# Patient Record
Sex: MALE | Race: WHITE | NOT HISPANIC OR LATINO | Employment: OTHER | URBAN - METROPOLITAN AREA
[De-identification: names, ages, dates, MRNs, and addresses within clinical notes are randomized per-mention and may not be internally consistent; named-entity substitution may affect disease eponyms.]

---

## 2017-02-04 ENCOUNTER — APPOINTMENT (OUTPATIENT)
Dept: LAB | Age: 67
End: 2017-02-04
Payer: MEDICARE

## 2017-02-04 ENCOUNTER — TRANSCRIBE ORDERS (OUTPATIENT)
Dept: ADMINISTRATIVE | Age: 67
End: 2017-02-04

## 2017-02-04 DIAGNOSIS — C91.10 LEUKEMIA, LYMPHOCYTIC, CHRONIC (HCC): Primary | ICD-10-CM

## 2017-02-04 DIAGNOSIS — C91.10 LEUKEMIA, LYMPHOCYTIC, CHRONIC (HCC): ICD-10-CM

## 2017-02-04 LAB
ALBUMIN SERPL BCP-MCNC: 3.7 G/DL (ref 3.5–5)
ALP SERPL-CCNC: 106 U/L (ref 46–116)
ALT SERPL W P-5'-P-CCNC: 33 U/L (ref 12–78)
ANION GAP SERPL CALCULATED.3IONS-SCNC: 7 MMOL/L (ref 4–13)
AST SERPL W P-5'-P-CCNC: 13 U/L (ref 5–45)
BASOPHILS # BLD MANUAL: 0 THOUSAND/UL (ref 0–0.1)
BASOPHILS NFR MAR MANUAL: 0 % (ref 0–1)
BILIRUB SERPL-MCNC: 0.21 MG/DL (ref 0.2–1)
BUN SERPL-MCNC: 20 MG/DL (ref 5–25)
CALCIUM SERPL-MCNC: 9 MG/DL (ref 8.3–10.1)
CHLORIDE SERPL-SCNC: 106 MMOL/L (ref 100–108)
CO2 SERPL-SCNC: 29 MMOL/L (ref 21–32)
CREAT SERPL-MCNC: 1.01 MG/DL (ref 0.6–1.3)
EOSINOPHIL # BLD MANUAL: 0.58 THOUSAND/UL (ref 0–0.4)
EOSINOPHIL NFR BLD MANUAL: 2 % (ref 0–6)
ERYTHROCYTE [DISTWIDTH] IN BLOOD BY AUTOMATED COUNT: 14.2 % (ref 11.6–15.1)
GFR SERPL CREATININE-BSD FRML MDRD: >60 ML/MIN/1.73SQ M
GLUCOSE SERPL-MCNC: 95 MG/DL (ref 65–140)
HCT VFR BLD AUTO: 37 % (ref 36.5–49.3)
HGB BLD-MCNC: 12.3 G/DL (ref 12–17)
LDH SERPL-CCNC: 182 U/L (ref 81–234)
LYMPHOCYTES # BLD AUTO: 17.02 THOUSAND/UL (ref 0.6–4.47)
LYMPHOCYTES # BLD AUTO: 59 % (ref 14–44)
MCH RBC QN AUTO: 31.9 PG (ref 26.8–34.3)
MCHC RBC AUTO-ENTMCNC: 33.2 G/DL (ref 31.4–37.4)
MCV RBC AUTO: 96 FL (ref 82–98)
MONOCYTES # BLD AUTO: 1.73 THOUSAND/UL (ref 0–1.22)
MONOCYTES NFR BLD: 6 % (ref 4–12)
NEUTROPHILS # BLD MANUAL: 8.65 THOUSAND/UL (ref 1.85–7.62)
NEUTS SEG NFR BLD AUTO: 30 % (ref 43–75)
NRBC BLD AUTO-RTO: 0 /100 WBCS
OVALOCYTES BLD QL SMEAR: PRESENT
PLATELET # BLD AUTO: 179 THOUSANDS/UL (ref 149–390)
PLATELET BLD QL SMEAR: ADEQUATE
PMV BLD AUTO: 9.5 FL (ref 8.9–12.7)
POIKILOCYTOSIS BLD QL SMEAR: PRESENT
POTASSIUM SERPL-SCNC: 4.5 MMOL/L (ref 3.5–5.3)
PROT SERPL-MCNC: 6.9 G/DL (ref 6.4–8.2)
RBC # BLD AUTO: 3.85 MILLION/UL (ref 3.88–5.62)
RBC MORPH BLD: PRESENT
SODIUM SERPL-SCNC: 142 MMOL/L (ref 136–145)
URATE SERPL-MCNC: 5.1 MG/DL (ref 4.2–8)
VARIANT LYMPHS # BLD AUTO: 3 %
WBC # BLD AUTO: 28.84 THOUSAND/UL (ref 4.31–10.16)

## 2017-02-04 PROCEDURE — 80053 COMPREHEN METABOLIC PANEL: CPT

## 2017-02-04 PROCEDURE — 82232 ASSAY OF BETA-2 PROTEIN: CPT

## 2017-02-04 PROCEDURE — 83615 LACTATE (LD) (LDH) ENZYME: CPT

## 2017-02-04 PROCEDURE — 36415 COLL VENOUS BLD VENIPUNCTURE: CPT

## 2017-02-04 PROCEDURE — 85027 COMPLETE CBC AUTOMATED: CPT

## 2017-02-04 PROCEDURE — 84550 ASSAY OF BLOOD/URIC ACID: CPT

## 2017-02-04 PROCEDURE — 85007 BL SMEAR W/DIFF WBC COUNT: CPT

## 2017-02-07 LAB — B2 MICROGLOB SERPL-MCNC: 1.9 MG/L (ref 0.6–2.4)

## 2017-03-03 ENCOUNTER — TRANSCRIBE ORDERS (OUTPATIENT)
Dept: ADMINISTRATIVE | Age: 67
End: 2017-03-03

## 2017-03-03 ENCOUNTER — APPOINTMENT (OUTPATIENT)
Dept: LAB | Age: 67
End: 2017-03-03
Payer: MEDICARE

## 2017-03-03 DIAGNOSIS — C91.10 CHRONIC LYMPHOCYTIC LEUKEMIA OF B-CELL TYPE NOT HAVING ACHIEVED REMISSION (HCC): ICD-10-CM

## 2017-03-03 LAB
ALBUMIN SERPL BCP-MCNC: 4.4 G/DL (ref 3.5–5)
ALP SERPL-CCNC: 105 U/L (ref 46–116)
ALT SERPL W P-5'-P-CCNC: 28 U/L (ref 12–78)
ANION GAP SERPL CALCULATED.3IONS-SCNC: 6 MMOL/L (ref 4–13)
AST SERPL W P-5'-P-CCNC: 10 U/L (ref 5–45)
BASOPHILS # BLD MANUAL: 0 THOUSAND/UL (ref 0–0.1)
BASOPHILS NFR MAR MANUAL: 0 % (ref 0–1)
BILIRUB SERPL-MCNC: 0.27 MG/DL (ref 0.2–1)
BUN SERPL-MCNC: 22 MG/DL (ref 5–25)
CALCIUM SERPL-MCNC: 9.8 MG/DL (ref 8.3–10.1)
CHLORIDE SERPL-SCNC: 106 MMOL/L (ref 100–108)
CO2 SERPL-SCNC: 28 MMOL/L (ref 21–32)
CREAT SERPL-MCNC: 1.17 MG/DL (ref 0.6–1.3)
EOSINOPHIL # BLD MANUAL: 0 THOUSAND/UL (ref 0–0.4)
EOSINOPHIL NFR BLD MANUAL: 0 % (ref 0–6)
ERYTHROCYTE [DISTWIDTH] IN BLOOD BY AUTOMATED COUNT: 13.7 % (ref 11.6–15.1)
GFR SERPL CREATININE-BSD FRML MDRD: >60 ML/MIN/1.73SQ M
GLUCOSE SERPL-MCNC: 92 MG/DL (ref 65–140)
HCT VFR BLD AUTO: 43.1 % (ref 36.5–49.3)
HGB BLD-MCNC: 14.4 G/DL (ref 12–17)
IGG SERPL-MCNC: 918 MG/DL (ref 700–1600)
LDH SERPL-CCNC: 189 U/L (ref 81–234)
LYMPHOCYTES # BLD AUTO: 21.37 THOUSAND/UL (ref 0.6–4.47)
LYMPHOCYTES # BLD AUTO: 63 % (ref 14–44)
MCH RBC QN AUTO: 31.9 PG (ref 26.8–34.3)
MCHC RBC AUTO-ENTMCNC: 33.4 G/DL (ref 31.4–37.4)
MCV RBC AUTO: 96 FL (ref 82–98)
MONOCYTES # BLD AUTO: 1.36 THOUSAND/UL (ref 0–1.22)
MONOCYTES NFR BLD: 4 % (ref 4–12)
NEUTROPHILS # BLD MANUAL: 8.82 THOUSAND/UL (ref 1.85–7.62)
NEUTS SEG NFR BLD AUTO: 26 % (ref 43–75)
NRBC BLD AUTO-RTO: 0 /100 WBCS
PLATELET # BLD AUTO: 205 THOUSANDS/UL (ref 149–390)
PLATELET BLD QL SMEAR: ADEQUATE
PMV BLD AUTO: 9.8 FL (ref 8.9–12.7)
POIKILOCYTOSIS BLD QL SMEAR: PRESENT
POTASSIUM SERPL-SCNC: 4.5 MMOL/L (ref 3.5–5.3)
PROT SERPL-MCNC: 7.7 G/DL (ref 6.4–8.2)
RBC # BLD AUTO: 4.51 MILLION/UL (ref 3.88–5.62)
RBC MORPH BLD: PRESENT
SODIUM SERPL-SCNC: 140 MMOL/L (ref 136–145)
URATE SERPL-MCNC: 5.1 MG/DL (ref 4.2–8)
VARIANT LYMPHS # BLD AUTO: 7 %
WBC # BLD AUTO: 33.92 THOUSAND/UL (ref 4.31–10.16)

## 2017-03-03 PROCEDURE — 83615 LACTATE (LD) (LDH) ENZYME: CPT

## 2017-03-03 PROCEDURE — 80053 COMPREHEN METABOLIC PANEL: CPT

## 2017-03-03 PROCEDURE — 84550 ASSAY OF BLOOD/URIC ACID: CPT

## 2017-03-03 PROCEDURE — 82784 ASSAY IGA/IGD/IGG/IGM EACH: CPT

## 2017-03-03 PROCEDURE — 85027 COMPLETE CBC AUTOMATED: CPT

## 2017-03-03 PROCEDURE — 82232 ASSAY OF BETA-2 PROTEIN: CPT

## 2017-03-03 PROCEDURE — 85007 BL SMEAR W/DIFF WBC COUNT: CPT

## 2017-03-03 PROCEDURE — 36415 COLL VENOUS BLD VENIPUNCTURE: CPT

## 2017-03-04 LAB — B2 MICROGLOB SERPL-MCNC: 1.8 MG/L (ref 0.6–2.4)

## 2017-03-09 ENCOUNTER — ALLSCRIPTS OFFICE VISIT (OUTPATIENT)
Dept: OTHER | Facility: OTHER | Age: 67
End: 2017-03-09

## 2017-03-30 ENCOUNTER — GENERIC CONVERSION - ENCOUNTER (OUTPATIENT)
Dept: OTHER | Facility: OTHER | Age: 67
End: 2017-03-30

## 2017-03-30 ENCOUNTER — TRANSCRIBE ORDERS (OUTPATIENT)
Dept: ADMINISTRATIVE | Age: 67
End: 2017-03-30

## 2017-03-30 ENCOUNTER — APPOINTMENT (OUTPATIENT)
Dept: LAB | Age: 67
End: 2017-03-30
Payer: MEDICARE

## 2017-03-30 DIAGNOSIS — C91.10 CHRONIC LYMPHOCYTIC LEUKEMIA OF B-CELL TYPE NOT HAVING ACHIEVED REMISSION (HCC): ICD-10-CM

## 2017-03-30 LAB
ALBUMIN SERPL BCP-MCNC: 4.4 G/DL (ref 3.5–5)
ALP SERPL-CCNC: 91 U/L (ref 46–116)
ALT SERPL W P-5'-P-CCNC: 31 U/L (ref 12–78)
ANION GAP SERPL CALCULATED.3IONS-SCNC: 7 MMOL/L (ref 4–13)
AST SERPL W P-5'-P-CCNC: 12 U/L (ref 5–45)
BASOPHILS # BLD MANUAL: 0 THOUSAND/UL (ref 0–0.1)
BASOPHILS NFR MAR MANUAL: 0 % (ref 0–1)
BILIRUB SERPL-MCNC: 0.35 MG/DL (ref 0.2–1)
BUN SERPL-MCNC: 19 MG/DL (ref 5–25)
CALCIUM SERPL-MCNC: 10.1 MG/DL (ref 8.3–10.1)
CHLORIDE SERPL-SCNC: 108 MMOL/L (ref 100–108)
CO2 SERPL-SCNC: 28 MMOL/L (ref 21–32)
CREAT SERPL-MCNC: 1.26 MG/DL (ref 0.6–1.3)
EOSINOPHIL # BLD MANUAL: 0 THOUSAND/UL (ref 0–0.4)
EOSINOPHIL NFR BLD MANUAL: 0 % (ref 0–6)
ERYTHROCYTE [DISTWIDTH] IN BLOOD BY AUTOMATED COUNT: 13.7 % (ref 11.6–15.1)
GFR SERPL CREATININE-BSD FRML MDRD: 57.4 ML/MIN/1.73SQ M
GLUCOSE SERPL-MCNC: 96 MG/DL (ref 65–140)
HCT VFR BLD AUTO: 42.9 % (ref 36.5–49.3)
HGB BLD-MCNC: 14.3 G/DL (ref 12–17)
IGG SERPL-MCNC: 906 MG/DL (ref 700–1600)
LDH SERPL-CCNC: 192 U/L (ref 81–234)
LYMPHOCYTES # BLD AUTO: 30.56 THOUSAND/UL (ref 0.6–4.47)
LYMPHOCYTES # BLD AUTO: 85 % (ref 14–44)
MCH RBC QN AUTO: 31.7 PG (ref 26.8–34.3)
MCHC RBC AUTO-ENTMCNC: 33.3 G/DL (ref 31.4–37.4)
MCV RBC AUTO: 95 FL (ref 82–98)
MONOCYTES # BLD AUTO: 0.72 THOUSAND/UL (ref 0–1.22)
MONOCYTES NFR BLD: 2 % (ref 4–12)
NEUTROPHILS # BLD MANUAL: 3.6 THOUSAND/UL (ref 1.85–7.62)
NEUTS SEG NFR BLD AUTO: 10 % (ref 43–75)
NRBC BLD AUTO-RTO: 0 /100 WBCS
PLATELET # BLD AUTO: 193 THOUSANDS/UL (ref 149–390)
PLATELET BLD QL SMEAR: ADEQUATE
PMV BLD AUTO: 9.5 FL (ref 8.9–12.7)
POIKILOCYTOSIS BLD QL SMEAR: PRESENT
POTASSIUM SERPL-SCNC: 4.8 MMOL/L (ref 3.5–5.3)
PROT SERPL-MCNC: 7.5 G/DL (ref 6.4–8.2)
RBC # BLD AUTO: 4.51 MILLION/UL (ref 3.88–5.62)
SMUDGE CELLS BLD QL SMEAR: PRESENT
SODIUM SERPL-SCNC: 143 MMOL/L (ref 136–145)
TOTAL CELLS COUNTED SPEC: 100
URATE SERPL-MCNC: 4.9 MG/DL (ref 4.2–8)
VARIANT LYMPHS # BLD AUTO: 3 %
WBC # BLD AUTO: 35.95 THOUSAND/UL (ref 4.31–10.16)

## 2017-03-30 PROCEDURE — 84550 ASSAY OF BLOOD/URIC ACID: CPT

## 2017-03-30 PROCEDURE — 85007 BL SMEAR W/DIFF WBC COUNT: CPT

## 2017-03-30 PROCEDURE — 83615 LACTATE (LD) (LDH) ENZYME: CPT

## 2017-03-30 PROCEDURE — 82232 ASSAY OF BETA-2 PROTEIN: CPT

## 2017-03-30 PROCEDURE — 82784 ASSAY IGA/IGD/IGG/IGM EACH: CPT

## 2017-03-30 PROCEDURE — 36415 COLL VENOUS BLD VENIPUNCTURE: CPT

## 2017-03-30 PROCEDURE — 80053 COMPREHEN METABOLIC PANEL: CPT

## 2017-03-30 PROCEDURE — 85027 COMPLETE CBC AUTOMATED: CPT

## 2017-03-31 LAB — B2 MICROGLOB SERPL-MCNC: 1.8 MG/L (ref 0.6–2.4)

## 2017-04-04 ENCOUNTER — GENERIC CONVERSION - ENCOUNTER (OUTPATIENT)
Dept: OTHER | Facility: OTHER | Age: 67
End: 2017-04-04

## 2017-05-02 ENCOUNTER — TRANSCRIBE ORDERS (OUTPATIENT)
Dept: ADMINISTRATIVE | Age: 67
End: 2017-05-02

## 2017-05-02 ENCOUNTER — GENERIC CONVERSION - ENCOUNTER (OUTPATIENT)
Dept: OTHER | Facility: OTHER | Age: 67
End: 2017-05-02

## 2017-05-02 ENCOUNTER — APPOINTMENT (OUTPATIENT)
Dept: LAB | Age: 67
End: 2017-05-02
Payer: MEDICARE

## 2017-05-02 DIAGNOSIS — C91.10 LEUKEMIA, LYMPHOCYTIC, CHRONIC (HCC): ICD-10-CM

## 2017-05-02 DIAGNOSIS — C91.10 LEUKEMIA, LYMPHOCYTIC, CHRONIC (HCC): Primary | ICD-10-CM

## 2017-05-02 LAB
ALBUMIN SERPL BCP-MCNC: 4.2 G/DL (ref 3.5–5)
ALP SERPL-CCNC: 77 U/L (ref 46–116)
ALT SERPL W P-5'-P-CCNC: 31 U/L (ref 12–78)
ANION GAP SERPL CALCULATED.3IONS-SCNC: 8 MMOL/L (ref 4–13)
AST SERPL W P-5'-P-CCNC: 14 U/L (ref 5–45)
BASOPHILS # BLD MANUAL: 0 THOUSAND/UL (ref 0–0.1)
BASOPHILS NFR MAR MANUAL: 0 % (ref 0–1)
BILIRUB SERPL-MCNC: 0.23 MG/DL (ref 0.2–1)
BUN SERPL-MCNC: 16 MG/DL (ref 5–25)
CALCIUM SERPL-MCNC: 9.3 MG/DL (ref 8.3–10.1)
CHLORIDE SERPL-SCNC: 109 MMOL/L (ref 100–108)
CO2 SERPL-SCNC: 26 MMOL/L (ref 21–32)
CREAT SERPL-MCNC: 1.11 MG/DL (ref 0.6–1.3)
EOSINOPHIL # BLD MANUAL: 0 THOUSAND/UL (ref 0–0.4)
EOSINOPHIL NFR BLD MANUAL: 0 % (ref 0–6)
ERYTHROCYTE [DISTWIDTH] IN BLOOD BY AUTOMATED COUNT: 14.1 % (ref 11.6–15.1)
GFR SERPL CREATININE-BSD FRML MDRD: >60 ML/MIN/1.73SQ M
GLUCOSE SERPL-MCNC: 90 MG/DL (ref 65–140)
HCT VFR BLD AUTO: 40.3 % (ref 36.5–49.3)
HGB BLD-MCNC: 13.1 G/DL (ref 12–17)
IGG SERPL-MCNC: 719 MG/DL (ref 700–1600)
LDH SERPL-CCNC: 203 U/L (ref 81–234)
LYMPHOCYTES # BLD AUTO: 29.38 THOUSAND/UL (ref 0.6–4.47)
LYMPHOCYTES # BLD AUTO: 81 % (ref 14–44)
MCH RBC QN AUTO: 31.2 PG (ref 26.8–34.3)
MCHC RBC AUTO-ENTMCNC: 32.5 G/DL (ref 31.4–37.4)
MCV RBC AUTO: 96 FL (ref 82–98)
MONOCYTES # BLD AUTO: 0.73 THOUSAND/UL (ref 0–1.22)
MONOCYTES NFR BLD: 2 % (ref 4–12)
NEUTROPHILS # BLD MANUAL: 2.54 THOUSAND/UL (ref 1.85–7.62)
NEUTS SEG NFR BLD AUTO: 7 % (ref 43–75)
NRBC BLD AUTO-RTO: 0 /100 WBCS
PLATELET # BLD AUTO: 163 THOUSANDS/UL (ref 149–390)
PLATELET BLD QL SMEAR: ADEQUATE
PMV BLD AUTO: 10 FL (ref 8.9–12.7)
POTASSIUM SERPL-SCNC: 4.8 MMOL/L (ref 3.5–5.3)
PROT SERPL-MCNC: 7 G/DL (ref 6.4–8.2)
RBC # BLD AUTO: 4.2 MILLION/UL (ref 3.88–5.62)
SMUDGE CELLS BLD QL SMEAR: PRESENT
SODIUM SERPL-SCNC: 143 MMOL/L (ref 136–145)
URATE SERPL-MCNC: 4.4 MG/DL (ref 4.2–8)
VARIANT LYMPHS # BLD AUTO: 10 %
WBC # BLD AUTO: 36.27 THOUSAND/UL (ref 4.31–10.16)

## 2017-05-02 PROCEDURE — 83615 LACTATE (LD) (LDH) ENZYME: CPT

## 2017-05-02 PROCEDURE — 36415 COLL VENOUS BLD VENIPUNCTURE: CPT

## 2017-05-02 PROCEDURE — 82784 ASSAY IGA/IGD/IGG/IGM EACH: CPT

## 2017-05-02 PROCEDURE — 82232 ASSAY OF BETA-2 PROTEIN: CPT

## 2017-05-02 PROCEDURE — 85027 COMPLETE CBC AUTOMATED: CPT

## 2017-05-02 PROCEDURE — 85007 BL SMEAR W/DIFF WBC COUNT: CPT

## 2017-05-02 PROCEDURE — 80053 COMPREHEN METABOLIC PANEL: CPT

## 2017-05-02 PROCEDURE — 84550 ASSAY OF BLOOD/URIC ACID: CPT

## 2017-05-03 LAB — B2 MICROGLOB SERPL-MCNC: 1.7 MG/L (ref 0.6–2.4)

## 2017-06-02 ENCOUNTER — APPOINTMENT (OUTPATIENT)
Dept: LAB | Age: 67
End: 2017-06-02
Payer: MEDICARE

## 2017-06-02 ENCOUNTER — GENERIC CONVERSION - ENCOUNTER (OUTPATIENT)
Dept: OTHER | Facility: OTHER | Age: 67
End: 2017-06-02

## 2017-06-02 ENCOUNTER — TRANSCRIBE ORDERS (OUTPATIENT)
Dept: ADMINISTRATIVE | Age: 67
End: 2017-06-02

## 2017-06-02 DIAGNOSIS — C91.10 LEUKEMIA, LYMPHOCYTIC, CHRONIC (HCC): Primary | ICD-10-CM

## 2017-06-02 DIAGNOSIS — C91.10 LEUKEMIA, LYMPHOCYTIC, CHRONIC (HCC): ICD-10-CM

## 2017-06-02 LAB
ALBUMIN SERPL BCP-MCNC: 4.5 G/DL (ref 3.5–5)
ALP SERPL-CCNC: 81 U/L (ref 46–116)
ALT SERPL W P-5'-P-CCNC: 29 U/L (ref 12–78)
ANION GAP SERPL CALCULATED.3IONS-SCNC: 7 MMOL/L (ref 4–13)
AST SERPL W P-5'-P-CCNC: 15 U/L (ref 5–45)
BASOPHILS # BLD MANUAL: 0 THOUSAND/UL (ref 0–0.1)
BASOPHILS NFR MAR MANUAL: 0 % (ref 0–1)
BILIRUB SERPL-MCNC: 0.31 MG/DL (ref 0.2–1)
BUN SERPL-MCNC: 21 MG/DL (ref 5–25)
CALCIUM ALBUM COR SERPL-MCNC: 9.9 MG/DL (ref 8.3–10.1)
CALCIUM SERPL-MCNC: 10.3 MG/DL (ref 8.3–10.1)
CHLORIDE SERPL-SCNC: 108 MMOL/L (ref 100–108)
CO2 SERPL-SCNC: 27 MMOL/L (ref 21–32)
CREAT SERPL-MCNC: 1.11 MG/DL (ref 0.6–1.3)
EOSINOPHIL # BLD MANUAL: 0.79 THOUSAND/UL (ref 0–0.4)
EOSINOPHIL NFR BLD MANUAL: 2 % (ref 0–6)
ERYTHROCYTE [DISTWIDTH] IN BLOOD BY AUTOMATED COUNT: 14.2 % (ref 11.6–15.1)
GFR SERPL CREATININE-BSD FRML MDRD: >60 ML/MIN/1.73SQ M
GLUCOSE SERPL-MCNC: 92 MG/DL (ref 65–140)
HCT VFR BLD AUTO: 40.3 % (ref 36.5–49.3)
HGB BLD-MCNC: 13.5 G/DL (ref 12–17)
IGG SERPL-MCNC: 791 MG/DL (ref 700–1600)
LDH SERPL-CCNC: 211 U/L (ref 81–234)
LYMPHOCYTES # BLD AUTO: 33.1 THOUSAND/UL (ref 0.6–4.47)
LYMPHOCYTES # BLD AUTO: 84 % (ref 14–44)
MCH RBC QN AUTO: 32.4 PG (ref 26.8–34.3)
MCHC RBC AUTO-ENTMCNC: 33.5 G/DL (ref 31.4–37.4)
MCV RBC AUTO: 97 FL (ref 82–98)
MONOCYTES # BLD AUTO: 0.39 THOUSAND/UL (ref 0–1.22)
MONOCYTES NFR BLD: 1 % (ref 4–12)
NEUTROPHILS # BLD MANUAL: 3.55 THOUSAND/UL (ref 1.85–7.62)
NEUTS SEG NFR BLD AUTO: 9 % (ref 43–75)
NRBC BLD AUTO-RTO: 0 /100 WBCS
OVALOCYTES BLD QL SMEAR: PRESENT
PLATELET # BLD AUTO: 158 THOUSANDS/UL (ref 149–390)
PLATELET BLD QL SMEAR: ADEQUATE
PMV BLD AUTO: 9.9 FL (ref 8.9–12.7)
POIKILOCYTOSIS BLD QL SMEAR: PRESENT
POTASSIUM SERPL-SCNC: 4.7 MMOL/L (ref 3.5–5.3)
PROT SERPL-MCNC: 7.3 G/DL (ref 6.4–8.2)
RBC # BLD AUTO: 4.17 MILLION/UL (ref 3.88–5.62)
SMUDGE CELLS BLD QL SMEAR: PRESENT
SODIUM SERPL-SCNC: 142 MMOL/L (ref 136–145)
TOTAL CELLS COUNTED SPEC: 100
URATE SERPL-MCNC: 4.7 MG/DL (ref 4.2–8)
VARIANT LYMPHS # BLD AUTO: 4 %
WBC # BLD AUTO: 39.41 THOUSAND/UL (ref 4.31–10.16)

## 2017-06-02 PROCEDURE — 85007 BL SMEAR W/DIFF WBC COUNT: CPT

## 2017-06-02 PROCEDURE — 80053 COMPREHEN METABOLIC PANEL: CPT

## 2017-06-02 PROCEDURE — 85027 COMPLETE CBC AUTOMATED: CPT

## 2017-06-02 PROCEDURE — 84550 ASSAY OF BLOOD/URIC ACID: CPT

## 2017-06-02 PROCEDURE — 82784 ASSAY IGA/IGD/IGG/IGM EACH: CPT

## 2017-06-02 PROCEDURE — 83615 LACTATE (LD) (LDH) ENZYME: CPT | Performed by: INTERNAL MEDICINE

## 2017-06-02 PROCEDURE — 36415 COLL VENOUS BLD VENIPUNCTURE: CPT

## 2017-06-02 PROCEDURE — 82232 ASSAY OF BETA-2 PROTEIN: CPT

## 2017-06-03 LAB — B2 MICROGLOB SERPL-MCNC: 1.7 MG/L (ref 0.6–2.4)

## 2017-06-08 ENCOUNTER — ALLSCRIPTS OFFICE VISIT (OUTPATIENT)
Dept: OTHER | Facility: OTHER | Age: 67
End: 2017-06-08

## 2017-06-08 DIAGNOSIS — C91.10 CHRONIC LYMPHOCYTIC LEUKEMIA OF B-CELL TYPE NOT HAVING ACHIEVED REMISSION (HCC): ICD-10-CM

## 2017-07-28 ENCOUNTER — TRANSCRIBE ORDERS (OUTPATIENT)
Dept: ADMINISTRATIVE | Age: 67
End: 2017-07-28

## 2017-07-28 ENCOUNTER — APPOINTMENT (OUTPATIENT)
Dept: LAB | Age: 67
End: 2017-07-28
Payer: MEDICARE

## 2017-07-28 DIAGNOSIS — C91.10 LEUKEMIA, LYMPHOCYTIC, CHRONIC (HCC): Primary | ICD-10-CM

## 2017-07-28 DIAGNOSIS — C91.10 LEUKEMIA, LYMPHOCYTIC, CHRONIC (HCC): ICD-10-CM

## 2017-07-28 LAB
ALBUMIN SERPL BCP-MCNC: 4.2 G/DL (ref 3.5–5)
ALP SERPL-CCNC: 89 U/L (ref 46–116)
ALT SERPL W P-5'-P-CCNC: 31 U/L (ref 12–78)
ANION GAP SERPL CALCULATED.3IONS-SCNC: 9 MMOL/L (ref 4–13)
AST SERPL W P-5'-P-CCNC: 18 U/L (ref 5–45)
BILIRUB SERPL-MCNC: 0.24 MG/DL (ref 0.2–1)
BUN SERPL-MCNC: 18 MG/DL (ref 5–25)
CALCIUM SERPL-MCNC: 9.5 MG/DL (ref 8.3–10.1)
CHLORIDE SERPL-SCNC: 108 MMOL/L (ref 100–108)
CO2 SERPL-SCNC: 24 MMOL/L (ref 21–32)
CREAT SERPL-MCNC: 1.09 MG/DL (ref 0.6–1.3)
ERYTHROCYTE [DISTWIDTH] IN BLOOD BY AUTOMATED COUNT: 14 % (ref 11.6–15.1)
GFR SERPL CREATININE-BSD FRML MDRD: 71 ML/MIN/1.73SQ M
GLUCOSE SERPL-MCNC: 89 MG/DL (ref 65–140)
HCT VFR BLD AUTO: 37.7 % (ref 36.5–49.3)
HGB BLD-MCNC: 12.7 G/DL (ref 12–17)
IGG SERPL-MCNC: 775 MG/DL (ref 700–1600)
LDH SERPL-CCNC: 189 U/L (ref 81–234)
MCH RBC QN AUTO: 32.4 PG (ref 26.8–34.3)
MCHC RBC AUTO-ENTMCNC: 33.7 G/DL (ref 31.4–37.4)
MCV RBC AUTO: 96 FL (ref 82–98)
NRBC BLD AUTO-RTO: 0 /100 WBCS
PLATELET # BLD AUTO: 158 THOUSANDS/UL (ref 149–390)
PMV BLD AUTO: 9.4 FL (ref 8.9–12.7)
POTASSIUM SERPL-SCNC: 4.1 MMOL/L (ref 3.5–5.3)
PROT SERPL-MCNC: 7 G/DL (ref 6.4–8.2)
RBC # BLD AUTO: 3.92 MILLION/UL (ref 3.88–5.62)
SODIUM SERPL-SCNC: 141 MMOL/L (ref 136–145)
URATE SERPL-MCNC: 5 MG/DL (ref 4.2–8)
WBC # BLD AUTO: 39.93 THOUSAND/UL (ref 4.31–10.16)

## 2017-07-28 PROCEDURE — 82232 ASSAY OF BETA-2 PROTEIN: CPT

## 2017-07-28 PROCEDURE — 83615 LACTATE (LD) (LDH) ENZYME: CPT

## 2017-07-28 PROCEDURE — 80053 COMPREHEN METABOLIC PANEL: CPT

## 2017-07-28 PROCEDURE — 36415 COLL VENOUS BLD VENIPUNCTURE: CPT

## 2017-07-28 PROCEDURE — 85027 COMPLETE CBC AUTOMATED: CPT

## 2017-07-28 PROCEDURE — 84550 ASSAY OF BLOOD/URIC ACID: CPT

## 2017-07-28 PROCEDURE — 82784 ASSAY IGA/IGD/IGG/IGM EACH: CPT

## 2017-07-28 PROCEDURE — 85007 BL SMEAR W/DIFF WBC COUNT: CPT

## 2017-07-29 LAB
B2 MICROGLOB SERPL-MCNC: 1.9 MG/L (ref 0.6–2.4)
BASOPHILS # BLD MANUAL: 0 THOUSAND/UL (ref 0–0.1)
BASOPHILS NFR MAR MANUAL: 0 % (ref 0–1)
EOSINOPHIL # BLD MANUAL: 0.4 THOUSAND/UL (ref 0–0.4)
EOSINOPHIL NFR BLD MANUAL: 1 % (ref 0–6)
LYMPHOCYTES # BLD AUTO: 34.74 THOUSAND/UL (ref 0.6–4.47)
LYMPHOCYTES # BLD AUTO: 87 % (ref 14–44)
MONOCYTES # BLD AUTO: 0.4 THOUSAND/UL (ref 0–1.22)
MONOCYTES NFR BLD: 1 % (ref 4–12)
NEUTROPHILS # BLD MANUAL: 2.8 THOUSAND/UL (ref 1.85–7.62)
NEUTS SEG NFR BLD AUTO: 7 % (ref 43–75)
OVALOCYTES BLD QL SMEAR: PRESENT
OVALOCYTES BLD QL SMEAR: PRESENT
PATHOLOGIST INTERPRETATION: NORMAL
PATHOLOGY REVIEW: YES
PATHOLOGY REVIEW: YES
PLATELET BLD QL SMEAR: ADEQUATE
PLATELET BLD QL SMEAR: ADEQUATE
POIKILOCYTOSIS BLD QL SMEAR: PRESENT
POIKILOCYTOSIS BLD QL SMEAR: PRESENT
RBC MORPH BLD: PRESENT
RBC MORPH BLD: PRESENT
SMUDGE CELLS BLD QL SMEAR: PRESENT
SMUDGE CELLS BLD QL SMEAR: PRESENT
VARIANT LYMPHS # BLD AUTO: 4 %

## 2017-08-03 DIAGNOSIS — C91.10 CHRONIC LYMPHOCYTIC LEUKEMIA OF B-CELL TYPE NOT HAVING ACHIEVED REMISSION (HCC): ICD-10-CM

## 2017-08-08 ENCOUNTER — GENERIC CONVERSION - ENCOUNTER (OUTPATIENT)
Dept: OTHER | Facility: OTHER | Age: 67
End: 2017-08-08

## 2017-09-08 ENCOUNTER — TRANSCRIBE ORDERS (OUTPATIENT)
Dept: ADMINISTRATIVE | Age: 67
End: 2017-09-08

## 2017-09-08 ENCOUNTER — GENERIC CONVERSION - ENCOUNTER (OUTPATIENT)
Dept: OTHER | Facility: OTHER | Age: 67
End: 2017-09-08

## 2017-09-08 ENCOUNTER — APPOINTMENT (OUTPATIENT)
Dept: LAB | Age: 67
End: 2017-09-08
Payer: MEDICARE

## 2017-09-08 DIAGNOSIS — C91.10 CHRONIC LYMPHOCYTIC LEUKEMIA OF B-CELL TYPE NOT HAVING ACHIEVED REMISSION (HCC): ICD-10-CM

## 2017-09-08 LAB
ALBUMIN SERPL BCP-MCNC: 4.3 G/DL (ref 3.5–5)
ALP SERPL-CCNC: 94 U/L (ref 46–116)
ALT SERPL W P-5'-P-CCNC: 33 U/L (ref 12–78)
ANION GAP SERPL CALCULATED.3IONS-SCNC: 6 MMOL/L (ref 4–13)
AST SERPL W P-5'-P-CCNC: 19 U/L (ref 5–45)
BASOPHILS # BLD MANUAL: 0 THOUSAND/UL (ref 0–0.1)
BASOPHILS NFR MAR MANUAL: 0 % (ref 0–1)
BILIRUB SERPL-MCNC: 0.29 MG/DL (ref 0.2–1)
BUN SERPL-MCNC: 21 MG/DL (ref 5–25)
CALCIUM SERPL-MCNC: 9.6 MG/DL (ref 8.3–10.1)
CHLORIDE SERPL-SCNC: 108 MMOL/L (ref 100–108)
CO2 SERPL-SCNC: 26 MMOL/L (ref 21–32)
CREAT SERPL-MCNC: 1.15 MG/DL (ref 0.6–1.3)
EOSINOPHIL # BLD MANUAL: 0.45 THOUSAND/UL (ref 0–0.4)
EOSINOPHIL NFR BLD MANUAL: 1 % (ref 0–6)
ERYTHROCYTE [DISTWIDTH] IN BLOOD BY AUTOMATED COUNT: 13.5 % (ref 11.6–15.1)
GFR SERPL CREATININE-BSD FRML MDRD: 66 ML/MIN/1.73SQ M
GLUCOSE SERPL-MCNC: 94 MG/DL (ref 65–140)
HCT VFR BLD AUTO: 41.5 % (ref 36.5–49.3)
HGB BLD-MCNC: 13.7 G/DL (ref 12–17)
IGG SERPL-MCNC: 874 MG/DL (ref 700–1600)
LDH SERPL-CCNC: 210 U/L (ref 81–234)
LYMPHOCYTES # BLD AUTO: 38.11 THOUSAND/UL (ref 0.6–4.47)
LYMPHOCYTES # BLD AUTO: 85 % (ref 14–44)
MCH RBC QN AUTO: 31.8 PG (ref 26.8–34.3)
MCHC RBC AUTO-ENTMCNC: 33 G/DL (ref 31.4–37.4)
MCV RBC AUTO: 96 FL (ref 82–98)
MONOCYTES # BLD AUTO: 1.35 THOUSAND/UL (ref 0–1.22)
MONOCYTES NFR BLD: 3 % (ref 4–12)
NEUTROPHILS # BLD MANUAL: 2.69 THOUSAND/UL (ref 1.85–7.62)
NEUTS SEG NFR BLD AUTO: 6 % (ref 43–75)
NRBC BLD AUTO-RTO: 0 /100 WBCS
OVALOCYTES BLD QL SMEAR: PRESENT
PLATELET # BLD AUTO: 156 THOUSANDS/UL (ref 149–390)
PLATELET BLD QL SMEAR: ADEQUATE
PMV BLD AUTO: 9.6 FL (ref 8.9–12.7)
POIKILOCYTOSIS BLD QL SMEAR: PRESENT
POTASSIUM SERPL-SCNC: 4.4 MMOL/L (ref 3.5–5.3)
PROT SERPL-MCNC: 7.5 G/DL (ref 6.4–8.2)
RBC # BLD AUTO: 4.31 MILLION/UL (ref 3.88–5.62)
SODIUM SERPL-SCNC: 140 MMOL/L (ref 136–145)
TOTAL CELLS COUNTED SPEC: 100
URATE SERPL-MCNC: 5.2 MG/DL (ref 4.2–8)
VARIANT LYMPHS # BLD AUTO: 5 %
WBC # BLD AUTO: 44.84 THOUSAND/UL (ref 4.31–10.16)

## 2017-09-08 PROCEDURE — 83615 LACTATE (LD) (LDH) ENZYME: CPT

## 2017-09-08 PROCEDURE — 85027 COMPLETE CBC AUTOMATED: CPT

## 2017-09-08 PROCEDURE — 85007 BL SMEAR W/DIFF WBC COUNT: CPT

## 2017-09-08 PROCEDURE — 82784 ASSAY IGA/IGD/IGG/IGM EACH: CPT

## 2017-09-08 PROCEDURE — 36415 COLL VENOUS BLD VENIPUNCTURE: CPT

## 2017-09-08 PROCEDURE — 82232 ASSAY OF BETA-2 PROTEIN: CPT

## 2017-09-08 PROCEDURE — 80053 COMPREHEN METABOLIC PANEL: CPT

## 2017-09-08 PROCEDURE — 84550 ASSAY OF BLOOD/URIC ACID: CPT

## 2017-09-09 LAB — B2 MICROGLOB SERPL-MCNC: 1.8 MG/L (ref 0.6–2.4)

## 2017-09-14 ENCOUNTER — ALLSCRIPTS OFFICE VISIT (OUTPATIENT)
Dept: OTHER | Facility: OTHER | Age: 67
End: 2017-09-14

## 2017-09-28 DIAGNOSIS — C91.10 CHRONIC LYMPHOCYTIC LEUKEMIA OF B-CELL TYPE NOT HAVING ACHIEVED REMISSION (HCC): ICD-10-CM

## 2017-11-03 ENCOUNTER — GENERIC CONVERSION - ENCOUNTER (OUTPATIENT)
Dept: OTHER | Facility: OTHER | Age: 67
End: 2017-11-03

## 2017-11-03 ENCOUNTER — APPOINTMENT (OUTPATIENT)
Dept: LAB | Age: 67
End: 2017-11-03
Payer: MEDICARE

## 2017-11-03 ENCOUNTER — TRANSCRIBE ORDERS (OUTPATIENT)
Dept: ADMINISTRATIVE | Age: 67
End: 2017-11-03

## 2017-11-03 DIAGNOSIS — C91.10 CHRONIC LYMPHOCYTIC LEUKEMIA OF B-CELL TYPE NOT HAVING ACHIEVED REMISSION (HCC): ICD-10-CM

## 2017-11-03 LAB
ALBUMIN SERPL BCP-MCNC: 4.2 G/DL (ref 3.5–5)
ALP SERPL-CCNC: 80 U/L (ref 46–116)
ALT SERPL W P-5'-P-CCNC: 31 U/L (ref 12–78)
ANION GAP SERPL CALCULATED.3IONS-SCNC: 6 MMOL/L (ref 4–13)
AST SERPL W P-5'-P-CCNC: 17 U/L (ref 5–45)
BASOPHILS # BLD MANUAL: 0 THOUSAND/UL (ref 0–0.1)
BASOPHILS NFR MAR MANUAL: 0 % (ref 0–1)
BILIRUB SERPL-MCNC: 0.3 MG/DL (ref 0.2–1)
BUN SERPL-MCNC: 21 MG/DL (ref 5–25)
CALCIUM SERPL-MCNC: 9.9 MG/DL (ref 8.3–10.1)
CHLORIDE SERPL-SCNC: 105 MMOL/L (ref 100–108)
CO2 SERPL-SCNC: 28 MMOL/L (ref 21–32)
CREAT SERPL-MCNC: 1.15 MG/DL (ref 0.6–1.3)
EOSINOPHIL # BLD MANUAL: 0.56 THOUSAND/UL (ref 0–0.4)
EOSINOPHIL NFR BLD MANUAL: 1 % (ref 0–6)
ERYTHROCYTE [DISTWIDTH] IN BLOOD BY AUTOMATED COUNT: 13.9 % (ref 11.6–15.1)
GFR SERPL CREATININE-BSD FRML MDRD: 66 ML/MIN/1.73SQ M
GLUCOSE SERPL-MCNC: 95 MG/DL (ref 65–140)
HCT VFR BLD AUTO: 39.5 % (ref 36.5–49.3)
HGB BLD-MCNC: 13 G/DL (ref 12–17)
IGG SERPL-MCNC: 713 MG/DL (ref 700–1600)
LDH SERPL-CCNC: 212 U/L (ref 81–234)
LYMPHOCYTES # BLD AUTO: 36.69 THOUSAND/UL (ref 0.6–4.47)
LYMPHOCYTES # BLD AUTO: 65 % (ref 14–44)
MCH RBC QN AUTO: 31.9 PG (ref 26.8–34.3)
MCHC RBC AUTO-ENTMCNC: 32.9 G/DL (ref 31.4–37.4)
MCV RBC AUTO: 97 FL (ref 82–98)
MONOCYTES # BLD AUTO: 0.56 THOUSAND/UL (ref 0–1.22)
MONOCYTES NFR BLD: 1 % (ref 4–12)
NEUTROPHILS # BLD MANUAL: 5.64 THOUSAND/UL (ref 1.85–7.62)
NEUTS SEG NFR BLD AUTO: 10 % (ref 43–75)
NRBC BLD AUTO-RTO: 0 /100 WBCS
PATHOLOGIST INTERPRETATION: NORMAL
PATHOLOGY REVIEW: YES
PLATELET # BLD AUTO: 170 THOUSANDS/UL (ref 149–390)
PLATELET BLD QL SMEAR: ADEQUATE
PMV BLD AUTO: 9.3 FL (ref 8.9–12.7)
POTASSIUM SERPL-SCNC: 5 MMOL/L (ref 3.5–5.3)
PROT SERPL-MCNC: 7.2 G/DL (ref 6.4–8.2)
RBC # BLD AUTO: 4.08 MILLION/UL (ref 3.88–5.62)
SMUDGE CELLS BLD QL SMEAR: PRESENT
SODIUM SERPL-SCNC: 139 MMOL/L (ref 136–145)
TOTAL CELLS COUNTED SPEC: 100
URATE SERPL-MCNC: 4.5 MG/DL (ref 4.2–8)
VARIANT LYMPHS # BLD AUTO: 23 %
WBC # BLD AUTO: 56.44 THOUSAND/UL (ref 4.31–10.16)

## 2017-11-03 PROCEDURE — 85007 BL SMEAR W/DIFF WBC COUNT: CPT

## 2017-11-03 PROCEDURE — 82232 ASSAY OF BETA-2 PROTEIN: CPT

## 2017-11-03 PROCEDURE — 85027 COMPLETE CBC AUTOMATED: CPT

## 2017-11-03 PROCEDURE — 80053 COMPREHEN METABOLIC PANEL: CPT

## 2017-11-03 PROCEDURE — 85025 COMPLETE CBC W/AUTO DIFF WBC: CPT

## 2017-11-03 PROCEDURE — 36415 COLL VENOUS BLD VENIPUNCTURE: CPT

## 2017-11-03 PROCEDURE — 82784 ASSAY IGA/IGD/IGG/IGM EACH: CPT

## 2017-11-03 PROCEDURE — 83615 LACTATE (LD) (LDH) ENZYME: CPT

## 2017-11-03 PROCEDURE — 84550 ASSAY OF BLOOD/URIC ACID: CPT

## 2017-11-04 LAB — B2 MICROGLOB SERPL-MCNC: 2.1 MG/L (ref 0.6–2.4)

## 2017-11-23 DIAGNOSIS — C91.10 CHRONIC LYMPHOCYTIC LEUKEMIA OF B-CELL TYPE NOT HAVING ACHIEVED REMISSION (HCC): ICD-10-CM

## 2017-12-05 DIAGNOSIS — C91.10 CHRONIC LYMPHOCYTIC LEUKEMIA OF B-CELL TYPE NOT HAVING ACHIEVED REMISSION (HCC): ICD-10-CM

## 2017-12-05 DIAGNOSIS — L98.9 DISORDER OF SKIN OR SUBCUTANEOUS TISSUE: ICD-10-CM

## 2017-12-08 ENCOUNTER — TRANSCRIBE ORDERS (OUTPATIENT)
Dept: ADMINISTRATIVE | Age: 67
End: 2017-12-08

## 2017-12-08 ENCOUNTER — GENERIC CONVERSION - ENCOUNTER (OUTPATIENT)
Dept: OTHER | Facility: OTHER | Age: 67
End: 2017-12-08

## 2017-12-08 ENCOUNTER — APPOINTMENT (OUTPATIENT)
Dept: LAB | Age: 67
End: 2017-12-08
Payer: MEDICARE

## 2017-12-08 DIAGNOSIS — C91.10 CHRONIC LYMPHOCYTIC LEUKEMIA OF B-CELL TYPE NOT HAVING ACHIEVED REMISSION (HCC): ICD-10-CM

## 2017-12-08 LAB
ALBUMIN SERPL BCP-MCNC: 4.5 G/DL (ref 3.5–5)
ALP SERPL-CCNC: 92 U/L (ref 46–116)
ALT SERPL W P-5'-P-CCNC: 30 U/L (ref 12–78)
ANION GAP SERPL CALCULATED.3IONS-SCNC: 5 MMOL/L (ref 4–13)
AST SERPL W P-5'-P-CCNC: 17 U/L (ref 5–45)
BILIRUB SERPL-MCNC: 0.33 MG/DL (ref 0.2–1)
BUN SERPL-MCNC: 21 MG/DL (ref 5–25)
CALCIUM SERPL-MCNC: 10 MG/DL (ref 8.3–10.1)
CHLORIDE SERPL-SCNC: 106 MMOL/L (ref 100–108)
CO2 SERPL-SCNC: 28 MMOL/L (ref 21–32)
CREAT SERPL-MCNC: 1.19 MG/DL (ref 0.6–1.3)
GFR SERPL CREATININE-BSD FRML MDRD: 63 ML/MIN/1.73SQ M
GLUCOSE SERPL-MCNC: 94 MG/DL (ref 65–140)
IGG SERPL-MCNC: 778 MG/DL (ref 700–1600)
LDH SERPL-CCNC: 190 U/L (ref 81–234)
POTASSIUM SERPL-SCNC: 4.5 MMOL/L (ref 3.5–5.3)
PROT SERPL-MCNC: 7.5 G/DL (ref 6.4–8.2)
SODIUM SERPL-SCNC: 139 MMOL/L (ref 136–145)
URATE SERPL-MCNC: 4.8 MG/DL (ref 4.2–8)

## 2017-12-08 PROCEDURE — 82784 ASSAY IGA/IGD/IGG/IGM EACH: CPT

## 2017-12-08 PROCEDURE — 85025 COMPLETE CBC W/AUTO DIFF WBC: CPT

## 2017-12-08 PROCEDURE — 80053 COMPREHEN METABOLIC PANEL: CPT

## 2017-12-08 PROCEDURE — 83615 LACTATE (LD) (LDH) ENZYME: CPT

## 2017-12-08 PROCEDURE — 36415 COLL VENOUS BLD VENIPUNCTURE: CPT

## 2017-12-08 PROCEDURE — 85027 COMPLETE CBC AUTOMATED: CPT

## 2017-12-08 PROCEDURE — 84550 ASSAY OF BLOOD/URIC ACID: CPT

## 2017-12-08 PROCEDURE — 82232 ASSAY OF BETA-2 PROTEIN: CPT

## 2017-12-08 PROCEDURE — 85007 BL SMEAR W/DIFF WBC COUNT: CPT

## 2017-12-09 LAB
B2 MICROGLOB SERPL-MCNC: 2 MG/L (ref 0.6–2.4)
BASOPHILS # BLD MANUAL: 0 THOUSAND/UL (ref 0–0.1)
BASOPHILS NFR MAR MANUAL: 0 % (ref 0–1)
EOSINOPHIL # BLD MANUAL: 1.19 THOUSAND/UL (ref 0–0.4)
EOSINOPHIL NFR BLD MANUAL: 2 % (ref 0–6)
ERYTHROCYTE [DISTWIDTH] IN BLOOD BY AUTOMATED COUNT: 14 % (ref 11.6–15.1)
HCT VFR BLD AUTO: 40.7 % (ref 36.5–49.3)
HGB BLD-MCNC: 13.6 G/DL (ref 12–17)
LYMPHOCYTES # BLD AUTO: 41.01 THOUSAND/UL (ref 0.6–4.47)
LYMPHOCYTES # BLD AUTO: 69 % (ref 14–44)
MCH RBC QN AUTO: 32.4 PG (ref 26.8–34.3)
MCHC RBC AUTO-ENTMCNC: 33.4 G/DL (ref 31.4–37.4)
MCV RBC AUTO: 97 FL (ref 82–98)
MONOCYTES # BLD AUTO: 7.13 THOUSAND/UL (ref 0–1.22)
MONOCYTES NFR BLD: 12 % (ref 4–12)
NEUTROPHILS # BLD MANUAL: 3.57 THOUSAND/UL (ref 1.85–7.62)
NEUTS SEG NFR BLD AUTO: 6 % (ref 43–75)
NRBC BLD AUTO-RTO: 0 /100 WBCS
OVALOCYTES BLD QL SMEAR: PRESENT
PATHOLOGIST INTERPRETATION: NORMAL
PATHOLOGY REVIEW: YES
PLATELET # BLD AUTO: 174 THOUSANDS/UL (ref 149–390)
PLATELET BLD QL SMEAR: ADEQUATE
PMV BLD AUTO: 9.5 FL (ref 8.9–12.7)
POIKILOCYTOSIS BLD QL SMEAR: PRESENT
RBC # BLD AUTO: 4.2 MILLION/UL (ref 3.88–5.62)
RBC MORPH BLD: PRESENT
TOTAL CELLS COUNTED SPEC: 100
VARIANT LYMPHS # BLD AUTO: 11 %
WBC # BLD AUTO: 59.43 THOUSAND/UL (ref 4.31–10.16)

## 2017-12-15 ENCOUNTER — ALLSCRIPTS OFFICE VISIT (OUTPATIENT)
Dept: OTHER | Facility: OTHER | Age: 67
End: 2017-12-15

## 2017-12-16 NOTE — PROGRESS NOTES
Assessment  1  Chronic lymphocytic leukemia (204 10) (C91 10)    Plan  Chronic lymphocytic leukemia    · Seek Immediate Medical Attention if: Your temperature is higher than 101 degreesFahrenheit ; Status:Complete;   Done: 93HPI1049   Ordered; For:Chronic lymphocytic leukemia; Ordered By:Proothi, Liang;   · (1) B-2 MICROGLOBULIN; Status:Active; Requested for:44Yby9811;    Perform:[facility]; Due:25Rnd5304; Ordered; For:Chronic lymphocytic leukemia; Ordered By:Proothi, Liang;   · (1) CBC/PLT/DIFF; Status:Active; Requested for:76Jtz9969;    Perform:[facility]; Due:30Jak3927; Ordered; For:Chronic lymphocytic leukemia; Ordered By:Proothi, Liang;   · (1) COMPREHENSIVE METABOLIC PANEL; Status:Active; Requested for:44Qzh2554;    Perform:[facility]; Due:35Dbu1338; Ordered; For:Chronic lymphocytic leukemia; Ordered By:Proothi, Liang;   · (1) IGG; Status:Active; Requested for:72Iej7133;    Perform:[facility]; Due:57Wte3231; Ordered; For:Chronic lymphocytic leukemia; Ordered By:Proothi, Liang;   · (1) LD (LDH); Status:Active; Requested for:13Grx9361;    Perform:[facility]; Due:47Pts0295; Ordered; For:Chronic lymphocytic leukemia; Ordered By:Proothi, Liang;   · (1) URIC ACID; Status:Active; Requested for:95Lmu2372;    Perform:[facility]; Due:83Uxy6047; Ordered; For:Chronic lymphocytic leukemia; Ordered By:Proothi, Liang;   · Follow-up visit in 3 months Evaluation and Treatment  Follow-up  Status: Complete Done: 84HLD6015 08:20AM   Ordered; For: Chronic lymphocytic leukemia; Ordered By: Dixon Armendariz Performed:  Due: 80QGF4035; Last Updated By: Tracy Elizondo; 12/15/2017 9:13:43 AM    Discussion/Summary  Discussion Summary:   Follow-up visit for relapsed chronic lymphocytic leukemia  Chronic lymphocytic leukemia was diagnosed in 1996  He was under supervision for several years  He was then treated intermittently with chlorambucil   In 2014 when time came to treat he was given 6 cycles of Rituxan and bendamustine and last treatment was in October 2014  No treatment since  WBC and lymphocyte counts are increasing  He is not symptomatic from relapsed CLL  Some restriction of movements at right shoulder as before    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, unfavorable prognostic feature  No anemia, thrombocytopenia and no enlarged liver , spleen or lymph nodes  He feels pretty good physically and psychologically  He has follow-up appointment with Dr Laura Olivas in February 2018  Physical examination and test results are as recorded and discussed  Patient remains under surveillance  Even though he has unfavorable prognostic feature of 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 his disease is not progressing rapidly  He'll come back in 3 months  Condition and plan discussed  Questions answered  Addendum: Scalp lesions have been taken care of and he has follow-up appointment with Dr Kaushik Doyle in 6 months  Counseling Documentation With Imm: The patient was counseled regarding diagnostic results,-- risk factor reductions,-- patient and family education,-- impressions  total time of encounter was 30 minutes-- and-- 20 minutes was spent counseling  Goals and Barriers: The patient has the current Goals: Treatment of CLL with 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  The patent has the current Barriers: No barriers  Patient's Capacity to Self-Care: Patient is able to Self-Care  Medication SE Review and Pt Understands Tx: The treatment plan was reviewed with the patient/guardian  The patient/guardian understands and agrees with the treatment plan   Self Referrals:   Self Referrals: No   Understands and agrees with treatment plan: The treatment plan was reviewed with the patient/guardian  The patient/guardian understands and agrees with the treatment plan      Chief Complaint  Chief Complaint: Chief Complaint:  The patient presents to the office today with Lymphocytic Leukemia     Chief Complaint Free Text Note Form: Chronic lymphocytic leukemia      History of Present Illness  HPI: Follow-up visit for relapsed chronic lymphocytic leukemia  Chronic lymphocytic leukemia was diagnosed in 1996  He was under supervision for several years  He was then treated intermittently with chlorambucil  In 2014 when time came to treat he was given 6 cycles of Rituxan and bendamustine and last treatment was in October 2014  No treatment since  WBC and lymphocyte counts are increasing  He is not symptomatic from relapsed CLL  Some restriction of movements at right shoulder as before    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, unfavorable prognostic feature  No anemia, thrombocytopenia and no enlarged liver , spleen or lymph nodes  He feels pretty good physically and psychologically  He has follow-up appointment with Dr Mariely Swanson in February 2018  Review of Systems                     Reviewed 13 systems  Has mild headache  No  seizures, diplopia, dysphagia, hoarseness, angina pain, chest pain, palpitations, shortness of breath, cough,  hemoptysis, abdominal pain, melena, or hematuria  No fever, chills, bleeding, bone pains, skin rash, itching, weight loss, nausea, vomiting and no change in bowel habits  Appetite is fair  No night sweats  Patient has minor arthritic symptoms  No leg cramps  No swelling of the ankles  No swollen glands  Not unusually sensitive to heat or cold  He has tiredness  Other symptoms as in history of present illness  Reviewed 12 systems  Complete-Male:  Constitutional: No fever or chills, feels well, no tiredness, no recent weight gain or weight loss,-- no fever,-- not feeling poorly,-- no recent weight gain,-- no chills,-- not feeling tired-- and-- no recent weight loss  Eyes: No complaints of eye pain, no red eyes, no discharge from eyes, no itchy eyes  ENT: no complaints of earache, no hearing loss, no nosebleeds, no nasal discharge, no sore throat, no hoarseness    Cardiovascular: No complaints of slow heart rate, no fast heart rate, no chest pain, no palpitations, no leg claudication, no lower extremity  Respiratory: No complaints of shortness of breath, no wheezing, no cough, no SOB on exertion, no orthopnea or PND  Gastrointestinal: No complaints of abdominal pain, no constipation, no nausea or vomiting, no diarrhea or bloody stools  Genitourinary: no dysuria,-- no urinary hesitancy,-- no incontinence-- and-- no nocturia  Musculoskeletal: joint stiffness-- and-- Right shoulder problem as in history, but-- as noted in HPI,-- no arthralgias,-- no joint swelling,-- no limb pain,-- no myalgias-- and-- no limb swelling  Integumentary: No complaints of skin rash or skin lesions, no itching, no skin wound, no dry skin  Neurological: No compliants of headache, no confusion, no convulsions, no numbness or tingling, no dizziness or fainting, no limb weakness, no difficulty walking  Psychiatric: not suicidal,-- no anxiety,-- no personality change,-- no sleep disturbances,-- no depression-- and-- no emotional problems  Endocrine: no muscle weakness,-- no deepening of the voice,-- no hot flashes-- and-- no feelings of weakness  Hematologic/Lymphatic: No complaints of swollen glands, no swollen glands in the neck, does not bleed easily, no easy bruising  ROS Reviewed:   ROS reviewed  Active Problems  1  Anemia (285 9) (D64 9)   2  Chronic lymphocytic leukemia (204 10) (C91 10)   3  Nausea (787 02) (R11 0)   4  Skin lesions (709 9) (L98 9)   5  Thrombocytopenia (287 5) (D69 6)   6  URI (upper respiratory infection) (465 9) (J06 9)    Past Medical History  1  History of cholelithiasis (V12 79) (Z87 19)                         Reviewed and no change   Surgical History  1  History of Ankle Surgery   2  History of Cataract Surgery   3  History of Jaw Surgery   4  History of Renal Lithotripsy   5  History of Tonsillectomy  Surgical History Reviewed: The surgical history was reviewed and updated today  Family History  Mother    1  Family history of Stroke Syndrome (V17 1)  Family History Reviewed: The family history was reviewed and updated today  Social History   · Never A Smoker  Social History Reviewed: The social history was reviewed and is unchanged  Current Meds   1  Allegra-D 24 Hour TB24; Therapy: (Recorded:39Qeg0799) to Recorded    Allergies  1  No Known Drug Allergies      Reviewed  Vitals  Vital Signs    Recorded: 54VJP3138 08:29AM   Temperature 97 4 F   Heart Rate 87   Respiration 15   Systolic 659   Diastolic 72   Height 5 ft 8 in   Weight 178 lb 8 oz   BMI Calculated 27 14   BSA Calculated 1 95   O2 Saturation 97   Pain Scale 0     Reviewed   Physical Exam                                                                               Patient is alert and oriented   He is not in distress  Vital signs are stable   No icterus  No oral thrush  No palpable neck mass  Heart rate is regular  Lung fields are clear to percussion and auscultation  Bilateral gynecomastia  Abdomen soft and nontender  No palpable abdominal mass  No ascites  No edema of ankles  No calf tenderness  No focal neurological deficit  No skin rash    No palpable lymphadenopathy  Good arterial pulses  Performance status 0  Anxious  No clubbing  Has restricted movements at right shoulder         ECOG 0       Results/Data  (1) B-2 MICROGLOBULIN 47NGO9037 09:50AM Nellie Aden Order Number: [id number]     Test Name Result Flag Reference   B-2 MICROGLOBUL 2 0 mg/L  0 6 - 2 4   S[employer]ens Immulite 2000 Immunochemiluminometric assay [facility])  Performed at:  [address] [place] [address]  [id number] : Marce Smith MD, Phone:  [phone]     (1) COMPREHENSIVE METABOLIC PANEL 37YMR3016 35:33FY Nellie Aden Order Number: [id number]     Test Name Result Flag Reference   GLUCOSE,RANDM 94 mg/dL     If the patient is fasting, the ADA then defines impaired fasting glucose as > 100 mg/dL and diabetes as > or equal to 123 mg/dL  Specimen collection should occur prior to Sulfasalazine administration due to the potential for falsely depressed results  Specimen collection should occur prior to Sulfapyridine administration due to the potential for falsely elevated results  SODIUM 139 mmol/L  136-145   POTASSIUM 4 5 mmol/L  3 5-5 3   CHLORIDE 106 mmol/L  100-108   CARBON DIOXIDE 28 mmol/L  21-32   ANION GAP (CALC) 5 mmol/L  4-13   BLOOD UREA NITROGEN 21 mg/dL  5-25   CREATININE 1 19 mg/dL  0 60-1 30   Standardized to IDMS reference method   CALCIUM 10 0 mg/dL  8 3-10 1   BILI, TOTAL 0 33 mg/dL  0 20-1 00   ALK PHOSPHATAS 92 U/L     ALT (SGPT) 30 U/L  12-78   Specimen collection should occur prior to Sulfasalazine and/or Sulfapyridine administration due to the potential for falsely depressed results  AST(SGOT) 17 U/L  5-45   Specimen collection should occur prior to Sulfasalazine administration due to the potential for falsely depressed results  ALBUMIN 4 5 g/dL  3 5-5 0   TOTAL PROTEIN 7 5 g/dL  6 4-8 2   eGFR 63 ml/min/1 73sq m     National Kidney Disease Education Program recommendations are as follows: GFR calculation is accurate only with a steady state creatinine Chronic Kidney disease less than 60 ml/min/1 73 sq  meters Kidney failure less than 15 ml/min/1 73 sq  meters  (1) LD (LDH) 31SBN9181 09:50AM Proothi, Myrtie Merlin Order Number: [id number]     Test Name Result Flag Reference   LD (LDH) 190 U/L       (1) URIC ACID 42AMH3641 09:50AM Proothi, Myrtie Merlin Order Number: [id number]     Test Name Result Flag Reference   URIC ACID 4 8 mg/dL  4 2-8 0   Specimen collection should occur prior to Metamizole administration due to the potential for falsely depressed results       (1) CBC/PLT/DIFF 70JYV5728 09:50AM Proothi, Myrtie Merlin Order Number: [id number]     Test Name Result Flag Reference   WBC COUNT 59 43 Thousand/uL HH 4 31-10 16   This result has been called to [address] [place], [place] [address]S Patrice @ DR ADEN'S OFFICE by Kia Coto on 12 08 2017 at [phone], and has been read back  RBC COUNT 4 20 Million/uL  3 88-5 62   HEMOGLOBIN 13 6 g/dL  12 0-17 0   HEMATOCRIT 40 7 %  36 5-49 3   MCV 97 fL  82-98   MCH 32 4 pg  26 8-34 3   MCHC 33 4 g/dL  31 4-37 4   RDW 14 0 %  11 6-15 1   MPV 9 5 fL  8 9-12 7   PLATELET COUNT 574 Thousands/uL  149-390   nRBC AUTOMATED 0 /100 WBCs     This is an appended report  These results have been appended to a previously verified report  NEUTROPHILS - REL 6 % L 43-75   LYMPHOCYTES - REL 69 % H 14-44   MONOCYTES - REL 12 %  4-12   EOSINOPHILS - REL 2 %  0-6   BASOPHILS - REL 0 %  0-1   ATYPICAL LYMPH 11 % H <=0   NEUTROPHILS ABS 3 57 Thousand/uL  1 85-7 62   LYMPHOTCYTES ABS 41 01 Thousand/uL H 0 60-4 47   MONOCYTES ABS 7 13 Thousand/uL H 0 00-1 22   EOSINOPHILS ABS 1 19 Thousand/uL H 0 00-0 40   BASOPHILS ABS 0 00 Thousand/uL  0 00-0 10   TOTAL COUNTED 100     RBC MORPHOLOGY Present     Ovalocytosis Present     Poikilocytosis Present     PLT ESTIMATE Adequate  Adequate   PATHOLOGY REVIEW Yes AA (none)     (1) IGG 79ZHQ4863 09:50AM Proothi, Elease Lease Order Number: [id number]     Test Name Result Flag Reference   GAMMAGLOBULIN;  0 mg/dL  700 0-1600 0     (1) MANUAL DIFFERENTIAL 68FIY5186 09:50AM Proothi, Elease Lease Order Number: [id number]     Test Name Result Flag Reference   PATHOLOGIST INTERPRETATION      Absolute mature lymphocytosis, with < 1% prolymphocytes, consistent with CLL  Reviewed By: Dr Diaz Rudolph MD      Future Appointments    Date/Time Provider Specialty Site   03/15/2018 08:20 AM Tessa Aden MD Hematology Oncology [facility]     Signatures   Electronically signed by : Nahid Mace MD; Dec 15 2017  9:59AM EST                       (Author)

## 2018-01-09 NOTE — MISCELLANEOUS
Message   Recorded as Task   Date: 03/30/2017 01:07 PM, Created By: Mel Mo   Task Name: Miscellaneous   Assigned To: Nicolasa Naylor   Regarding Patient: Derek Covarrubias, Status: Active   Comment:    Jordyn Parisi - 30 Mar 2017 1:07 PM     TASK CREATED  Caller: laurita, Other; Other; [phone] (Day)  Addi Mg called from [facility] lab with criticals  drawn today  MZS-16,088  hemog-14 3   Noted  Active Problems    1  Anemia (285 9) (D64 9)   2  Chronic lymphocytic leukemia (204 10) (C91 10)   3  Nausea (787 02) (R11 0)   4  Thrombocytopenia (287 5) (D69 6)   5  URI (upper respiratory infection) (465 9) (J06 9)    Current Meds   1  Allegra-D 24 Hour TB24;   Therapy: (Recorded:43Yji0426) to Recorded    Allergies    1   No Known Drug Allergies    Signatures   Electronically signed by : Patricia Lo RN; Mar 30 2017  1:10PM EST                       (Author)

## 2018-01-11 NOTE — MISCELLANEOUS
Message   Recorded as Task   Date: 05/02/2017 12:48 PM, Created By: Angelica Beal   Task Name: Call Back   Assigned To: Leif Jha   Regarding Patient: Jailene Oswald, Status: Active   CommentLynnviry Antoine - 02 May 2017 12:48 PM     TASK CREATED  Caller: Teri Acuna; Results Inquiry; [phone]  called from [facility] lab with critical labs of wbc 36 27   Noted  Active Problems    1  Anemia (285 9) (D64 9)   2  Chronic lymphocytic leukemia (204 10) (C91 10)   3  Nausea (787 02) (R11 0)   4  Thrombocytopenia (287 5) (D69 6)   5  URI (upper respiratory infection) (465 9) (J06 9)    Current Meds   1  Allegra-D 24 Hour TB24;   Therapy: (Recorded:03Vha0109) to Recorded    Allergies    1   No Known Drug Allergies    Signatures   Electronically signed by : Herminia Bartlett RN; May  2 2017 12:49PM EST                       (Author)

## 2018-01-12 NOTE — MISCELLANEOUS
Message   Recorded as Task   Date: 11/03/2017 12:19 PM, Created By: Ofelia Ruff   Task Name: Call Back   Assigned To: Farzana Merrill   Regarding Patient: Luda Franco, Status: In Progress   CommentMarlys Nails - 03 Nov 2017 12:19 PM     TASK CREATED  Caller: Fernando Moreira, Other; Results Inquiry; [phone] (Work)  Caller from [facility]  has a critical white count of 56 44 for this PT   [facility] - 03 Nov 2017 12:52 PM     TASK REASSIGNED: Previously Assigned To Carlo Cruz - 51 Nov 2017 1:16 PM     TASK IN PROGRESS   Noted  Active Problems    1  Anemia (285 9) (D64 9)   2  Chronic lymphocytic leukemia (204 10) (C91 10)   3  Nausea (787 02) (R11 0)   4  Skin lesions (709 9) (L98 9)   5  Thrombocytopenia (287 5) (D69 6)   6  URI (upper respiratory infection) (465 9) (J06 9)    Current Meds   1  Allegra-D 24 Hour TB24;   Therapy: (Recorded:10Cmu1812) to Recorded    Allergies    1   No Known Drug Allergies    Signatures   Electronically signed by : Kely Garrison, ; Nov  3 2017  1:17PM EST                       (Author)

## 2018-01-13 VITALS
WEIGHT: 192.25 LBS | SYSTOLIC BLOOD PRESSURE: 124 MMHG | OXYGEN SATURATION: 97 % | HEIGHT: 68 IN | RESPIRATION RATE: 16 BRPM | DIASTOLIC BLOOD PRESSURE: 72 MMHG | BODY MASS INDEX: 29.14 KG/M2 | TEMPERATURE: 98.9 F | HEART RATE: 113 BPM

## 2018-01-13 VITALS
OXYGEN SATURATION: 97 % | DIASTOLIC BLOOD PRESSURE: 64 MMHG | HEIGHT: 68 IN | BODY MASS INDEX: 28.49 KG/M2 | SYSTOLIC BLOOD PRESSURE: 122 MMHG | RESPIRATION RATE: 16 BRPM | WEIGHT: 188 LBS | HEART RATE: 107 BPM | TEMPERATURE: 97.5 F

## 2018-01-13 NOTE — MISCELLANEOUS
Message   Recorded as Task   Date: 06/02/2017 02:01 PM, Created By: SAVANA ROCKWELL   Task Name: Miscellaneous   Assigned To: Shannon Henry   Regarding Patient: Ellie Mora, Status: Active   CommentDorise Im - 02 Jun 2017 2:01 PM     TASK CREATED  Miri from [facility] labs called with critical WBC of 39 41   Noted  Active Problems    1  Anemia (285 9) (D64 9)   2  Chronic lymphocytic leukemia (204 10) (C91 10)   3  Nausea (787 02) (R11 0)   4  Thrombocytopenia (287 5) (D69 6)   5  URI (upper respiratory infection) (465 9) (J06 9)    Current Meds   1  Allegra-D 24 Hour TB24;   Therapy: (Recorded:64Uub8394) to Recorded    Allergies    1   No Known Drug Allergies    Signatures   Electronically signed by : Joe Rodas RN; Jun 2 2017  2:10PM EST                       (Author)

## 2018-01-13 NOTE — PROGRESS NOTES
Assessment    1  Chronic lymphocytic leukemia (204 10) (C91 10)    Plan  Chronic lymphocytic leukemia    · Follow-up visit in 3 months Evaluation and Treatment  Follow-up  Status: Complete   Done: [id number] 08:30AM   Ordered; For: Chronic lymphocytic leukemia; Ordered By: Rae Nava Performed:  Due: [id number]; Last Updated By: Lesly Díaz; 3/9/2017 9:41:00 AM    Discussion/Summary  Discussion Summary:   Follow-up visit for chronic lymphocytic leukemia that was diagnosed in 1996  After a long period of surveillance he was intermittently treated with chlorambucil  In 2014 and he had 6 cycles of Rituxan and bendamustine  Physically he is feeling okay but psychologically he is somewhat depressed because of rising lymphocyte count and 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  He saw Dr Kassy Godfrey in January 2017 and has follow-up appointment with him next month  Dr Kassy Godfrey has not recommended any therapy at present  When the treatment is indicated he'll be on IBRUTINIB first before he could be tried on CAR-T therapy at [facility]  He is very much concerned about the financial aspect of IBRUTINIB, an extensive drug  My nurse could help him getting approval and funding for this medication when time came  I explained this to the patient  There has not been any significant change in the CLL parameters in last couple of months  I explained this to the patient  Presently he is under surveillance  He is not symptomatic from CLL at present  No fever, sweats, weight loss and no swollen glands  No recent or frequent infections  He still has problem with his right shoulder  In Nov , 2015 he had surgery on right shoulder for ruptured tendon  He had physical therapy  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  No pain  Physical examination and test results are as recorded and discussed   Discussed the total clinical picture Follow-up visit for chronic lymphocytic leukemia that was diagnosed in 1996  After a long period of surveillance he was intermittently treated with chlorambucil  In 2014 and he had 6 cycles of Rituxan and bendamustine  Physically he is feeling okay but psychologically he is somewhat depressed because of rising lymphocyte count and 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  He saw Dr Anila Estrella in January 2017 and has follow-up appointment with him next month  Dr Anila Estrella has not recommended any therapy at present  When the treatment is indicated he'll be on IBRUTINIB first before he could be tried on CAR[facility] at [facility]  He is very much concerned about the financial aspect of IBRUTINIB, an extensive drug  My nurse could help him getting approval and funding for this medication when time came  I explained this to the patient  There has not been any significant change in the CLL parameters in last couple of months  I explained this to the patient  Presently he is under surveillance  He is not symptomatic from CLL at present  No fever, sweats, weight loss and no swollen glands  No recent or frequent infections  He still has problem with his right shoulder  In Nov , 2015 he had surgery on right shoulder for ruptured tendon  He had physical therapy  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  Patient's Capacity to Self-Care: Patient is able to Self-Care  Medication SE Review and Pt Understands Tx: Possible side effects of new medications were reviewed with the patient/guardian today  The treatment plan was reviewed with the patient/guardian  The patient/guardian understands and agrees with the treatment plan   Counseling Documentation With Imm: The patient was counseled regarding diagnostic results, risk factor reductions, prognosis, patient and family education, impressions, risks and benefits of treatment options  total time of encounter was 35 minutes and 25 minutes was spent counseling  Understands and agrees with treatment plan: The treatment plan was reviewed with the patient/guardian  The patient/guardian understands and agrees with the treatment plan      Chief Complaint  Chief Complaint: Chief Complaint:   The patient presents to the office today with Lymphocytic Leukemia  Chief Complaint Free Text Note Form: Chronic lymphocytic leukemia      History of Present Illness  HPI: Follow-up visit for chronic lymphocytic leukemia that was diagnosed in 1996  After a long period of surveillance he was intermittently treated with chlorambucil  In 2014 and he had 6 cycles of Rituxan and bendamustine  Physically he is feeling okay but psychologically he is somewhat depressed because of rising lymphocyte count and 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  He saw Dr Clem Rebolledo in January 2017 and has follow-up appointment with him next month  Dr Clem Rebolledo has not recommended any therapy at present  When the treatment is indicated he'll be on IBRUTINIB first before he could be tried on CAR-T therapy at [facility]  He is very much concerned about the financial aspect of IBRUTINIB, an extensive drug  My nurse could help him getting approval and funding for this medication when time came  I explained this to the patient  There has not been any significant change in the CLL parameters in last couple of months  I explained this to the patient  Presently he is under surveillance  He is not symptomatic from CLL at present  No fever, sweats, weight loss and no swollen glands  No recent or frequent infections  He still has problem with his right shoulder  In Nov , 2015 he had surgery on right shoulder for ruptured tendon  He had physical therapy  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  No pain  Review of Systems                         Reviewed 13 systems  Has mild headache  No  seizures, diplopia, dysphagia, hoarseness, angina pain, chest pain, palpitations, shortness of breath, cough,  hemoptysis, abdominal pain, melena, or hematuria  No fever, chills, bleeding, bone pains, skin rash, itching, weight loss, nausea, vomiting and no change in bowel habits  Appetite is fair  No night sweats  Patient has minor arthritic symptoms  No leg cramps  No swelling of the ankles  No swollen glands  Not unusually sensitive to heat or cold  He has tiredness  Other symptoms as in history of present illness  Reviewed 12 systems  Complete-Male:   Constitutional: No fever or chills, feels well, no tiredness, no recent weight gain or weight loss, no fever, not feeling poorly, no recent weight gain, no chills, not feeling tired and no recent weight loss  Eyes: No complaints of eye pain, no red eyes, no discharge from eyes, no itchy eyes  ENT: no complaints of earache, no hearing loss, no nosebleeds, no nasal discharge, no sore throat, no hoarseness  Cardiovascular: No complaints of slow heart rate, no fast heart rate, no chest pain, no palpitations, no leg claudication, no lower extremity  Respiratory: No complaints of shortness of breath, no wheezing, no cough, no SOB on exertion, no orthopnea or PND  Gastrointestinal: No complaints of abdominal pain, no constipation, no nausea or vomiting, no diarrhea or bloody stools     Genitourinary: no dysuria, no urinary hesitancy, no incontinence and no nocturia  Musculoskeletal: joint stiffness and Right shoulder problem as in history, but as noted in HPI, no arthralgias, no joint swelling, no limb pain, no myalgias and no limb swelling  Integumentary: No complaints of skin rash or skin lesions, no itching, no skin wound, no dry skin  Neurological: No compliants of headache, no confusion, no convulsions, no numbness or tingling, no dizziness or fainting, no limb weakness, no difficulty walking  Psychiatric: anxiety and depression, but not suicidal, no personality change, no sleep disturbances and no emotional problems  Endocrine: no muscle weakness, no deepening of the voice, no hot flashes and no feelings of weakness  Hematologic/Lymphatic: No complaints of swollen glands, no swollen glands in the neck, does not bleed easily, no easy bruising  ROS Reviewed:   ROS reviewed  Active Problems    1  Anemia (285 9) (D64 9)   2  Chronic lymphocytic leukemia (204 10) (C91 10)   3  Nausea (787 02) (R11 0)   4  Thrombocytopenia (287 5) (D69 6)   5  URI (upper respiratory infection) (465 9) (J06 9)    Past Medical History    1  History of cholelithiasis (V12 79) (Z87 19)                                           Reviewed and no change     Surgical History    1  History of Ankle Surgery   2  History of Cataract Surgery   3  History of Jaw Surgery   4  History of Renal Lithotripsy   5  History of Tonsillectomy  Surgical History Reviewed: The surgical history was reviewed and updated today  Family History  Mother    1  Family history of Stroke Syndrome (V17 1)  Family History Reviewed: The family history was reviewed and updated today  Social History    · Never A Smoker  Social History Reviewed: The social history was reviewed and is unchanged  Current Meds   1  Allegra-D 24 Hour TB24;   Therapy: (Recorded:06Ekh8616) to Recorded    Allergies    1  No Known Drug Allergies      Reviewed       Vitals  Vital Signs    Recorded: [id number] 08: 59AM   Temperature 98 9 F   Heart Rate 113   Respiration 16   Systolic 270   Diastolic 72   Height 5 ft 8 in   Weight 192 lb 4 00 oz   BMI Calculated 29 23   BSA Calculated 2 02   O2 Saturation 97   Pain Scale 0               Reviewed  Heart rate came down to 98     Physical Exam                                                                                         Patient is alert and oriented   He is not in distress  Vital signs are stable  No icterus  No oral thrush  No palpable neck mass  Heart rate is regular  Lung fields are clear to percussion and auscultation  Bilateral gynecomastia  Abdomen soft and nontender  No palpable abdominal mass  No ascites  No edema of ankles  No calf tenderness  No focal neurological deficit  No skin rash    No palpable lymphadenopathy  Good arterial pulses  Performance status 1  Anxious  No clubbing  Has restricted movements at right shoulder  ECOG 1       Results/Data  (1) B-2 MICROGLOBULIN [id number] 10:49AM Marina Luda Order Number: [id number]     Test Name Result Flag Reference   B-2 MICROGLOBUL 1 8 mg/L  0 6 - 2 4   Performed at:  [address] [place], [place] [id number]  : Luz Hess MD, Phone:  [phone]     (1) CBC/PLT/DIFF [id number] 10:49AM Belkys Adenrosia Order Number: [id number]     Test Name Result Flag Reference   WBC COUNT 33 92 Thousand/uL  4 31-10 16   This result has been called to Bishop Barbosa by Viky Jones on 03 03 2017 at 0484 31 [phone] 02, and has been read back  RBC COUNT 4 51 Million/uL  3 88-5 62   HEMOGLOBIN 14 4 g/dL  12 0-17 0   HEMATOCRIT 43 1 %  36 5-49 3   MCV 96 fL  82-98   MCH 31 9 pg  26 8-34 3   MCHC 33 4 g/dL  31 4-37 4   RDW 13 7 %  11 6-15 1   MPV 9 8 fL  8 9-12 7   PLATELET COUNT 398 Thousands/uL  149-390   nRBC AUTOMATED 0 /100 WBCs     - Patient Instructions: This bloodwork is non-fasting    Please drink two glasses of water morning of bloodwork  - Patient Instructions: This bloodwork is non-fasting  Please drink two glasses of water morning of bloodwork  This is an appended report  These results have been appended to a previously verified report  NEUTROPHILS - REL 26 % L 43-75   LYMPHOCYTES - REL 63 % H 14-44   MONOCYTES - REL 4 %  4-12   EOSINOPHILS - REL 0 %  0-6   BASOPHILS - REL 0 %  0-1   ATYPICAL LYMPH 7 % H <=0   NEUTROPHILS ABS 8 82 Thousand/uL H 1 85-7 62   LYMPHOTCYTES ABS 21 37 Thousand/uL H 0 60-4 47   MONOCYTES ABS 1 36 Thousand/uL H 0 00-1 22   EOSINOPHILS ABS 0 00 Thousand/uL  0 00-0 40   BASOPHILS ABS 0 00 Thousand/uL  0 00-0 10   TOTAL COUNTED      RBC MORPHOLOGY Present     Poikilocytosis Present     PLT ESTIMATE Adequate  Adequate   - Patient Instructions: This bloodwork is non-fasting  Please drink two glasses of water morning of bloodwork  - Patient Instructions: This bloodwork is non-fasting  Please drink two glasses of water morning of bloodwork  (1) COMPREHENSIVE METABOLIC PANEL [id number] 61:75ZO [facility] Order Number: [id number]     Test Name Result Flag Reference   GLUCOSE,RANDM 92 mg/dL     If the patient is fasting, the ADA then defines impaired fasting glucose as > 100 mg/dL and diabetes as > or equal to 123 mg/dL     SODIUM 140 mmol/L  136-145   POTASSIUM 4 5 mmol/L  3 5-5 3   CHLORIDE 106 mmol/L  100-108   CARBON DIOXIDE 28 mmol/L  21-32   ANION GAP (CALC) 6 mmol/L  4-13   BLOOD UREA NITROGEN 22 mg/dL  5-25   CREATININE 1 17 mg/dL  0 60-1 30   Standardized to IDMS reference method   CALCIUM 9 8 mg/dL  8 3-10 1   BILI, TOTAL 0 27 mg/dL  0 20-1 00   ALK PHOSPHATAS 105 U/L     ALT (SGPT) 28 U/L  12-78   AST(SGOT) 10 U/L  5-45   ALBUMIN 4 4 g/dL  3 5-5 0   TOTAL PROTEIN 7 7 g/dL  6 4-8 2   eGFR Non-African American      >60 0 ml/min/1 73sq [facility] Disease Education Program recommendations are as follows:  GFR calculation is accurate only with a steady state creatinine  Chronic Kidney disease less than 60 ml/min/1 73 sq  meters  Kidney failure less than 15 ml/min/1 73 sq  meters  (1) IGG [id number] 10:49AM ElBenbria Bussing Order Number: [id number]     Test Name Result Flag Reference   GAMMAGLOBULIN;  0 mg/dL  700 0-1600 0     (1) LD (LDH) [id number] 10:49AM ElAndae Bussing Order Number: [id number]     Test Name Result Flag Reference   LD (LDH) 189 U/L       (1) URIC ACID [id number] 10:49AM ElAndae Bussing Order Number: [id number]     Test Name Result Flag Reference   URIC ACID 5 1 mg/dL  4 2-8 0   Specimen collection should occur prior to Metamizole administration due to the potential for falsely depressed results  (1) URIC ACID [id number] 08:57AM Proothi, Liang     Test Name Result Flag Reference   URIC ACID 5 1 mg/dL  4 2-8 0   Specimen collection should occur prior to Metamizole administration due to the potential for falsely depressed results       Future Appointments    Date/Time Provider Specialty Site   06/08/2017 08:30 AM Britney Aden MD Hematology Oncology [facility]     Signatures   Electronically signed by : Jose D Corcoran MD; Mar 12 2017  6:10PM EST                       (Author)    Electronically signed by : Jose D Corcoran MD; Mar 12 2017  6:11PM EST                       (Author)

## 2018-01-14 NOTE — MISCELLANEOUS
Message   Recorded as Task   Date: 09/08/2017 12:31 PM, Created By: Antonio Esparza   Task Name: Hospital Call   Assigned To: Mara Sims   Regarding Patient: Juan Martin, Status: Active   CommentMelvena Blend - 08 Sep 2017 12:31 PM     TASK CREATED  Alexi Distance from [facility] lab called with critical results    WBC 44 84   Noted  Active Problems    1  Anemia (285 9) (D64 9)   2  Chronic lymphocytic leukemia (204 10) (C91 10)   3  Nausea (787 02) (R11 0)   4  Thrombocytopenia (287 5) (D69 6)   5  URI (upper respiratory infection) (465 9) (J06 9)    Current Meds   1  Allegra-D 24 Hour TB24;   Therapy: (Recorded:83Qzd7608) to Recorded    Allergies    1   No Known Drug Allergies    Signatures   Electronically signed by : Leah Sosa RN; Sep  8 2017 12:38PM EST                       (Author)

## 2018-01-15 VITALS
SYSTOLIC BLOOD PRESSURE: 118 MMHG | HEART RATE: 76 BPM | DIASTOLIC BLOOD PRESSURE: 68 MMHG | WEIGHT: 188 LBS | BODY MASS INDEX: 28.49 KG/M2 | HEIGHT: 68 IN | OXYGEN SATURATION: 98 % | TEMPERATURE: 97.8 F | RESPIRATION RATE: 15 BRPM

## 2018-01-18 DIAGNOSIS — C91.10 CHRONIC LYMPHOCYTIC LEUKEMIA OF B-CELL TYPE NOT HAVING ACHIEVED REMISSION (HCC): ICD-10-CM

## 2018-01-23 VITALS
WEIGHT: 178.5 LBS | RESPIRATION RATE: 15 BRPM | HEART RATE: 87 BPM | TEMPERATURE: 97.4 F | OXYGEN SATURATION: 97 % | SYSTOLIC BLOOD PRESSURE: 144 MMHG | HEIGHT: 68 IN | DIASTOLIC BLOOD PRESSURE: 72 MMHG | BODY MASS INDEX: 27.05 KG/M2

## 2018-01-23 NOTE — MISCELLANEOUS
Message   Recorded as Task   Date: 12/08/2017 01:54 PM, Created By: Jessica Murdock   Task Name: Hospital Call   Assigned To: Tabby Shaw   Regarding Patient: Lambert Espinoza, Status: Active   CommentElesa Bridge - 08 Dec 2017 1:54 PM     TASK CREATED  Caller: Vel Lan , Other; Hospital Call; [phone] from [facility] stating critical WBC findings of 59,430  phone # [phone]   Nellie Vinson - 08 Dec 2017 1:58 PM     TASK EDITED   Noted  Active Problems    1  Anemia (285 9) (D64 9)   2  Chronic lymphocytic leukemia (204 10) (C91 10)   3  Nausea (787 02) (R11 0)   4  Skin lesions (709 9) (L98 9)   5  Thrombocytopenia (287 5) (D69 6)   6  URI (upper respiratory infection) (465 9) (J06 9)    Current Meds   1  Allegra-D 24 Hour TB24;   Therapy: (Recorded:99Uzr3642) to Recorded    Allergies    1   No Known Drug Allergies    Signatures   Electronically signed by : Cyndi De La Cruz RN; Dec  8 2017  2:04PM EST                       (Author)

## 2018-02-02 ENCOUNTER — TRANSCRIBE ORDERS (OUTPATIENT)
Dept: ADMINISTRATIVE | Age: 68
End: 2018-02-02

## 2018-02-02 ENCOUNTER — TELEPHONE (OUTPATIENT)
Dept: HEMATOLOGY ONCOLOGY | Facility: CLINIC | Age: 68
End: 2018-02-02

## 2018-02-02 ENCOUNTER — APPOINTMENT (OUTPATIENT)
Dept: LAB | Age: 68
End: 2018-02-02
Payer: MEDICARE

## 2018-02-02 DIAGNOSIS — L98.9 DISORDER OF SKIN OR SUBCUTANEOUS TISSUE: ICD-10-CM

## 2018-02-02 DIAGNOSIS — C91.10 CHRONIC LYMPHOCYTIC LEUKEMIA OF B-CELL TYPE NOT HAVING ACHIEVED REMISSION (HCC): ICD-10-CM

## 2018-02-02 LAB
ALBUMIN SERPL BCP-MCNC: 4.3 G/DL (ref 3.5–5)
ALP SERPL-CCNC: 94 U/L (ref 46–116)
ALT SERPL W P-5'-P-CCNC: 29 U/L (ref 12–78)
ANION GAP SERPL CALCULATED.3IONS-SCNC: 6 MMOL/L (ref 4–13)
AST SERPL W P-5'-P-CCNC: 15 U/L (ref 5–45)
BASOPHILS # BLD MANUAL: 0.54 THOUSAND/UL (ref 0–0.1)
BASOPHILS NFR MAR MANUAL: 1 % (ref 0–1)
BILIRUB SERPL-MCNC: 0.35 MG/DL (ref 0.2–1)
BUN SERPL-MCNC: 19 MG/DL (ref 5–25)
CALCIUM SERPL-MCNC: 9.6 MG/DL (ref 8.3–10.1)
CHLORIDE SERPL-SCNC: 107 MMOL/L (ref 100–108)
CO2 SERPL-SCNC: 28 MMOL/L (ref 21–32)
CREAT SERPL-MCNC: 1.11 MG/DL (ref 0.6–1.3)
EOSINOPHIL # BLD MANUAL: 0.54 THOUSAND/UL (ref 0–0.4)
EOSINOPHIL NFR BLD MANUAL: 1 % (ref 0–6)
ERYTHROCYTE [DISTWIDTH] IN BLOOD BY AUTOMATED COUNT: 13.5 % (ref 11.6–15.1)
GFR SERPL CREATININE-BSD FRML MDRD: 69 ML/MIN/1.73SQ M
GLUCOSE P FAST SERPL-MCNC: 93 MG/DL (ref 65–99)
HCT VFR BLD AUTO: 40.9 % (ref 36.5–49.3)
HGB BLD-MCNC: 13.5 G/DL (ref 12–17)
IGG SERPL-MCNC: 775 MG/DL (ref 700–1600)
LDH SERPL-CCNC: 188 U/L (ref 81–234)
LYMPHOCYTES # BLD AUTO: 44.7 THOUSAND/UL (ref 0.6–4.47)
LYMPHOCYTES # BLD AUTO: 83 % (ref 14–44)
MCH RBC QN AUTO: 32.1 PG (ref 26.8–34.3)
MCHC RBC AUTO-ENTMCNC: 33 G/DL (ref 31.4–37.4)
MCV RBC AUTO: 97 FL (ref 82–98)
MONOCYTES # BLD AUTO: 2.15 THOUSAND/UL (ref 0–1.22)
MONOCYTES NFR BLD: 4 % (ref 4–12)
NEUTROPHILS # BLD MANUAL: 4.85 THOUSAND/UL (ref 1.85–7.62)
NEUTS SEG NFR BLD AUTO: 9 % (ref 43–75)
NRBC BLD AUTO-RTO: 0 /100 WBCS
PLATELET # BLD AUTO: 158 THOUSANDS/UL (ref 149–390)
PLATELET BLD QL SMEAR: ADEQUATE
PMV BLD AUTO: 9.4 FL (ref 8.9–12.7)
POTASSIUM SERPL-SCNC: 4.6 MMOL/L (ref 3.5–5.3)
PROT SERPL-MCNC: 7.5 G/DL (ref 6.4–8.2)
RBC # BLD AUTO: 4.2 MILLION/UL (ref 3.88–5.62)
SODIUM SERPL-SCNC: 141 MMOL/L (ref 136–145)
TOTAL CELLS COUNTED SPEC: 100
URATE SERPL-MCNC: 4.7 MG/DL (ref 4.2–8)
VARIANT LYMPHS # BLD AUTO: 2 %
WBC # BLD AUTO: 53.85 THOUSAND/UL (ref 4.31–10.16)

## 2018-02-02 PROCEDURE — 83615 LACTATE (LD) (LDH) ENZYME: CPT

## 2018-02-02 PROCEDURE — 80053 COMPREHEN METABOLIC PANEL: CPT

## 2018-02-02 PROCEDURE — 84550 ASSAY OF BLOOD/URIC ACID: CPT

## 2018-02-02 PROCEDURE — 82784 ASSAY IGA/IGD/IGG/IGM EACH: CPT

## 2018-02-02 PROCEDURE — 82232 ASSAY OF BETA-2 PROTEIN: CPT

## 2018-02-02 PROCEDURE — 85007 BL SMEAR W/DIFF WBC COUNT: CPT

## 2018-02-02 PROCEDURE — 85027 COMPLETE CBC AUTOMATED: CPT

## 2018-02-04 LAB — B2 MICROGLOB SERPL-MCNC: 2 MG/L (ref 0.6–2.4)

## 2018-03-15 ENCOUNTER — OFFICE VISIT (OUTPATIENT)
Dept: HEMATOLOGY ONCOLOGY | Facility: CLINIC | Age: 68
End: 2018-03-15
Payer: MEDICARE

## 2018-03-15 VITALS
DIASTOLIC BLOOD PRESSURE: 66 MMHG | HEART RATE: 74 BPM | RESPIRATION RATE: 16 BRPM | HEIGHT: 69 IN | WEIGHT: 176.2 LBS | SYSTOLIC BLOOD PRESSURE: 124 MMHG | TEMPERATURE: 97.5 F | OXYGEN SATURATION: 98 % | BODY MASS INDEX: 26.1 KG/M2

## 2018-03-15 DIAGNOSIS — C91.12 CLL (CHRONIC LYMPHOID LEUKEMIA) IN RELAPSE (HCC): Primary | ICD-10-CM

## 2018-03-15 PROCEDURE — 99214 OFFICE O/P EST MOD 30 MIN: CPT | Performed by: INTERNAL MEDICINE

## 2018-03-15 RX ORDER — FEXOFENADINE HCL AND PSEUDOEPHEDRINE HCI 180; 240 MG/1; MG/1
1 TABLET, EXTENDED RELEASE ORAL AS NEEDED
COMMUNITY

## 2018-03-15 NOTE — LETTER
March 15, 2018     Nikky Brewer MD  [address] [place] [address]    Patient: Jordyn Bower   YOB: 1950   Date of Visit: 3/15/2018       Dear Dr Danielle Nguyen: Thank you for referring Shae Donis to me for evaluation  Below are my notes for this consultation  If you have questions, please do not hesitate to call me  I look forward to following your patient along with you  Sincerely,        Jose D Corcoran MD        CC: MD Jose D Quinn MD  3/15/2018  9:45 AM  Sign at close encounter    HPI:   Jordyn Bower is a 78 y o  male with  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  Right now he is under surveillance  He is not symptomatic from CLL  Follow-up visit for relapsed chronic lymphocytic leukemia  Chronic lymphocytic leukemia was diagnosed in 1996  He was under supervision for several years  He was then treated intermittently with chlorambucil  In 2014 when time came to treat he was given 6 cycles of Rituxan and bendamustine and last treatment was in October 2014  No treatment since  Some restriction of movements at right shoulder   but better than before  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, unfavorable prognostic feature  He feels pretty good physically and psychologically  He has follow-up appointment with Dr Ying Su in August 2018  Current Outpatient Prescriptions:     fexofenadine-pseudoephedrine (ALLEGRA-D 24) 180-240 MG per 24 hr tablet, Take by mouth, Disp: , Rfl:     No Known Allergies    ROS:  03/15/18 Reviewed 13 systems:  Presently no headaches, seizures, dizziness, diplopia, dysphagia, hoarseness, chest pain, palpitations, shortness of breath, cough, hemoptysis, abdominal pain, nausea, vomiting, change in bowel habits, melena, hematuria, fever, chills, bleeding, bone pains, skin rash, weight loss,  numbness, tiredness , weakness, claudication and gait problem  No frequent infections  No hot flashes   Not unusually sensitive to heat or cold  No swelling of the ankles  No swollen glands  Patient is anxious  Other symptoms are in HPI      /66 (BP Location: Left arm, Cuff Size: Adult)   Pulse 74   Temp 97 5 °F (36 4 °C) (Tympanic)   Resp 16   Ht 5' 9" (1 753 m)   Wt 79 9 kg (176 lb 3 2 oz)   SpO2 98%   BMI 26 02 kg/m²      Physical Exam:  Alert, oriented, not in distress, no icterus, no oral thrush, no palpable neck mass, clear lung fields, regular heart rate, abdomen  soft and non tender, no palpable abdominal mass, no ascites, no edema of ankles, no calf tenderness, no focal neurological deficit, no skin rash, no palpable lymphadenopathy, good arterial pulses, no clubbing  Patient is anxious  Performance status 0      IMAGING:  No orders to display       LABS:    Results for orders placed or performed in visit on 02/02/18   Beta 2 microglobuline, serum   Result Value Ref Range    Beta-2 Microglobulin 2 0 0 6 - 2 4 mg/L   CBC and differential   Result Value Ref Range    WBC 53 85 (HH) 4 31 - 10 16 Thousand/uL    RBC 4 20 3 88 - 5 62 Million/uL    Hemoglobin 13 5 12 0 - 17 0 g/dL    Hematocrit 40 9 36 5 - 49 3 %    MCV 97 82 - 98 fL    MCH 32 1 26 8 - 34 3 pg    MCHC 33 0 31 4 - 37 4 g/dL    RDW 13 5 11 6 - 15 1 %    MPV 9 4 8 9 - 12 7 fL    Platelets 540 504 - 064 Thousands/uL    nRBC 0 /100 WBCs   Comprehensive metabolic panel   Result Value Ref Range    Sodium 141 136 - 145 mmol/L    Potassium 4 6 3 5 - 5 3 mmol/L    Chloride 107 100 - 108 mmol/L    CO2 28 21 - 32 mmol/L    Anion Gap 6 4 - 13 mmol/L    BUN 19 5 - 25 mg/dL    Creatinine 1 11 0 60 - 1 30 mg/dL    Glucose, Fasting 93 65 - 99 mg/dL    Calcium 9 6 8 3 - 10 1 mg/dL    AST 15 5 - 45 U/L    ALT 29 12 - 78 U/L    Alkaline Phosphatase 94 46 - 116 U/L    Total Protein 7 5 6 4 - 8 2 g/dL    Albumin 4 3 3 5 - 5 0 g/dL    Total Bilirubin 0 35 0 20 - 1 00 mg/dL    eGFR 69 ml/min/1 73sq m   IgG   Result Value Ref Range     0 700 0 - 1,600 0 mg/dL   LD,Blood   Result Value Ref Range     81 - 234 U/L   Uric acid   Result Value Ref Range    Uric Acid 4 7 4 2 - 8 0 mg/dL   Manual Differential(PHLEBS Do Not Order)   Result Value Ref Range    Segmented % 9 (L) 43 - 75 %    Lymphocytes % 83 (H) 14 - 44 %    Monocytes % 4 4 - 12 %    Eosinophils % 1 0 - 6 %    Basophils % 1 0 - 1 %    Atypical Lymphocytes % 2 (H) <=0 %    Absolute Neutrophils 4 85 1 85 - 7 62 Thousand/uL    Lymphocytes Absolute 44 70 (H) 0 60 - 4 47 Thousand/uL    Monocytes Absolute 2 15 (H) 0 00 - 1 22 Thousand/uL    Eosinophils Absolute 0 54 (H) 0 00 - 0 40 Thousand/uL    Basophils Absolute 0 54 (H) 0 00 - 0 10 Thousand/uL    Total Counted 100     Platelet Estimate Adequate Adequate           Reviewed and discussed with patient  Assessment and plan:  Romero Bullard is a 78 y o  male with  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  Right now he is under surveillance  He is not symptomatic from CLL  Follow-up visit for relapsed chronic lymphocytic leukemia  Chronic lymphocytic leukemia was diagnosed in 1996  He was under supervision for several years  He was then treated intermittently with chlorambucil  In 2014 when time came to treat he was given 6 cycles of Rituxan and bendamustine and last treatment was in October 2014  No treatment since  Some restriction of movements at right shoulder   but better than before  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, unfavorable prognostic feature  He feels pretty good physically and psychologically  He has follow-up appointment with Dr Clem Rebolledo in August 2018  Physical examination and test results are as recorded and discussed in detail  There is some increase in lymphocyte count  No other change  Patient knows that he has 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 that is an unfavorable prognostic feature and he will be requiring treatment in the near future  He is comfortable with present approach of surveillance because he is not symptomatic from this condition    He will wait to  see what Dr Clem Rebolledo has to offer  We had no discussion about ibrutinib, venetoclax and CAR-T  He will discuss more with Dr Kalli Guadarrama  He will come back in 3 months or sooner if he has problem related to CLL or to our speciality  Patient voiced understanding and agreement in the discussion  Counseling / Coordination of Care   Greater than 50% of total time was spent with the patient and / or family counseling and / or coordination of care

## 2018-03-15 NOTE — PROGRESS NOTES
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, unfavorable prognostic feature  He feels pretty good physically and psychologically  He has follow-up appointment with Dr Kamila Bermeo in August 2018  Current Outpatient Prescriptions:     fexofenadine-pseudoephedrine (ALLEGRA-D 24) 180-240 MG per 24 hr tablet, Take by mouth, Disp: , Rfl:     No Known Allergies    ROS:  03/15/18 Reviewed 13 systems:  Presently no headaches, seizures, dizziness, diplopia, dysphagia, hoarseness, chest pain, palpitations, shortness of breath, cough, hemoptysis, abdominal pain, nausea, vomiting, change in bowel habits, melena, hematuria, fever, chills, bleeding, bone pains, skin rash, weight loss,  numbness, tiredness , weakness, claudication and gait problem  No frequent infections  No hot flashes  Not unusually sensitive to heat or cold  No swelling of the ankles  No swollen glands  Patient is anxious     Other symptoms are in HPI      /66 (BP Location: Left arm, Cuff Size: Adult)   Pulse 74   Temp 97 5 °F (36 4 °C) (Tympanic)   Resp 16   Ht 5' 9" (1 753 m)   Wt 79 9 kg (176 lb 3 2 oz)   SpO2 98%   BMI 26 02 kg/m²     Physical Exam:  Alert, oriented, not in distress, no icterus, no oral thrush, no palpable neck mass, clear lung fields, regular heart rate, abdomen  soft and non tender, no palpable abdominal mass, no ascites, no edema of ankles, no calf tenderness, no focal neurological deficit, no skin rash, no palpable lymphadenopathy, good arterial pulses, no clubbing  Patient is anxious  Performance status 0      IMAGING:  No orders to display       LABS:    Results for orders placed or performed in visit on 02/02/18   Beta 2 microglobuline, serum   Result Value Ref Range    Beta-2 Microglobulin 2 0 0 6 - 2 4 mg/L   CBC and differential   Result Value Ref Range    WBC 53 85 (HH) 4 31 - 10 16 Thousand/uL    RBC 4 20 3 88 - 5 62 Million/uL    Hemoglobin 13 5 12 0 - 17 0 g/dL    Hematocrit 40 9 36 5 - 49 3 %    MCV 97 82 - 98 fL    MCH 32 1 26 8 - 34 3 pg    MCHC 33 0 31 4 - 37 4 g/dL    RDW 13 5 11 6 - 15 1 %    MPV 9 4 8 9 - 12 7 fL    Platelets 841 297 - 241 Thousands/uL    nRBC 0 /100 WBCs   Comprehensive metabolic panel   Result Value Ref Range    Sodium 141 136 - 145 mmol/L    Potassium 4 6 3 5 - 5 3 mmol/L    Chloride 107 100 - 108 mmol/L    CO2 28 21 - 32 mmol/L    Anion Gap 6 4 - 13 mmol/L    BUN 19 5 - 25 mg/dL    Creatinine 1 11 0 60 - 1 30 mg/dL    Glucose, Fasting 93 65 - 99 mg/dL    Calcium 9 6 8 3 - 10 1 mg/dL    AST 15 5 - 45 U/L    ALT 29 12 - 78 U/L    Alkaline Phosphatase 94 46 - 116 U/L    Total Protein 7 5 6 4 - 8 2 g/dL    Albumin 4 3 3 5 - 5 0 g/dL    Total Bilirubin 0 35 0 20 - 1 00 mg/dL    eGFR 69 ml/min/1 73sq m   IgG   Result Value Ref Range     0 700 0 - 1,600 0 mg/dL   LD,Blood   Result Value Ref Range     81 - 234 U/L   Uric acid   Result Value Ref Range    Uric Acid 4 7 4 2 - 8 0 mg/dL   Manual Differential(PHLEBS Do Not Order)   Result Value Ref Range    Segmented % 9 (L) 43 - 75 %    Lymphocytes % 83 (H) 14 - 44 %    Monocytes % 4 4 - 12 %    Eosinophils % 1 0 - 6 %    Basophils % 1 0 - 1 %    Atypical Lymphocytes % 2 (H) <=0 %    Absolute Neutrophils 4 85 1 85 - 7 62 Thousand/uL    Lymphocytes Absolute 44 70 (H) 0 60 - 4 47 Thousand/uL    Monocytes Absolute 2 15 (H) 0 00 - 1 22 Thousand/uL    Eosinophils Absolute 0 54 (H) 0 00 - 0 40 Thousand/uL    Basophils Absolute 0 54 (H) 0 00 - 0 10 Thousand/uL    Total Counted 100     Platelet Estimate Adequate Adequate           Reviewed and discussed with patient  Assessment and plan:  Gumaro Olivia is a 78 y o  male with  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  Right now he is under surveillance  He is not symptomatic from CLL  Follow-up visit for relapsed chronic lymphocytic leukemia  Chronic lymphocytic leukemia was diagnosed in 1996  He was under supervision for several years  He was then treated intermittently with chlorambucil  In 2014 when time came to treat he was given 6 cycles of Rituxan and bendamustine and last treatment was in October 2014  No treatment since  Some restriction of movements at right shoulder   but better than before  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, unfavorable prognostic feature  He feels pretty good physically and psychologically  He has follow-up appointment with Dr Jorge Powell in August 2018  Physical examination and test results are as recorded and discussed in detail  There is some increase in lymphocyte count  No other change  Patient knows that he has 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 that is an unfavorable prognostic feature and he will be requiring treatment in the near future  He is comfortable with present approach of surveillance because he is not symptomatic from this condition  He will wait to  see what Dr Jorge Powell has to offer  We had no discussion about ibrutinib, venetoclax and CAR-T  He will discuss more with Dr Jorge Powell  He will come back in 3 months or sooner if he has problem related to CLL or to our speciality  Patient voiced understanding and agreement in the discussion  Counseling / Coordination of Care   Greater than 50% of total time was spent with the patient and / or family counseling and / or coordination of care

## 2018-04-05 ENCOUNTER — TRANSCRIBE ORDERS (OUTPATIENT)
Dept: ADMINISTRATIVE | Age: 68
End: 2018-04-05

## 2018-04-05 DIAGNOSIS — C91.10 LEUKEMIA, LYMPHOCYTIC, CHRONIC (HCC): Primary | ICD-10-CM

## 2018-04-06 ENCOUNTER — APPOINTMENT (OUTPATIENT)
Dept: LAB | Age: 68
End: 2018-04-06
Payer: MEDICARE

## 2018-04-06 ENCOUNTER — TELEPHONE (OUTPATIENT)
Dept: HEMATOLOGY ONCOLOGY | Facility: CLINIC | Age: 68
End: 2018-04-06

## 2018-04-06 DIAGNOSIS — C91.10 LEUKEMIA, LYMPHOCYTIC, CHRONIC (HCC): ICD-10-CM

## 2018-04-06 LAB
ALBUMIN SERPL BCP-MCNC: 4.5 G/DL (ref 3.5–5)
ALP SERPL-CCNC: 86 U/L (ref 46–116)
ALT SERPL W P-5'-P-CCNC: 25 U/L (ref 12–78)
ANION GAP SERPL CALCULATED.3IONS-SCNC: 7 MMOL/L (ref 4–13)
AST SERPL W P-5'-P-CCNC: 15 U/L (ref 5–45)
BASOPHILS # BLD MANUAL: 0 THOUSAND/UL (ref 0–0.1)
BASOPHILS NFR MAR MANUAL: 0 % (ref 0–1)
BILIRUB SERPL-MCNC: 0.25 MG/DL (ref 0.2–1)
BUN SERPL-MCNC: 21 MG/DL (ref 5–25)
CALCIUM SERPL-MCNC: 10.1 MG/DL (ref 8.3–10.1)
CHLORIDE SERPL-SCNC: 105 MMOL/L (ref 100–108)
CO2 SERPL-SCNC: 29 MMOL/L (ref 21–32)
CREAT SERPL-MCNC: 1.13 MG/DL (ref 0.6–1.3)
EOSINOPHIL # BLD MANUAL: 0 THOUSAND/UL (ref 0–0.4)
EOSINOPHIL NFR BLD MANUAL: 0 % (ref 0–6)
ERYTHROCYTE [DISTWIDTH] IN BLOOD BY AUTOMATED COUNT: 14 % (ref 11.6–15.1)
GFR SERPL CREATININE-BSD FRML MDRD: 67 ML/MIN/1.73SQ M
GLUCOSE SERPL-MCNC: 90 MG/DL (ref 65–140)
HCT VFR BLD AUTO: 41.4 % (ref 36.5–49.3)
HGB BLD-MCNC: 13.1 G/DL (ref 12–17)
IGG SERPL-MCNC: 790 MG/DL (ref 700–1600)
LDH SERPL-CCNC: 185 U/L (ref 81–234)
LYMPHOCYTES # BLD AUTO: 46.02 THOUSAND/UL (ref 0.6–4.47)
LYMPHOCYTES # BLD AUTO: 82 % (ref 14–44)
MCH RBC QN AUTO: 30.8 PG (ref 26.8–34.3)
MCHC RBC AUTO-ENTMCNC: 31.6 G/DL (ref 31.4–37.4)
MCV RBC AUTO: 97 FL (ref 82–98)
MONOCYTES # BLD AUTO: 0.56 THOUSAND/UL (ref 0–1.22)
MONOCYTES NFR BLD: 1 % (ref 4–12)
NEUTROPHILS # BLD MANUAL: 8.98 THOUSAND/UL (ref 1.85–7.62)
NEUTS SEG NFR BLD AUTO: 16 % (ref 43–75)
NRBC BLD AUTO-RTO: 0 /100 WBCS
PLATELET # BLD AUTO: 146 THOUSANDS/UL (ref 149–390)
PLATELET BLD QL SMEAR: ADEQUATE
PMV BLD AUTO: 9.5 FL (ref 8.9–12.7)
POTASSIUM SERPL-SCNC: 4.8 MMOL/L (ref 3.5–5.3)
PROT SERPL-MCNC: 7.5 G/DL (ref 6.4–8.2)
RBC # BLD AUTO: 4.25 MILLION/UL (ref 3.88–5.62)
SODIUM SERPL-SCNC: 141 MMOL/L (ref 136–145)
URATE SERPL-MCNC: 4.6 MG/DL (ref 4.2–8)
VARIANT LYMPHS # BLD AUTO: 1 %
WBC # BLD AUTO: 56.12 THOUSAND/UL (ref 4.31–10.16)

## 2018-04-06 PROCEDURE — 85027 COMPLETE CBC AUTOMATED: CPT

## 2018-04-06 PROCEDURE — 84550 ASSAY OF BLOOD/URIC ACID: CPT

## 2018-04-06 PROCEDURE — 85007 BL SMEAR W/DIFF WBC COUNT: CPT

## 2018-04-06 PROCEDURE — 82784 ASSAY IGA/IGD/IGG/IGM EACH: CPT

## 2018-04-06 PROCEDURE — 83615 LACTATE (LD) (LDH) ENZYME: CPT

## 2018-04-06 PROCEDURE — 82232 ASSAY OF BETA-2 PROTEIN: CPT

## 2018-04-06 PROCEDURE — 80053 COMPREHEN METABOLIC PANEL: CPT

## 2018-04-06 PROCEDURE — 36415 COLL VENOUS BLD VENIPUNCTURE: CPT

## 2018-04-06 NOTE — TELEPHONE ENCOUNTER
Patient with a history of CLL had a WBC of 56 12 on 4/6/18  Patient had a previous WBC of 53 85 on 2/2/18 and 59 43 on 12/8/17

## 2018-04-09 LAB — B2 MICROGLOB SERPL-MCNC: 2 MG/L (ref 0.6–2.4)

## 2018-05-30 ENCOUNTER — TRANSCRIBE ORDERS (OUTPATIENT)
Dept: ADMINISTRATIVE | Age: 68
End: 2018-05-30

## 2018-05-30 ENCOUNTER — APPOINTMENT (OUTPATIENT)
Dept: LAB | Age: 68
End: 2018-05-30
Payer: MEDICARE

## 2018-05-30 ENCOUNTER — TELEPHONE (OUTPATIENT)
Dept: HEMATOLOGY ONCOLOGY | Facility: CLINIC | Age: 68
End: 2018-05-30

## 2018-05-30 DIAGNOSIS — L98.9 FIBROHISTIOCYTIC PROLIFERATION OF THE SKIN: ICD-10-CM

## 2018-05-30 DIAGNOSIS — L98.9 FIBROHISTIOCYTIC PROLIFERATION OF THE SKIN: Primary | ICD-10-CM

## 2018-05-30 LAB
ALBUMIN SERPL BCP-MCNC: 4.2 G/DL (ref 3.5–5)
ALP SERPL-CCNC: 97 U/L (ref 46–116)
ALT SERPL W P-5'-P-CCNC: 29 U/L (ref 12–78)
ANION GAP SERPL CALCULATED.3IONS-SCNC: 6 MMOL/L (ref 4–13)
AST SERPL W P-5'-P-CCNC: 20 U/L (ref 5–45)
BASOPHILS # BLD MANUAL: 0 THOUSAND/UL (ref 0–0.1)
BASOPHILS NFR MAR MANUAL: 0 % (ref 0–1)
BILIRUB SERPL-MCNC: 0.24 MG/DL (ref 0.2–1)
BUN SERPL-MCNC: 20 MG/DL (ref 5–25)
CALCIUM SERPL-MCNC: 9.9 MG/DL (ref 8.3–10.1)
CHLORIDE SERPL-SCNC: 106 MMOL/L (ref 100–108)
CO2 SERPL-SCNC: 29 MMOL/L (ref 21–32)
CREAT SERPL-MCNC: 1.12 MG/DL (ref 0.6–1.3)
EOSINOPHIL # BLD MANUAL: 0 THOUSAND/UL (ref 0–0.4)
EOSINOPHIL NFR BLD MANUAL: 0 % (ref 0–6)
ERYTHROCYTE [DISTWIDTH] IN BLOOD BY AUTOMATED COUNT: 13.9 % (ref 11.6–15.1)
GFR SERPL CREATININE-BSD FRML MDRD: 68 ML/MIN/1.73SQ M
GLUCOSE SERPL-MCNC: 92 MG/DL (ref 65–140)
HCT VFR BLD AUTO: 39.7 % (ref 36.5–49.3)
HGB BLD-MCNC: 12.7 G/DL (ref 12–17)
IGG SERPL-MCNC: 779 MG/DL (ref 700–1600)
LDH SERPL-CCNC: 196 U/L (ref 81–234)
LYMPHOCYTES # BLD AUTO: 48.86 THOUSAND/UL (ref 0.6–4.47)
LYMPHOCYTES # BLD AUTO: 78 % (ref 14–44)
MCH RBC QN AUTO: 31.8 PG (ref 26.8–34.3)
MCHC RBC AUTO-ENTMCNC: 32 G/DL (ref 31.4–37.4)
MCV RBC AUTO: 100 FL (ref 82–98)
MONOCYTES # BLD AUTO: 1.88 THOUSAND/UL (ref 0–1.22)
MONOCYTES NFR BLD: 3 % (ref 4–12)
NEUTROPHILS # BLD MANUAL: 11.9 THOUSAND/UL (ref 1.85–7.62)
NEUTS SEG NFR BLD AUTO: 19 % (ref 43–75)
NRBC BLD AUTO-RTO: 0 /100 WBCS
PLATELET # BLD AUTO: 186 THOUSANDS/UL (ref 149–390)
PLATELET BLD QL SMEAR: ADEQUATE
PMV BLD AUTO: 9.3 FL (ref 8.9–12.7)
POTASSIUM SERPL-SCNC: 4.5 MMOL/L (ref 3.5–5.3)
PROT SERPL-MCNC: 7.4 G/DL (ref 6.4–8.2)
RBC # BLD AUTO: 3.99 MILLION/UL (ref 3.88–5.62)
SODIUM SERPL-SCNC: 141 MMOL/L (ref 136–145)
TOTAL CELLS COUNTED SPEC: 100
URATE SERPL-MCNC: 4.3 MG/DL (ref 4.2–8)
WBC # BLD AUTO: 62.64 THOUSAND/UL (ref 4.31–10.16)

## 2018-05-30 PROCEDURE — 82232 ASSAY OF BETA-2 PROTEIN: CPT

## 2018-05-30 PROCEDURE — 84550 ASSAY OF BLOOD/URIC ACID: CPT

## 2018-05-30 PROCEDURE — 36415 COLL VENOUS BLD VENIPUNCTURE: CPT

## 2018-05-30 PROCEDURE — 83615 LACTATE (LD) (LDH) ENZYME: CPT

## 2018-05-30 PROCEDURE — 82784 ASSAY IGA/IGD/IGG/IGM EACH: CPT

## 2018-05-30 PROCEDURE — 85007 BL SMEAR W/DIFF WBC COUNT: CPT

## 2018-05-30 PROCEDURE — 80053 COMPREHEN METABOLIC PANEL: CPT

## 2018-05-30 PROCEDURE — 85027 COMPLETE CBC AUTOMATED: CPT

## 2018-05-30 NOTE — TELEPHONE ENCOUNTER
Patient with a hx of CLL, had a WBC of 62 64 on 5/30/18  WBC for 4/6/18 was 56 12 and 53 85 on 2/2/18  Patient has a f/u appointment with Dr Zuleyka Saucedo on 6/15/18  No new orders at this time

## 2018-05-31 LAB — B2 MICROGLOB SERPL-MCNC: 2.3 MG/L (ref 0.6–2.4)

## 2018-06-15 ENCOUNTER — OFFICE VISIT (OUTPATIENT)
Dept: HEMATOLOGY ONCOLOGY | Facility: CLINIC | Age: 68
End: 2018-06-15
Payer: MEDICARE

## 2018-06-15 VITALS
OXYGEN SATURATION: 99 % | TEMPERATURE: 97.1 F | RESPIRATION RATE: 16 BRPM | WEIGHT: 177.2 LBS | BODY MASS INDEX: 26.25 KG/M2 | SYSTOLIC BLOOD PRESSURE: 128 MMHG | HEIGHT: 69 IN | DIASTOLIC BLOOD PRESSURE: 76 MMHG | HEART RATE: 112 BPM

## 2018-06-15 DIAGNOSIS — C91.12 CLL (CHRONIC LYMPHOID LEUKEMIA) IN RELAPSE (HCC): Primary | ICD-10-CM

## 2018-06-15 PROCEDURE — 99214 OFFICE O/P EST MOD 30 MIN: CPT | Performed by: INTERNAL MEDICINE

## 2018-06-15 NOTE — PATIENT INSTRUCTIONS
Patient will see Dr Teddy Barakat in August   He will come back to our office in October with blood tests

## 2018-06-15 NOTE — PROGRESS NOTES
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 but his disease is not behaving aggressively  There is some increase in WBC and lymphocyte count  CLL (chronic lymphoid leukemia) in relapse ([facility] [address] )    3/15/2018 Initial Diagnosis     CLL (chronic lymphoid leukemia) in relapse [facility])            Current Outpatient Prescriptions:     fexofenadine-pseudoephedrine (ALLEGRA-D 24) 180-240 MG per 24 hr tablet, Take by mouth, Disp: , Rfl:     No Known Allergies     No history exists  ROS:  06/15/18 Reviewed 13 systems:  Presently no headaches, seizures, dizziness, diplopia, dysphagia, hoarseness, chest pain, palpitations, shortness of breath, cough, hemoptysis, abdominal pain, nausea, vomiting, change in bowel habits, melena, hematuria, fever, chills, bleeding, bone pains, skin rash, weight loss, arthritic symptoms, tiredness ,  weakness, numbness,  claudication and gait problem  No frequent infections  Not unusually sensitive to heat or cold  No swelling of the ankles  No swollen glands  Patient is anxious   Other symptoms are in HPI        /76 (BP Location: Right arm, Cuff Size: Adult)   Pulse (!) 112   Temp (!) 97 1 °F (36 2 °C) (Tympanic)   Resp 16   Ht 5' 9" (1 753 m)   Wt 80 4 kg (177 lb 3 2 oz)   SpO2 99%   BMI 26 17 kg/m²     Physical Exam:  Alert, oriented, not in distress, no icterus, no oral thrush, no palpable neck mass, clear lung fields, regular heart rate, abdomen  soft and non tender, no palpable abdominal mass, no ascites, no edema of ankles, no calf tenderness, no focal neurological deficit, no skin rash, no palpable lymphadenopathy, good arterial pulses, no clubbing  Patient is anxious  Performance status 0      IMAGING:      LABS:  Results for orders placed or performed in visit on 05/30/18   Beta 2 microglobulin, serum   Result Value Ref Range    Beta-2 Microglobulin 2 3 0 6 - 2 4 mg/L   CBC and differential   Result Value Ref Range    WBC 62 64 (HH) 4 31 - 10 16 Thousand/uL    RBC 3 99 3 88 - 5 62 Million/uL    Hemoglobin 12 7 12 0 - 17 0 g/dL    Hematocrit 39 7 36 5 - 49 3 %     (H) 82 - 98 fL    MCH 31 8 26 8 - 34 3 pg    MCHC 32 0 31 4 - 37 4 g/dL    RDW 13 9 11 6 - 15 1 %    MPV 9 3 8 9 - 12 7 fL    Platelets 753 350 - 140 Thousands/uL    nRBC 0 /100 WBCs   Comprehensive metabolic panel   Result Value Ref Range    Sodium 141 136 - 145 mmol/L    Potassium 4 5 3 5 - 5 3 mmol/L    Chloride 106 100 - 108 mmol/L    CO2 29 21 - 32 mmol/L    Anion Gap 6 4 - 13 mmol/L    BUN 20 5 - 25 mg/dL    Creatinine 1 12 0 60 - 1 30 mg/dL    Glucose 92 65 - 140 mg/dL    Calcium 9 9 8 3 - 10 1 mg/dL    AST 20 5 - 45 U/L    ALT 29 12 - 78 U/L    Alkaline Phosphatase 97 46 - 116 U/L    Total Protein 7 4 6 4 - 8 2 g/dL    Albumin 4 2 3 5 - 5 0 g/dL    Total Bilirubin 0 24 0 20 - 1 00 mg/dL    eGFR 68 ml/min/1 73sq m   IgG   Result Value Ref Range     0 700 0-1,600 0 mg/dL   LD,Blood   Result Value Ref Range     81 - 234 U/L   Uric acid   Result Value Ref Range    Uric Acid 4 3 4 2 - 8 0 mg/dL   Manual Differential(PHLEBS Do Not Order)   Result Value Ref Range    Segmented % 19 (L) 43 - 75 %    Lymphocytes % 78 (H) 14 - 44 %    Monocytes % 3 (L) 4 - 12 %    Eosinophils % 0 0 - 6 %    Basophils % 0 0 - 1 %    Absolute Neutrophils 11 90 (H) 1 85 - 7 62 Thousand/uL    Lymphocytes Absolute 48 86 (H) 0 60 - 4 47 Thousand/uL    Monocytes Absolute 1 88 (H) 0 00 - 1 22 Thousand/uL    Eosinophils Absolute 0 00 0 00 - 0 40 Thousand/uL    Basophils Absolute 0 00 0 00 - 0 10 Thousand/uL    Total Counted 100     Platelet Estimate Adequate Adequate     Labs, Imaging, & Other studies:   All pertinent labs and imaging studies were personally reviewed    Lab Results   Component Value Date     05/30/2018    K 4 5 05/30/2018     05/30/2018    CO2 29 05/30/2018    ANIONGAP 6 05/30/2018    BUN 20 05/30/2018    CREATININE 1 12 05/30/2018    GLUCOSE 92 05/30/2018    GLUF 93 02/02/2018    CALCIUM 9 9 05/30/2018    CORRECTEDCA 9 9 06/02/2017    AST 20 05/30/2018    ALT 29 05/30/2018    ALKPHOS 97 05/30/2018    PROT 7 4 05/30/2018    BILITOT 0 24 05/30/2018    EGFR 68 05/30/2018     Lab Results   Component Value Date    WBC 62 64 (HH) 05/30/2018    HGB 12 7 05/30/2018    HCT 39 7 05/30/2018     (H) 05/30/2018     05/30/2018       Reviewed and discussed with patient  Assessment and plan: 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  1996:  CLL was diagnosed  Intermittently he received chlorambucil over the years  2014:  6 cycles of Rituxan and bendamustine  Presently under surveillance  No symptoms at present  Patient has appointment with Dr Laura Olivas in August and he will come back to our office in October 2018  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 but his disease is not behaving aggressively  There is some increase in WBC and lymphocyte count     Physical examination and test results are as recorded and discussed in detail  He remains under surveillance  Visit with Dr Laura Olivas as above  Also discussed the importance of eating healthy foods, staying  active and health screening tests  1  CLL (chronic lymphoid leukemia) in relapse [facility])        Patient voiced understanding and agreement in the discussion  Counseling / Coordination of Care   Greater than 50% of total time was spent with the patient and / or family counseling and / or coordination of care

## 2018-06-15 NOTE — LETTER
Fani 15, 2018     Chayo Enrique MD  [address] [address]    Patient: Flower Chiu   YOB: 1950   Date of Visit: 6/15/2018       Dear Dr Franc Ng: Thank you for referring Ceasar Nguyen to me for evaluation  Below are my notes for this consultation  If you have questions, please do not hesitate to call me  I look forward to following your patient along with you  Sincerely,        Ceasar Shaffer MD        CC: MD Ceasar Mcelroy MD  6/15/2018  3:15 PM  Sign at close encounter    HPI:    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  1996:  CLL was diagnosed  Intermittently he received chlorambucil over the years  2014:  6 cycles of Rituxan and bendamustine  Presently under surveillance  No symptoms at present  Patient has appointment with Dr Robbie Sanz in August and he will come back to our office in October 2018  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 but his disease is not behaving aggressively  There is some increase in WBC and lymphocyte count  CLL (chronic lymphoid leukemia) in relapse ([facility] [address] )    3/15/2018 Initial Diagnosis     CLL (chronic lymphoid leukemia) in relapse [facility])            Current Outpatient Prescriptions:     fexofenadine-pseudoephedrine (ALLEGRA-D 24) 180-240 MG per 24 hr tablet, Take by mouth, Disp: , Rfl:     No Known Allergies     No history exists  ROS:  06/15/18 Reviewed 13 systems:  Presently no headaches, seizures, dizziness, diplopia, dysphagia, hoarseness, chest pain, palpitations, shortness of breath, cough, hemoptysis, abdominal pain, nausea, vomiting, change in bowel habits, melena, hematuria, fever, chills, bleeding, bone pains, skin rash, weight loss, arthritic symptoms, tiredness ,  weakness, numbness,  claudication and gait problem  No frequent infections  Not unusually sensitive to heat or cold  No swelling of the ankles  No swollen glands  Patient is anxious   Other symptoms are in HPI        /76 (BP Location: Right arm, Cuff Size: Adult)   Pulse (!) 112   Temp (!) 97 1 °F (36 2 °C) (Tympanic)   Resp 16   Ht 5' 9" (1 753 m)   Wt 80 4 kg (177 lb 3 2 oz)   SpO2 99%   BMI 26 17 kg/m²      Physical Exam:  Alert, oriented, not in distress, no icterus, no oral thrush, no palpable neck mass, clear lung fields, regular heart rate, abdomen  soft and non tender, no palpable abdominal mass, no ascites, no edema of ankles, no calf tenderness, no focal neurological deficit, no skin rash, no palpable lymphadenopathy, good arterial pulses, no clubbing  Patient is anxious  Performance status 0      IMAGING:      LABS:  Results for orders placed or performed in visit on 05/30/18   Beta 2 microglobulin, serum   Result Value Ref Range    Beta-2 Microglobulin 2 3 0 6 - 2 4 mg/L   CBC and differential   Result Value Ref Range    WBC 62 64 (HH) 4 31 - 10 16 Thousand/uL    RBC 3 99 3 88 - 5 62 Million/uL    Hemoglobin 12 7 12 0 - 17 0 g/dL    Hematocrit 39 7 36 5 - 49 3 %     (H) 82 - 98 fL    MCH 31 8 26 8 - 34 3 pg    MCHC 32 0 31 4 - 37 4 g/dL    RDW 13 9 11 6 - 15 1 %    MPV 9 3 8 9 - 12 7 fL    Platelets 409 270 - 301 Thousands/uL    nRBC 0 /100 WBCs   Comprehensive metabolic panel   Result Value Ref Range    Sodium 141 136 - 145 mmol/L    Potassium 4 5 3 5 - 5 3 mmol/L    Chloride 106 100 - 108 mmol/L    CO2 29 21 - 32 mmol/L    Anion Gap 6 4 - 13 mmol/L    BUN 20 5 - 25 mg/dL    Creatinine 1 12 0 60 - 1 30 mg/dL    Glucose 92 65 - 140 mg/dL    Calcium 9 9 8 3 - 10 1 mg/dL    AST 20 5 - 45 U/L    ALT 29 12 - 78 U/L    Alkaline Phosphatase 97 46 - 116 U/L    Total Protein 7 4 6 4 - 8 2 g/dL    Albumin 4 2 3 5 - 5 0 g/dL    Total Bilirubin 0 24 0 20 - 1 00 mg/dL    eGFR 68 ml/min/1 73sq m   IgG   Result Value Ref Range     0 700 0-1,600 0 mg/dL   LD,Blood   Result Value Ref Range     81 - 234 U/L   Uric acid   Result Value Ref Range    Uric Acid 4 3 4 2 - 8 0 mg/dL   Manual Differential(PHLEBS Do Not Order)   Result Value Ref Range    Segmented % 19 (L) 43 - 75 %    Lymphocytes % 78 (H) 14 - 44 %    Monocytes % 3 (L) 4 - 12 %    Eosinophils % 0 0 - 6 %    Basophils % 0 0 - 1 %    Absolute Neutrophils 11 90 (H) 1 85 - 7 62 Thousand/uL    Lymphocytes Absolute 48 86 (H) 0 60 - 4 47 Thousand/uL    Monocytes Absolute 1 88 (H) 0 00 - 1 22 Thousand/uL    Eosinophils Absolute 0 00 0 00 - 0 40 Thousand/uL    Basophils Absolute 0 00 0 00 - 0 10 Thousand/uL    Total Counted 100     Platelet Estimate Adequate Adequate     Labs, Imaging, & Other studies:   All pertinent labs and imaging studies were personally reviewed    Lab Results   Component Value Date     05/30/2018    K 4 5 05/30/2018     05/30/2018    CO2 29 05/30/2018    ANIONGAP 6 05/30/2018    BUN 20 05/30/2018    CREATININE 1 12 05/30/2018    GLUCOSE 92 05/30/2018    GLUF 93 02/02/2018    CALCIUM 9 9 05/30/2018    CORRECTEDCA 9 9 06/02/2017    AST 20 05/30/2018    ALT 29 05/30/2018    ALKPHOS 97 05/30/2018    PROT 7 4 05/30/2018    BILITOT 0 24 05/30/2018    EGFR 68 05/30/2018     Lab Results   Component Value Date    WBC 62 64 (HH) 05/30/2018    HGB 12 7 05/30/2018    HCT 39 7 05/30/2018     (H) 05/30/2018     05/30/2018       Reviewed and discussed with patient  Assessment and plan: 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  1996:  CLL was diagnosed  Intermittently he received chlorambucil over the years  2014:  6 cycles of Rituxan and bendamustine  Presently under surveillance  No symptoms at present  Patient has appointment with Dr Domenico Leblanc in August and he will come back to our office in October 2018  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 but his disease is not behaving aggressively  There is some increase in WBC and lymphocyte count     Physical examination and test results are as recorded and discussed in detail  He remains under surveillance  Visit with Dr Domenico Leblanc as above      Also discussed the importance of eating healthy foods, staying  active and health screening tests  1  CLL (chronic lymphoid leukemia) in relapse [facility])        Patient voiced understanding and agreement in the discussion  Counseling / Coordination of Care   Greater than 50% of total time was spent with the patient and / or family counseling and / or coordination of care

## 2018-08-04 ENCOUNTER — APPOINTMENT (OUTPATIENT)
Dept: LAB | Age: 68
End: 2018-08-04
Payer: MEDICARE

## 2018-08-04 ENCOUNTER — TRANSCRIBE ORDERS (OUTPATIENT)
Dept: ADMINISTRATIVE | Age: 68
End: 2018-08-04

## 2018-08-04 DIAGNOSIS — C91.10 ANEMIA ASSOCIATED WITH CHRONIC LYMPHOCYTIC LEUKEMIA TREATED WITH ERYTHROPOIETIN (HCC): Primary | ICD-10-CM

## 2018-08-04 DIAGNOSIS — C91.10 ANEMIA ASSOCIATED WITH CHRONIC LYMPHOCYTIC LEUKEMIA TREATED WITH ERYTHROPOIETIN (HCC): ICD-10-CM

## 2018-08-04 DIAGNOSIS — D63.0 ANEMIA ASSOCIATED WITH CHRONIC LYMPHOCYTIC LEUKEMIA TREATED WITH ERYTHROPOIETIN (HCC): ICD-10-CM

## 2018-08-04 DIAGNOSIS — D63.0 ANEMIA ASSOCIATED WITH CHRONIC LYMPHOCYTIC LEUKEMIA TREATED WITH ERYTHROPOIETIN (HCC): Primary | ICD-10-CM

## 2018-08-04 LAB
ALBUMIN SERPL BCP-MCNC: 4.3 G/DL (ref 3.5–5)
ALP SERPL-CCNC: 93 U/L (ref 46–116)
ALT SERPL W P-5'-P-CCNC: 23 U/L (ref 12–78)
ANION GAP SERPL CALCULATED.3IONS-SCNC: 7 MMOL/L (ref 4–13)
AST SERPL W P-5'-P-CCNC: 16 U/L (ref 5–45)
BASOPHILS # BLD MANUAL: 0 THOUSAND/UL (ref 0–0.1)
BASOPHILS NFR MAR MANUAL: 0 % (ref 0–1)
BILIRUB SERPL-MCNC: 0.46 MG/DL (ref 0.2–1)
BLASTS NFR BLD MANUAL: 3 %
BUN SERPL-MCNC: 24 MG/DL (ref 5–25)
CALCIUM SERPL-MCNC: 9.5 MG/DL (ref 8.3–10.1)
CHLORIDE SERPL-SCNC: 107 MMOL/L (ref 100–108)
CO2 SERPL-SCNC: 26 MMOL/L (ref 21–32)
CREAT SERPL-MCNC: 1.27 MG/DL (ref 0.6–1.3)
EOSINOPHIL # BLD MANUAL: 0 THOUSAND/UL (ref 0–0.4)
EOSINOPHIL NFR BLD MANUAL: 0 % (ref 0–6)
ERYTHROCYTE [DISTWIDTH] IN BLOOD BY AUTOMATED COUNT: 13.3 % (ref 11.6–15.1)
GFR SERPL CREATININE-BSD FRML MDRD: 58 ML/MIN/1.73SQ M
GLUCOSE SERPL-MCNC: 105 MG/DL (ref 65–140)
HCT VFR BLD AUTO: 40.7 % (ref 36.5–49.3)
HGB BLD-MCNC: 12.8 G/DL (ref 12–17)
LYMPHOCYTES # BLD AUTO: 64.97 THOUSAND/UL (ref 0.6–4.47)
LYMPHOCYTES # BLD AUTO: 84 % (ref 14–44)
MCH RBC QN AUTO: 31.1 PG (ref 26.8–34.3)
MCHC RBC AUTO-ENTMCNC: 31.4 G/DL (ref 31.4–37.4)
MCV RBC AUTO: 99 FL (ref 82–98)
MONOCYTES # BLD AUTO: 1.55 THOUSAND/UL (ref 0–1.22)
MONOCYTES NFR BLD: 2 % (ref 4–12)
NEUTROPHILS # BLD MANUAL: 3.87 THOUSAND/UL (ref 1.85–7.62)
NEUTS SEG NFR BLD AUTO: 5 % (ref 43–75)
NRBC BLD AUTO-RTO: 0 /100 WBCS
PLATELET # BLD AUTO: 161 THOUSANDS/UL (ref 149–390)
PLATELET BLD QL SMEAR: ADEQUATE
PMV BLD AUTO: 9.1 FL (ref 8.9–12.7)
POTASSIUM SERPL-SCNC: 4.6 MMOL/L (ref 3.5–5.3)
PROT SERPL-MCNC: 7.3 G/DL (ref 6.4–8.2)
RBC # BLD AUTO: 4.11 MILLION/UL (ref 3.88–5.62)
RBC MORPH BLD: NORMAL
SMUDGE CELLS BLD QL SMEAR: PRESENT
SODIUM SERPL-SCNC: 140 MMOL/L (ref 136–145)
TOTAL CELLS COUNTED SPEC: 100
VARIANT LYMPHS # BLD AUTO: 6 %
WBC # BLD AUTO: 77.35 THOUSAND/UL (ref 4.31–10.16)

## 2018-08-04 PROCEDURE — 85027 COMPLETE CBC AUTOMATED: CPT

## 2018-08-04 PROCEDURE — 80053 COMPREHEN METABOLIC PANEL: CPT

## 2018-08-04 PROCEDURE — 85007 BL SMEAR W/DIFF WBC COUNT: CPT

## 2018-08-04 PROCEDURE — 82232 ASSAY OF BETA-2 PROTEIN: CPT

## 2018-08-04 PROCEDURE — 36415 COLL VENOUS BLD VENIPUNCTURE: CPT

## 2018-08-06 LAB — B2 MICROGLOB SERPL-MCNC: 1.9 MG/L (ref 0.6–2.4)

## 2018-10-03 ENCOUNTER — APPOINTMENT (OUTPATIENT)
Dept: LAB | Age: 68
End: 2018-10-03
Payer: MEDICARE

## 2018-10-03 ENCOUNTER — TELEPHONE (OUTPATIENT)
Dept: HEMATOLOGY ONCOLOGY | Facility: CLINIC | Age: 68
End: 2018-10-03

## 2018-10-03 ENCOUNTER — TRANSCRIBE ORDERS (OUTPATIENT)
Dept: ADMINISTRATIVE | Age: 68
End: 2018-10-03

## 2018-10-03 DIAGNOSIS — C91.10 ANEMIA ASSOCIATED WITH CHRONIC LYMPHOCYTIC LEUKEMIA TREATED WITH ERYTHROPOIETIN (HCC): ICD-10-CM

## 2018-10-03 DIAGNOSIS — C91.10 ANEMIA ASSOCIATED WITH CHRONIC LYMPHOCYTIC LEUKEMIA TREATED WITH ERYTHROPOIETIN (HCC): Primary | ICD-10-CM

## 2018-10-03 DIAGNOSIS — D63.0 ANEMIA ASSOCIATED WITH CHRONIC LYMPHOCYTIC LEUKEMIA TREATED WITH ERYTHROPOIETIN (HCC): Primary | ICD-10-CM

## 2018-10-03 DIAGNOSIS — D63.0 ANEMIA ASSOCIATED WITH CHRONIC LYMPHOCYTIC LEUKEMIA TREATED WITH ERYTHROPOIETIN (HCC): ICD-10-CM

## 2018-10-03 LAB
ALBUMIN SERPL BCP-MCNC: 4 G/DL (ref 3.5–5)
ALP SERPL-CCNC: 97 U/L (ref 46–116)
ALT SERPL W P-5'-P-CCNC: 30 U/L (ref 12–78)
ANION GAP SERPL CALCULATED.3IONS-SCNC: 5 MMOL/L (ref 4–13)
AST SERPL W P-5'-P-CCNC: 16 U/L (ref 5–45)
BASOPHILS # BLD MANUAL: 0 THOUSAND/UL (ref 0–0.1)
BASOPHILS NFR MAR MANUAL: 0 % (ref 0–1)
BILIRUB SERPL-MCNC: 0.56 MG/DL (ref 0.2–1)
BLASTS NFR BLD MANUAL: 2 %
BUN SERPL-MCNC: 25 MG/DL (ref 5–25)
CALCIUM SERPL-MCNC: 9.7 MG/DL (ref 8.3–10.1)
CHLORIDE SERPL-SCNC: 105 MMOL/L (ref 100–108)
CO2 SERPL-SCNC: 26 MMOL/L (ref 21–32)
CREAT SERPL-MCNC: 1.14 MG/DL (ref 0.6–1.3)
EOSINOPHIL # BLD MANUAL: 0 THOUSAND/UL (ref 0–0.4)
EOSINOPHIL NFR BLD MANUAL: 0 % (ref 0–6)
ERYTHROCYTE [DISTWIDTH] IN BLOOD BY AUTOMATED COUNT: 13.5 % (ref 11.6–15.1)
GFR SERPL CREATININE-BSD FRML MDRD: 66 ML/MIN/1.73SQ M
GLUCOSE SERPL-MCNC: 94 MG/DL (ref 65–140)
HCT VFR BLD AUTO: 40.6 % (ref 36.5–49.3)
HGB BLD-MCNC: 12.7 G/DL (ref 12–17)
LYMPHOCYTES # BLD AUTO: 71.34 THOUSAND/UL (ref 0.6–4.47)
LYMPHOCYTES # BLD AUTO: 89 % (ref 14–44)
MCH RBC QN AUTO: 31.2 PG (ref 26.8–34.3)
MCHC RBC AUTO-ENTMCNC: 31.3 G/DL (ref 31.4–37.4)
MCV RBC AUTO: 100 FL (ref 82–98)
MONOCYTES # BLD AUTO: 1.6 THOUSAND/UL (ref 0–1.22)
MONOCYTES NFR BLD: 2 % (ref 4–12)
NEUTROPHILS # BLD MANUAL: 3.21 THOUSAND/UL (ref 1.85–7.62)
NEUTS SEG NFR BLD AUTO: 4 % (ref 43–75)
NRBC BLD AUTO-RTO: 0 /100 WBCS
PLATELET # BLD AUTO: 177 THOUSANDS/UL (ref 149–390)
PLATELET BLD QL SMEAR: ADEQUATE
PMV BLD AUTO: 9.1 FL (ref 8.9–12.7)
POTASSIUM SERPL-SCNC: 4.7 MMOL/L (ref 3.5–5.3)
PROT SERPL-MCNC: 7.2 G/DL (ref 6.4–8.2)
RBC # BLD AUTO: 4.07 MILLION/UL (ref 3.88–5.62)
SODIUM SERPL-SCNC: 136 MMOL/L (ref 136–145)
TOTAL CELLS COUNTED SPEC: 100
VARIANT LYMPHS # BLD AUTO: 3 %
WBC # BLD AUTO: 80.16 THOUSAND/UL (ref 4.31–10.16)

## 2018-10-03 PROCEDURE — 85027 COMPLETE CBC AUTOMATED: CPT

## 2018-10-03 PROCEDURE — 82232 ASSAY OF BETA-2 PROTEIN: CPT

## 2018-10-03 PROCEDURE — 80053 COMPREHEN METABOLIC PANEL: CPT

## 2018-10-03 PROCEDURE — 85007 BL SMEAR W/DIFF WBC COUNT: CPT

## 2018-10-03 PROCEDURE — 36415 COLL VENOUS BLD VENIPUNCTURE: CPT

## 2018-10-04 LAB — B2 MICROGLOB SERPL-MCNC: 2.1 MG/L (ref 0.6–2.4)

## 2018-10-15 ENCOUNTER — OFFICE VISIT (OUTPATIENT)
Dept: HEMATOLOGY ONCOLOGY | Facility: CLINIC | Age: 68
End: 2018-10-15
Payer: MEDICARE

## 2018-10-15 VITALS
HEIGHT: 69 IN | BODY MASS INDEX: 27.09 KG/M2 | WEIGHT: 182.9 LBS | RESPIRATION RATE: 20 BRPM | TEMPERATURE: 98.7 F | DIASTOLIC BLOOD PRESSURE: 78 MMHG | OXYGEN SATURATION: 97 % | SYSTOLIC BLOOD PRESSURE: 120 MMHG | HEART RATE: 83 BPM

## 2018-10-15 DIAGNOSIS — C91.12 CLL (CHRONIC LYMPHOID LEUKEMIA) IN RELAPSE (HCC): Primary | ICD-10-CM

## 2018-10-15 PROCEDURE — 99214 OFFICE O/P EST MOD 30 MIN: CPT | Performed by: INTERNAL MEDICINE

## 2018-10-15 NOTE — PROGRESS NOTES
HPI:   In 1996 patient was diagnosed to have CLL and since then he has received chlorambucil intermittently, 6 cycles of Rituxan and bendamustine in 2014 and no treatment since then even though CLL has developed 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  He is not symptomatic from CLL  WBC counts and lymphocyte counts are slowly rising  No anemia and no thrombocytopenia  No B symptoms  No lymphadenopathy  No hepatosplenomegaly  He is comfortable with present plan of surveillance  He also follows with Dr Ying Su at [facility] and he suggested that when treatment becomes indicated he could be tried on ibrutinib followed by venetoclax  Clinical trials and   CAR-T could be other options  Patient has been fully aware of his condition and treatment options  He was surprised to hear that there is no cure  He states this was brought to his attention by Dr Ying Su  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  1996:  CLL was diagnosed  Intermittently he received chlorambucil over the years  2014:  6 cycles of Rituxan and bendamustine  Presently under surveillance  No symptoms at present  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 but his disease is not behaving aggressively                 Current Outpatient Prescriptions:     fexofenadine-pseudoephedrine (ALLEGRA-D 24) 180-240 MG per 24 hr tablet, Take by mouth, Disp: , Rfl:     No Known Allergies      ROS:  10/15/18 Reviewed 13 systems:  Presently no headaches, seizures, dizziness, diplopia, dysphagia, hoarseness, chest pain, palpitations, shortness of breath, cough, hemoptysis, abdominal pain, nausea, vomiting, change in bowel habits, melena, hematuria, fever, chills, bleeding, bone pains, skin rash, weight loss, arthritic symptoms, tiredness ,  weakness, numbness,  claudication and gait problem  No frequent infections  Not unusually sensitive to heat or cold  No swelling of the ankles  No swollen glands  Patient is anxious   Other symptoms are in HPI        /78 (BP Location: Right arm, Patient Position: Sitting, Cuff Size: Adult)   Pulse 83   Temp 98 7 °F (37 1 °C)   Resp 20   Ht 5' 9" (1 753 m)   Wt 83 kg (182 lb 14 4 oz)   SpO2 97%   BMI 27 01 kg/m²     Physical Exam:  Alert, oriented, not in distress, no icterus, no oral thrush, no palpable neck mass, clear lung fields, regular heart rate, abdomen  soft and non tender, no palpable abdominal mass, no ascites, no edema of ankles, no calf tenderness, no focal neurological deficit, no skin rash, no palpable lymphadenopathy, good arterial pulses, no clubbing  Patient is anxious  Performance status 1      IMAGING:  No orders to display       LABS:  Results for orders placed or performed in visit on 10/03/18   CBC and differential   Result Value Ref Range    WBC 80 16 (HH) 4 31 - 10 16 Thousand/uL    RBC 4 07 3 88 - 5 62 Million/uL    Hemoglobin 12 7 12 0 - 17 0 g/dL    Hematocrit 40 6 36 5 - 49 3 %     (H) 82 - 98 fL    MCH 31 2 26 8 - 34 3 pg    MCHC 31 3 (L) 31 4 - 37 4 g/dL    RDW 13 5 11 6 - 15 1 %    MPV 9 1 8 9 - 12 7 fL    Platelets 087 762 - 591 Thousands/uL    nRBC 0 /100 WBCs   Comprehensive metabolic panel   Result Value Ref Range    Sodium 136 136 - 145 mmol/L    Potassium 4 7 3 5 - 5 3 mmol/L    Chloride 105 100 - 108 mmol/L    CO2 26 21 - 32 mmol/L    ANION GAP 5 4 - 13 mmol/L    BUN 25 5 - 25 mg/dL    Creatinine 1 14 0 60 - 1 30 mg/dL    Glucose 94 65 - 140 mg/dL    Calcium 9 7 8 3 - 10 1 mg/dL    AST 16 5 - 45 U/L    ALT 30 12 - 78 U/L    Alkaline Phosphatase 97 46 - 116 U/L    Total Protein 7 2 6 4 - 8 2 g/dL    Albumin 4 0 3 5 - 5 0 g/dL    Total Bilirubin 0 56 0 20 - 1 00 mg/dL    eGFR 66 ml/min/1 73sq m   Beta 2 microglobulin, serum   Result Value Ref Range    Beta-2 Microglobulin 2 1 0 6 - 2 4 mg/L   Manual Differential(PHLEBS Do Not Order)   Result Value Ref Range    Segmented % 4 (L) 43 - 75 %    Lymphocytes % 89 (H) 14 - 44 %    Monocytes % 2 (L) 4 - 12 %    Eosinophils % 0 0 - 6 %    Basophils % 0 0 - 1 %    Blasts % 2 (H) <=0 %    Atypical Lymphocytes % 3 (H) <=0 %    Absolute Neutrophils 3 21 1 85 - 7 62 Thousand/uL    Lymphocytes Absolute 71 34 (H) 0 60 - 4 47 Thousand/uL    Monocytes Absolute 1 60 (H) 0 00 - 1 22 Thousand/uL    Eosinophils Absolute 0 00 0 00 - 0 40 Thousand/uL    Basophils Absolute 0 00 0 00 - 0 10 Thousand/uL    Total Counted 100     Platelet Estimate Adequate Adequate     Labs, Imaging, & Other studies:   All pertinent labs and imaging studies were personally reviewed    Lab Results   Component Value Date     10/03/2018    K 4 7 10/03/2018     10/03/2018    CO2 26 10/03/2018    ANIONGAP 8 08/01/2015    BUN 25 10/03/2018    CREATININE 1 14 10/03/2018    GLUCOSE 96 08/01/2015    GLUF 93 02/02/2018    CALCIUM 9 7 10/03/2018    CORRECTEDCA 9 9 06/02/2017    AST 16 10/03/2018    ALT 30 10/03/2018    ALKPHOS 97 10/03/2018    PROT 7 6 08/01/2015    BILITOT 0 33 08/01/2015    EGFR 66 10/03/2018       Reviewed and discussed with patient  Assessment and plan: In 1996 patient was diagnosed to have CLL and since then he has received chlorambucil intermittently, 6 cycles of Rituxan and bendamustine in 2014 and no treatment since then even though CLL has developed 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  He is not symptomatic from CLL  WBC counts and lymphocyte counts are slowly rising  No anemia and no thrombocytopenia  No B symptoms  No lymphadenopathy  No hepatosplenomegaly  He is comfortable with present plan of surveillance  He also follows with Dr Daryl Whittaker at [facility] and he suggested that when treatment becomes indicated he could be tried on ibrutinib followed by venetoclax  Clinical trials and   CAR-T could be other options  Patient has been fully aware of his condition and treatment options  He was surprised to hear that there is no cure  He states this was brought to his attention by Dr Daryl Whittaker  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  1996:  CLL was diagnosed    Intermittently he received chlorambucil over the years  2014:  6 cycles of Rituxan and bendamustine  Presently under surveillance  No symptoms at present  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 but his disease is not behaving aggressively  Pamela Mendoza Physical examination and test results are as recorded and discussed in detail  He remains under surveillance  He also follows with Dr Kassy Godfrey at [facility]  We discussed treatment indications  Condition and plan discussed  Questions answered  Plan is surveillance  Goal is monitoring of patient's condition and blood tests  Also discussed the importance of eating healthy foods, staying  active and health screening tests  Patient is self-care  He would like to have blood tests every 2 months and visit in 3 months  1  CLL (chronic lymphoid leukemia) in relapse [facility])              Patient voiced understanding and agreement in the discussion  Counseling / Coordination of Care   Greater than 50% of total time was spent with the patient and / or family counseling and / or coordination of care

## 2018-10-15 NOTE — LETTER
October 15, 2018     Paulina Whatley MD  [address] [place] [address]    Patient: Zhou Gaming   YOB: 1950   Date of Visit: 10/15/2018       Dear Dr Indigo Nunn: Thank you for referring Jelly Baltazar to me for evaluation  Below are my notes for this consultation  If you have questions, please do not hesitate to call me  I look forward to following your patient along with you  Sincerely,        Flor Velarde MD        CC: MD Flor Das MD  10/15/2018  9:52 AM  Sign at close encounter  HPI:   In 1996 patient was diagnosed to have CLL and since then he has received chlorambucil intermittently, 6 cycles of Rituxan and bendamustine in 2014 and no treatment since then even though CLL has developed 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  He is not symptomatic from CLL  WBC counts and lymphocyte counts are slowly rising  No anemia and no thrombocytopenia  No B symptoms  No lymphadenopathy  No hepatosplenomegaly  He is comfortable with present plan of surveillance  He also follows with Dr Maddie Richard at [facility] and he suggested that when treatment becomes indicated he could be tried on ibrutinib followed by venetoclax  Clinical trials and   CAR-T could be other options  Patient has been fully aware of his condition and treatment options  He was surprised to hear that there is no cure  He states this was brought to his attention by Dr Maddie Richard  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  1996:  CLL was diagnosed  Intermittently he received chlorambucil over the years  2014:  6 cycles of Rituxan and bendamustine  Presently under surveillance  No symptoms at present  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 but his disease is not behaving aggressively                    Current Outpatient Prescriptions:     fexofenadine-pseudoephedrine (ALLEGRA-D 24) 180-240 MG per 24 hr tablet, Take by mouth, Disp: , Rfl:     No Known Allergies      ROS:  10/15/18 Reviewed 13 systems:  Presently no headaches, seizures, dizziness, diplopia, dysphagia, hoarseness, chest pain, palpitations, shortness of breath, cough, hemoptysis, abdominal pain, nausea, vomiting, change in bowel habits, melena, hematuria, fever, chills, bleeding, bone pains, skin rash, weight loss, arthritic symptoms, tiredness ,  weakness, numbness,  claudication and gait problem  No frequent infections  Not unusually sensitive to heat or cold  No swelling of the ankles  No swollen glands  Patient is anxious  Other symptoms are in HPI        /78 (BP Location: Right arm, Patient Position: Sitting, Cuff Size: Adult)   Pulse 83   Temp 98 7 °F (37 1 °C)   Resp 20   Ht 5' 9" (1 753 m)   Wt 83 kg (182 lb 14 4 oz)   SpO2 97%   BMI 27 01 kg/m²      Physical Exam:  Alert, oriented, not in distress, no icterus, no oral thrush, no palpable neck mass, clear lung fields, regular heart rate, abdomen  soft and non tender, no palpable abdominal mass, no ascites, no edema of ankles, no calf tenderness, no focal neurological deficit, no skin rash, no palpable lymphadenopathy, good arterial pulses, no clubbing  Patient is anxious  Performance status 1      IMAGING:  No orders to display       LABS:  Results for orders placed or performed in visit on 10/03/18   CBC and differential   Result Value Ref Range    WBC 80 16 (HH) 4 31 - 10 16 Thousand/uL    RBC 4 07 3 88 - 5 62 Million/uL    Hemoglobin 12 7 12 0 - 17 0 g/dL    Hematocrit 40 6 36 5 - 49 3 %     (H) 82 - 98 fL    MCH 31 2 26 8 - 34 3 pg    MCHC 31 3 (L) 31 4 - 37 4 g/dL    RDW 13 5 11 6 - 15 1 %    MPV 9 1 8 9 - 12 7 fL    Platelets 797 175 - 103 Thousands/uL    nRBC 0 /100 WBCs   Comprehensive metabolic panel   Result Value Ref Range    Sodium 136 136 - 145 mmol/L    Potassium 4 7 3 5 - 5 3 mmol/L    Chloride 105 100 - 108 mmol/L    CO2 26 21 - 32 mmol/L    ANION GAP 5 4 - 13 mmol/L    BUN 25 5 - 25 mg/dL    Creatinine 1 14 0 60 - 1 30 mg/dL    Glucose 94 65 - 140 mg/dL    Calcium 9 7 8 3 - 10 1 mg/dL    AST 16 5 - 45 U/L    ALT 30 12 - 78 U/L    Alkaline Phosphatase 97 46 - 116 U/L    Total Protein 7 2 6 4 - 8 2 g/dL    Albumin 4 0 3 5 - 5 0 g/dL    Total Bilirubin 0 56 0 20 - 1 00 mg/dL    eGFR 66 ml/min/1 73sq m   Beta 2 microglobulin, serum   Result Value Ref Range    Beta-2 Microglobulin 2 1 0 6 - 2 4 mg/L   Manual Differential(PHLEBS Do Not Order)   Result Value Ref Range    Segmented % 4 (L) 43 - 75 %    Lymphocytes % 89 (H) 14 - 44 %    Monocytes % 2 (L) 4 - 12 %    Eosinophils % 0 0 - 6 %    Basophils % 0 0 - 1 %    Blasts % 2 (H) <=0 %    Atypical Lymphocytes % 3 (H) <=0 %    Absolute Neutrophils 3 21 1 85 - 7 62 Thousand/uL    Lymphocytes Absolute 71 34 (H) 0 60 - 4 47 Thousand/uL    Monocytes Absolute 1 60 (H) 0 00 - 1 22 Thousand/uL    Eosinophils Absolute 0 00 0 00 - 0 40 Thousand/uL    Basophils Absolute 0 00 0 00 - 0 10 Thousand/uL    Total Counted 100     Platelet Estimate Adequate Adequate     Labs, Imaging, & Other studies:   All pertinent labs and imaging studies were personally reviewed    Lab Results   Component Value Date     10/03/2018    K 4 7 10/03/2018     10/03/2018    CO2 26 10/03/2018    ANIONGAP 8 08/01/2015    BUN 25 10/03/2018    CREATININE 1 14 10/03/2018    GLUCOSE 96 08/01/2015    GLUF 93 02/02/2018    CALCIUM 9 7 10/03/2018    CORRECTEDCA 9 9 06/02/2017    AST 16 10/03/2018    ALT 30 10/03/2018    ALKPHOS 97 10/03/2018    PROT 7 6 08/01/2015    BILITOT 0 33 08/01/2015    EGFR 66 10/03/2018       Reviewed and discussed with patient  Assessment and plan: In 1996 patient was diagnosed to have CLL and since then he has received chlorambucil intermittently, 6 cycles of Rituxan and bendamustine in 2014 and no treatment since then even though CLL has developed 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  He is not symptomatic from CLL  WBC counts and lymphocyte counts are slowly rising  No anemia and no thrombocytopenia  No B symptoms    No lymphadenopathy  No hepatosplenomegaly  He is comfortable with present plan of surveillance  He also follows with Dr Guicho Bledsoe at [facility] and he suggested that when treatment becomes indicated he could be tried on ibrutinib followed by venetoclax  Clinical trials and   CAR-T could be other options  Patient has been fully aware of his condition and treatment options  He was surprised to hear that there is no cure  He states this was brought to his attention by Dr Guicho Bledsoe  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  1996:  CLL was diagnosed  Intermittently he received chlorambucil over the years  2014:  6 cycles of Rituxan and bendamustine  Presently under surveillance  No symptoms at present  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 but his disease is not behaving aggressively  Lise Anderson Physical examination and test results are as recorded and discussed in detail  He remains under surveillance  He also follows with Dr Guicho Bledsoe at [facility]  We discussed treatment indications  Condition and plan discussed  Questions answered  Also discussed the importance of eating healthy foods, staying  active and health screening tests  Patient is self-care  He would like to have blood tests every 2 months and visit in 3 months  1  CLL (chronic lymphoid leukemia) in relapse [facility])              Patient voiced understanding and agreement in the discussion  Counseling / Coordination of Care   Greater than 50% of total time was spent with the patient and / or family counseling and / or coordination of care

## 2018-10-31 ENCOUNTER — TELEPHONE (OUTPATIENT)
Dept: HEMATOLOGY ONCOLOGY | Facility: CLINIC | Age: 68
End: 2018-10-31

## 2018-10-31 DIAGNOSIS — C91.12 CLL (CHRONIC LYMPHOID LEUKEMIA) IN RELAPSE (HCC): Primary | ICD-10-CM

## 2018-10-31 DIAGNOSIS — C91.12 CHRONIC LYMPHOID LEUKEMIA IN RELAPSE (HCC): Primary | ICD-10-CM

## 2019-01-09 ENCOUNTER — TRANSCRIBE ORDERS (OUTPATIENT)
Dept: ADMINISTRATIVE | Age: 69
End: 2019-01-09

## 2019-01-10 ENCOUNTER — APPOINTMENT (OUTPATIENT)
Dept: LAB | Age: 69
End: 2019-01-10
Payer: MEDICARE

## 2019-01-10 ENCOUNTER — TELEPHONE (OUTPATIENT)
Dept: HEMATOLOGY ONCOLOGY | Facility: CLINIC | Age: 69
End: 2019-01-10

## 2019-01-10 ENCOUNTER — TRANSCRIBE ORDERS (OUTPATIENT)
Dept: ADMINISTRATIVE | Age: 69
End: 2019-01-10

## 2019-01-10 DIAGNOSIS — C91.12 CLL (CHRONIC LYMPHOID LEUKEMIA) IN RELAPSE (HCC): ICD-10-CM

## 2019-01-10 DIAGNOSIS — D63.0 ANEMIA IN NEOPLASTIC DISEASE: ICD-10-CM

## 2019-01-10 DIAGNOSIS — C91.01 ACUTE LYMPHOBLASTIC LEUKEMIA (ALL) IN REMISSION (HCC): ICD-10-CM

## 2019-01-10 DIAGNOSIS — C91.01 ACUTE LYMPHOBLASTIC LEUKEMIA (ALL) IN REMISSION (HCC): Primary | ICD-10-CM

## 2019-01-10 DIAGNOSIS — C91.12 CHRONIC LYMPHOID LEUKEMIA IN RELAPSE (HCC): ICD-10-CM

## 2019-01-10 LAB
ALBUMIN SERPL BCP-MCNC: 4.3 G/DL (ref 3.5–5)
ALP SERPL-CCNC: 95 U/L (ref 46–116)
ALT SERPL W P-5'-P-CCNC: 32 U/L (ref 12–78)
ANION GAP SERPL CALCULATED.3IONS-SCNC: 7 MMOL/L (ref 4–13)
AST SERPL W P-5'-P-CCNC: 12 U/L (ref 5–45)
BASOPHILS # BLD MANUAL: 0 THOUSAND/UL (ref 0–0.1)
BASOPHILS NFR MAR MANUAL: 0 % (ref 0–1)
BILIRUB SERPL-MCNC: 0.37 MG/DL (ref 0.2–1)
BLASTS NFR BLD MANUAL: 3 %
BUN SERPL-MCNC: 21 MG/DL (ref 5–25)
CALCIUM SERPL-MCNC: 9.5 MG/DL (ref 8.3–10.1)
CHLORIDE SERPL-SCNC: 107 MMOL/L (ref 100–108)
CO2 SERPL-SCNC: 27 MMOL/L (ref 21–32)
CREAT SERPL-MCNC: 1.12 MG/DL (ref 0.6–1.3)
EOSINOPHIL # BLD MANUAL: 0 THOUSAND/UL (ref 0–0.4)
EOSINOPHIL NFR BLD MANUAL: 0 % (ref 0–6)
ERYTHROCYTE [DISTWIDTH] IN BLOOD BY AUTOMATED COUNT: 13.8 % (ref 11.6–15.1)
GFR SERPL CREATININE-BSD FRML MDRD: 68 ML/MIN/1.73SQ M
GLUCOSE SERPL-MCNC: 92 MG/DL (ref 65–140)
HCT VFR BLD AUTO: 39.3 % (ref 36.5–49.3)
HGB BLD-MCNC: 12.2 G/DL (ref 12–17)
IGG SERPL-MCNC: 790 MG/DL (ref 700–1600)
LDH SERPL-CCNC: 199 U/L (ref 81–234)
LYMPHOCYTES # BLD AUTO: 67.24 THOUSAND/UL (ref 0.6–4.47)
LYMPHOCYTES # BLD AUTO: 77 % (ref 14–44)
MACROCYTES BLD QL AUTO: PRESENT
MCH RBC QN AUTO: 31.3 PG (ref 26.8–34.3)
MCHC RBC AUTO-ENTMCNC: 31 G/DL (ref 31.4–37.4)
MCV RBC AUTO: 101 FL (ref 82–98)
MONOCYTES # BLD AUTO: 1.75 THOUSAND/UL (ref 0–1.22)
MONOCYTES NFR BLD: 2 % (ref 4–12)
MYELOCYTES NFR BLD MANUAL: 2 % (ref 0–1)
NEUTROPHILS # BLD MANUAL: 8.73 THOUSAND/UL (ref 1.85–7.62)
NEUTS SEG NFR BLD AUTO: 10 % (ref 43–75)
NRBC BLD AUTO-RTO: 0 /100 WBCS
PLATELET # BLD AUTO: 157 THOUSANDS/UL (ref 149–390)
PLATELET BLD QL SMEAR: ADEQUATE
PMV BLD AUTO: 9.1 FL (ref 8.9–12.7)
POTASSIUM SERPL-SCNC: 4.4 MMOL/L (ref 3.5–5.3)
PROMYELOCYTES NFR BLD MANUAL: 1 % (ref 0–0)
PROT SERPL-MCNC: 7.1 G/DL (ref 6.4–8.2)
RBC # BLD AUTO: 3.9 MILLION/UL (ref 3.88–5.62)
RBC MORPH BLD: PRESENT
SODIUM SERPL-SCNC: 141 MMOL/L (ref 136–145)
URATE SERPL-MCNC: 5 MG/DL (ref 4.2–8)
VARIANT LYMPHS # BLD AUTO: 5 %
WBC # BLD AUTO: 87.33 THOUSAND/UL (ref 4.31–10.16)

## 2019-01-10 PROCEDURE — 83615 LACTATE (LD) (LDH) ENZYME: CPT

## 2019-01-10 PROCEDURE — 85027 COMPLETE CBC AUTOMATED: CPT

## 2019-01-10 PROCEDURE — 84550 ASSAY OF BLOOD/URIC ACID: CPT

## 2019-01-10 PROCEDURE — 82784 ASSAY IGA/IGD/IGG/IGM EACH: CPT

## 2019-01-10 PROCEDURE — 82232 ASSAY OF BETA-2 PROTEIN: CPT

## 2019-01-10 PROCEDURE — 85007 BL SMEAR W/DIFF WBC COUNT: CPT

## 2019-01-10 PROCEDURE — 80053 COMPREHEN METABOLIC PANEL: CPT

## 2019-01-10 PROCEDURE — 36415 COLL VENOUS BLD VENIPUNCTURE: CPT

## 2019-01-11 LAB — B2 MICROGLOB SERPL-MCNC: 2.1 MG/L (ref 0.6–2.4)

## 2019-01-18 ENCOUNTER — OFFICE VISIT (OUTPATIENT)
Dept: HEMATOLOGY ONCOLOGY | Facility: CLINIC | Age: 69
End: 2019-01-18
Payer: MEDICARE

## 2019-01-18 VITALS
HEART RATE: 87 BPM | DIASTOLIC BLOOD PRESSURE: 78 MMHG | SYSTOLIC BLOOD PRESSURE: 136 MMHG | BODY MASS INDEX: 26.66 KG/M2 | WEIGHT: 180 LBS | RESPIRATION RATE: 16 BRPM | OXYGEN SATURATION: 99 % | TEMPERATURE: 97.8 F | HEIGHT: 69 IN

## 2019-01-18 DIAGNOSIS — C91.12 CLL (CHRONIC LYMPHOID LEUKEMIA) IN RELAPSE (HCC): Primary | ICD-10-CM

## 2019-01-18 PROCEDURE — 99214 OFFICE O/P EST MOD 30 MIN: CPT | Performed by: INTERNAL MEDICINE

## 2019-01-18 NOTE — PROGRESS NOTES
HPI:  Patient is under surveillance for 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  WBC and lymphocyte counts have been rising  Patient is not symptomatic from this condition  Occasionally some tiredness  In 1996 patient was diagnosed to have CLL and since then he has received chlorambucil intermittently  over the years, 6 cycles of Rituxan and bendamustine in 2014 and no treatment since then even though CLL has developed 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     His disease is not behaving aggressively  No anemia and no thrombocytopenia  No B symptoms  No lymphadenopathy  No hepatosplenomegaly  He is comfortable with present plan of surveillance  He also follows with Dr Kalli Guadarrama at [facility] and he suggested that when treatment becomes indicated he could be tried on ibrutinib followed by venetoclax  Clinical trials and   CAR-T could be other options  Patient has been fully aware of his condition and treatment options  2014:  6 cycles of Rituxan and bendamustine             Current Outpatient Prescriptions:     fexofenadine-pseudoephedrine (ALLEGRA-D 24) 180-240 MG per 24 hr tablet, Take by mouth, Disp: , Rfl:     No Known Allergies    Oncology History    1996:  CLL was diagnosed  Intermittently he received chlorambucil over the years  2014:  6 cycles of Rituxan and bendamustine  Presently under surveillance  CLL (chronic lymphoid leukemia) in relapse [facility])     Chemotherapy        1996:  CLL was diagnosed  Intermittent therapy with chlorambucil  2014:  6 cycles of Rituxan and bendamustine  Presently under surveillance  ROS:  01/18/19 Reviewed 13 systems:  Presently no neurological, cardiac, pulmonary, GI and  symptoms  No  symptoms like fever, chills, bleeding, bone pains, skin rash, weight loss, arthritic symptoms,   weakness, numbness,  claudication and gait problem  No frequent infections  Not unusually sensitive to heat or cold  No swelling of the ankles  No swollen glands  Patient is anxious   Other symptoms are in HPI        /78 (BP Location: Left arm, Patient Position: Sitting, Cuff Size: Adult)   Pulse 87   Temp 97 8 °F (36 6 °C)   Resp 16   Ht 5' 9" (1 753 m)   Wt 81 6 kg (180 lb)   SpO2 99%   BMI 26 58 kg/m²     Physical Exam:    Patient is alert and oriented  He is not in distress  Vital signs are stable  No icterus  No oral thrush  There is no palpable neck mass  Lung fields are clear to percussion and auscultation  Heart rate is regular  Abdomen is soft and non tender, no palpable abdominal mass, no ascites, no edema of ankles, no calf tenderness, no focal neurological deficit, no skin rash, no palpable lymphadenopathy, good arterial pulses, no clubbing  Patient is anxious  Performance status 0  IMAGING:      LABS:  Results for orders placed or performed in visit on 01/10/19   IgG   Result Value Ref Range     0 700 0-1,600 0 mg/dL   LD,Blood   Result Value Ref Range     81 - 234 U/L     Labs, Imaging, & Other studies:   All pertinent labs and imaging studies were personally reviewed    Lab Results   Component Value Date     08/01/2015    K 4 4 01/10/2019     01/10/2019    CO2 27 01/10/2019    ANIONGAP 8 08/01/2015    BUN 21 01/10/2019    CREATININE 1 12 01/10/2019    GLUCOSE 96 08/01/2015    GLUF 93 02/02/2018    CALCIUM 9 5 01/10/2019    CORRECTEDCA 9 9 06/02/2017    AST 12 01/10/2019    ALT 32 01/10/2019    ALKPHOS 95 01/10/2019    PROT 7 6 08/01/2015    BILITOT 0 33 08/01/2015    EGFR 68 01/10/2019     Lab Results   Component Value Date    WBC 87 33 (HH) 01/10/2019    HGB 12 2 01/10/2019    HCT 39 3 01/10/2019     (H) 01/10/2019     01/10/2019    ABSOLUTE LYMPHOCYTE COUNT 67,240  3% Blast cells 5% Atypical lymphocytes and 1% Promyelocytes 8  2% Myelocyte    Reviewed and discussed with patient  Assessment and plan:   Patient is under surveillance for 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  WBC and lymphocyte counts have been rising  Patient is not symptomatic from this condition  In 1996 patient was diagnosed to have CLL and since then he has received chlorambucil intermittently  over the years, 6 cycles of Rituxan and bendamustine in 2014 and no treatment since then even though CLL has developed 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     His disease is not behaving aggressively  No anemia and no thrombocytopenia  No B symptoms  No lymphadenopathy  No hepatosplenomegaly  He is comfortable with present plan of surveillance  He also follows with Dr Kathi Ga at [facility] and he suggested that when treatment becomes indicated he could be tried on ibrutinib followed by venetoclax  Clinical trials and   CAR-T could be other options  Patient has been fully aware of his condition and treatment options  2014:  6 cycles of Rituxan and bendamustine        Physical examination and test results are as recorded and discussed in detail  We discussed bone marrow test because of immature cells including 3% blast cells in the peripheral blood  He preferred to wait  He preferred to remain under surveillance  He has agreed to come back in 2 months for more close monitoring of his condition and counts  He  follows with Dr Kathi Ga at [facility]  Condition and plan discussed  Questions answered  Plan is surveillance  Goal is monitoring of patient's condition and blood tests  Also discussed the importance of eating healthy foods, staying  active and health screening tests  Patient is capable of self-care  1  CLL (chronic lymphoid leukemia) in relapse (HCC)    - CBC and differential; Future  - Comprehensive metabolic panel; Future  - LD,Blood; Future  - IgG; Future  - Uric acid; Future        Patient voiced understanding and agreement in the discussion  Counseling / Coordination of Care   Greater than 50% of total time was spent with the patient and / or family counseling and / or coordination of care

## 2019-01-18 NOTE — LETTER
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  WBC and lymphocyte counts have been rising  Patient is not symptomatic from this condition  Occasionally some tiredness  In 1996 patient was diagnosed to have CLL and since then he has received chlorambucil intermittently  over the years, 6 cycles of Rituxan and bendamustine in 2014 and no treatment since then even though CLL has developed 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     His disease is not behaving aggressively  No anemia and no thrombocytopenia  No B symptoms  No lymphadenopathy  No hepatosplenomegaly  He is comfortable with present plan of surveillance  He also follows with Dr Kathi Ga at [facility] and he suggested that when treatment becomes indicated he could be tried on ibrutinib followed by venetoclax  Clinical trials and   CAR-T could be other options  Patient has been fully aware of his condition and treatment options  2014:  6 cycles of Rituxan and bendamustine             Current Outpatient Prescriptions:     fexofenadine-pseudoephedrine (ALLEGRA-D 24) 180-240 MG per 24 hr tablet, Take by mouth, Disp: , Rfl:     No Known Allergies    Oncology History    1996:  CLL was diagnosed  Intermittently he received chlorambucil over the years  2014:  6 cycles of Rituxan and bendamustine  Presently under surveillance          CLL (chronic lymphoid leukemia) in relapse [facility])     Chemotherapy 1996:  CLL was diagnosed  Intermittent therapy with chlorambucil  2014:  6 cycles of Rituxan and bendamustine  Presently under surveillance  ROS:  01/18/19 Reviewed 13 systems:  Presently no neurological, cardiac, pulmonary, GI and  symptoms  No  symptoms like fever, chills, bleeding, bone pains, skin rash, weight loss, arthritic symptoms,   weakness, numbness,  claudication and gait problem  No frequent infections  Not unusually sensitive to heat or cold  No swelling of the ankles  No swollen glands  Patient is anxious  Other symptoms are in HPI        /78 (BP Location: Left arm, Patient Position: Sitting, Cuff Size: Adult)   Pulse 87   Temp 97 8 °F (36 6 °C)   Resp 16   Ht 5' 9" (1 753 m)   Wt 81 6 kg (180 lb)   SpO2 99%   BMI 26 58 kg/m²      Physical Exam:    Patient is alert and oriented  He is not in distress  Vital signs are stable  No icterus  No oral thrush  There is no palpable neck mass  Lung fields are clear to percussion and auscultation  Heart rate is regular  Abdomen is soft and non tender, no palpable abdominal mass, no ascites, no edema of ankles, no calf tenderness, no focal neurological deficit, no skin rash, no palpable lymphadenopathy, good arterial pulses, no clubbing  Patient is anxious  Performance status 0      IMAGING:      LABS:  Results for orders placed or performed in visit on 01/10/19   IgG   Result Value Ref Range     0 700 0-1,600 0 mg/dL   LD,Blood   Result Value Ref Range     81 - 234 U/L     Labs, Imaging, & Other studies:   All pertinent labs and imaging studies were personally reviewed    Lab Results   Component Value Date     08/01/2015    K 4 4 01/10/2019     01/10/2019    CO2 27 01/10/2019    ANIONGAP 8 08/01/2015    BUN 21 01/10/2019    CREATININE 1 12 01/10/2019    GLUCOSE 96 08/01/2015    GLUF 93 02/02/2018    CALCIUM 9 5 01/10/2019    CORRECTEDCA 9 9 06/02/2017    AST 12 01/10/2019    ALT 32 01/10/2019 ALKPHOS 95 01/10/2019    PROT 7 6 08/01/2015    BILITOT 0 33 08/01/2015    EGFR 68 01/10/2019     Lab Results   Component Value Date    WBC 87 33 (HH) 01/10/2019    HGB 12 2 01/10/2019    HCT 39 3 01/10/2019     (H) 01/10/2019     01/10/2019    ABSOLUTE LYMPHOCYTE COUNT 67,240  3% Blast cells 5% Atypical lymphocytes and 1% Promyelocytes 8  2% Myelocyte    Reviewed and discussed with patient  Assessment and plan:   Patient is under surveillance for 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  WBC and lymphocyte counts have been rising  Patient is not symptomatic from this condition  In 1996 patient was diagnosed to have CLL and since then he has received chlorambucil intermittently  over the years, 6 cycles of Rituxan and bendamustine in 2014 and no treatment since then even though CLL has developed 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     His disease is not behaving aggressively  No anemia and no thrombocytopenia  No B symptoms  No lymphadenopathy  No hepatosplenomegaly  He is comfortable with present plan of surveillance  He also follows with Dr Kalli Guadarrama at [facility] and he suggested that when treatment becomes indicated he could be tried on ibrutinib followed by venetoclax  Clinical trials and   CAR-T could be other options  Patient has been fully aware of his condition and treatment options  2014:  6 cycles of Rituxan and bendamustine        Physical examination and test results are as recorded and discussed in detail  We discussed bone marrow test because of immature cells including 3% blast cells in the peripheral blood  He preferred to wait  He preferred to remain under surveillance  He has agreed to come back in 2 months for more close monitoring of his condition and counts  He  follows with Dr Kalli Guadarrama at [facility]  Condition and plan discussed  Questions answered  Plan is surveillance  Goal is monitoring of patient's condition and blood tests    Also discussed the importance of eating healthy foods, staying  active and health screening tests  Patient is capable of self-care  1  CLL (chronic lymphoid leukemia) in relapse (HCC)    - CBC and differential; Future  - Comprehensive metabolic panel; Future  - LD,Blood; Future  - IgG; Future  - Uric acid; Future        Patient voiced understanding and agreement in the discussion  Counseling / Coordination of Care   Greater than 50% of total time was spent with the patient and / or family counseling and / or coordination of care

## 2019-02-21 ENCOUNTER — TELEPHONE (OUTPATIENT)
Dept: HEMATOLOGY ONCOLOGY | Facility: CLINIC | Age: 69
End: 2019-02-21

## 2019-02-21 NOTE — TELEPHONE ENCOUNTER
[facility] and [facility] for pt to reschedule his appt with Darlene Severe on 3/21  Darlene Severe will not be available that morning but she will be available in the afternoon

## 2019-03-10 ENCOUNTER — TRANSCRIBE ORDERS (OUTPATIENT)
Dept: ADMINISTRATIVE | Age: 69
End: 2019-03-10

## 2019-03-10 DIAGNOSIS — D63.0 ANEMIA IN NEOPLASTIC DISEASE: ICD-10-CM

## 2019-03-10 DIAGNOSIS — C91.10 ANEMIA ASSOCIATED WITH CHRONIC LYMPHOCYTIC LEUKEMIA TREATED WITH ERYTHROPOIETIN (HCC): Primary | ICD-10-CM

## 2019-03-10 DIAGNOSIS — D63.0 ANEMIA ASSOCIATED WITH CHRONIC LYMPHOCYTIC LEUKEMIA TREATED WITH ERYTHROPOIETIN (HCC): Primary | ICD-10-CM

## 2019-03-11 ENCOUNTER — TELEPHONE (OUTPATIENT)
Dept: HEMATOLOGY ONCOLOGY | Facility: CLINIC | Age: 69
End: 2019-03-11

## 2019-03-11 ENCOUNTER — APPOINTMENT (OUTPATIENT)
Dept: LAB | Age: 69
End: 2019-03-11
Payer: MEDICARE

## 2019-03-11 DIAGNOSIS — D63.0 ANEMIA ASSOCIATED WITH CHRONIC LYMPHOCYTIC LEUKEMIA TREATED WITH ERYTHROPOIETIN (HCC): ICD-10-CM

## 2019-03-11 DIAGNOSIS — D63.0 ANEMIA IN NEOPLASTIC DISEASE: ICD-10-CM

## 2019-03-11 DIAGNOSIS — C91.10 ANEMIA ASSOCIATED WITH CHRONIC LYMPHOCYTIC LEUKEMIA TREATED WITH ERYTHROPOIETIN (HCC): ICD-10-CM

## 2019-03-11 DIAGNOSIS — C91.12 CLL (CHRONIC LYMPHOID LEUKEMIA) IN RELAPSE (HCC): ICD-10-CM

## 2019-03-11 LAB
ALBUMIN SERPL BCP-MCNC: 4.3 G/DL (ref 3.5–5)
ALP SERPL-CCNC: 95 U/L (ref 46–116)
ALT SERPL W P-5'-P-CCNC: 30 U/L (ref 12–78)
ANION GAP SERPL CALCULATED.3IONS-SCNC: 7 MMOL/L (ref 4–13)
AST SERPL W P-5'-P-CCNC: 16 U/L (ref 5–45)
BASOPHILS # BLD MANUAL: 0 THOUSAND/UL (ref 0–0.1)
BASOPHILS NFR MAR MANUAL: 0 % (ref 0–1)
BILIRUB SERPL-MCNC: 0.23 MG/DL (ref 0.2–1)
BUN SERPL-MCNC: 25 MG/DL (ref 5–25)
CALCIUM SERPL-MCNC: 9.4 MG/DL (ref 8.3–10.1)
CHLORIDE SERPL-SCNC: 110 MMOL/L (ref 100–108)
CO2 SERPL-SCNC: 26 MMOL/L (ref 21–32)
CREAT SERPL-MCNC: 1.18 MG/DL (ref 0.6–1.3)
EOSINOPHIL # BLD MANUAL: 0 THOUSAND/UL (ref 0–0.4)
EOSINOPHIL NFR BLD MANUAL: 0 % (ref 0–6)
ERYTHROCYTE [DISTWIDTH] IN BLOOD BY AUTOMATED COUNT: 13.7 % (ref 11.6–15.1)
GFR SERPL CREATININE-BSD FRML MDRD: 63 ML/MIN/1.73SQ M
GLUCOSE SERPL-MCNC: 92 MG/DL (ref 65–140)
HCT VFR BLD AUTO: 40.3 % (ref 36.5–49.3)
HGB BLD-MCNC: 12.5 G/DL (ref 12–17)
IGG SERPL-MCNC: 873 MG/DL (ref 700–1600)
LDH SERPL-CCNC: 220 U/L (ref 81–234)
LYMPHOCYTES # BLD AUTO: 54.39 THOUSAND/UL (ref 0.6–4.47)
LYMPHOCYTES # BLD AUTO: 60 % (ref 14–44)
MCH RBC QN AUTO: 31.7 PG (ref 26.8–34.3)
MCHC RBC AUTO-ENTMCNC: 31 G/DL (ref 31.4–37.4)
MCV RBC AUTO: 102 FL (ref 82–98)
MONOCYTES # BLD AUTO: 1.81 THOUSAND/UL (ref 0–1.22)
MONOCYTES NFR BLD: 2 % (ref 4–12)
NEUTROPHILS # BLD MANUAL: 6.35 THOUSAND/UL (ref 1.85–7.62)
NEUTS SEG NFR BLD AUTO: 7 % (ref 43–75)
NRBC BLD AUTO-RTO: 0 /100 WBCS
PLATELET # BLD AUTO: 174 THOUSANDS/UL (ref 149–390)
PLATELET BLD QL SMEAR: ADEQUATE
PMV BLD AUTO: 9.2 FL (ref 8.9–12.7)
POTASSIUM SERPL-SCNC: 4.8 MMOL/L (ref 3.5–5.3)
PROT SERPL-MCNC: 7.3 G/DL (ref 6.4–8.2)
RBC # BLD AUTO: 3.94 MILLION/UL (ref 3.88–5.62)
SMUDGE CELLS BLD QL SMEAR: PRESENT
SODIUM SERPL-SCNC: 143 MMOL/L (ref 136–145)
URATE SERPL-MCNC: 5.2 MG/DL (ref 4.2–8)
VARIANT LYMPHS # BLD AUTO: 31 %
WBC # BLD AUTO: 90.65 THOUSAND/UL (ref 4.31–10.16)

## 2019-03-11 PROCEDURE — 83615 LACTATE (LD) (LDH) ENZYME: CPT

## 2019-03-11 PROCEDURE — 36415 COLL VENOUS BLD VENIPUNCTURE: CPT

## 2019-03-11 PROCEDURE — 80053 COMPREHEN METABOLIC PANEL: CPT

## 2019-03-11 PROCEDURE — 82784 ASSAY IGA/IGD/IGG/IGM EACH: CPT

## 2019-03-11 PROCEDURE — 85007 BL SMEAR W/DIFF WBC COUNT: CPT

## 2019-03-11 PROCEDURE — 82232 ASSAY OF BETA-2 PROTEIN: CPT

## 2019-03-11 PROCEDURE — 84550 ASSAY OF BLOOD/URIC ACID: CPT

## 2019-03-11 PROCEDURE — 85027 COMPLETE CBC AUTOMATED: CPT

## 2019-03-12 LAB — B2 MICROGLOB SERPL-MCNC: 1.9 MG/L (ref 0.6–2.4)

## 2019-03-21 ENCOUNTER — OFFICE VISIT (OUTPATIENT)
Dept: HEMATOLOGY ONCOLOGY | Facility: CLINIC | Age: 69
End: 2019-03-21
Payer: MEDICARE

## 2019-03-21 VITALS
TEMPERATURE: 96 F | DIASTOLIC BLOOD PRESSURE: 80 MMHG | BODY MASS INDEX: 27.99 KG/M2 | HEIGHT: 69 IN | RESPIRATION RATE: 18 BRPM | WEIGHT: 189 LBS | HEART RATE: 69 BPM | SYSTOLIC BLOOD PRESSURE: 110 MMHG | OXYGEN SATURATION: 99 %

## 2019-03-21 DIAGNOSIS — C91.12 CLL (CHRONIC LYMPHOID LEUKEMIA) IN RELAPSE (HCC): Primary | ICD-10-CM

## 2019-03-21 PROCEDURE — 99214 OFFICE O/P EST MOD 30 MIN: CPT | Performed by: PHYSICIAN ASSISTANT

## 2019-03-21 RX ORDER — IMIQUIMOD 12.5 MG/.25G
CREAM TOPICAL
Refills: 1 | Status: ON HOLD | COMMUNITY
Start: 2019-03-08 | End: 2020-04-14

## 2019-03-21 NOTE — PROGRESS NOTES
Hematology/Oncology Outpatient Follow-up  Andie Troncoso 80 y o  male 1950 [id number]    Date:  3/21/2019      Assessment and Plan:    1  CLL (chronic lymphoid leukemia) in relapse [facility])  52-year-old male with history of CLL presents for follow-up  Patient is asymptomatic  Physical exam unremarkable  Labs remain relatively stable  CBC from March 2019 does show 31% atypical lymphs which is increased compared to prior  Patient to repeat CBC in 1 month  Call to review results  During visit patient states that he was told his last visit that he was progressing to develop acute leukemia  Discussed with patient that this is not the case  Discussed that patient misinterpreted this  Patient has 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 which is a unfavorable prognostic factor in can indicate a faster progressing element of CLL  He understands this at this time  Discussed with patient that monitoring is still recommended in that treatment at present is not needed  He still is frustrated with the fact that CLL cannot be cured  Discussed that there are multiple treatment options if needed in her  He also follows up with Bret by yearly  Dr Luis Lowe also spoke with patient during the visit today  - CBC and differential; Standing  - Comprehensive metabolic panel; Standing  - Uric acid; Standing  - LD,Blood; Standing  - IgG; Standing  - CBC and differential  - Comprehensive metabolic panel  - Uric acid  - LD,Blood  - IgG    HPI:  52-year-old male with history of CLL  He was diagnosed with CLL in 1996  He had received chlorambucil intermittently, 6 cycles of Rituxan and bendamustine in 2014 in no treatment since  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  WBC count presently has been slowly increasing  Patient also follows at the [address] [place] [address] with Dr Iza Daniel  He suggested that 1 treatment becomes indicated to try either ibrutinib followed by banana clicks, clinical trials, CAR-T therapy  Interval history:    ROS: Review of Systems   Constitutional: Negative for appetite change, chills, fatigue, fever and unexpected weight change  HENT: Negative for mouth sores and nosebleeds  Respiratory: Negative for cough and shortness of breath  Cardiovascular: Negative for chest pain, palpitations and leg swelling  Gastrointestinal: Negative for abdominal pain, blood in stool, constipation, diarrhea, nausea and vomiting  Genitourinary: Negative for difficulty urinating, dysuria and hematuria  Musculoskeletal: Negative for arthralgias  Skin: Negative  Neurological: Negative for dizziness, weakness, light-headedness, numbness and headaches  Hematological: Negative  Psychiatric/Behavioral: Negative  Past Medical History:   Diagnosis Date    Lymphocytic leukemia ([facility] [address] )        Past Surgical History:   Procedure Laterality Date    SHOULDER SURGERY      TONSILLECTOMY         Social History     Socioeconomic History    Marital status:       Spouse name: Not on file    Number of children: Not on file    Years of education: Not on file    Highest education level: Not on file   Occupational History    Not on file   Social Needs    Financial resource strain: Not on file    Food insecurity:     Worry: Not on file     Inability: Not on file    Transportation needs:     Medical: Not on file     Non-medical: Not on file   Tobacco Use    Smoking status: Never Smoker    Smokeless tobacco: Never Used   Substance and Sexual Activity    Alcohol use: No    Drug use: No    Sexual activity: Not on file   Lifestyle    Physical activity:     Days per week: Not on file     Minutes per session: Not on file    Stress: Not on file   Relationships    Social connections:     Talks on phone: Not on file     Gets together: Not on file     Attends [employer] service: Not on file     Active member of club or organization: Not on file     Attends meetings of clubs or organizations: Not on file     Relationship status: Not on file    Intimate partner violence:     Fear of current or ex partner: Not on file     Emotionally abused: Not on file     Physically abused: Not on file     Forced sexual activity: Not on file   Other Topics Concern    Not on file   Social History Narrative    Not on file       Family History   Problem Relation Age of Onset    No Known Problems Mother     No Known Problems Father        No Known Allergies      Current Outpatient Medications:     fexofenadine-pseudoephedrine (ALLEGRA-D 24) 180-240 MG per 24 hr tablet, Take by mouth, Disp: , Rfl:     imiquimod (ALDARA) 5 % cream, TORIN 1/2 PACKET TO SKIN QHS 5 NIGHTS A WEEK  [address] OFF IN THE MORNING, Disp: , Rfl: 1      Physical Exam:  /80 (BP Location: Left arm)   Pulse 69   Temp (!) 96 °F (35 6 °C) (Tympanic)   Resp 18   Ht 5' 9" (1 753 m)   Wt 85 7 kg (189 lb)   SpO2 99%   BMI 27 91 kg/m²     Physical Exam   Constitutional: He is oriented to person, place, and time  He appears well-developed and well-nourished  No distress  HENT:   Head: Normocephalic and atraumatic  Eyes: Conjunctivae are normal  No scleral icterus  Neck: Normal range of motion  Neck supple  Cardiovascular: Normal rate, regular rhythm and normal heart sounds  No murmur heard  Pulmonary/Chest: Effort normal and breath sounds normal  No respiratory distress  Abdominal: Soft  There is no splenomegaly or hepatomegaly  There is no tenderness  Musculoskeletal: Normal range of motion  He exhibits no edema or tenderness  Lymphadenopathy:        Head (right side): No submental, no submandibular and no tonsillar adenopathy present  Head (left side): No submental, no submandibular and no tonsillar adenopathy present  He has no cervical adenopathy  He has no axillary adenopathy  Right: No inguinal and no supraclavicular adenopathy present  Left: No inguinal and no supraclavicular adenopathy present  Neurological: He is alert and oriented to person, place, and time  No cranial nerve deficit  Skin: Skin is warm and dry  Psychiatric: He has a normal mood and affect  Vitals reviewed  Labs:  Lab Results   Component Value Date    WBC 90 65 (HH) 03/11/2019    HGB 12 5 03/11/2019    HCT 40 3 03/11/2019     (H) 03/11/2019     03/11/2019     Lab Results   Component Value Date     08/01/2015    K 4 8 03/11/2019     (H) 03/11/2019    CO2 26 03/11/2019    ANIONGAP 8 08/01/2015    BUN 25 03/11/2019    CREATININE 1 18 03/11/2019    GLUCOSE 96 08/01/2015    GLUF 93 02/02/2018    CALCIUM 9 4 03/11/2019    CORRECTEDCA 9 9 06/02/2017    AST 16 03/11/2019    ALT 30 03/11/2019    ALKPHOS 95 03/11/2019    PROT 7 6 08/01/2015    BILITOT 0 33 08/01/2015    EGFR 63 03/11/2019     I have spent 25 minutes with Patient  today in which greater than 50% of this time was spent in counseling/coordination of care regarding Diagnostic results, Prognosis, Intructions for management, Patient and family education and Impressions  Patient voiced understanding and agreement in the above discussion  Aware to contact our office with questions/symptoms in the interim

## 2019-04-11 ENCOUNTER — TRANSCRIBE ORDERS (OUTPATIENT)
Dept: ADMINISTRATIVE | Age: 69
End: 2019-04-11

## 2019-04-12 ENCOUNTER — APPOINTMENT (OUTPATIENT)
Dept: LAB | Age: 69
End: 2019-04-12
Payer: MEDICARE

## 2019-04-12 ENCOUNTER — TRANSCRIBE ORDERS (OUTPATIENT)
Dept: ADMINISTRATIVE | Age: 69
End: 2019-04-12

## 2019-04-12 ENCOUNTER — TELEPHONE (OUTPATIENT)
Dept: HEMATOLOGY ONCOLOGY | Facility: CLINIC | Age: 69
End: 2019-04-12

## 2019-04-12 DIAGNOSIS — C91.12 CLL (CHRONIC LYMPHOID LEUKEMIA) IN RELAPSE (HCC): ICD-10-CM

## 2019-04-12 DIAGNOSIS — C91.12 CHRONIC LYMPHOID LEUKEMIA IN RELAPSE (HCC): ICD-10-CM

## 2019-04-12 DIAGNOSIS — C91.12 CHRONIC LYMPHOID LEUKEMIA IN RELAPSE (HCC): Primary | ICD-10-CM

## 2019-04-12 LAB — IGG SERPL-MCNC: 888 MG/DL (ref 700–1600)

## 2019-04-12 PROCEDURE — 82784 ASSAY IGA/IGD/IGG/IGM EACH: CPT

## 2019-04-16 ENCOUNTER — TELEPHONE (OUTPATIENT)
Dept: HEMATOLOGY ONCOLOGY | Facility: CLINIC | Age: 69
End: 2019-04-16

## 2019-05-10 ENCOUNTER — APPOINTMENT (OUTPATIENT)
Dept: LAB | Age: 69
End: 2019-05-10
Payer: MEDICARE

## 2019-05-10 ENCOUNTER — TRANSCRIBE ORDERS (OUTPATIENT)
Dept: ADMINISTRATIVE | Age: 69
End: 2019-05-10

## 2019-05-10 DIAGNOSIS — C91.12 CHRONIC LYMPHOID LEUKEMIA IN RELAPSE (HCC): ICD-10-CM

## 2019-05-10 DIAGNOSIS — C91.12 CHRONIC LYMPHOID LEUKEMIA IN RELAPSE (HCC): Primary | ICD-10-CM

## 2019-05-10 LAB
ALBUMIN SERPL BCP-MCNC: 4.2 G/DL (ref 3.5–5)
ALP SERPL-CCNC: 92 U/L (ref 46–116)
ALT SERPL W P-5'-P-CCNC: 30 U/L (ref 12–78)
ANION GAP SERPL CALCULATED.3IONS-SCNC: 4 MMOL/L (ref 4–13)
AST SERPL W P-5'-P-CCNC: 22 U/L (ref 5–45)
BASOPHILS # BLD MANUAL: 0 THOUSAND/UL (ref 0–0.1)
BASOPHILS NFR MAR MANUAL: 0 % (ref 0–1)
BILIRUB SERPL-MCNC: 0.26 MG/DL (ref 0.2–1)
BUN SERPL-MCNC: 20 MG/DL (ref 5–25)
CALCIUM SERPL-MCNC: 8.9 MG/DL (ref 8.3–10.1)
CHLORIDE SERPL-SCNC: 110 MMOL/L (ref 100–108)
CO2 SERPL-SCNC: 28 MMOL/L (ref 21–32)
CREAT SERPL-MCNC: 1.23 MG/DL (ref 0.6–1.3)
EOSINOPHIL # BLD MANUAL: 0 THOUSAND/UL (ref 0–0.4)
EOSINOPHIL NFR BLD MANUAL: 0 % (ref 0–6)
ERYTHROCYTE [DISTWIDTH] IN BLOOD BY AUTOMATED COUNT: 13.8 % (ref 11.6–15.1)
GFR SERPL CREATININE-BSD FRML MDRD: 60 ML/MIN/1.73SQ M
GLUCOSE SERPL-MCNC: 103 MG/DL (ref 65–140)
HCT VFR BLD AUTO: 39.1 % (ref 36.5–49.3)
HGB BLD-MCNC: 12 G/DL (ref 12–17)
IGG SERPL-MCNC: 799 MG/DL (ref 700–1600)
LDH SERPL-CCNC: 253 U/L (ref 81–234)
LYMPHOCYTES # BLD AUTO: 73.68 THOUSAND/UL (ref 0.6–4.47)
LYMPHOCYTES # BLD AUTO: 89 % (ref 14–44)
MACROCYTES BLD QL AUTO: PRESENT
MCH RBC QN AUTO: 31.5 PG (ref 26.8–34.3)
MCHC RBC AUTO-ENTMCNC: 30.7 G/DL (ref 31.4–37.4)
MCV RBC AUTO: 103 FL (ref 82–98)
MONOCYTES # BLD AUTO: 0.83 THOUSAND/UL (ref 0–1.22)
MONOCYTES NFR BLD: 1 % (ref 4–12)
NEUTROPHILS # BLD MANUAL: 3.31 THOUSAND/UL (ref 1.85–7.62)
NEUTS SEG NFR BLD AUTO: 4 % (ref 43–75)
NRBC BLD AUTO-RTO: 0 /100 WBCS
OVALOCYTES BLD QL SMEAR: PRESENT
PLATELET # BLD AUTO: 152 THOUSANDS/UL (ref 149–390)
PLATELET BLD QL SMEAR: ADEQUATE
PMV BLD AUTO: 9.4 FL (ref 8.9–12.7)
POIKILOCYTOSIS BLD QL SMEAR: PRESENT
POTASSIUM SERPL-SCNC: 4.9 MMOL/L (ref 3.5–5.3)
PROT SERPL-MCNC: 7 G/DL (ref 6.4–8.2)
RBC # BLD AUTO: 3.81 MILLION/UL (ref 3.88–5.62)
RBC MORPH BLD: PRESENT
SMUDGE CELLS BLD QL SMEAR: PRESENT
SODIUM SERPL-SCNC: 142 MMOL/L (ref 136–145)
URATE SERPL-MCNC: 5.7 MG/DL (ref 4.2–8)
VARIANT LYMPHS # BLD AUTO: 6 %
WBC # BLD AUTO: 82.79 THOUSAND/UL (ref 4.31–10.16)

## 2019-05-10 PROCEDURE — 84550 ASSAY OF BLOOD/URIC ACID: CPT | Performed by: PHYSICIAN ASSISTANT

## 2019-05-10 PROCEDURE — 83615 LACTATE (LD) (LDH) ENZYME: CPT | Performed by: PHYSICIAN ASSISTANT

## 2019-05-10 PROCEDURE — 85027 COMPLETE CBC AUTOMATED: CPT | Performed by: PHYSICIAN ASSISTANT

## 2019-05-10 PROCEDURE — 80053 COMPREHEN METABOLIC PANEL: CPT | Performed by: PHYSICIAN ASSISTANT

## 2019-05-10 PROCEDURE — 82784 ASSAY IGA/IGD/IGG/IGM EACH: CPT | Performed by: PHYSICIAN ASSISTANT

## 2019-05-10 PROCEDURE — 85007 BL SMEAR W/DIFF WBC COUNT: CPT | Performed by: PHYSICIAN ASSISTANT

## 2019-05-10 PROCEDURE — 36415 COLL VENOUS BLD VENIPUNCTURE: CPT | Performed by: PHYSICIAN ASSISTANT

## 2019-05-24 ENCOUNTER — OFFICE VISIT (OUTPATIENT)
Dept: HEMATOLOGY ONCOLOGY | Facility: CLINIC | Age: 69
End: 2019-05-24
Payer: MEDICARE

## 2019-05-24 VITALS
OXYGEN SATURATION: 98 % | SYSTOLIC BLOOD PRESSURE: 138 MMHG | DIASTOLIC BLOOD PRESSURE: 78 MMHG | HEIGHT: 69 IN | HEART RATE: 111 BPM | WEIGHT: 182.9 LBS | RESPIRATION RATE: 18 BRPM | BODY MASS INDEX: 27.09 KG/M2 | TEMPERATURE: 98.7 F

## 2019-05-24 DIAGNOSIS — C91.12 CLL (CHRONIC LYMPHOID LEUKEMIA) IN RELAPSE (HCC): Primary | ICD-10-CM

## 2019-05-24 PROCEDURE — 99214 OFFICE O/P EST MOD 30 MIN: CPT | Performed by: INTERNAL MEDICINE

## 2019-07-12 ENCOUNTER — TRANSCRIBE ORDERS (OUTPATIENT)
Dept: ADMINISTRATIVE | Age: 69
End: 2019-07-12

## 2019-07-12 ENCOUNTER — TELEPHONE (OUTPATIENT)
Dept: HEMATOLOGY ONCOLOGY | Facility: CLINIC | Age: 69
End: 2019-07-12

## 2019-07-12 ENCOUNTER — APPOINTMENT (OUTPATIENT)
Dept: LAB | Age: 69
End: 2019-07-12
Payer: MEDICARE

## 2019-07-12 DIAGNOSIS — C91.12 CLL (CHRONIC LYMPHOID LEUKEMIA) IN RELAPSE (HCC): ICD-10-CM

## 2019-07-12 LAB
ALBUMIN SERPL BCP-MCNC: 4.2 G/DL (ref 3.5–5)
ALP SERPL-CCNC: 112 U/L (ref 46–116)
ALT SERPL W P-5'-P-CCNC: 25 U/L (ref 12–78)
ANION GAP SERPL CALCULATED.3IONS-SCNC: 4 MMOL/L (ref 4–13)
AST SERPL W P-5'-P-CCNC: 19 U/L (ref 5–45)
BASOPHILS # BLD MANUAL: 0 THOUSAND/UL (ref 0–0.1)
BASOPHILS NFR MAR MANUAL: 0 % (ref 0–1)
BILIRUB SERPL-MCNC: 0.34 MG/DL (ref 0.2–1)
BUN SERPL-MCNC: 20 MG/DL (ref 5–25)
CALCIUM SERPL-MCNC: 9.7 MG/DL (ref 8.3–10.1)
CHLORIDE SERPL-SCNC: 104 MMOL/L (ref 100–108)
CO2 SERPL-SCNC: 28 MMOL/L (ref 21–32)
CREAT SERPL-MCNC: 1.13 MG/DL (ref 0.6–1.3)
EOSINOPHIL # BLD MANUAL: 1.01 THOUSAND/UL (ref 0–0.4)
EOSINOPHIL NFR BLD MANUAL: 1 % (ref 0–6)
ERYTHROCYTE [DISTWIDTH] IN BLOOD BY AUTOMATED COUNT: 13.7 % (ref 11.6–15.1)
GFR SERPL CREATININE-BSD FRML MDRD: 67 ML/MIN/1.73SQ M
GLUCOSE SERPL-MCNC: 92 MG/DL (ref 65–140)
HCT VFR BLD AUTO: 40.9 % (ref 36.5–49.3)
HGB BLD-MCNC: 12.6 G/DL (ref 12–17)
IGA SERPL-MCNC: 104 MG/DL (ref 70–400)
IGG SERPL-MCNC: 780 MG/DL (ref 700–1600)
IGM SERPL-MCNC: 20 MG/DL (ref 40–230)
LDH SERPL-CCNC: 248 U/L (ref 81–234)
LYMPHOCYTES # BLD AUTO: 75 % (ref 14–44)
LYMPHOCYTES # BLD AUTO: 76.04 THOUSAND/UL (ref 0.6–4.47)
MACROCYTES BLD QL AUTO: PRESENT
MCH RBC QN AUTO: 31 PG (ref 26.8–34.3)
MCHC RBC AUTO-ENTMCNC: 30.8 G/DL (ref 31.4–37.4)
MCV RBC AUTO: 101 FL (ref 82–98)
MONOCYTES # BLD AUTO: 4.06 THOUSAND/UL (ref 0–1.22)
MONOCYTES NFR BLD: 4 % (ref 4–12)
NEUTROPHILS # BLD MANUAL: 5.07 THOUSAND/UL (ref 1.85–7.62)
NEUTS SEG NFR BLD AUTO: 5 % (ref 43–75)
NRBC BLD AUTO-RTO: 0 /100 WBCS
OVALOCYTES BLD QL SMEAR: PRESENT
PLATELET # BLD AUTO: 180 THOUSANDS/UL (ref 149–390)
PLATELET BLD QL SMEAR: ADEQUATE
PMV BLD AUTO: 8.9 FL (ref 8.9–12.7)
POIKILOCYTOSIS BLD QL SMEAR: PRESENT
POTASSIUM SERPL-SCNC: 5.1 MMOL/L (ref 3.5–5.3)
PROT SERPL-MCNC: 7.3 G/DL (ref 6.4–8.2)
RBC # BLD AUTO: 4.06 MILLION/UL (ref 3.88–5.62)
RBC MORPH BLD: PRESENT
SODIUM SERPL-SCNC: 136 MMOL/L (ref 136–145)
TOTAL CELLS COUNTED SPEC: 100
URATE SERPL-MCNC: 5.5 MG/DL (ref 4.2–8)
VARIANT LYMPHS # BLD AUTO: 15 %
WBC # BLD AUTO: 101.38 THOUSAND/UL (ref 4.31–10.16)

## 2019-07-12 PROCEDURE — 85027 COMPLETE CBC AUTOMATED: CPT | Performed by: INTERNAL MEDICINE

## 2019-07-12 PROCEDURE — 83615 LACTATE (LD) (LDH) ENZYME: CPT | Performed by: INTERNAL MEDICINE

## 2019-07-12 PROCEDURE — 80053 COMPREHEN METABOLIC PANEL: CPT | Performed by: INTERNAL MEDICINE

## 2019-07-12 PROCEDURE — 85007 BL SMEAR W/DIFF WBC COUNT: CPT | Performed by: INTERNAL MEDICINE

## 2019-07-12 PROCEDURE — 82784 ASSAY IGA/IGD/IGG/IGM EACH: CPT | Performed by: INTERNAL MEDICINE

## 2019-07-12 PROCEDURE — 36415 COLL VENOUS BLD VENIPUNCTURE: CPT | Performed by: INTERNAL MEDICINE

## 2019-07-12 PROCEDURE — 84550 ASSAY OF BLOOD/URIC ACID: CPT | Performed by: INTERNAL MEDICINE

## 2019-09-08 ENCOUNTER — TRANSCRIBE ORDERS (OUTPATIENT)
Dept: ADMINISTRATIVE | Age: 69
End: 2019-09-08

## 2019-09-09 ENCOUNTER — TELEPHONE (OUTPATIENT)
Dept: HEMATOLOGY ONCOLOGY | Facility: CLINIC | Age: 69
End: 2019-09-09

## 2019-09-09 ENCOUNTER — APPOINTMENT (OUTPATIENT)
Dept: LAB | Age: 69
End: 2019-09-09
Payer: MEDICARE

## 2019-09-09 DIAGNOSIS — C91.12 CLL (CHRONIC LYMPHOID LEUKEMIA) IN RELAPSE (HCC): ICD-10-CM

## 2019-09-09 LAB
ALBUMIN SERPL BCP-MCNC: 4.7 G/DL (ref 3.5–5)
ALP SERPL-CCNC: 122 U/L (ref 46–116)
ALT SERPL W P-5'-P-CCNC: 28 U/L (ref 12–78)
ANION GAP SERPL CALCULATED.3IONS-SCNC: 7 MMOL/L (ref 4–13)
AST SERPL W P-5'-P-CCNC: 16 U/L (ref 5–45)
BASOPHILS # BLD MANUAL: 0 THOUSAND/UL (ref 0–0.1)
BASOPHILS NFR MAR MANUAL: 0 % (ref 0–1)
BILIRUB SERPL-MCNC: 0.33 MG/DL (ref 0.2–1)
BUN SERPL-MCNC: 17 MG/DL (ref 5–25)
CALCIUM SERPL-MCNC: 10 MG/DL (ref 8.3–10.1)
CHLORIDE SERPL-SCNC: 110 MMOL/L (ref 100–108)
CO2 SERPL-SCNC: 26 MMOL/L (ref 21–32)
CREAT SERPL-MCNC: 1.31 MG/DL (ref 0.6–1.3)
EOSINOPHIL # BLD MANUAL: 0 THOUSAND/UL (ref 0–0.4)
EOSINOPHIL NFR BLD MANUAL: 0 % (ref 0–6)
ERYTHROCYTE [DISTWIDTH] IN BLOOD BY AUTOMATED COUNT: 14.1 % (ref 11.6–15.1)
GFR SERPL CREATININE-BSD FRML MDRD: 56 ML/MIN/1.73SQ M
GLUCOSE SERPL-MCNC: 98 MG/DL (ref 65–140)
HCT VFR BLD AUTO: 40.6 % (ref 36.5–49.3)
HGB BLD-MCNC: 12.4 G/DL (ref 12–17)
IGA SERPL-MCNC: 108 MG/DL (ref 70–400)
IGG SERPL-MCNC: 810 MG/DL (ref 700–1600)
IGM SERPL-MCNC: 27 MG/DL (ref 40–230)
LDH SERPL-CCNC: 238 U/L (ref 81–234)
LYMPHOCYTES # BLD AUTO: 90 % (ref 14–44)
LYMPHOCYTES # BLD AUTO: 91.79 THOUSAND/UL (ref 0.6–4.47)
MCH RBC QN AUTO: 31.3 PG (ref 26.8–34.3)
MCHC RBC AUTO-ENTMCNC: 30.5 G/DL (ref 31.4–37.4)
MCV RBC AUTO: 103 FL (ref 82–98)
MONOCYTES # BLD AUTO: 0 THOUSAND/UL (ref 0–1.22)
MONOCYTES NFR BLD: 0 % (ref 4–12)
NEUTROPHILS # BLD MANUAL: 7.14 THOUSAND/UL (ref 1.85–7.62)
NEUTS SEG NFR BLD AUTO: 7 % (ref 43–75)
NRBC BLD AUTO-RTO: 0 /100 WBCS
PLATELET # BLD AUTO: 176 THOUSANDS/UL (ref 149–390)
PLATELET BLD QL SMEAR: ADEQUATE
PMV BLD AUTO: 9.2 FL (ref 8.9–12.7)
POTASSIUM SERPL-SCNC: 5.1 MMOL/L (ref 3.5–5.3)
PROT SERPL-MCNC: 7.5 G/DL (ref 6.4–8.2)
RBC # BLD AUTO: 3.96 MILLION/UL (ref 3.88–5.62)
SMUDGE CELLS BLD QL SMEAR: PRESENT
SODIUM SERPL-SCNC: 143 MMOL/L (ref 136–145)
TOTAL CELLS COUNTED SPEC: 100
URATE SERPL-MCNC: 5.2 MG/DL (ref 4.2–8)
VARIANT LYMPHS # BLD AUTO: 3 %
WBC # BLD AUTO: 101.99 THOUSAND/UL (ref 4.31–10.16)

## 2019-09-09 PROCEDURE — 80053 COMPREHEN METABOLIC PANEL: CPT

## 2019-09-09 PROCEDURE — 84550 ASSAY OF BLOOD/URIC ACID: CPT

## 2019-09-09 PROCEDURE — 83615 LACTATE (LD) (LDH) ENZYME: CPT

## 2019-09-09 PROCEDURE — 82784 ASSAY IGA/IGD/IGG/IGM EACH: CPT

## 2019-09-09 PROCEDURE — 85007 BL SMEAR W/DIFF WBC COUNT: CPT

## 2019-09-09 PROCEDURE — 36415 COLL VENOUS BLD VENIPUNCTURE: CPT

## 2019-09-09 PROCEDURE — 85027 COMPLETE CBC AUTOMATED: CPT

## 2019-09-09 NOTE — TELEPHONE ENCOUNTER
Hermelinda Alcantara from [facility] lab called critical  Patient's WBC count is 101 99, please advise

## 2019-11-05 ENCOUNTER — TRANSCRIBE ORDERS (OUTPATIENT)
Dept: ADMINISTRATIVE | Age: 69
End: 2019-11-05

## 2019-11-05 ENCOUNTER — TELEPHONE (OUTPATIENT)
Dept: HEMATOLOGY ONCOLOGY | Facility: CLINIC | Age: 69
End: 2019-11-05

## 2019-11-05 ENCOUNTER — APPOINTMENT (OUTPATIENT)
Dept: LAB | Age: 69
End: 2019-11-05
Payer: MEDICARE

## 2019-11-05 DIAGNOSIS — C91.12 CLL (CHRONIC LYMPHOID LEUKEMIA) IN RELAPSE (HCC): ICD-10-CM

## 2019-11-05 LAB
ALBUMIN SERPL BCP-MCNC: 4.8 G/DL (ref 3.5–5)
ALP SERPL-CCNC: 113 U/L (ref 46–116)
ALT SERPL W P-5'-P-CCNC: 22 U/L (ref 12–78)
ANION GAP SERPL CALCULATED.3IONS-SCNC: 5 MMOL/L (ref 4–13)
AST SERPL W P-5'-P-CCNC: 16 U/L (ref 5–45)
BASOPHILS # BLD MANUAL: 0 THOUSAND/UL (ref 0–0.1)
BASOPHILS NFR MAR MANUAL: 0 % (ref 0–1)
BILIRUB SERPL-MCNC: 0.33 MG/DL (ref 0.2–1)
BUN SERPL-MCNC: 19 MG/DL (ref 5–25)
CALCIUM SERPL-MCNC: 9.8 MG/DL (ref 8.3–10.1)
CHLORIDE SERPL-SCNC: 105 MMOL/L (ref 100–108)
CO2 SERPL-SCNC: 27 MMOL/L (ref 21–32)
CREAT SERPL-MCNC: 1.19 MG/DL (ref 0.6–1.3)
EOSINOPHIL # BLD MANUAL: 0 THOUSAND/UL (ref 0–0.4)
EOSINOPHIL NFR BLD MANUAL: 0 % (ref 0–6)
ERYTHROCYTE [DISTWIDTH] IN BLOOD BY AUTOMATED COUNT: 13.5 % (ref 11.6–15.1)
GFR SERPL CREATININE-BSD FRML MDRD: 62 ML/MIN/1.73SQ M
GLUCOSE SERPL-MCNC: 87 MG/DL (ref 65–140)
HCT VFR BLD AUTO: 40.5 % (ref 36.5–49.3)
HGB BLD-MCNC: 12.4 G/DL (ref 12–17)
HYPERCHROMIA BLD QL SMEAR: PRESENT
IGA SERPL-MCNC: 119 MG/DL (ref 70–400)
IGG SERPL-MCNC: 767 MG/DL (ref 700–1600)
IGM SERPL-MCNC: 24 MG/DL (ref 40–230)
LDH SERPL-CCNC: 209 U/L (ref 81–234)
LYMPHOCYTES # BLD AUTO: 90 % (ref 14–44)
LYMPHOCYTES # BLD AUTO: 92.06 THOUSAND/UL (ref 0.6–4.47)
MACROCYTES BLD QL AUTO: PRESENT
MCH RBC QN AUTO: 30.9 PG (ref 26.8–34.3)
MCHC RBC AUTO-ENTMCNC: 30.6 G/DL (ref 31.4–37.4)
MCV RBC AUTO: 101 FL (ref 82–98)
MONOCYTES # BLD AUTO: 1.02 THOUSAND/UL (ref 0–1.22)
MONOCYTES NFR BLD: 1 % (ref 4–12)
NEUTROPHILS # BLD MANUAL: 5.11 THOUSAND/UL (ref 1.85–7.62)
NEUTS SEG NFR BLD AUTO: 5 % (ref 43–75)
NRBC BLD AUTO-RTO: 0 /100 WBCS
PLATELET # BLD AUTO: 157 THOUSANDS/UL (ref 149–390)
PLATELET BLD QL SMEAR: ADEQUATE
PMV BLD AUTO: 9.4 FL (ref 8.9–12.7)
POIKILOCYTOSIS BLD QL SMEAR: PRESENT
POTASSIUM SERPL-SCNC: 4.8 MMOL/L (ref 3.5–5.3)
PROT SERPL-MCNC: 7.3 G/DL (ref 6.4–8.2)
RBC # BLD AUTO: 4.01 MILLION/UL (ref 3.88–5.62)
RBC MORPH BLD: PRESENT
SODIUM SERPL-SCNC: 137 MMOL/L (ref 136–145)
URATE SERPL-MCNC: 5 MG/DL (ref 4.2–8)
VARIANT LYMPHS # BLD AUTO: 4 %
WBC # BLD AUTO: 102.29 THOUSAND/UL (ref 4.31–10.16)

## 2019-11-05 PROCEDURE — 83615 LACTATE (LD) (LDH) ENZYME: CPT

## 2019-11-05 PROCEDURE — 80053 COMPREHEN METABOLIC PANEL: CPT

## 2019-11-05 PROCEDURE — 82784 ASSAY IGA/IGD/IGG/IGM EACH: CPT

## 2019-11-05 PROCEDURE — 85007 BL SMEAR W/DIFF WBC COUNT: CPT

## 2019-11-05 PROCEDURE — 85027 COMPLETE CBC AUTOMATED: CPT

## 2019-11-05 PROCEDURE — 36415 COLL VENOUS BLD VENIPUNCTURE: CPT

## 2019-11-05 PROCEDURE — 84550 ASSAY OF BLOOD/URIC ACID: CPT

## 2019-11-06 ENCOUNTER — TELEPHONE (OUTPATIENT)
Dept: HEMATOLOGY ONCOLOGY | Facility: CLINIC | Age: 69
End: 2019-11-06

## 2019-11-06 NOTE — TELEPHONE ENCOUNTER
Spoke to patient and informed him that his appt on 11/21/19 needed to be R/S  His new appt is 11/21/19 at 9:30 am with GLORIA Costello at the [place] location  Patient was fine with appt change

## 2019-11-21 ENCOUNTER — OFFICE VISIT (OUTPATIENT)
Dept: HEMATOLOGY ONCOLOGY | Facility: CLINIC | Age: 69
End: 2019-11-21
Payer: MEDICARE

## 2019-11-21 VITALS
SYSTOLIC BLOOD PRESSURE: 118 MMHG | TEMPERATURE: 97.7 F | HEART RATE: 84 BPM | BODY MASS INDEX: 26.98 KG/M2 | RESPIRATION RATE: 18 BRPM | WEIGHT: 182.2 LBS | DIASTOLIC BLOOD PRESSURE: 68 MMHG | HEIGHT: 69 IN | OXYGEN SATURATION: 99 %

## 2019-11-21 DIAGNOSIS — C91.12 CLL (CHRONIC LYMPHOID LEUKEMIA) IN RELAPSE (HCC): Primary | ICD-10-CM

## 2019-11-21 PROCEDURE — 99214 OFFICE O/P EST MOD 30 MIN: CPT | Performed by: PHYSICIAN ASSISTANT

## 2019-11-21 NOTE — PROGRESS NOTES
Hematology/Oncology Outpatient Follow-up  David Topete 77 y o  male 1950 [id number]    Date:  11/21/2019      Assessment and Plan:  1  CLL (chronic lymphoid leukemia) in relapse [facility])  62-year-old male presents for follow-up regarding chronic lymphocytic leukemia  His WBC is stable  He has no anemia or thrombocytopenia  He has no constitutional symptoms  He does not have any adenopathy on physical exam   Reviewed that continued observation is favored  He will continue with labs every 2 months  Follow-up in 6 months  He continues to follow with Jonnie on a yearly basis  He will be seen again in August 2020  We reviewed that patient could be a candidate for ibritinib if needed in the future  However there also could be new medication on the market when patient requires treatment  HPI:  62-year-old male with history of CLL  He was diagnosed with CLL in 1996  He had received chlorambucil intermittently, 6 cycles of Rituxan and bendamustine in 2014 in no treatment since  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      WBC count presently has been slowly increasing      Patient also follows at the [address] [place] [address] with Dr Carlos Reich  He was last seen at [facility] in Aug 2019  Interval history:    ROS: Review of Systems   Constitutional: Negative for activity change, appetite change, chills, fatigue, fever and unexpected weight change  Denies night sweats   Respiratory: Negative for cough and shortness of breath  Cardiovascular: Negative for leg swelling  Gastrointestinal: Negative for abdominal pain, constipation, diarrhea, nausea and vomiting  Genitourinary: Negative for difficulty urinating and dysuria  Musculoskeletal: Negative for arthralgias  Skin: Negative  Neurological: Negative for dizziness, weakness, light-headedness, numbness and headaches  Hematological: Negative  Psychiatric/Behavioral: Negative          Past Medical History:   Diagnosis Date    Lymphocytic leukemia ([facility] [address] )        Past Surgical History:   Procedure Laterality Date    SHOULDER SURGERY      TONSILLECTOMY         Social History     Socioeconomic History    Marital status:      Spouse name: None    Number of children: None    Years of education: None    Highest education level: None   Occupational History    None   Social Needs    Financial resource strain: None    Food insecurity:     Worry: None     Inability: None    Transportation needs:     Medical: None     Non-medical: None   Tobacco Use    Smoking status: Never Smoker    Smokeless tobacco: Never Used   Substance and Sexual Activity    Alcohol use: No    Drug use: No    Sexual activity: None   Lifestyle    Physical activity:     Days per week: None     Minutes per session: None    Stress: None   Relationships    Social connections:     Talks on phone: None     Gets together: None     Attends Jew service: None     Active member of club or organization: None     Attends meetings of clubs or organizations: None     Relationship status: None    Intimate partner violence:     Fear of current or ex partner: None     Emotionally abused: None     Physically abused: None     Forced sexual activity: None   Other Topics Concern    None   Social History Narrative    None       Family History   Problem Relation Age of Onset    No Known Problems Mother     No Known Problems Father        No Known Allergies      Current Outpatient Medications:     fexofenadine-pseudoephedrine (ALLEGRA-D 24) 180-240 MG per 24 hr tablet, Take by mouth, Disp: , Rfl:     imiquimod (ALDARA) 5 % cream, TORIN 1/2 PACKET TO SKIN QHS 5 NIGHTS A WEEK  [address] OFF IN THE MORNING, Disp: , Rfl: 1      Physical Exam:  Resp 18   Ht 5' 8 75" (1 746 m)   Wt 82 6 kg (182 lb 3 2 oz)   BMI 27 10 kg/m²     Physical Exam   Constitutional: He is oriented to person, place, and time  He appears well-developed and well-nourished  No distress     HENT:   Head: Normocephalic and atraumatic  Eyes: Conjunctivae are normal  No scleral icterus  Neck: Normal range of motion  Neck supple  Cardiovascular: Normal rate, regular rhythm and normal heart sounds  No murmur heard  Pulmonary/Chest: Effort normal and breath sounds normal  No respiratory distress  Abdominal: Soft  There is no tenderness  Musculoskeletal: Normal range of motion  He exhibits no edema or tenderness  Lymphadenopathy:        Head (right side): No submandibular, no tonsillar, no preauricular, no posterior auricular and no occipital adenopathy present  Head (left side): No submandibular, no tonsillar, no preauricular, no posterior auricular and no occipital adenopathy present  He has no cervical adenopathy  He has no axillary adenopathy  Right: No supraclavicular adenopathy present  Left: No supraclavicular adenopathy present  Neurological: He is alert and oriented to person, place, and time  No cranial nerve deficit  Skin: Skin is warm and dry  Psychiatric: He has a normal mood and affect  Labs:  Lab Results   Component Value Date     29 (HH) 11/05/2019    HGB 12 4 11/05/2019    HCT 40 5 11/05/2019     (H) 11/05/2019     11/05/2019     Lab Results   Component Value Date     08/01/2015    K 4 8 11/05/2019     11/05/2019    CO2 27 11/05/2019    ANIONGAP 8 08/01/2015    BUN 19 11/05/2019    CREATININE 1 19 11/05/2019    GLUCOSE 96 08/01/2015    GLUF 93 02/02/2018    CALCIUM 9 8 11/05/2019    CORRECTEDCA 9 9 06/02/2017    AST 16 11/05/2019    ALT 22 11/05/2019    ALKPHOS 113 11/05/2019    PROT 7 6 08/01/2015    BILITOT 0 33 08/01/2015    EGFR 62 11/05/2019       Patient voiced understanding and agreement in the above discussion  Aware to contact our office with questions/symptoms in the interim  This note has been generated by voice recognition software system  Therefore, there may be spelling, grammar, and or syntax errors  Please contact if questions arise

## 2019-12-03 ENCOUNTER — TELEPHONE (OUTPATIENT)
Dept: HEMATOLOGY ONCOLOGY | Facility: CLINIC | Age: 69
End: 2019-12-03

## 2019-12-04 ENCOUNTER — TELEPHONE (OUTPATIENT)
Dept: HEMATOLOGY ONCOLOGY | Facility: CLINIC | Age: 69
End: 2019-12-04

## 2019-12-04 NOTE — TELEPHONE ENCOUNTER
Cristina from [place] called to confirm if we received Medical Records Request  I did confirmed with [place] the request has been received

## 2020-01-08 ENCOUNTER — APPOINTMENT (OUTPATIENT)
Dept: LAB | Age: 70
End: 2020-01-08
Payer: MEDICARE

## 2020-01-08 ENCOUNTER — TELEPHONE (OUTPATIENT)
Dept: HEMATOLOGY ONCOLOGY | Facility: CLINIC | Age: 70
End: 2020-01-08

## 2020-01-08 ENCOUNTER — TRANSCRIBE ORDERS (OUTPATIENT)
Dept: ADMINISTRATIVE | Age: 70
End: 2020-01-08

## 2020-01-08 DIAGNOSIS — C91.12 CHRONIC LYMPHOID LEUKEMIA IN RELAPSE (HCC): ICD-10-CM

## 2020-01-08 DIAGNOSIS — C91.12 CHRONIC LYMPHOID LEUKEMIA IN RELAPSE (HCC): Primary | ICD-10-CM

## 2020-03-05 ENCOUNTER — APPOINTMENT (OUTPATIENT)
Dept: LAB | Age: 70
End: 2020-03-05
Payer: MEDICARE

## 2020-03-05 ENCOUNTER — TELEPHONE (OUTPATIENT)
Dept: INFUSION CENTER | Facility: HOSPITAL | Age: 70
End: 2020-03-05

## 2020-04-10 ENCOUNTER — OFFICE VISIT (OUTPATIENT)
Dept: LAB | Age: 70
End: 2020-04-10
Payer: MEDICARE

## 2020-04-10 DIAGNOSIS — J34.89 LESION OF NOSE: ICD-10-CM

## 2020-04-10 LAB
ATRIAL RATE: 62 BPM
P AXIS: 66 DEGREES
PR INTERVAL: 180 MS
QRS AXIS: 72 DEGREES
QRSD INTERVAL: 80 MS
QT INTERVAL: 396 MS
QTC INTERVAL: 401 MS
T WAVE AXIS: 59 DEGREES
VENTRICULAR RATE: 62 BPM

## 2020-04-10 PROCEDURE — 93010 ELECTROCARDIOGRAM REPORT: CPT | Performed by: INTERNAL MEDICINE

## 2020-04-10 PROCEDURE — 93005 ELECTROCARDIOGRAM TRACING: CPT

## 2020-04-13 ENCOUNTER — ANESTHESIA EVENT (OUTPATIENT)
Dept: PERIOP | Facility: HOSPITAL | Age: 70
DRG: 577 | End: 2020-04-13
Payer: MEDICARE

## 2020-04-14 ENCOUNTER — ANESTHESIA (OUTPATIENT)
Dept: PERIOP | Facility: HOSPITAL | Age: 70
DRG: 577 | End: 2020-04-14
Payer: MEDICARE

## 2020-04-14 ENCOUNTER — HOSPITAL ENCOUNTER (INPATIENT)
Facility: HOSPITAL | Age: 70
LOS: 1 days | Discharge: HOME HEALTH | DRG: 577 | End: 2020-04-16
Attending: PLASTIC SURGERY | Admitting: PLASTIC SURGERY
Payer: MEDICARE

## 2020-04-14 DIAGNOSIS — H53.8 BLURRY VISION, RIGHT EYE: Primary | ICD-10-CM

## 2020-04-14 DIAGNOSIS — C91.12 CLL (CHRONIC LYMPHOID LEUKEMIA) IN RELAPSE (HCC): ICD-10-CM

## 2020-04-14 DIAGNOSIS — C80.1 CARCINOMA (HCC): ICD-10-CM

## 2020-04-14 PROCEDURE — 99024 POSTOP FOLLOW-UP VISIT: CPT | Performed by: PLASTIC SURGERY

## 2020-04-14 PROCEDURE — 15731 FOREHEAD FLAP W/VASC PEDICLE: CPT | Performed by: PLASTIC SURGERY

## 2020-04-14 PROCEDURE — 20912 REMOVE CARTILAGE FOR GRAFT: CPT | Performed by: PLASTIC SURGERY

## 2020-04-14 PROCEDURE — 09UK07Z SUPPLEMENT NASAL MUCOSA AND SOFT TISSUE WITH AUTOLOGOUS TISSUE SUBSTITUTE, OPEN APPROACH: ICD-10-PCS | Performed by: PLASTIC SURGERY

## 2020-04-14 PROCEDURE — 0HR1X73 REPLACEMENT OF FACE SKIN WITH AUTOLOGOUS TISSUE SUBSTITUTE, FULL THICKNESS, EXTERNAL APPROACH: ICD-10-PCS | Performed by: PLASTIC SURGERY

## 2020-04-14 PROCEDURE — 0HB4XZZ EXCISION OF NECK SKIN, EXTERNAL APPROACH: ICD-10-PCS | Performed by: PLASTIC SURGERY

## 2020-04-14 PROCEDURE — 0NBB0ZZ EXCISION OF NASAL BONE, OPEN APPROACH: ICD-10-PCS | Performed by: PLASTIC SURGERY

## 2020-04-14 PROCEDURE — 0HB1XZZ EXCISION OF FACE SKIN, EXTERNAL APPROACH: ICD-10-PCS | Performed by: PLASTIC SURGERY

## 2020-04-14 PROCEDURE — 15260 FTH/GFT FR N/E/E/L 20 SQCM/<: CPT | Performed by: PLASTIC SURGERY

## 2020-04-14 RX ORDER — HYDROMORPHONE HCL/PF 1 MG/ML
0.5 SYRINGE (ML) INJECTION
Status: DISCONTINUED | OUTPATIENT
Start: 2020-04-14 | End: 2020-04-14 | Stop reason: HOSPADM

## 2020-04-14 RX ORDER — HEPARIN SODIUM 5000 [USP'U]/ML
5000 INJECTION, SOLUTION INTRAVENOUS; SUBCUTANEOUS EVERY 8 HOURS SCHEDULED
Status: DISCONTINUED | OUTPATIENT
Start: 2020-04-14 | End: 2020-04-15

## 2020-04-14 RX ORDER — CLINDAMYCIN PHOSPHATE 600 MG/50ML
600 INJECTION INTRAVENOUS EVERY 8 HOURS
Status: COMPLETED | OUTPATIENT
Start: 2020-04-14 | End: 2020-04-15

## 2020-04-14 RX ORDER — OXYCODONE HYDROCHLORIDE 5 MG/1
10 TABLET ORAL EVERY 4 HOURS PRN
Status: DISCONTINUED | OUTPATIENT
Start: 2020-04-14 | End: 2020-04-16 | Stop reason: HOSPADM

## 2020-04-14 RX ORDER — GABAPENTIN 300 MG/1
300 CAPSULE ORAL ONCE
Status: COMPLETED | OUTPATIENT
Start: 2020-04-14 | End: 2020-04-14

## 2020-04-14 RX ORDER — MIDAZOLAM HYDROCHLORIDE 2 MG/2ML
INJECTION, SOLUTION INTRAMUSCULAR; INTRAVENOUS AS NEEDED
Status: DISCONTINUED | OUTPATIENT
Start: 2020-04-14 | End: 2020-04-14 | Stop reason: SURG

## 2020-04-14 RX ORDER — LABETALOL 20 MG/4 ML (5 MG/ML) INTRAVENOUS SYRINGE
5
Status: DISCONTINUED | OUTPATIENT
Start: 2020-04-14 | End: 2020-04-14 | Stop reason: HOSPADM

## 2020-04-14 RX ORDER — OXYMETAZOLINE HYDROCHLORIDE 0.05 G/100ML
SPRAY NASAL AS NEEDED
Status: DISCONTINUED | OUTPATIENT
Start: 2020-04-14 | End: 2020-04-14 | Stop reason: HOSPADM

## 2020-04-14 RX ORDER — GLYCOPYRROLATE 0.2 MG/ML
INJECTION INTRAMUSCULAR; INTRAVENOUS AS NEEDED
Status: DISCONTINUED | OUTPATIENT
Start: 2020-04-14 | End: 2020-04-14 | Stop reason: SURG

## 2020-04-14 RX ORDER — EPHEDRINE SULFATE 50 MG/ML
INJECTION INTRAVENOUS AS NEEDED
Status: DISCONTINUED | OUTPATIENT
Start: 2020-04-14 | End: 2020-04-14 | Stop reason: SURG

## 2020-04-14 RX ORDER — NEOSTIGMINE METHYLSULFATE 1 MG/ML
INJECTION INTRAVENOUS AS NEEDED
Status: DISCONTINUED | OUTPATIENT
Start: 2020-04-14 | End: 2020-04-14 | Stop reason: SURG

## 2020-04-14 RX ORDER — CEFAZOLIN SODIUM 2 G/50ML
2000 SOLUTION INTRAVENOUS ONCE
Status: COMPLETED | OUTPATIENT
Start: 2020-04-14 | End: 2020-04-14

## 2020-04-14 RX ORDER — ACETAMINOPHEN 325 MG/1
975 TABLET ORAL ONCE
Status: COMPLETED | OUTPATIENT
Start: 2020-04-14 | End: 2020-04-14

## 2020-04-14 RX ORDER — GINSENG 100 MG
CAPSULE ORAL AS NEEDED
Status: DISCONTINUED | OUTPATIENT
Start: 2020-04-14 | End: 2020-04-14 | Stop reason: HOSPADM

## 2020-04-14 RX ORDER — ROCURONIUM BROMIDE 10 MG/ML
INJECTION, SOLUTION INTRAVENOUS AS NEEDED
Status: DISCONTINUED | OUTPATIENT
Start: 2020-04-14 | End: 2020-04-14 | Stop reason: SURG

## 2020-04-14 RX ORDER — ONDANSETRON 2 MG/ML
INJECTION INTRAMUSCULAR; INTRAVENOUS AS NEEDED
Status: DISCONTINUED | OUTPATIENT
Start: 2020-04-14 | End: 2020-04-14 | Stop reason: SURG

## 2020-04-14 RX ORDER — ONDANSETRON 2 MG/ML
4 INJECTION INTRAMUSCULAR; INTRAVENOUS ONCE AS NEEDED
Status: DISCONTINUED | OUTPATIENT
Start: 2020-04-14 | End: 2020-04-14 | Stop reason: HOSPADM

## 2020-04-14 RX ORDER — OXYCODONE HYDROCHLORIDE 5 MG/1
5 TABLET ORAL EVERY 4 HOURS PRN
Status: DISCONTINUED | OUTPATIENT
Start: 2020-04-14 | End: 2020-04-16 | Stop reason: HOSPADM

## 2020-04-14 RX ORDER — MEPERIDINE HYDROCHLORIDE 25 MG/ML
12.5 INJECTION INTRAMUSCULAR; INTRAVENOUS; SUBCUTANEOUS AS NEEDED
Status: DISCONTINUED | OUTPATIENT
Start: 2020-04-14 | End: 2020-04-14 | Stop reason: HOSPADM

## 2020-04-14 RX ORDER — DEXAMETHASONE SODIUM PHOSPHATE 10 MG/ML
INJECTION, SOLUTION INTRAMUSCULAR; INTRAVENOUS AS NEEDED
Status: DISCONTINUED | OUTPATIENT
Start: 2020-04-14 | End: 2020-04-14 | Stop reason: SURG

## 2020-04-14 RX ORDER — PROMETHAZINE HYDROCHLORIDE 25 MG/ML
12.5 INJECTION, SOLUTION INTRAMUSCULAR; INTRAVENOUS ONCE AS NEEDED
Status: DISCONTINUED | OUTPATIENT
Start: 2020-04-14 | End: 2020-04-14 | Stop reason: HOSPADM

## 2020-04-14 RX ORDER — ACETAMINOPHEN 325 MG/1
975 TABLET ORAL EVERY 8 HOURS
Status: DISCONTINUED | OUTPATIENT
Start: 2020-04-14 | End: 2020-04-16 | Stop reason: HOSPADM

## 2020-04-14 RX ORDER — LIDOCAINE HYDROCHLORIDE AND EPINEPHRINE 10; 10 MG/ML; UG/ML
INJECTION, SOLUTION INFILTRATION; PERINEURAL AS NEEDED
Status: DISCONTINUED | OUTPATIENT
Start: 2020-04-14 | End: 2020-04-14 | Stop reason: HOSPADM

## 2020-04-14 RX ORDER — SUCCINYLCHOLINE/SOD CL,ISO/PF 100 MG/5ML
SYRINGE (ML) INTRAVENOUS AS NEEDED
Status: DISCONTINUED | OUTPATIENT
Start: 2020-04-14 | End: 2020-04-14 | Stop reason: SURG

## 2020-04-14 RX ORDER — LIDOCAINE HYDROCHLORIDE 10 MG/ML
INJECTION, SOLUTION EPIDURAL; INFILTRATION; INTRACAUDAL; PERINEURAL AS NEEDED
Status: DISCONTINUED | OUTPATIENT
Start: 2020-04-14 | End: 2020-04-14 | Stop reason: SURG

## 2020-04-14 RX ORDER — FENTANYL CITRATE 50 UG/ML
INJECTION, SOLUTION INTRAMUSCULAR; INTRAVENOUS AS NEEDED
Status: DISCONTINUED | OUTPATIENT
Start: 2020-04-14 | End: 2020-04-14 | Stop reason: SURG

## 2020-04-14 RX ORDER — SODIUM CHLORIDE, SODIUM LACTATE, POTASSIUM CHLORIDE, CALCIUM CHLORIDE 600; 310; 30; 20 MG/100ML; MG/100ML; MG/100ML; MG/100ML
50 INJECTION, SOLUTION INTRAVENOUS CONTINUOUS
Status: DISCONTINUED | OUTPATIENT
Start: 2020-04-14 | End: 2020-04-15

## 2020-04-14 RX ORDER — FENTANYL CITRATE/PF 50 MCG/ML
25 SYRINGE (ML) INJECTION
Status: DISCONTINUED | OUTPATIENT
Start: 2020-04-14 | End: 2020-04-14 | Stop reason: HOSPADM

## 2020-04-14 RX ORDER — PROPOFOL 10 MG/ML
INJECTION, EMULSION INTRAVENOUS AS NEEDED
Status: DISCONTINUED | OUTPATIENT
Start: 2020-04-14 | End: 2020-04-14 | Stop reason: SURG

## 2020-04-14 RX ORDER — SODIUM CHLORIDE, SODIUM LACTATE, POTASSIUM CHLORIDE, CALCIUM CHLORIDE 600; 310; 30; 20 MG/100ML; MG/100ML; MG/100ML; MG/100ML
125 INJECTION, SOLUTION INTRAVENOUS CONTINUOUS
Status: DISCONTINUED | OUTPATIENT
Start: 2020-04-14 | End: 2020-04-15

## 2020-04-14 RX ORDER — GABAPENTIN 300 MG/1
300 CAPSULE ORAL 3 TIMES DAILY
Status: DISCONTINUED | OUTPATIENT
Start: 2020-04-14 | End: 2020-04-16

## 2020-04-14 RX ORDER — LIDOCAINE HYDROCHLORIDE 10 MG/ML
0.5 INJECTION, SOLUTION EPIDURAL; INFILTRATION; INTRACAUDAL; PERINEURAL ONCE AS NEEDED
Status: DISCONTINUED | OUTPATIENT
Start: 2020-04-14 | End: 2020-04-14 | Stop reason: HOSPADM

## 2020-04-14 RX ORDER — ALBUTEROL SULFATE 2.5 MG/3ML
2.5 SOLUTION RESPIRATORY (INHALATION) ONCE AS NEEDED
Status: DISCONTINUED | OUTPATIENT
Start: 2020-04-14 | End: 2020-04-14 | Stop reason: HOSPADM

## 2020-04-14 RX ADMIN — HEPARIN SODIUM 5000 UNITS: 5000 INJECTION INTRAVENOUS; SUBCUTANEOUS at 23:59

## 2020-04-14 RX ADMIN — ROCURONIUM BROMIDE 20 MG: 10 INJECTION, SOLUTION INTRAVENOUS at 20:00

## 2020-04-14 RX ADMIN — Medication 100 MG: at 18:21

## 2020-04-14 RX ADMIN — NEOSTIGMINE METHYLSULFATE 4 MG: 1 INJECTION, SOLUTION INTRAVENOUS at 21:26

## 2020-04-14 RX ADMIN — PROPOFOL 200 MG: 10 INJECTION, EMULSION INTRAVENOUS at 18:26

## 2020-04-14 RX ADMIN — LIDOCAINE HYDROCHLORIDE 50 MG: 10 INJECTION, SOLUTION EPIDURAL; INFILTRATION; INTRACAUDAL; PERINEURAL at 21:37

## 2020-04-14 RX ADMIN — DEXAMETHASONE SODIUM PHOSPHATE 5 MG: 10 INJECTION, SOLUTION INTRAMUSCULAR; INTRAVENOUS at 18:32

## 2020-04-14 RX ADMIN — ROCURONIUM BROMIDE 20 MG: 10 INJECTION, SOLUTION INTRAVENOUS at 18:40

## 2020-04-14 RX ADMIN — LIDOCAINE HYDROCHLORIDE 50 MG: 10 INJECTION, SOLUTION EPIDURAL; INFILTRATION; INTRACAUDAL; PERINEURAL at 21:42

## 2020-04-14 RX ADMIN — FENTANYL CITRATE 100 MCG: 50 INJECTION, SOLUTION INTRAMUSCULAR; INTRAVENOUS at 18:21

## 2020-04-14 RX ADMIN — PHENYLEPHRINE HYDROCHLORIDE 100 MCG: 10 INJECTION INTRAVENOUS at 19:31

## 2020-04-14 RX ADMIN — CLINDAMYCIN PHOSPHATE 600 MG: 600 INJECTION, SOLUTION INTRAVENOUS at 23:59

## 2020-04-14 RX ADMIN — ROCURONIUM BROMIDE 10 MG: 10 INJECTION, SOLUTION INTRAVENOUS at 20:45

## 2020-04-14 RX ADMIN — LIDOCAINE HYDROCHLORIDE 50 MG: 10 INJECTION, SOLUTION EPIDURAL; INFILTRATION; INTRACAUDAL; PERINEURAL at 18:21

## 2020-04-14 RX ADMIN — PHENYLEPHRINE HYDROCHLORIDE 40 MCG/MIN: 10 INJECTION INTRAVENOUS at 19:53

## 2020-04-14 RX ADMIN — MIDAZOLAM 2 MG: 1 INJECTION INTRAMUSCULAR; INTRAVENOUS at 18:12

## 2020-04-14 RX ADMIN — SODIUM CHLORIDE, SODIUM LACTATE, POTASSIUM CHLORIDE, AND CALCIUM CHLORIDE 50 ML/HR: .6; .31; .03; .02 INJECTION, SOLUTION INTRAVENOUS at 22:06

## 2020-04-14 RX ADMIN — GABAPENTIN 300 MG: 300 CAPSULE ORAL at 13:46

## 2020-04-14 RX ADMIN — ROCURONIUM BROMIDE 30 MG: 10 INJECTION, SOLUTION INTRAVENOUS at 19:09

## 2020-04-14 RX ADMIN — Medication 100 MG: at 18:26

## 2020-04-14 RX ADMIN — ACETAMINOPHEN 975 MG: 325 TABLET ORAL at 23:59

## 2020-04-14 RX ADMIN — ROCURONIUM BROMIDE 20 MG: 10 INJECTION, SOLUTION INTRAVENOUS at 20:20

## 2020-04-14 RX ADMIN — PHENYLEPHRINE HYDROCHLORIDE 10 MCG: 10 INJECTION INTRAVENOUS at 19:29

## 2020-04-14 RX ADMIN — ONDANSETRON 4 MG: 2 INJECTION INTRAMUSCULAR; INTRAVENOUS at 18:32

## 2020-04-14 RX ADMIN — CEFAZOLIN SODIUM 2000 MG: 2 SOLUTION INTRAVENOUS at 18:34

## 2020-04-14 RX ADMIN — PROPOFOL 150 MG: 10 INJECTION, EMULSION INTRAVENOUS at 18:21

## 2020-04-14 RX ADMIN — GLYCOPYRROLATE 0.4 MG: 0.2 INJECTION, SOLUTION INTRAMUSCULAR; INTRAVENOUS at 21:26

## 2020-04-14 RX ADMIN — ACETAMINOPHEN 975 MG: 325 TABLET ORAL at 13:46

## 2020-04-14 RX ADMIN — EPHEDRINE SULFATE 10 MG: 50 INJECTION, SOLUTION INTRAVENOUS at 19:24

## 2020-04-14 RX ADMIN — SODIUM CHLORIDE, SODIUM LACTATE, POTASSIUM CHLORIDE, AND CALCIUM CHLORIDE 50 ML/HR: .6; .31; .03; .02 INJECTION, SOLUTION INTRAVENOUS at 13:35

## 2020-04-14 RX ADMIN — SODIUM CHLORIDE, SODIUM LACTATE, POTASSIUM CHLORIDE, AND CALCIUM CHLORIDE: .6; .31; .03; .02 INJECTION, SOLUTION INTRAVENOUS at 19:36

## 2020-04-15 LAB
ANION GAP SERPL CALCULATED.3IONS-SCNC: 6 MMOL/L (ref 4–13)
APTT PPP: 24 SECONDS (ref 23–37)
BUN SERPL-MCNC: 19 MG/DL (ref 5–25)
CALCIUM SERPL-MCNC: 8.7 MG/DL (ref 8.3–10.1)
CHLORIDE SERPL-SCNC: 108 MMOL/L (ref 100–108)
CO2 SERPL-SCNC: 24 MMOL/L (ref 21–32)
CREAT SERPL-MCNC: 1.18 MG/DL (ref 0.6–1.3)
ERYTHROCYTE [DISTWIDTH] IN BLOOD BY AUTOMATED COUNT: 13.9 % (ref 11.6–15.1)
GFR SERPL CREATININE-BSD FRML MDRD: 63 ML/MIN/1.73SQ M
GLUCOSE SERPL-MCNC: 166 MG/DL (ref 65–140)
HCT VFR BLD AUTO: 35.1 % (ref 36.5–49.3)
HGB BLD-MCNC: 11 G/DL (ref 12–17)
INR PPP: 1.13 (ref 0.84–1.19)
MAGNESIUM SERPL-MCNC: 2.2 MG/DL (ref 1.6–2.6)
MCH RBC QN AUTO: 30.8 PG (ref 26.8–34.3)
MCHC RBC AUTO-ENTMCNC: 31.3 G/DL (ref 31.4–37.4)
MCV RBC AUTO: 98 FL (ref 82–98)
PLATELET # BLD AUTO: 174 THOUSANDS/UL (ref 149–390)
PMV BLD AUTO: 9 FL (ref 8.9–12.7)
POTASSIUM SERPL-SCNC: 5.2 MMOL/L (ref 3.5–5.3)
PROTHROMBIN TIME: 14.1 SECONDS (ref 11.6–14.5)
RBC # BLD AUTO: 3.57 MILLION/UL (ref 3.88–5.62)
SODIUM SERPL-SCNC: 138 MMOL/L (ref 136–145)
WBC # BLD AUTO: 122.4 THOUSAND/UL (ref 4.31–10.16)

## 2020-04-15 PROCEDURE — 99024 POSTOP FOLLOW-UP VISIT: CPT | Performed by: PLASTIC SURGERY

## 2020-04-15 PROCEDURE — 85027 COMPLETE CBC AUTOMATED: CPT | Performed by: STUDENT IN AN ORGANIZED HEALTH CARE EDUCATION/TRAINING PROGRAM

## 2020-04-15 PROCEDURE — 80048 BASIC METABOLIC PNL TOTAL CA: CPT | Performed by: STUDENT IN AN ORGANIZED HEALTH CARE EDUCATION/TRAINING PROGRAM

## 2020-04-15 PROCEDURE — 85730 THROMBOPLASTIN TIME PARTIAL: CPT | Performed by: STUDENT IN AN ORGANIZED HEALTH CARE EDUCATION/TRAINING PROGRAM

## 2020-04-15 PROCEDURE — 83735 ASSAY OF MAGNESIUM: CPT | Performed by: STUDENT IN AN ORGANIZED HEALTH CARE EDUCATION/TRAINING PROGRAM

## 2020-04-15 PROCEDURE — 85610 PROTHROMBIN TIME: CPT | Performed by: STUDENT IN AN ORGANIZED HEALTH CARE EDUCATION/TRAINING PROGRAM

## 2020-04-15 RX ORDER — ASCORBIC ACID 500 MG
500 TABLET ORAL DAILY
Status: DISCONTINUED | OUTPATIENT
Start: 2020-04-15 | End: 2020-04-16 | Stop reason: HOSPADM

## 2020-04-15 RX ORDER — LORATADINE 10 MG/1
10 TABLET ORAL DAILY
Status: DISCONTINUED | OUTPATIENT
Start: 2020-04-15 | End: 2020-04-15 | Stop reason: CLARIF

## 2020-04-15 RX ORDER — LORATADINE AND PSEUDOEPHEDRINE 10; 240 MG/1; MG/1
1 TABLET, EXTENDED RELEASE ORAL DAILY
Status: DISCONTINUED | OUTPATIENT
Start: 2020-04-15 | End: 2020-04-16 | Stop reason: HOSPADM

## 2020-04-15 RX ORDER — FEXOFENADINE HCL AND PSEUDOEPHEDRINE HCI 180; 240 MG/1; MG/1
1 TABLET, EXTENDED RELEASE ORAL DAILY
Status: DISCONTINUED | OUTPATIENT
Start: 2020-04-15 | End: 2020-04-15 | Stop reason: CLARIF

## 2020-04-15 RX ORDER — SULFAMETHOXAZOLE AND TRIMETHOPRIM 800; 160 MG/1; MG/1
1 TABLET ORAL EVERY 12 HOURS SCHEDULED
Status: DISCONTINUED | OUTPATIENT
Start: 2020-04-15 | End: 2020-04-16 | Stop reason: HOSPADM

## 2020-04-15 RX ORDER — GINSENG 100 MG
1 CAPSULE ORAL DAILY
Status: DISCONTINUED | OUTPATIENT
Start: 2020-04-15 | End: 2020-04-16 | Stop reason: HOSPADM

## 2020-04-15 RX ADMIN — SULFAMETHOXAZOLE AND TRIMETHOPRIM 1 TABLET: 800; 160 TABLET ORAL at 11:28

## 2020-04-15 RX ADMIN — CLINDAMYCIN PHOSPHATE 600 MG: 600 INJECTION, SOLUTION INTRAVENOUS at 07:30

## 2020-04-15 RX ADMIN — ACETAMINOPHEN 975 MG: 325 TABLET ORAL at 07:31

## 2020-04-15 RX ADMIN — GABAPENTIN 300 MG: 300 CAPSULE ORAL at 00:00

## 2020-04-15 RX ADMIN — OXYCODONE HYDROCHLORIDE 10 MG: 10 TABLET ORAL at 04:42

## 2020-04-15 RX ADMIN — LORATADINE AND PSEUDOEPHEDRINE 1 TABLET: 10; 240 TABLET, EXTENDED RELEASE ORAL at 11:28

## 2020-04-15 RX ADMIN — HEPARIN SODIUM 5000 UNITS: 5000 INJECTION INTRAVENOUS; SUBCUTANEOUS at 07:31

## 2020-04-15 RX ADMIN — BACITRACIN 1 LARGE APPLICATION: 500 OINTMENT TOPICAL at 11:28

## 2020-04-15 RX ADMIN — SULFAMETHOXAZOLE AND TRIMETHOPRIM 1 TABLET: 800; 160 TABLET ORAL at 21:37

## 2020-04-15 RX ADMIN — GABAPENTIN 300 MG: 300 CAPSULE ORAL at 21:37

## 2020-04-15 RX ADMIN — GABAPENTIN 300 MG: 300 CAPSULE ORAL at 17:39

## 2020-04-15 RX ADMIN — ACETAMINOPHEN 975 MG: 325 TABLET ORAL at 23:11

## 2020-04-15 RX ADMIN — ACETAMINOPHEN 975 MG: 325 TABLET ORAL at 17:40

## 2020-04-15 RX ADMIN — OXYCODONE HYDROCHLORIDE 10 MG: 10 TABLET ORAL at 11:38

## 2020-04-15 RX ADMIN — GABAPENTIN 300 MG: 300 CAPSULE ORAL at 08:20

## 2020-04-15 RX ADMIN — OXYCODONE HYDROCHLORIDE AND ACETAMINOPHEN 500 MG: 500 TABLET ORAL at 11:28

## 2020-04-16 VITALS
HEART RATE: 85 BPM | OXYGEN SATURATION: 95 % | RESPIRATION RATE: 18 BRPM | TEMPERATURE: 98.4 F | WEIGHT: 180 LBS | DIASTOLIC BLOOD PRESSURE: 70 MMHG | BODY MASS INDEX: 26.66 KG/M2 | SYSTOLIC BLOOD PRESSURE: 130 MMHG | HEIGHT: 69 IN

## 2020-04-16 PROCEDURE — NC001 PR NO CHARGE: Performed by: PLASTIC SURGERY

## 2020-04-16 RX ORDER — SULFAMETHOXAZOLE AND TRIMETHOPRIM 800; 160 MG/1; MG/1
1 TABLET ORAL EVERY 12 HOURS SCHEDULED
Qty: 15 TABLET | Refills: 0 | Status: SHIPPED | OUTPATIENT
Start: 2020-04-16 | End: 2020-04-23

## 2020-04-16 RX ORDER — ACETAMINOPHEN 325 MG/1
975 TABLET ORAL EVERY 8 HOURS
Qty: 30 TABLET | Refills: 0 | Status: SHIPPED | OUTPATIENT
Start: 2020-04-16 | End: 2022-04-21 | Stop reason: SDDI

## 2020-04-16 RX ORDER — OXYCODONE HYDROCHLORIDE 5 MG/1
5 TABLET ORAL EVERY 4 HOURS PRN
Qty: 15 TABLET | Refills: 0 | Status: SHIPPED | OUTPATIENT
Start: 2020-04-16 | End: 2020-04-26

## 2020-04-16 RX ORDER — PURIFIED WATER 986 MG/ML
SOLUTION OPHTHALMIC ONCE AS NEEDED
Status: DISCONTINUED | OUTPATIENT
Start: 2020-04-16 | End: 2020-04-16 | Stop reason: HOSPADM

## 2020-04-16 RX ADMIN — BACITRACIN 1 LARGE APPLICATION: 500 OINTMENT TOPICAL at 08:05

## 2020-04-16 RX ADMIN — ACETAMINOPHEN 975 MG: 325 TABLET ORAL at 08:04

## 2020-04-16 RX ADMIN — LORATADINE AND PSEUDOEPHEDRINE 1 TABLET: 10; 240 TABLET, EXTENDED RELEASE ORAL at 08:04

## 2020-04-16 RX ADMIN — OXYCODONE HYDROCHLORIDE AND ACETAMINOPHEN 500 MG: 500 TABLET ORAL at 08:04

## 2020-04-16 RX ADMIN — GABAPENTIN 300 MG: 300 CAPSULE ORAL at 08:04

## 2020-04-16 RX ADMIN — SULFAMETHOXAZOLE AND TRIMETHOPRIM 1 TABLET: 800; 160 TABLET ORAL at 08:04

## 2020-04-22 ENCOUNTER — OFFICE VISIT (OUTPATIENT)
Dept: PLASTIC SURGERY | Facility: CLINIC | Age: 70
End: 2020-04-22

## 2020-04-22 VITALS — TEMPERATURE: 97.2 F

## 2020-04-22 DIAGNOSIS — C44.311 BASAL CELL CARCINOMA (BCC) OF NASAL SIDEWALL: Primary | ICD-10-CM

## 2020-04-22 DIAGNOSIS — Z98.890 MOHS DEFECT OF NOSE: ICD-10-CM

## 2020-04-22 DIAGNOSIS — M95.0 MOHS DEFECT OF NOSE: ICD-10-CM

## 2020-04-22 PROCEDURE — 99024 POSTOP FOLLOW-UP VISIT: CPT | Performed by: PHYSICIAN ASSISTANT

## 2020-04-24 ENCOUNTER — TELEPHONE (OUTPATIENT)
Dept: PLASTIC SURGERY | Facility: CLINIC | Age: 70
End: 2020-04-24

## 2020-05-03 ENCOUNTER — ANESTHESIA EVENT (OUTPATIENT)
Dept: PERIOP | Facility: HOSPITAL | Age: 70
End: 2020-05-03
Payer: MEDICARE

## 2020-05-03 PROCEDURE — 99024 POSTOP FOLLOW-UP VISIT: CPT | Performed by: PHYSICIAN ASSISTANT

## 2020-05-04 ENCOUNTER — HOSPITAL ENCOUNTER (OUTPATIENT)
Facility: HOSPITAL | Age: 70
Setting detail: OUTPATIENT SURGERY
Discharge: HOME | End: 2020-05-04
Attending: PLASTIC SURGERY | Admitting: PLASTIC SURGERY
Payer: MEDICARE

## 2020-05-04 ENCOUNTER — ANESTHESIA (OUTPATIENT)
Dept: PERIOP | Facility: HOSPITAL | Age: 70
End: 2020-05-04
Payer: MEDICARE

## 2020-05-04 VITALS
OXYGEN SATURATION: 98 % | SYSTOLIC BLOOD PRESSURE: 139 MMHG | DIASTOLIC BLOOD PRESSURE: 70 MMHG | BODY MASS INDEX: 26.66 KG/M2 | HEIGHT: 69 IN | RESPIRATION RATE: 18 BRPM | HEART RATE: 62 BPM | TEMPERATURE: 97.5 F | WEIGHT: 180 LBS

## 2020-05-04 DIAGNOSIS — M95.0 MOHS DEFECT OF NOSE: ICD-10-CM

## 2020-05-04 DIAGNOSIS — C44.311 BASAL CELL CARCINOMA (BCC) OF NASAL SIDEWALL: Primary | ICD-10-CM

## 2020-05-04 DIAGNOSIS — Z98.890 MOHS DEFECT OF NOSE: ICD-10-CM

## 2020-05-04 PROCEDURE — 21235 EAR CARTILAGE GRAFT: CPT | Performed by: PLASTIC SURGERY

## 2020-05-04 PROCEDURE — 21235 EAR CARTILAGE GRAFT: CPT | Performed by: PHYSICIAN ASSISTANT

## 2020-05-04 RX ORDER — SODIUM CHLORIDE, SODIUM LACTATE, POTASSIUM CHLORIDE, CALCIUM CHLORIDE 600; 310; 30; 20 MG/100ML; MG/100ML; MG/100ML; MG/100ML
100 INJECTION, SOLUTION INTRAVENOUS CONTINUOUS
Status: DISCONTINUED | OUTPATIENT
Start: 2020-05-04 | End: 2020-05-04 | Stop reason: HOSPADM

## 2020-05-04 RX ORDER — SODIUM CHLORIDE, SODIUM LACTATE, POTASSIUM CHLORIDE, CALCIUM CHLORIDE 600; 310; 30; 20 MG/100ML; MG/100ML; MG/100ML; MG/100ML
50 INJECTION, SOLUTION INTRAVENOUS CONTINUOUS
Status: DISCONTINUED | OUTPATIENT
Start: 2020-05-04 | End: 2020-05-04

## 2020-05-04 RX ORDER — MIDAZOLAM HYDROCHLORIDE 2 MG/2ML
INJECTION, SOLUTION INTRAMUSCULAR; INTRAVENOUS AS NEEDED
Status: DISCONTINUED | OUTPATIENT
Start: 2020-05-04 | End: 2020-05-04 | Stop reason: SURG

## 2020-05-04 RX ORDER — ACETAMINOPHEN 325 MG/1
975 TABLET ORAL ONCE
Status: COMPLETED | OUTPATIENT
Start: 2020-05-04 | End: 2020-05-04

## 2020-05-04 RX ORDER — HYDROMORPHONE HCL/PF 1 MG/ML
0.2 SYRINGE (ML) INJECTION
Status: DISCONTINUED | OUTPATIENT
Start: 2020-05-04 | End: 2020-05-04 | Stop reason: HOSPADM

## 2020-05-04 RX ORDER — ONDANSETRON 2 MG/ML
4 INJECTION INTRAMUSCULAR; INTRAVENOUS ONCE AS NEEDED
Status: DISCONTINUED | OUTPATIENT
Start: 2020-05-04 | End: 2020-05-04 | Stop reason: HOSPADM

## 2020-05-04 RX ORDER — CEFAZOLIN SODIUM 2 G/50ML
2000 SOLUTION INTRAVENOUS ONCE
Status: COMPLETED | OUTPATIENT
Start: 2020-05-04 | End: 2020-05-04

## 2020-05-04 RX ORDER — LIDOCAINE HYDROCHLORIDE 10 MG/ML
0.5 INJECTION, SOLUTION EPIDURAL; INFILTRATION; INTRACAUDAL; PERINEURAL ONCE AS NEEDED
Status: COMPLETED | OUTPATIENT
Start: 2020-05-04 | End: 2020-05-04

## 2020-05-04 RX ORDER — GABAPENTIN 300 MG/1
300 CAPSULE ORAL ONCE
Status: COMPLETED | OUTPATIENT
Start: 2020-05-04 | End: 2020-05-04

## 2020-05-04 RX ORDER — TRAMADOL HYDROCHLORIDE 50 MG/1
50 TABLET ORAL EVERY 4 HOURS PRN
Status: DISCONTINUED | OUTPATIENT
Start: 2020-05-04 | End: 2020-05-04 | Stop reason: HOSPADM

## 2020-05-04 RX ORDER — TRAMADOL HYDROCHLORIDE 50 MG/1
50 TABLET ORAL EVERY 8 HOURS PRN
Qty: 21 TABLET | Refills: 0 | Status: SHIPPED | OUTPATIENT
Start: 2020-05-04 | End: 2020-05-14

## 2020-05-04 RX ORDER — LIDOCAINE HYDROCHLORIDE AND EPINEPHRINE 10; 10 MG/ML; UG/ML
INJECTION, SOLUTION INFILTRATION; PERINEURAL AS NEEDED
Status: DISCONTINUED | OUTPATIENT
Start: 2020-05-04 | End: 2020-05-04 | Stop reason: HOSPADM

## 2020-05-04 RX ORDER — PROPOFOL 10 MG/ML
INJECTION, EMULSION INTRAVENOUS CONTINUOUS PRN
Status: DISCONTINUED | OUTPATIENT
Start: 2020-05-04 | End: 2020-05-04

## 2020-05-04 RX ORDER — FENTANYL CITRATE/PF 50 MCG/ML
25 SYRINGE (ML) INJECTION
Status: DISCONTINUED | OUTPATIENT
Start: 2020-05-04 | End: 2020-05-04 | Stop reason: HOSPADM

## 2020-05-04 RX ADMIN — PROPOFOL 50 MCG/KG/MIN: 10 INJECTION, EMULSION INTRAVENOUS at 08:56

## 2020-05-04 RX ADMIN — GABAPENTIN 300 MG: 300 CAPSULE ORAL at 07:40

## 2020-05-04 RX ADMIN — ACETAMINOPHEN 975 MG: 325 TABLET ORAL at 07:40

## 2020-05-04 RX ADMIN — SODIUM CHLORIDE, SODIUM LACTATE, POTASSIUM CHLORIDE, AND CALCIUM CHLORIDE 50 ML/HR: .6; .31; .03; .02 INJECTION, SOLUTION INTRAVENOUS at 08:05

## 2020-05-04 RX ADMIN — FENTANYL CITRATE 25 MCG: 50 INJECTION INTRAMUSCULAR; INTRAVENOUS at 10:42

## 2020-05-04 RX ADMIN — MIDAZOLAM 2 MG: 1 INJECTION INTRAMUSCULAR; INTRAVENOUS at 08:56

## 2020-05-04 RX ADMIN — MIDAZOLAM 1 MG: 1 INJECTION INTRAMUSCULAR; INTRAVENOUS at 09:18

## 2020-05-04 RX ADMIN — CEFAZOLIN SODIUM 2000 MG: 2 SOLUTION INTRAVENOUS at 08:51

## 2020-05-04 RX ADMIN — LIDOCAINE HYDROCHLORIDE 0.1 ML: 10 INJECTION, SOLUTION EPIDURAL; INFILTRATION; INTRACAUDAL; PERINEURAL at 08:05

## 2020-05-04 RX ADMIN — FENTANYL CITRATE 25 MCG: 50 INJECTION INTRAMUSCULAR; INTRAVENOUS at 10:33

## 2020-05-05 ENCOUNTER — TELEPHONE (OUTPATIENT)
Dept: PLASTIC SURGERY | Facility: CLINIC | Age: 70
End: 2020-05-05

## 2020-05-07 ENCOUNTER — TELEPHONE (OUTPATIENT)
Dept: HEMATOLOGY ONCOLOGY | Facility: MEDICAL CENTER | Age: 70
End: 2020-05-07

## 2020-05-14 ENCOUNTER — OFFICE VISIT (OUTPATIENT)
Dept: PLASTIC SURGERY | Facility: CLINIC | Age: 70
End: 2020-05-14

## 2020-05-14 VITALS — WEIGHT: 180 LBS | HEIGHT: 69 IN | BODY MASS INDEX: 26.66 KG/M2

## 2020-05-14 DIAGNOSIS — M95.0 MOHS DEFECT OF NOSE: ICD-10-CM

## 2020-05-14 DIAGNOSIS — Z98.890 S/P FLAP GRAFT: ICD-10-CM

## 2020-05-14 DIAGNOSIS — Z98.890 MOHS DEFECT OF NOSE: ICD-10-CM

## 2020-05-14 DIAGNOSIS — C44.311 BASAL CELL CARCINOMA (BCC) OF NASAL SIDEWALL: Primary | ICD-10-CM

## 2020-05-14 PROCEDURE — 99024 POSTOP FOLLOW-UP VISIT: CPT | Performed by: PHYSICIAN ASSISTANT

## 2020-05-19 ENCOUNTER — OFFICE VISIT (OUTPATIENT)
Dept: PLASTIC SURGERY | Facility: CLINIC | Age: 70
End: 2020-05-19

## 2020-05-19 VITALS — TEMPERATURE: 98.6 F

## 2020-05-19 DIAGNOSIS — Z98.890 POST-OPERATIVE STATE: Primary | ICD-10-CM

## 2020-05-19 PROCEDURE — 99024 POSTOP FOLLOW-UP VISIT: CPT | Performed by: PLASTIC SURGERY

## 2020-05-28 ENCOUNTER — OFFICE VISIT (OUTPATIENT)
Dept: PLASTIC SURGERY | Facility: CLINIC | Age: 70
End: 2020-05-28

## 2020-05-28 VITALS — BODY MASS INDEX: 26.66 KG/M2 | HEIGHT: 69 IN | WEIGHT: 180 LBS

## 2020-05-28 DIAGNOSIS — C44.311 BASAL CELL CARCINOMA (BCC) OF NASAL SIDEWALL: Primary | ICD-10-CM

## 2020-05-28 PROCEDURE — 99024 POSTOP FOLLOW-UP VISIT: CPT | Performed by: PHYSICIAN ASSISTANT

## 2020-05-29 ENCOUNTER — TELEPHONE (OUTPATIENT)
Dept: PLASTIC SURGERY | Facility: CLINIC | Age: 70
End: 2020-05-29

## 2020-06-12 ENCOUNTER — TRANSCRIBE ORDERS (OUTPATIENT)
Dept: ADMINISTRATIVE | Age: 70
End: 2020-06-12

## 2020-06-12 ENCOUNTER — APPOINTMENT (OUTPATIENT)
Dept: LAB | Age: 70
End: 2020-06-12
Payer: MEDICARE

## 2020-06-12 ENCOUNTER — TELEPHONE (OUTPATIENT)
Dept: HEMATOLOGY ONCOLOGY | Facility: MEDICAL CENTER | Age: 70
End: 2020-06-12

## 2020-06-12 ENCOUNTER — DOCUMENTATION (OUTPATIENT)
Dept: URGENT CARE | Age: 70
End: 2020-06-12

## 2020-06-12 DIAGNOSIS — C91.12 CHRONIC LYMPHOID LEUKEMIA IN RELAPSE (HCC): Primary | ICD-10-CM

## 2020-06-12 DIAGNOSIS — C91.12 CHRONIC LYMPHOID LEUKEMIA IN RELAPSE (HCC): ICD-10-CM

## 2020-06-12 LAB
ALBUMIN SERPL BCP-MCNC: 4.3 G/DL (ref 3.5–5)
ALP SERPL-CCNC: 121 U/L (ref 46–116)
ALT SERPL W P-5'-P-CCNC: 26 U/L (ref 12–78)
ANION GAP SERPL CALCULATED.3IONS-SCNC: 7 MMOL/L (ref 4–13)
AST SERPL W P-5'-P-CCNC: 19 U/L (ref 5–45)
BASOPHILS # BLD MANUAL: 0 THOUSAND/UL (ref 0–0.1)
BASOPHILS NFR MAR MANUAL: 0 % (ref 0–1)
BILIRUB SERPL-MCNC: 0.27 MG/DL (ref 0.2–1)
BUN SERPL-MCNC: 21 MG/DL (ref 5–25)
CALCIUM SERPL-MCNC: 9.5 MG/DL (ref 8.3–10.1)
CHLORIDE SERPL-SCNC: 108 MMOL/L (ref 100–108)
CO2 SERPL-SCNC: 24 MMOL/L (ref 21–32)
CREAT SERPL-MCNC: 1.14 MG/DL (ref 0.6–1.3)
EOSINOPHIL # BLD MANUAL: 0 THOUSAND/UL (ref 0–0.4)
EOSINOPHIL NFR BLD MANUAL: 0 % (ref 0–6)
ERYTHROCYTE [DISTWIDTH] IN BLOOD BY AUTOMATED COUNT: 13.8 % (ref 11.6–15.1)
GFR SERPL CREATININE-BSD FRML MDRD: 66 ML/MIN/1.73SQ M
GLUCOSE SERPL-MCNC: 90 MG/DL (ref 65–140)
HCT VFR BLD AUTO: 37.9 % (ref 36.5–49.3)
HGB BLD-MCNC: 11.7 G/DL (ref 12–17)
IGA SERPL-MCNC: 88 MG/DL (ref 70–400)
IGG SERPL-MCNC: 760 MG/DL (ref 700–1600)
IGM SERPL-MCNC: 24 MG/DL (ref 40–230)
LDH SERPL-CCNC: 249 U/L (ref 81–234)
LYMPHOCYTES # BLD AUTO: 93 % (ref 14–44)
LYMPHOCYTES # BLD AUTO: 97.06 THOUSAND/UL (ref 0.6–4.47)
MACROCYTES BLD QL AUTO: PRESENT
MCH RBC QN AUTO: 31.4 PG (ref 26.8–34.3)
MCHC RBC AUTO-ENTMCNC: 30.9 G/DL (ref 31.4–37.4)
MCV RBC AUTO: 102 FL (ref 82–98)
MONOCYTES # BLD AUTO: 2.09 THOUSAND/UL (ref 0–1.22)
MONOCYTES NFR BLD: 2 % (ref 4–12)
NEUTROPHILS # BLD MANUAL: 5.22 THOUSAND/UL (ref 1.85–7.62)
NEUTS SEG NFR BLD AUTO: 5 % (ref 43–75)
NRBC BLD AUTO-RTO: 0 /100 WBCS
PLATELET # BLD AUTO: 139 THOUSANDS/UL (ref 149–390)
PLATELET BLD QL SMEAR: ADEQUATE
PMV BLD AUTO: 9.1 FL (ref 8.9–12.7)
POTASSIUM SERPL-SCNC: 4.3 MMOL/L (ref 3.5–5.3)
PROT SERPL-MCNC: 7.3 G/DL (ref 6.4–8.2)
RBC # BLD AUTO: 3.73 MILLION/UL (ref 3.88–5.62)
SODIUM SERPL-SCNC: 139 MMOL/L (ref 136–145)
TOTAL CELLS COUNTED SPEC: 100
URATE SERPL-MCNC: 5.3 MG/DL (ref 4.2–8)
WBC # BLD AUTO: 104.37 THOUSAND/UL (ref 4.31–10.16)

## 2020-06-12 PROCEDURE — 85007 BL SMEAR W/DIFF WBC COUNT: CPT

## 2020-06-12 PROCEDURE — 85027 COMPLETE CBC AUTOMATED: CPT

## 2020-06-12 PROCEDURE — 83615 LACTATE (LD) (LDH) ENZYME: CPT

## 2020-06-12 PROCEDURE — 80053 COMPREHEN METABOLIC PANEL: CPT

## 2020-06-12 PROCEDURE — 36415 COLL VENOUS BLD VENIPUNCTURE: CPT

## 2020-06-12 PROCEDURE — 82784 ASSAY IGA/IGD/IGG/IGM EACH: CPT

## 2020-06-12 PROCEDURE — 84550 ASSAY OF BLOOD/URIC ACID: CPT

## 2020-06-16 ENCOUNTER — ANESTHESIA EVENT (OUTPATIENT)
Dept: PERIOP | Facility: HOSPITAL | Age: 70
End: 2020-06-16
Payer: MEDICARE

## 2020-06-16 PROCEDURE — 99024 POSTOP FOLLOW-UP VISIT: CPT | Performed by: PHYSICIAN ASSISTANT

## 2020-06-17 ENCOUNTER — HOSPITAL ENCOUNTER (OUTPATIENT)
Facility: HOSPITAL | Age: 70
Discharge: HOME | End: 2020-06-18
Attending: PLASTIC SURGERY | Admitting: PLASTIC SURGERY
Payer: MEDICARE

## 2020-06-17 ENCOUNTER — ANESTHESIA (OUTPATIENT)
Dept: PERIOP | Facility: HOSPITAL | Age: 70
End: 2020-06-17
Payer: MEDICARE

## 2020-06-17 VITALS
RESPIRATION RATE: 18 BRPM | WEIGHT: 180 LBS | HEIGHT: 69 IN | OXYGEN SATURATION: 98 % | HEART RATE: 79 BPM | DIASTOLIC BLOOD PRESSURE: 72 MMHG | BODY MASS INDEX: 26.66 KG/M2 | TEMPERATURE: 98 F | SYSTOLIC BLOOD PRESSURE: 126 MMHG

## 2020-06-17 PROCEDURE — 15630 DELAY FLAP EYE/NOS/EAR/LIP: CPT | Performed by: PLASTIC SURGERY

## 2020-06-17 PROCEDURE — 15004 WOUND PREP F/N/HF/G: CPT | Performed by: PLASTIC SURGERY

## 2020-06-17 PROCEDURE — 15240 FTH/GFT F/C/C/M/N/AX/G/H/F20: CPT | Performed by: PLASTIC SURGERY

## 2020-06-17 RX ORDER — SODIUM CHLORIDE, SODIUM LACTATE, POTASSIUM CHLORIDE, CALCIUM CHLORIDE 600; 310; 30; 20 MG/100ML; MG/100ML; MG/100ML; MG/100ML
50 INJECTION, SOLUTION INTRAVENOUS CONTINUOUS
Status: DISCONTINUED | OUTPATIENT
Start: 2020-06-17 | End: 2020-06-18 | Stop reason: HOSPADM

## 2020-06-17 RX ORDER — ACETAMINOPHEN 325 MG/1
975 TABLET ORAL ONCE
Status: COMPLETED | OUTPATIENT
Start: 2020-06-17 | End: 2020-06-17

## 2020-06-17 RX ORDER — LIDOCAINE HYDROCHLORIDE 10 MG/ML
INJECTION, SOLUTION EPIDURAL; INFILTRATION; INTRACAUDAL; PERINEURAL AS NEEDED
Status: DISCONTINUED | OUTPATIENT
Start: 2020-06-17 | End: 2020-06-17 | Stop reason: SURG

## 2020-06-17 RX ORDER — FENTANYL CITRATE 50 UG/ML
INJECTION, SOLUTION INTRAMUSCULAR; INTRAVENOUS AS NEEDED
Status: DISCONTINUED | OUTPATIENT
Start: 2020-06-17 | End: 2020-06-17 | Stop reason: SURG

## 2020-06-17 RX ORDER — GABAPENTIN 300 MG/1
300 CAPSULE ORAL ONCE
Status: COMPLETED | OUTPATIENT
Start: 2020-06-17 | End: 2020-06-17

## 2020-06-17 RX ORDER — PROPOFOL 10 MG/ML
INJECTION, EMULSION INTRAVENOUS AS NEEDED
Status: DISCONTINUED | OUTPATIENT
Start: 2020-06-17 | End: 2020-06-17 | Stop reason: SURG

## 2020-06-17 RX ORDER — HYDROMORPHONE HCL/PF 1 MG/ML
0.2 SYRINGE (ML) INJECTION
Status: DISCONTINUED | OUTPATIENT
Start: 2020-06-17 | End: 2020-06-17 | Stop reason: HOSPADM

## 2020-06-17 RX ORDER — FENTANYL CITRATE/PF 50 MCG/ML
25 SYRINGE (ML) INJECTION
Status: DISCONTINUED | OUTPATIENT
Start: 2020-06-17 | End: 2020-06-17 | Stop reason: HOSPADM

## 2020-06-17 RX ORDER — ONDANSETRON 2 MG/ML
4 INJECTION INTRAMUSCULAR; INTRAVENOUS ONCE AS NEEDED
Status: DISCONTINUED | OUTPATIENT
Start: 2020-06-17 | End: 2020-06-17 | Stop reason: HOSPADM

## 2020-06-17 RX ORDER — CEFAZOLIN SODIUM 2 G/50ML
2000 SOLUTION INTRAVENOUS ONCE
Status: DISCONTINUED | OUTPATIENT
Start: 2020-06-17 | End: 2020-06-17 | Stop reason: HOSPADM

## 2020-06-17 RX ORDER — ONDANSETRON 2 MG/ML
INJECTION INTRAMUSCULAR; INTRAVENOUS AS NEEDED
Status: DISCONTINUED | OUTPATIENT
Start: 2020-06-17 | End: 2020-06-17 | Stop reason: SURG

## 2020-06-17 RX ORDER — METOCLOPRAMIDE HYDROCHLORIDE 5 MG/ML
10 INJECTION INTRAMUSCULAR; INTRAVENOUS ONCE AS NEEDED
Status: DISCONTINUED | OUTPATIENT
Start: 2020-06-17 | End: 2020-06-17 | Stop reason: HOSPADM

## 2020-06-17 RX ORDER — MIDAZOLAM HYDROCHLORIDE 2 MG/2ML
INJECTION, SOLUTION INTRAMUSCULAR; INTRAVENOUS AS NEEDED
Status: DISCONTINUED | OUTPATIENT
Start: 2020-06-17 | End: 2020-06-17 | Stop reason: SURG

## 2020-06-17 RX ORDER — CEFAZOLIN SODIUM 1 G/3ML
INJECTION, POWDER, FOR SOLUTION INTRAMUSCULAR; INTRAVENOUS AS NEEDED
Status: DISCONTINUED | OUTPATIENT
Start: 2020-06-17 | End: 2020-06-17 | Stop reason: SURG

## 2020-06-17 RX ORDER — LIDOCAINE HYDROCHLORIDE AND EPINEPHRINE 10; 10 MG/ML; UG/ML
INJECTION, SOLUTION INFILTRATION; PERINEURAL AS NEEDED
Status: DISCONTINUED | OUTPATIENT
Start: 2020-06-17 | End: 2020-06-17 | Stop reason: HOSPADM

## 2020-06-17 RX ADMIN — SODIUM CHLORIDE, SODIUM LACTATE, POTASSIUM CHLORIDE, AND CALCIUM CHLORIDE 50 ML/HR: .6; .31; .03; .02 INJECTION, SOLUTION INTRAVENOUS at 21:18

## 2020-06-17 RX ADMIN — CEFAZOLIN 2000 MG: 1 INJECTION, POWDER, FOR SOLUTION INTRAVENOUS at 19:47

## 2020-06-17 RX ADMIN — GABAPENTIN 300 MG: 300 CAPSULE ORAL at 15:27

## 2020-06-17 RX ADMIN — FENTANYL CITRATE 25 MCG: 50 INJECTION, SOLUTION INTRAMUSCULAR; INTRAVENOUS at 19:56

## 2020-06-17 RX ADMIN — SODIUM CHLORIDE, SODIUM LACTATE, POTASSIUM CHLORIDE, AND CALCIUM CHLORIDE 50 ML/HR: .6; .31; .03; .02 INJECTION, SOLUTION INTRAVENOUS at 15:40

## 2020-06-17 RX ADMIN — FENTANYL CITRATE 25 MCG: 50 INJECTION, SOLUTION INTRAMUSCULAR; INTRAVENOUS at 20:55

## 2020-06-17 RX ADMIN — MIDAZOLAM 2 MG: 1 INJECTION INTRAMUSCULAR; INTRAVENOUS at 19:38

## 2020-06-17 RX ADMIN — FENTANYL CITRATE 25 MCG: 50 INJECTION, SOLUTION INTRAMUSCULAR; INTRAVENOUS at 20:51

## 2020-06-17 RX ADMIN — LIDOCAINE HYDROCHLORIDE 50 MG: 10 INJECTION, SOLUTION EPIDURAL; INFILTRATION; INTRACAUDAL; PERINEURAL at 19:52

## 2020-06-17 RX ADMIN — ONDANSETRON 4 MG: 2 INJECTION INTRAMUSCULAR; INTRAVENOUS at 19:52

## 2020-06-17 RX ADMIN — ACETAMINOPHEN 975 MG: 325 TABLET ORAL at 15:27

## 2020-06-17 RX ADMIN — FENTANYL CITRATE 25 MCG: 50 INJECTION, SOLUTION INTRAMUSCULAR; INTRAVENOUS at 20:24

## 2020-06-17 RX ADMIN — PROPOFOL 150 MG: 10 INJECTION, EMULSION INTRAVENOUS at 19:52

## 2020-06-24 ENCOUNTER — OFFICE VISIT (OUTPATIENT)
Dept: PLASTIC SURGERY | Facility: CLINIC | Age: 70
End: 2020-06-24

## 2020-06-24 VITALS — BODY MASS INDEX: 26.66 KG/M2 | WEIGHT: 180 LBS | HEIGHT: 69 IN

## 2020-06-24 DIAGNOSIS — Z98.890 POST-OPERATIVE STATE: Primary | ICD-10-CM

## 2020-06-24 PROCEDURE — 99024 POSTOP FOLLOW-UP VISIT: CPT

## 2020-07-01 ENCOUNTER — OFFICE VISIT (OUTPATIENT)
Dept: PLASTIC SURGERY | Facility: CLINIC | Age: 70
End: 2020-07-01

## 2020-07-01 VITALS — TEMPERATURE: 97.7 F | WEIGHT: 180 LBS | HEIGHT: 69 IN | BODY MASS INDEX: 26.66 KG/M2

## 2020-07-01 DIAGNOSIS — C44.311 BASAL CELL CARCINOMA (BCC) OF NASAL SIDEWALL: ICD-10-CM

## 2020-07-01 DIAGNOSIS — Z98.890 S/P FLAP GRAFT: Primary | ICD-10-CM

## 2020-07-01 PROCEDURE — 99024 POSTOP FOLLOW-UP VISIT: CPT | Performed by: PHYSICIAN ASSISTANT

## 2020-07-01 NOTE — PROGRESS NOTES
Pt is 2 weeks post-op: DIVISION INSET FOREHEAD FLAP; FULL THICKNESS SKIN GRAFT TO FOREHEAD (N/A)  He is here for routine follow-up        S:  Doing well  Sutures are out and he feels satisfied with results      O:  Forehead: well healed graft, donor site scar well healed as well with no hypertrophy  Nose: Flap well healed, decreased nasal dorsum projection      A:  Doing well  He is healing well, 2 weeks postop  He is  happy with results       P: Silicone scar massage instruction were provided  Patient to return to evaluate scars and radiation changes

## 2020-07-16 ENCOUNTER — OFFICE VISIT (OUTPATIENT)
Dept: PLASTIC SURGERY | Facility: CLINIC | Age: 70
End: 2020-07-16

## 2020-07-16 VITALS — BODY MASS INDEX: 26.66 KG/M2 | TEMPERATURE: 98.2 F | WEIGHT: 180 LBS | HEIGHT: 69 IN

## 2020-07-16 DIAGNOSIS — C44.311 BASAL CELL CARCINOMA (BCC) OF NASAL SIDEWALL: Primary | ICD-10-CM

## 2020-07-16 PROCEDURE — 99024 POSTOP FOLLOW-UP VISIT: CPT | Performed by: PLASTIC SURGERY

## 2020-07-16 NOTE — PROGRESS NOTES
Raghavendra is 4 weeks post-op: DIVISION INSET FOREHEAD FLAP; FULL THICKNESS SKIN GRAFT TO FOREHEAD (N/A)  Presenting for routine follow-up  S:  Doing well  Patient has noted wound were all healed  He recently has seen dermatology who has recommended radiation therapy  O:  Forehead: well healed graft, donor site scar well healed as well with no hypertrophy  Nose: Flap well healed, mild pincushion effect, decrease nasal dorsum projection distally  narrow left internal valve    A:  Satisfactory course  I discussed with him my findings, he is very heal healed, dorsum loss of projection is due to cartilage loss, that could be improved, but he is not interested at this time and manifest happy with results  I explained that flap was left bulky in anticipation of potential radiation need  Patient asked me about recommendation for radiation therapy for [facility]  I explained that I would defer to his medical and radiation oncology team to discuss  P: scar massage instruction were provided  Patient to return in 2 months to evaluate scars and radiation changes if started  Patient is to return as needed for redness, swelling, discomfort, or any concern about his surgery

## 2020-07-30 ENCOUNTER — OFFICE VISIT (OUTPATIENT)
Dept: HEMATOLOGY ONCOLOGY | Facility: CLINIC | Age: 70
End: 2020-07-30
Payer: MEDICARE

## 2020-07-30 VITALS
HEIGHT: 69 IN | WEIGHT: 176 LBS | HEART RATE: 74 BPM | DIASTOLIC BLOOD PRESSURE: 74 MMHG | SYSTOLIC BLOOD PRESSURE: 128 MMHG | OXYGEN SATURATION: 98 % | RESPIRATION RATE: 17 BRPM | TEMPERATURE: 97.8 F | BODY MASS INDEX: 26.07 KG/M2

## 2020-07-30 DIAGNOSIS — C91.12 CLL (CHRONIC LYMPHOID LEUKEMIA) IN RELAPSE (HCC): Primary | ICD-10-CM

## 2020-07-30 PROCEDURE — 99214 OFFICE O/P EST MOD 30 MIN: CPT | Performed by: PHYSICIAN ASSISTANT

## 2020-07-30 RX ORDER — DIPHENOXYLATE HYDROCHLORIDE AND ATROPINE SULFATE 2.5; .025 MG/1; MG/1
1 TABLET ORAL DAILY
COMMUNITY
End: 2022-07-13

## 2020-07-30 NOTE — PROGRESS NOTES
Hematology/Oncology Outpatient Follow-up  Danny Sinlgeton 77 y o  male 1950 [id number]    Date:  7/30/2020      Assessment and Plan:    1  CLL (chronic lymphoid leukemia)   56-year-old male presents for follow-up regarding history of CLL  His CLL continues to remain stable  WBC is improved compared to last reading  Hemoglobin is stable  Platelets are slightly decreased but still above 100,000  He would be a candidate for treatment if these decreased below 100,000  He asked today regarding treatment options  Reviewed that this time treatment options include acalabrutinib, ibrutinib, venetoclax  Monitoring is favored  Labs every 2 months  Follow-up 6 months     - CBC and differential; Standing  - Comprehensive metabolic panel; Standing  - IgG, IgA, IgM; Standing  - Uric acid; Standing  - LD,Blood; Standing  - CBC and differential  - Comprehensive metabolic panel  - IgG, IgA, IgM  - Uric acid  - LD,Blood      HPI:  56-year-old male with history of CLL  Elisha Epperson was diagnosed with CLL in 1996  He had received chlorambucil intermittently, 6 cycles of Rituxan and bendamustine in 2014 in no treatment since  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      Patient also follows at the [address] [place] [address] with Dr Lashonda Martinez  He was last seen at [facility] in Aug 2019  He does not plan to follow up with Dr Lashonda Martinez at this time  Interval history: Since patient's last visit he was dx with stage III skin cancer, 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, T3N0, left side nose, 1 5 cm, deep invasion into muscle  He required Moh's surgery with positive margins  He underwent re excision and then with subsequent skin graft  He was recommended to receive radiation and will be receiving at [facility] with Dr Matthew Marking  ROS: Review of Systems   Constitutional: Negative for appetite change, chills, fatigue, fever and unexpected weight change          Denies night sweats   HENT: Negative for mouth sores, nosebleeds and trouble swallowing  Respiratory: Negative for cough and shortness of breath  Cardiovascular: Negative for chest pain, palpitations and leg swelling  Gastrointestinal: Negative for abdominal pain, blood in stool, constipation, diarrhea, nausea and vomiting  Genitourinary: Negative for difficulty urinating, dysuria and hematuria  Musculoskeletal: Negative for arthralgias  Skin: Negative  Neurological: Negative for dizziness, weakness, light-headedness, numbness and headaches  Hematological: Negative  Psychiatric/Behavioral: Negative  Past Medical History:   Diagnosis Date    Basal cell carcinoma (BCC) of skin of nose     History of chemotherapy     Lymphocytic leukemia ([facility] [address] )        Past Surgical History:   Procedure Laterality Date    CATARACT EXTRACTION Bilateral     FLAP LOCAL HEAD / NECK N/A 4/14/2020    Procedure: ADJACENT TISSUE TRANSFER  FOREHEAD FLAP,  CARTILAGE GRAFT NOSE;  Surgeon: Zaki Escobar MD;  [facility];  Service: Plastics    FLAP LOCAL HEAD / NECK N/A 6/17/2020    Procedure: DIVISION INSET FOREHEAD FLAP; FULL THICKNESS SKIN GRAFT TO FOREHEAD;  Surgeon: Zaki Escobar MD;  [facility];  Service: Plastics    FULL THICKNESS SKIN GRAFT N/A 4/14/2020    Procedure: SKIN GRAFT FULL THICKNESS  (FTSG) NOSE;  Surgeon: Zaki Escobar MD;  [facility];  Service: Plastics    MOHS RECONSTRUCTION N/A 4/14/2020    Procedure: MOHS RECONSTRUCTION NOSE DEFECT;  Surgeon: Zaki Escobar MD;  [facility];  Service: Plastics    MOHS RECONSTRUCTION N/A 5/4/2020    Procedure: RECONSTRUCTION MOHS NASAL DEFECT WITH EAR CARTILAGE GRAFT;  Surgeon: Zaki Escobra MD;  [facility];  Service: Plastics    SHOULDER SURGERY      TIBIA FRACTURE SURGERY      TONSILLECTOMY         Social History     Socioeconomic History    Marital status:       Spouse name: Not on file    Number of children: Not on file    Years of education: Not on file    Highest education level: Not on file   Occupational History    Not on file   Social Needs    Financial resource strain: Not on file    Food insecurity:     Worry: Not on file     Inability: Not on file    Transportation needs:     Medical: Not on file     Non-medical: Not on file   Tobacco Use    Smoking status: Never Smoker    Smokeless tobacco: Never Used   Substance and Sexual Activity    Alcohol use: Yes     Frequency: Monthly or less    Drug use: No    Sexual activity: Not on file   Lifestyle    Physical activity:     Days per week: Not on file     Minutes per session: Not on file    Stress: Not on file   Relationships    Social connections:     Talks on phone: Not on file     Gets together: Not on file     Attends Faith service: Not on file     Active member of club or organization: Not on file     Attends meetings of clubs or organizations: Not on file     Relationship status: Not on file    Intimate partner violence:     Fear of current or ex partner: Not on file     Emotionally abused: Not on file     Physically abused: Not on file     Forced sexual activity: Not on file   Other Topics Concern    Not on file   Social History Narrative    Not on file       Family History   Problem Relation Age of Onset    No Known Problems Mother     No Known Problems Father        No Known Allergies      Current Outpatient Medications:     acetaminophen (TYLENOL) 325 mg tablet, Take 3 tablets (975 mg total) by mouth every 8 (eight) hours (Patient taking differently: Take 975 mg by mouth as needed ), Disp: 30 tablet, Rfl: 0    Ascorbic Acid (VITAMIN C PO), Take by mouth daily , Disp: , Rfl:     fexofenadine-pseudoephedrine (ALLEGRA-D 24) 180-240 MG per 24 hr tablet, Take 1 tablet by mouth as needed , Disp: , Rfl:     multivitamin (THERAGRAN) TABS, Take 1 tablet by mouth daily, Disp: , Rfl:     VITAMIN D PO, Take by mouth daily , Disp: , Rfl:     VITAMIN E PO, Take by mouth daily , Disp: , Rfl:       Physical Exam:  /74 (BP Location: Left arm, Patient Position: Sitting)   Pulse 74   Temp 97 8 °F (36 6 °C) (Tympanic)   Resp 17   Ht 5' 9" (1 753 m)   Wt 79 8 kg (176 lb)   SpO2 98%   BMI 25 99 kg/m²     Physical Exam   Constitutional: He is oriented to person, place, and time  He appears well-developed and well-nourished  No distress  HENT:   Head: Normocephalic and atraumatic  Eyes: Conjunctivae are normal  No scleral icterus  Neck: Normal range of motion  Neck supple  Cardiovascular: Normal rate, regular rhythm and normal heart sounds  No murmur heard  Pulmonary/Chest: Effort normal and breath sounds normal  No respiratory distress  Abdominal: Soft  There is no tenderness  Musculoskeletal: Normal range of motion  He exhibits no edema or tenderness  Lymphadenopathy:     He has no cervical adenopathy  Neurological: He is alert and oriented to person, place, and time  No cranial nerve deficit  Skin: Skin is warm and dry  Psychiatric: He has a normal mood and affect  Vitals reviewed  Labs:  Lab Results   Component Value Date     37 (HH) 06/12/2020    HGB 11 7 (L) 06/12/2020    HCT 37 9 06/12/2020     (H) 06/12/2020     (L) 06/12/2020     Lab Results   Component Value Date     08/01/2015    K 4 3 06/12/2020     06/12/2020    CO2 24 06/12/2020    ANIONGAP 8 08/01/2015    BUN 21 06/12/2020    CREATININE 1 14 06/12/2020    GLUCOSE 96 08/01/2015    GLUF 91 03/05/2020    CALCIUM 9 5 06/12/2020    CORRECTEDCA 9 9 06/02/2017    AST 19 06/12/2020    ALT 26 06/12/2020    ALKPHOS 121 (H) 06/12/2020    PROT 7 6 08/01/2015    BILITOT 0 33 08/01/2015    EGFR 66 06/12/2020     Patient voiced understanding and agreement in the above discussion  Aware to contact our office with questions/symptoms in the interim  This note has been generated by voice recognition software system    Therefore, there may be spelling, grammar, and or syntax errors  Please contact if questions arise

## 2020-09-17 ENCOUNTER — OFFICE VISIT (OUTPATIENT)
Dept: PLASTIC SURGERY | Facility: CLINIC | Age: 70
End: 2020-09-17

## 2020-09-17 VITALS — HEIGHT: 69 IN | BODY MASS INDEX: 25.92 KG/M2 | TEMPERATURE: 97.1 F | WEIGHT: 175 LBS

## 2020-09-17 DIAGNOSIS — C91.12 CLL (CHRONIC LYMPHOID LEUKEMIA) IN RELAPSE (HCC): ICD-10-CM

## 2020-09-17 DIAGNOSIS — C44.311 BASAL CELL CARCINOMA (BCC) OF NASAL SIDEWALL: Primary | ICD-10-CM

## 2020-09-17 DIAGNOSIS — Z98.890 S/P FLAP GRAFT: ICD-10-CM

## 2020-09-17 PROCEDURE — 99024 POSTOP FOLLOW-UP VISIT: CPT | Performed by: PLASTIC SURGERY

## 2020-09-17 NOTE — PROGRESS NOTES
Arnold Blazing is 2 months post-op: DIVISION INSET FOREHEAD FLAP; FULL THICKNESS SKIN GRAFT TO FOREHEAD (N/A)  Presenting for routine follow-up  S:  Doing well  Patient has completed radiation therapy 2 weeks ago and explained that did well but on last session had significant redness and congestion on nose with bleeding as well  O:  Dorsum of the nose skin paddle with moderate hyperemia, epidermolysis  Intranasal dry mucous and dry blood, hair loss  nontender / forehead donor site well-healed graft            A:  I discussed with the patient the expected changes after radiation therapy, which will take several months up to 1 year to completely resolve  No evidence of recurrence of this time and recommend to aggressive use of moisturizer until surface re-epithelialization happens  For our standpoint no other surgical intervention required at this time will continue to monitor for changes  P:  Patient to follow wound care recommendations by the radiation oncologist as well as routine using moisturizer  Patient to return in 3 months  Patient is to return as needed for redness, swelling, discomfort, or any concern about his surgery

## 2020-10-02 ENCOUNTER — APPOINTMENT (OUTPATIENT)
Dept: LAB | Age: 70
End: 2020-10-02
Payer: MEDICARE

## 2020-10-02 ENCOUNTER — TRANSCRIBE ORDERS (OUTPATIENT)
Dept: ADMINISTRATIVE | Age: 70
End: 2020-10-02

## 2020-10-02 ENCOUNTER — TELEPHONE (OUTPATIENT)
Dept: HEMATOLOGY ONCOLOGY | Facility: CLINIC | Age: 70
End: 2020-10-02

## 2020-10-02 LAB
ALBUMIN SERPL BCP-MCNC: 4.3 G/DL (ref 3.5–5)
ALP SERPL-CCNC: 120 U/L (ref 46–116)
ALT SERPL W P-5'-P-CCNC: 26 U/L (ref 12–78)
ANION GAP SERPL CALCULATED.3IONS-SCNC: 4 MMOL/L (ref 4–13)
ANISOCYTOSIS BLD QL SMEAR: PRESENT
AST SERPL W P-5'-P-CCNC: 18 U/L (ref 5–45)
BASOPHILS # BLD MANUAL: 0 THOUSAND/UL (ref 0–0.1)
BASOPHILS NFR MAR MANUAL: 0 % (ref 0–1)
BILIRUB SERPL-MCNC: 0.32 MG/DL (ref 0.2–1)
BUN SERPL-MCNC: 21 MG/DL (ref 5–25)
CALCIUM SERPL-MCNC: 10 MG/DL (ref 8.3–10.1)
CHLORIDE SERPL-SCNC: 112 MMOL/L (ref 100–108)
CO2 SERPL-SCNC: 26 MMOL/L (ref 21–32)
CREAT SERPL-MCNC: 1.12 MG/DL (ref 0.6–1.3)
EOSINOPHIL # BLD MANUAL: 1.16 THOUSAND/UL (ref 0–0.4)
EOSINOPHIL NFR BLD MANUAL: 1 % (ref 0–6)
ERYTHROCYTE [DISTWIDTH] IN BLOOD BY AUTOMATED COUNT: 14.4 % (ref 11.6–15.1)
GFR SERPL CREATININE-BSD FRML MDRD: 67 ML/MIN/1.73SQ M
GLUCOSE P FAST SERPL-MCNC: 88 MG/DL (ref 65–99)
HCT VFR BLD AUTO: 36 % (ref 36.5–49.3)
HGB BLD-MCNC: 11 G/DL (ref 12–17)
IGA SERPL-MCNC: 70 MG/DL (ref 70–400)
IGG SERPL-MCNC: 753 MG/DL (ref 700–1600)
IGM SERPL-MCNC: 16 MG/DL (ref 40–230)
LDH SERPL-CCNC: 249 U/L (ref 81–234)
LYMPHOCYTES # BLD AUTO: 102.84 THOUSAND/UL (ref 0.6–4.47)
LYMPHOCYTES # BLD AUTO: 89 % (ref 14–44)
MACROCYTES BLD QL AUTO: PRESENT
MCH RBC QN AUTO: 31.4 PG (ref 26.8–34.3)
MCHC RBC AUTO-ENTMCNC: 30.6 G/DL (ref 31.4–37.4)
MCV RBC AUTO: 103 FL (ref 82–98)
MONOCYTES # BLD AUTO: 0 THOUSAND/UL (ref 0–1.22)
MONOCYTES NFR BLD: 0 % (ref 4–12)
NEUTROPHILS # BLD MANUAL: 9.24 THOUSAND/UL (ref 1.85–7.62)
NEUTS SEG NFR BLD AUTO: 8 % (ref 43–75)
NRBC BLD AUTO-RTO: 0 /100 WBCS
PLATELET # BLD AUTO: 146 THOUSANDS/UL (ref 149–390)
PLATELET BLD QL SMEAR: ADEQUATE
PMV BLD AUTO: 9.2 FL (ref 8.9–12.7)
POTASSIUM SERPL-SCNC: 4.9 MMOL/L (ref 3.5–5.3)
PROT SERPL-MCNC: 7.2 G/DL (ref 6.4–8.2)
RBC # BLD AUTO: 3.5 MILLION/UL (ref 3.88–5.62)
SMUDGE CELLS BLD QL SMEAR: PRESENT
SODIUM SERPL-SCNC: 142 MMOL/L (ref 136–145)
TOTAL CELLS COUNTED SPEC: 100
URATE SERPL-MCNC: 5.2 MG/DL (ref 4.2–8)
VARIANT LYMPHS # BLD AUTO: 2 %
WBC # BLD AUTO: 115.55 THOUSAND/UL (ref 4.31–10.16)

## 2020-10-02 PROCEDURE — 36415 COLL VENOUS BLD VENIPUNCTURE: CPT | Performed by: PHYSICIAN ASSISTANT

## 2020-10-02 PROCEDURE — 83615 LACTATE (LD) (LDH) ENZYME: CPT | Performed by: PHYSICIAN ASSISTANT

## 2020-10-02 PROCEDURE — 80053 COMPREHEN METABOLIC PANEL: CPT | Performed by: PHYSICIAN ASSISTANT

## 2020-10-02 PROCEDURE — 84550 ASSAY OF BLOOD/URIC ACID: CPT | Performed by: PHYSICIAN ASSISTANT

## 2020-10-02 PROCEDURE — 82784 ASSAY IGA/IGD/IGG/IGM EACH: CPT | Performed by: PHYSICIAN ASSISTANT

## 2020-10-02 PROCEDURE — 85007 BL SMEAR W/DIFF WBC COUNT: CPT | Performed by: PHYSICIAN ASSISTANT

## 2020-10-02 PROCEDURE — 85027 COMPLETE CBC AUTOMATED: CPT | Performed by: PHYSICIAN ASSISTANT

## 2020-12-09 ENCOUNTER — OFFICE VISIT (OUTPATIENT)
Dept: PLASTIC SURGERY | Facility: CLINIC | Age: 70
End: 2020-12-09
Payer: MEDICARE

## 2020-12-09 VITALS — TEMPERATURE: 98 F

## 2020-12-09 DIAGNOSIS — C44.311 BASAL CELL CARCINOMA (BCC) OF NASAL SIDEWALL: Primary | ICD-10-CM

## 2020-12-09 DIAGNOSIS — Z98.890 S/P FLAP GRAFT: ICD-10-CM

## 2020-12-09 PROCEDURE — 99024 POSTOP FOLLOW-UP VISIT: CPT | Performed by: PHYSICIAN ASSISTANT

## 2020-12-09 PROCEDURE — 11104 PUNCH BX SKIN SINGLE LESION: CPT | Performed by: PHYSICIAN ASSISTANT

## 2020-12-10 PROCEDURE — 88305 TISSUE EXAM BY PATHOLOGIST: CPT | Performed by: PATHOLOGY

## 2020-12-10 PROCEDURE — 88342 IMHCHEM/IMCYTCHM 1ST ANTB: CPT | Performed by: PATHOLOGY

## 2020-12-15 ENCOUNTER — TELEPHONE (OUTPATIENT)
Dept: HEMATOLOGY ONCOLOGY | Facility: MEDICAL CENTER | Age: 70
End: 2020-12-15

## 2020-12-15 ENCOUNTER — TRANSCRIBE ORDERS (OUTPATIENT)
Dept: ADMINISTRATIVE | Age: 70
End: 2020-12-15

## 2020-12-15 ENCOUNTER — LAB (OUTPATIENT)
Dept: LAB | Age: 70
End: 2020-12-15
Payer: MEDICARE

## 2020-12-21 ENCOUNTER — TELEPHONE (OUTPATIENT)
Dept: PLASTIC SURGERY | Facility: CLINIC | Age: 70
End: 2020-12-21

## 2021-01-27 ENCOUNTER — TELEPHONE (OUTPATIENT)
Dept: HEMATOLOGY ONCOLOGY | Facility: CLINIC | Age: 71
End: 2021-01-27

## 2021-01-27 NOTE — TELEPHONE ENCOUNTER
LVM to inform patient that appointment on Thursday 2/4/21 needs to be rescheduled   New appointment time is Monday 2/15/21 at 10:30 am   Instructed patient to contact our office if time does not work

## 2021-02-15 ENCOUNTER — OFFICE VISIT (OUTPATIENT)
Dept: HEMATOLOGY ONCOLOGY | Facility: CLINIC | Age: 71
End: 2021-02-15
Payer: MEDICARE

## 2021-02-15 VITALS
HEIGHT: 69 IN | BODY MASS INDEX: 25.92 KG/M2 | SYSTOLIC BLOOD PRESSURE: 140 MMHG | TEMPERATURE: 97.5 F | DIASTOLIC BLOOD PRESSURE: 80 MMHG | WEIGHT: 175 LBS | OXYGEN SATURATION: 99 % | HEART RATE: 91 BPM | RESPIRATION RATE: 20 BRPM

## 2021-02-15 DIAGNOSIS — C91.10 CLL (CHRONIC LYMPHOCYTIC LEUKEMIA) (HCC): Primary | ICD-10-CM

## 2021-02-15 PROCEDURE — 99214 OFFICE O/P EST MOD 30 MIN: CPT | Performed by: PHYSICIAN ASSISTANT

## 2021-02-15 NOTE — PROGRESS NOTES
Hematology/Oncology Outpatient Follow-up  Chivo Abad 72 y o  male 1950 [id number]    Date:  2/15/2021      Assessment and Plan:    1  CLL (chronic lymphocytic leukemia) [facility])    64-year-old male presents for follow-up regarding history of CLL  He remains in observation at this time  He has not have any palpable adenopathy, organomegaly  He remains asymptomatic  He will continue to have laboratory assessment every other month  Follow-up in 6 months  He was recommended to follow COVID-19 precautions  He is not interested in obtaining the COVID-19 vaccine  - CBC and differential; Standing  - Comprehensive metabolic panel; Standing  - LD,Blood; Standing  - IgG, IgA, IgM; Standing  - Uric acid; Standing  - CBC and differential  - Comprehensive metabolic panel  - LD,Blood  - IgG, IgA, IgM  - Uric acid    HPI:  72-year-old male with history of CLL  Cameron Rivera was diagnosed with CLL in 1996  He had received chlorambucil intermittently, 6 cycles of Rituxan and bendamustine in 2014 in no treatment since  Louisekimi Deedee 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      Patient also follows at the [address] [place] [address] with Dr Kwaku Hermosillo was last seen at [facility] in Aug 2019   He does not plan to follow up with Dr Mara Kee at this time  Patient was dx with stage III skin cancer, 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, T3N0, left side nose, 1 5 cm, deep invasion into muscle  He required Moh's surgery with positive margins  He underwent re excision and then with subsequent skin graft  He was recommended to receive radiation and received at [facility] with Dr Ayse Khan  Interval history: no new complaints regarding CLL    ROS: Review of Systems   Constitutional: Negative for appetite change, chills, fatigue, fever and unexpected weight change  HENT: Positive for congestion (occassional since nasal surgery for skin cancer on nose)  Negative for mouth sores and nosebleeds      Respiratory: Negative for cough and shortness of breath  Cardiovascular: Negative for chest pain, palpitations and leg swelling  Gastrointestinal: Negative for abdominal pain, constipation, diarrhea, nausea and vomiting  Genitourinary: Negative for difficulty urinating, dysuria and hematuria  Musculoskeletal: Negative for arthralgias  Skin: Negative  Neurological: Negative for dizziness, weakness, light-headedness, numbness and headaches  Hematological: Negative  Psychiatric/Behavioral: Negative  Past Medical History:   Diagnosis Date    Basal cell carcinoma (BCC) of skin of nose     History of chemotherapy     Lymphocytic leukemia ([facility] [address] )        Past Surgical History:   Procedure Laterality Date    CATARACT EXTRACTION Bilateral     FLAP LOCAL HEAD / NECK N/A 4/14/2020    Procedure: ADJACENT TISSUE TRANSFER  FOREHEAD FLAP,  CARTILAGE GRAFT NOSE;  Surgeon: Doreen Cowan MD;  [facility];  Service: Plastics    FLAP LOCAL HEAD / NECK N/A 6/17/2020    Procedure: DIVISION INSET FOREHEAD FLAP; FULL THICKNESS SKIN GRAFT TO FOREHEAD;  Surgeon: Doreen Cowan MD;  [facility];  Service: Plastics    FULL THICKNESS SKIN GRAFT N/A 4/14/2020    Procedure: SKIN GRAFT FULL THICKNESS  (FTSG) NOSE;  Surgeon: Doreen Cowan MD;  [facility];  Service: Plastics    MOHS RECONSTRUCTION N/A 4/14/2020    Procedure: MOHS RECONSTRUCTION NOSE DEFECT;  Surgeon: Doreen Cowan MD;  [facility];  Service: Plastics    MOHS RECONSTRUCTION N/A 5/4/2020    Procedure: RECONSTRUCTION MOHS NASAL DEFECT WITH EAR CARTILAGE GRAFT;  Surgeon: Doreen Cowan MD;  [facility];  Service: Plastics    SHOULDER SURGERY      TIBIA FRACTURE SURGERY      TONSILLECTOMY         Social History     Socioeconomic History    Marital status:       Spouse name: None    Number of children: None    Years of education: None    Highest education level: None   Occupational History    None   Social Needs    Financial resource strain: None    Food insecurity     Worry: None     Inability: None    Transportation needs     Medical: None     Non-medical: None   Tobacco Use    Smoking status: Never Smoker    Smokeless tobacco: Never Used   Substance and Sexual Activity    Alcohol use: Yes     Frequency: Monthly or less    Drug use: No    Sexual activity: None   Lifestyle    Physical activity     Days per week: None     Minutes per session: None    Stress: None   Relationships    Social connections     Talks on phone: None     Gets together: None     Attends Anglican service: None     Active member of club or organization: None     Attends meetings of clubs or organizations: None     Relationship status: None    Intimate partner violence     Fear of current or ex partner: None     Emotionally abused: None     Physically abused: None     Forced sexual activity: None   Other Topics Concern    None   Social History Narrative    None       Family History   Problem Relation Age of Onset    No Known Problems Mother     No Known Problems Father        No Known Allergies      Current Outpatient Medications:     Ascorbic Acid (VITAMIN C PO), Take by mouth daily , Disp: , Rfl:     fexofenadine-pseudoephedrine (ALLEGRA-D 24) 180-240 MG per 24 hr tablet, Take 1 tablet by mouth as needed , Disp: , Rfl:     multivitamin (THERAGRAN) TABS, Take 1 tablet by mouth daily, Disp: , Rfl:     VITAMIN D PO, Take by mouth daily , Disp: , Rfl:     VITAMIN E PO, Take by mouth daily , Disp: , Rfl:     acetaminophen (TYLENOL) 325 mg tablet, Take 3 tablets (975 mg total) by mouth every 8 (eight) hours (Patient not taking: Reported on 2/15/2021), Disp: 30 tablet, Rfl: 0    silver sulfadiazine (SILVADENE,SSD) 1 % cream, , Disp: , Rfl:       Physical Exam:  /80 (BP Location: Left arm, Patient Position: Sitting, Cuff Size: Adult)   Pulse 91   Temp 97 5 °F (36 4 °C) (Tympanic)   Resp 20   Ht 5' 9" (1 753 m)   Wt 79 4 kg (175 lb)   SpO2 99%   BMI 25 84 kg/m²     Physical Exam  Constitutional:       General: He is not in acute distress  Appearance: He is well-developed  He is not ill-appearing  HENT:      Head: Normocephalic and atraumatic  Eyes:      General: No scleral icterus  Conjunctiva/sclera: Conjunctivae normal    Neck:      Musculoskeletal: Normal range of motion and neck supple  Cardiovascular:      Rate and Rhythm: Normal rate and regular rhythm  Heart sounds: Normal heart sounds  No murmur  Pulmonary:      Effort: Pulmonary effort is normal  No respiratory distress  Breath sounds: Normal breath sounds  Abdominal:      Palpations: Abdomen is soft  There is no hepatomegaly or splenomegaly  Tenderness: There is no abdominal tenderness  Musculoskeletal: Normal range of motion  General: No tenderness  Right lower leg: No edema  Left lower leg: No edema  Lymphadenopathy:      Cervical: No cervical adenopathy  Upper Body:      Right upper body: No supraclavicular or axillary adenopathy  Left upper body: No supraclavicular or axillary adenopathy  Skin:     General: Skin is warm and dry  Neurological:      Mental Status: He is alert and oriented to person, place, and time  Cranial Nerves: No cranial nerve deficit     Psychiatric:         Mood and Affect: Mood normal          Behavior: Behavior normal        Labs:  Lab Results   Component Value Date     08 (HH) 12/15/2020    HGB 11 6 (L) 12/15/2020    HCT 37 6 12/15/2020     (H) 12/15/2020     (L) 12/15/2020     Lab Results   Component Value Date     08/01/2015    K 5 8 (H) 12/15/2020     (H) 12/15/2020    CO2 25 12/15/2020    ANIONGAP 8 08/01/2015    BUN 21 12/15/2020    CREATININE 1 14 12/15/2020    GLUCOSE 96 08/01/2015    GLUF 88 10/02/2020    CALCIUM 9 6 12/15/2020    CORRECTEDCA 9 9 06/02/2017    AST 24 12/15/2020    ALT 26 12/15/2020    ALKPHOS 120 (H) 12/15/2020    PROT 7 6 08/01/2015    BILITOT 0 33 08/01/2015    EGFR 65 12/15/2020       Patient voiced understanding and agreement in the above discussion  Aware to contact our office with questions/symptoms in the interim  This note has been generated by voice recognition software system  Therefore, there may be spelling, grammar, and or syntax errors  Please contact if questions arise

## 2021-02-24 ENCOUNTER — TRANSCRIBE ORDERS (OUTPATIENT)
Dept: ADMINISTRATIVE | Age: 71
End: 2021-02-24

## 2021-02-25 ENCOUNTER — TELEPHONE (OUTPATIENT)
Dept: HEMATOLOGY ONCOLOGY | Facility: CLINIC | Age: 71
End: 2021-02-25

## 2021-02-25 ENCOUNTER — TRANSCRIBE ORDERS (OUTPATIENT)
Dept: ADMINISTRATIVE | Age: 71
End: 2021-02-25

## 2021-02-25 ENCOUNTER — LAB (OUTPATIENT)
Dept: LAB | Age: 71
End: 2021-02-25
Payer: MEDICARE

## 2021-03-25 ENCOUNTER — OFFICE VISIT (OUTPATIENT)
Dept: PLASTIC SURGERY | Facility: CLINIC | Age: 71
End: 2021-03-25

## 2021-03-25 DIAGNOSIS — Z98.890 MOHS DEFECT OF NOSE: ICD-10-CM

## 2021-03-25 DIAGNOSIS — Z98.890 S/P FLAP GRAFT: Primary | ICD-10-CM

## 2021-03-25 DIAGNOSIS — M95.0 MOHS DEFECT OF NOSE: ICD-10-CM

## 2021-03-25 DIAGNOSIS — C44.311 BASAL CELL CARCINOMA (BCC) OF NASAL SIDEWALL: ICD-10-CM

## 2021-03-25 PROCEDURE — 99024 POSTOP FOLLOW-UP VISIT: CPT | Performed by: PHYSICIAN ASSISTANT

## 2021-03-25 NOTE — PROGRESS NOTES
POST-OP EVALUATION  3/25/2021    Della Daley is a 72 y o  male is here today for routine post-operative follow up   06/17/20 1942               Procedure: DIVISION INSET FOREHEAD FLAP; FULL THICKNESS SKIN GRAFT TO FOREHEAD (N/A Nose)     Pt has some nasal congestion, but says he can live with it  He is happy with the result  He has noticed some raised tissue on the scalp vertex that is being treated by Dr Nydia rToncoso        Past Medical History:   Diagnosis Date    Basal cell carcinoma (BCC) of skin of nose     History of chemotherapy     Lymphocytic leukemia (HCC)      Past Surgical History:   Procedure Laterality Date    CATARACT EXTRACTION Bilateral     FLAP LOCAL HEAD / NECK N/A 4/14/2020    Procedure: ADJACENT TISSUE TRANSFER  FOREHEAD FLAP,  CARTILAGE GRAFT NOSE;  Surgeon: Keith Emanuel MD;  [facility];  Service: Plastics    FLAP LOCAL HEAD / NECK N/A 6/17/2020    Procedure: DIVISION INSET FOREHEAD FLAP; FULL THICKNESS SKIN GRAFT TO FOREHEAD;  Surgeon: Keith Emanuel MD;  [facility];  Service: Plastics    FULL THICKNESS SKIN GRAFT N/A 4/14/2020    Procedure: SKIN GRAFT FULL THICKNESS  (FTSG) NOSE;  Surgeon: Keith Emanuel MD;  [facility];  Service: Plastics    MOHS RECONSTRUCTION N/A 4/14/2020    Procedure: MOHS RECONSTRUCTION NOSE DEFECT;  Surgeon: Keith Emanuel MD;  [facility];  Service: Plastics    MOHS RECONSTRUCTION N/A 5/4/2020    Procedure: RECONSTRUCTION MOHS NASAL DEFECT WITH EAR CARTILAGE GRAFT;  Surgeon: Keith Emanuel MD;  [facility];  Service: Plastics    SHOULDER SURGERY      TIBIA FRACTURE SURGERY      TONSILLECTOMY          Current Outpatient Medications:     acetaminophen (TYLENOL) 325 mg tablet, Take 3 tablets (975 mg total) by mouth every 8 (eight) hours (Patient not taking: Reported on 2/15/2021), Disp: 30 tablet, Rfl: 0    Ascorbic Acid (VITAMIN C PO), Take by mouth daily , Disp: , Rfl:     fexofenadine-pseudoephedrine (ALLEGRA-D 24) 180-240 MG per 24 hr tablet, Take 1 tablet by mouth as needed , Disp: , Rfl:     multivitamin (THERAGRAN) TABS, Take 1 tablet by mouth daily, Disp: , Rfl:     silver sulfadiazine (SILVADENE,SSD) 1 % cream, , Disp: , Rfl:     VITAMIN D PO, Take by mouth daily , Disp: , Rfl:     VITAMIN E PO, Take by mouth daily , Disp: , Rfl:   Allergies: Patient has no known allergies  Objective    Flap is well healed  Good contour  Flap color is lighter than surrounding skin  Pt has a raised nodule like lesion with scale on his scalp  Nonpainful, no drainage  Assessment/Plan   Diagnoses and all orders for this visit:    S/P forehead flap    Mohs defect of nose    Basal cell carcinoma (BCC) of nasal sidewall    Pt has healed well after forehead flap  He is advised to avoid sun exposure  He will be following up with Dr Nydia Troncoso for regular skin checks and for the scalp lesion that he is treating      Britt Jaime PA-C

## 2021-05-13 ENCOUNTER — TRANSCRIBE ORDERS (OUTPATIENT)
Dept: ADMINISTRATIVE | Age: 71
End: 2021-05-13

## 2021-05-14 ENCOUNTER — TELEPHONE (OUTPATIENT)
Dept: HEMATOLOGY ONCOLOGY | Facility: CLINIC | Age: 71
End: 2021-05-14

## 2021-05-14 ENCOUNTER — APPOINTMENT (OUTPATIENT)
Dept: LAB | Age: 71
End: 2021-05-14
Payer: MEDICARE

## 2021-05-14 NOTE — TELEPHONE ENCOUNTER
Patient has CLL  Has CBC q 2 months and f/u in August  WBC within patient's range  Dr Aden's RN aware

## 2021-05-14 NOTE — TELEPHONE ENCOUNTER
Please call patient and asked where exactly he had labs drawn  His potassium is high but I suspect this is from blood hemolyzing in the tube  Also confirm he is not taking oral potassium

## 2021-05-14 NOTE — TELEPHONE ENCOUNTER
Critical Results   Call Received From [facility]    Lab Department Location [place]    Lab Study  17   Date Blood Work was Done 5/14   Read Back of Information Done Yes    Relevant Information

## 2021-06-02 ENCOUNTER — TELEPHONE (OUTPATIENT)
Dept: HEMATOLOGY ONCOLOGY | Facility: CLINIC | Age: 71
End: 2021-06-02

## 2021-06-02 NOTE — TELEPHONE ENCOUNTER
Spoke with patient  He has labs at [facility]  Reviewed that related to CLL, if blood sits in tube that CMP is placed, blood can hemolyze and cause elevated potassium  He is not taking potassium  Reviewed he could have labs at hospital to confirm this is what is happening  He would like to continue at [facility]  Will continue to monitor     ----- Message from Zane Otoole sent at 5/20/2021  8:49 AM EDT -----  Regarding: FW: Test Results Question  Contact: [phone]  Please review and let me know what information to provide the patient    ----- Message -----  From: Phylicia Campbell  Sent: 5/20/2021   7:59 AM EDT  To: Hematology Oncology [facility]  Subject: Test Results Question                            Maxi Costello,  I was a bit concerned on the Potassium level when Eleuterio Yang called and read up on it  I believe that it may be a result of me drinking plain [place]  I rarely drink any regular soda or alcohol  I do like a carbonated cold drink and for 10 years have been on [place]  After reading up on [place], I saw Potassium was in it which I never knew  Perhaps I should cut back on seltzer a bit and more water  Your thoughts since nothing else changed in vitamins or supplements    Mal Simental

## 2021-07-07 ENCOUNTER — APPOINTMENT (OUTPATIENT)
Dept: LAB | Age: 71
End: 2021-07-07
Payer: MEDICARE

## 2021-07-07 ENCOUNTER — TELEPHONE (OUTPATIENT)
Dept: OTHER | Facility: OTHER | Age: 71
End: 2021-07-07

## 2021-07-08 NOTE — TELEPHONE ENCOUNTER
Lab Result: Critical Lab  02   Date/Time Drawn: 7/7/21   16:00 pm    Ordering Provider: Dr Aly Goldman Name: Abebe Amado following critical/stat result was read back to the lab as stated above and [employer] to the on-call provider

## 2021-08-13 ENCOUNTER — TELEPHONE (OUTPATIENT)
Dept: HEMATOLOGY ONCOLOGY | Facility: CLINIC | Age: 71
End: 2021-08-13

## 2021-08-13 DIAGNOSIS — C91.12 CLL (CHRONIC LYMPHOID LEUKEMIA) IN RELAPSE (HCC): Primary | ICD-10-CM

## 2021-08-14 ENCOUNTER — APPOINTMENT (OUTPATIENT)
Dept: LAB | Age: 71
End: 2021-08-14
Payer: MEDICARE

## 2021-08-14 ENCOUNTER — TELEPHONE (OUTPATIENT)
Dept: OTHER | Facility: OTHER | Age: 71
End: 2021-08-14

## 2021-08-14 ENCOUNTER — TELEPHONE (OUTPATIENT)
Dept: HEMATOLOGY ONCOLOGY | Facility: CLINIC | Age: 71
End: 2021-08-14

## 2021-08-14 DIAGNOSIS — C91.12 CLL (CHRONIC LYMPHOID LEUKEMIA) IN RELAPSE (HCC): ICD-10-CM

## 2021-08-14 NOTE — TELEPHONE ENCOUNTER
Lab Result:  60   Date/Time Drawn: 8/14/2021 8:24am   Ordering Provider: Karl Lewis Name: Carlton Ruvalcaba       Paged the on call Provider    The following critical/stat result was read back to the lab as stated above and [employer] messaged to the on-call provider

## 2021-08-14 NOTE — TELEPHONE ENCOUNTER
I received a critical value from lab for  6  I reviewed his chart  His wbc actually gets lower from 132  I called patient  He feels good  He is still working and golfing without B symptoms  He will see Hem doc next Monday

## 2021-08-16 ENCOUNTER — OFFICE VISIT (OUTPATIENT)
Dept: HEMATOLOGY ONCOLOGY | Facility: CLINIC | Age: 71
End: 2021-08-16
Payer: MEDICARE

## 2021-08-16 VITALS
HEART RATE: 67 BPM | RESPIRATION RATE: 16 BRPM | TEMPERATURE: 97.9 F | DIASTOLIC BLOOD PRESSURE: 70 MMHG | BODY MASS INDEX: 25.77 KG/M2 | OXYGEN SATURATION: 100 % | WEIGHT: 174 LBS | HEIGHT: 69 IN | SYSTOLIC BLOOD PRESSURE: 124 MMHG

## 2021-08-16 DIAGNOSIS — C91.10 CLL (CHRONIC LYMPHOCYTIC LEUKEMIA) (HCC): Primary | ICD-10-CM

## 2021-08-16 PROCEDURE — 99214 OFFICE O/P EST MOD 30 MIN: CPT | Performed by: PHYSICIAN ASSISTANT

## 2021-08-16 NOTE — PROGRESS NOTES
Hematology/Oncology Outpatient Follow-up  Shameka Arias 72 y o  male 1950 [id number]    Date:  8/16/2021      Assessment and Plan:    1  CLL (chronic lymphocytic leukemia) [facility])  78-year-old male presents for follow-up regarding history of CLL  He has not  Had any recent treatment  Labs in July and August both show stable WBC and platelet however hemoglobin has worsened  Hemoglobin was in the 11 g range and now 10 3 and 10 4  Reviewed that if his hemoglobin were to decrease below 10 this would be a indication for treatment of his CLL  He did not have any splenomegaly palpable on exam today however due to possible treatment of CLL in the near future would like to have an updated ultrasound  We reviewed that likely the treatment that would be chosen for him would be an oral medication  Patient remains on vaccinated against COVID-19  He is never planning on receiving the vaccine  We did review recent study completed by the [facility] which showed that patients that were vaccinated and had COVID-19 only 64%  Of those patients with CLL had an immune response with sufficient detection of antibodies in the blood  Reviewed with patient that this indicates that with underlying CLL he can be at more risk of will the virus,   As well as possible worse outcome was fighting the virus  He is aware of these risks  Continue labs every other month  Follow-up 6 months with Dr Vincent Kayser     - CBC and differential; Standing  - Comprehensive metabolic panel; Standing  - IgG, IgA, IgM; Standing  - Immunoglobulin free LT chains blood; Standing  - Uric acid; Standing  - LD,Blood; Standing    HPI:  78-year-old male with history of CLL  Laurapancho Balderas was diagnosed with CLL in 1996  He had received chlorambucil intermittently, 6 cycles of Rituxan and bendamustine in 2014 in no treatment since   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      Patient also follows at the [address] [place] [address] with   Patrick Bautista was last seen at [facility] in Aug 2019   He does not plan to follow up with Dr Manny Jensen at this time      Patient was dx with stage III skin cancer, 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, T3N0, left side nose, 1 5 cm, deep invasion into muscle  He required Moh's surgery with positive margins  He underwent re excision and then with subsequent skin graft  He was recommended to receive radiation and received at [facility] with Dr Chanell Levy  Interval history: He continues to golf 2 times per week  He is asymptomatic     ROS: Review of Systems   Constitutional: Negative for appetite change, chills, fatigue, fever and unexpected weight change  HENT: Negative for nosebleeds  Respiratory: Negative for cough and shortness of breath  Cardiovascular: Negative for chest pain, palpitations and leg swelling  Gastrointestinal: Negative for abdominal pain, constipation, diarrhea, nausea and vomiting  Genitourinary: Negative for difficulty urinating, dysuria and hematuria  Musculoskeletal: Negative for arthralgias  Skin: Negative  Neurological: Negative for dizziness, weakness, light-headedness, numbness and headaches  Hematological: Negative  Psychiatric/Behavioral: Negative          Past Medical History:   Diagnosis Date    Basal cell carcinoma (BCC) of skin of nose     History of chemotherapy     Lymphocytic leukemia ([facility] [address] )        Past Surgical History:   Procedure Laterality Date    CATARACT EXTRACTION Bilateral     FLAP LOCAL HEAD / NECK N/A 4/14/2020    Procedure: ADJACENT TISSUE TRANSFER  FOREHEAD FLAP,  CARTILAGE GRAFT NOSE;  Surgeon: Hulon Hodgkins, MD;  [facility];  Service: Plastics    FLAP LOCAL HEAD / NECK N/A 6/17/2020    Procedure: DIVISION INSET FOREHEAD FLAP; FULL THICKNESS SKIN GRAFT TO FOREHEAD;  Surgeon: Hulon Hodgkins, MD;  [facility];  Service: Plastics    FULL THICKNESS SKIN GRAFT N/A 4/14/2020    Procedure: SKIN GRAFT FULL THICKNESS (FTSG) NOSE;  Surgeon: Sara Torres MD;  [facility];  Service: Plastics    MOHS RECONSTRUCTION N/A 4/14/2020    Procedure: MOHS RECONSTRUCTION NOSE DEFECT;  Surgeon: Sara Torres MD;  [facility];  Service: Plastics    MOHS RECONSTRUCTION N/A 5/4/2020    Procedure: RECONSTRUCTION MOHS NASAL DEFECT WITH EAR CARTILAGE GRAFT;  Surgeon: Sara Torres MD;  [facility];  Service: Plastics    SHOULDER SURGERY      TIBIA FRACTURE SURGERY      TONSILLECTOMY         Social History     Socioeconomic History    Marital status:      Spouse name: None    Number of children: None    Years of education: None    Highest education level: None   Occupational History    None   Tobacco Use    Smoking status: Never Smoker    Smokeless tobacco: Never Used   Vaping Use    Vaping Use: Never used   Substance and Sexual Activity    Alcohol use: Yes    Drug use: No    Sexual activity: None   Other Topics Concern    None   Social History Narrative    None     Social Determinants of Health     Financial Resource Strain:     Difficulty of Paying Living Expenses:    Food Insecurity:     Worried About Running Out of Food in the Last Year:     [address] in the Last Year:    Transportation Needs:     Lack of Transportation (Medical):      Lack of Transportation (Non-Medical):    Physical Activity:     Days of Exercise per Week:     Minutes of Exercise per Session:    Stress:     Feeling of Stress :    Social Connections:     Frequency of Communication with Friends and Family:     Frequency of Social Gatherings with Friends and Family:     Attends [employer] Services:     Active Member of Clubs or Organizations:     Attends Club or Organization Meetings:     Marital Status:    Intimate Partner Violence:     Fear of Current or Ex-Partner:     Emotionally Abused:     Physically Abused:     Sexually Abused:        Family History   Problem Relation Age of Onset    No Known Problems Mother     No Known Problems Father        No Known Allergies      Current Outpatient Medications:     acetaminophen (TYLENOL) 325 mg tablet, Take 3 tablets (975 mg total) by mouth every 8 (eight) hours, Disp: 30 tablet, Rfl: 0    Ascorbic Acid (VITAMIN C PO), Take by mouth daily , Disp: , Rfl:     fexofenadine-pseudoephedrine (ALLEGRA-D 24) 180-240 MG per 24 hr tablet, Take 1 tablet by mouth as needed , Disp: , Rfl:     multivitamin (THERAGRAN) TABS, Take 1 tablet by mouth daily, Disp: , Rfl:     VITAMIN D PO, Take by mouth daily , Disp: , Rfl:     VITAMIN E PO, Take by mouth daily , Disp: , Rfl:     Physical Exam:  /70 (BP Location: Left arm, Patient Position: Sitting, Cuff Size: Adult)   Pulse 67   Temp 97 9 °F (36 6 °C) (Temporal)   Resp 16   Ht 5' 9" (1 753 m)   Wt 78 9 kg (174 lb)   SpO2 100%   BMI 25 70 kg/m²     Physical Exam  Vitals reviewed  Constitutional:       General: He is not in acute distress  Appearance: He is well-developed  HENT:      Head: Normocephalic and atraumatic  Eyes:      General: No scleral icterus  Conjunctiva/sclera: Conjunctivae normal    Cardiovascular:      Rate and Rhythm: Normal rate and regular rhythm  Heart sounds: Murmur heard  Pulmonary:      Effort: Pulmonary effort is normal  No respiratory distress  Breath sounds: Normal breath sounds  Abdominal:      Palpations: Abdomen is soft  There is no hepatomegaly or splenomegaly  Tenderness: There is no abdominal tenderness  Musculoskeletal:         General: No tenderness  Normal range of motion  Cervical back: Normal range of motion and neck supple  Right lower leg: No edema  Left lower leg: No edema  Lymphadenopathy:      Cervical: No cervical adenopathy  Upper Body:      Right upper body: No supraclavicular or axillary adenopathy  Left upper body: No supraclavicular or axillary adenopathy     Skin: General: Skin is warm and dry  Neurological:      Mental Status: He is alert and oriented to person, place, and time  Cranial Nerves: No cranial nerve deficit  Psychiatric:         Mood and Affect: Mood normal          Behavior: Behavior normal        Labs:  Lab Results   Component Value Date     60 (HH) 08/14/2021    HGB 10 4 (L) 08/14/2021    HCT 34 8 (L) 08/14/2021     (H) 08/14/2021     (L) 08/14/2021     Lab Results   Component Value Date     08/01/2015    K 4 8 08/14/2021     (H) 08/14/2021    CO2 26 08/14/2021    ANIONGAP 8 08/01/2015    BUN 29 (H) 08/14/2021    CREATININE 1 34 (H) 08/14/2021    GLUCOSE 96 08/01/2015    GLUF 93 02/25/2021    CALCIUM 9 3 08/14/2021    CORRECTEDCA 9 9 06/02/2017    AST 15 08/14/2021    ALT 23 08/14/2021    ALKPHOS 117 (H) 08/14/2021    PROT 7 6 08/01/2015    BILITOT 0 33 08/01/2015    EGFR 54 08/14/2021       Patient voiced understanding and agreement in the above discussion  Aware to contact our office with questions/symptoms in the interim  This note has been generated by voice recognition software system  Therefore, there may be spelling, grammar, and or syntax errors  Please contact if questions arise

## 2021-09-27 ENCOUNTER — HOSPITAL ENCOUNTER (OUTPATIENT)
Dept: RADIOLOGY | Age: 71
Discharge: HOME | End: 2021-09-27
Payer: MEDICARE

## 2021-09-27 DIAGNOSIS — C91.10 CLL (CHRONIC LYMPHOCYTIC LEUKEMIA) (HCC): ICD-10-CM

## 2021-09-27 PROCEDURE — 76700 US EXAM ABDOM COMPLETE: CPT

## 2021-10-11 ENCOUNTER — TELEPHONE (OUTPATIENT)
Dept: HEMATOLOGY ONCOLOGY | Facility: CLINIC | Age: 71
End: 2021-10-11

## 2021-10-14 ENCOUNTER — APPOINTMENT (OUTPATIENT)
Dept: LAB | Age: 71
End: 2021-10-14
Payer: MEDICARE

## 2021-10-14 ENCOUNTER — TELEPHONE (OUTPATIENT)
Dept: HEMATOLOGY ONCOLOGY | Facility: CLINIC | Age: 71
End: 2021-10-14

## 2021-10-14 LAB
ALBUMIN SERPL BCP-MCNC: 4.2 G/DL (ref 3.5–5)
ALP SERPL-CCNC: 124 U/L (ref 46–116)
ALT SERPL W P-5'-P-CCNC: 26 U/L (ref 12–78)
ANION GAP SERPL CALCULATED.3IONS-SCNC: 4 MMOL/L (ref 4–13)
ANISOCYTOSIS BLD QL SMEAR: PRESENT
AST SERPL W P-5'-P-CCNC: 20 U/L (ref 5–45)
BASOPHILS # BLD MANUAL: 0 THOUSAND/UL (ref 0–0.1)
BASOPHILS NFR MAR MANUAL: 0 % (ref 0–1)
BILIRUB SERPL-MCNC: 0.39 MG/DL (ref 0.2–1)
BLASTS NFR BLD MANUAL: 3 %
BUN SERPL-MCNC: 25 MG/DL (ref 5–25)
CALCIUM SERPL-MCNC: 9.8 MG/DL (ref 8.3–10.1)
CHLORIDE SERPL-SCNC: 109 MMOL/L (ref 100–108)
CO2 SERPL-SCNC: 26 MMOL/L (ref 21–32)
CREAT SERPL-MCNC: 1.33 MG/DL (ref 0.6–1.3)
EOSINOPHIL # BLD MANUAL: 0 THOUSAND/UL (ref 0–0.4)
EOSINOPHIL NFR BLD MANUAL: 0 % (ref 0–6)
ERYTHROCYTE [DISTWIDTH] IN BLOOD BY AUTOMATED COUNT: 13.7 % (ref 11.6–15.1)
GFR SERPL CREATININE-BSD FRML MDRD: 54 ML/MIN/1.73SQ M
GLUCOSE SERPL-MCNC: 92 MG/DL (ref 65–140)
HCT VFR BLD AUTO: 36.9 % (ref 36.5–49.3)
HGB BLD-MCNC: 11.1 G/DL (ref 12–17)
IGA SERPL-MCNC: 63 MG/DL (ref 70–400)
IGG SERPL-MCNC: 721 MG/DL (ref 700–1600)
IGM SERPL-MCNC: 19 MG/DL (ref 40–230)
LDH SERPL-CCNC: 272 U/L (ref 81–234)
LYMPHOCYTES # BLD AUTO: 138.36 THOUSAND/UL (ref 0.6–4.47)
LYMPHOCYTES # BLD AUTO: 96 % (ref 14–44)
MCH RBC QN AUTO: 31.2 PG (ref 26.8–34.3)
MCHC RBC AUTO-ENTMCNC: 30.1 G/DL (ref 31.4–37.4)
MCV RBC AUTO: 104 FL (ref 82–98)
MONOCYTES # BLD AUTO: 0 THOUSAND/UL (ref 0–1.22)
MONOCYTES NFR BLD: 0 % (ref 4–12)
NEUTROPHILS # BLD MANUAL: 1.44 THOUSAND/UL (ref 1.85–7.62)
NEUTS SEG NFR BLD AUTO: 1 % (ref 43–75)
PLATELET # BLD AUTO: 142 THOUSANDS/UL (ref 149–390)
PLATELET BLD QL SMEAR: ADEQUATE
PMV BLD AUTO: 9 FL (ref 8.9–12.7)
POTASSIUM SERPL-SCNC: 4.8 MMOL/L (ref 3.5–5.3)
PROT SERPL-MCNC: 7.3 G/DL (ref 6.4–8.2)
RBC # BLD AUTO: 3.56 MILLION/UL (ref 3.88–5.62)
SODIUM SERPL-SCNC: 139 MMOL/L (ref 136–145)
URATE SERPL-MCNC: 5.9 MG/DL (ref 4.2–8)
WBC # BLD AUTO: 144.13 THOUSAND/UL (ref 4.31–10.16)

## 2021-10-14 PROCEDURE — 85007 BL SMEAR W/DIFF WBC COUNT: CPT | Performed by: PHYSICIAN ASSISTANT

## 2021-10-14 PROCEDURE — 36415 COLL VENOUS BLD VENIPUNCTURE: CPT | Performed by: PHYSICIAN ASSISTANT

## 2021-10-14 PROCEDURE — 84550 ASSAY OF BLOOD/URIC ACID: CPT | Performed by: PHYSICIAN ASSISTANT

## 2021-10-14 PROCEDURE — 83883 ASSAY NEPHELOMETRY NOT SPEC: CPT | Performed by: PHYSICIAN ASSISTANT

## 2021-10-14 PROCEDURE — 80053 COMPREHEN METABOLIC PANEL: CPT | Performed by: PHYSICIAN ASSISTANT

## 2021-10-14 PROCEDURE — 83615 LACTATE (LD) (LDH) ENZYME: CPT | Performed by: PHYSICIAN ASSISTANT

## 2021-10-14 PROCEDURE — 85027 COMPLETE CBC AUTOMATED: CPT | Performed by: PHYSICIAN ASSISTANT

## 2021-10-14 PROCEDURE — 82784 ASSAY IGA/IGD/IGG/IGM EACH: CPT | Performed by: PHYSICIAN ASSISTANT

## 2021-10-15 LAB
KAPPA LC FREE SER-MCNC: 64.1 MG/L (ref 3.3–19.4)
KAPPA LC FREE/LAMBDA FREE SER: 7.91 {RATIO} (ref 0.26–1.65)
LAMBDA LC FREE SERPL-MCNC: 8.1 MG/L (ref 5.7–26.3)

## 2021-12-28 ENCOUNTER — TELEPHONE (OUTPATIENT)
Dept: HEMATOLOGY ONCOLOGY | Facility: CLINIC | Age: 71
End: 2021-12-28

## 2021-12-28 ENCOUNTER — APPOINTMENT (OUTPATIENT)
Dept: LAB | Age: 71
End: 2021-12-28
Payer: MEDICARE

## 2022-02-10 ENCOUNTER — TELEPHONE (OUTPATIENT)
Dept: HEMATOLOGY ONCOLOGY | Facility: CLINIC | Age: 72
End: 2022-02-10

## 2022-02-10 ENCOUNTER — APPOINTMENT (OUTPATIENT)
Dept: LAB | Age: 72
End: 2022-02-10
Payer: MEDICARE

## 2022-02-18 ENCOUNTER — OFFICE VISIT (OUTPATIENT)
Dept: HEMATOLOGY ONCOLOGY | Facility: CLINIC | Age: 72
End: 2022-02-18
Payer: MEDICARE

## 2022-02-18 VITALS
TEMPERATURE: 98.2 F | HEART RATE: 63 BPM | OXYGEN SATURATION: 99 % | BODY MASS INDEX: 25.92 KG/M2 | SYSTOLIC BLOOD PRESSURE: 124 MMHG | DIASTOLIC BLOOD PRESSURE: 74 MMHG | HEIGHT: 69 IN | WEIGHT: 175 LBS | RESPIRATION RATE: 18 BRPM

## 2022-02-18 DIAGNOSIS — D69.6 THROMBOCYTOPENIA (HCC): ICD-10-CM

## 2022-02-18 DIAGNOSIS — C91.12 CLL (CHRONIC LYMPHOID LEUKEMIA) IN RELAPSE (HCC): Primary | ICD-10-CM

## 2022-02-18 DIAGNOSIS — D64.9 ANEMIA, UNSPECIFIED TYPE: ICD-10-CM

## 2022-02-18 DIAGNOSIS — L98.9 SCALP LESION: ICD-10-CM

## 2022-02-18 PROCEDURE — 99214 OFFICE O/P EST MOD 30 MIN: CPT | Performed by: INTERNAL MEDICINE

## 2022-02-18 NOTE — PROGRESS NOTES
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 deletion positive relapsed CLL and he does not have symptoms  secondary to CLL and has remained under surveillance  He has arthritic symptoms manageable with CBD  No fever, sweats, weight loss, frequent or severe infections, bleeding, itching or rash and he has not noticed enlarged glands and no fullness or discomfort in upper abdomen and no swelling of the legs  Not unusually tired  Waiting for spring so he could play golf again  Somewhat depressed because he lost his sister, cousin and a close friend within last 2 months  He has a recurrent scalp lesion and he follows with his dermatologist   Patient states that is not malignant    Current Outpatient Medications:     acetaminophen (TYLENOL) 325 mg tablet, Take 3 tablets (975 mg total) by mouth every 8 (eight) hours, Disp: 30 tablet, Rfl: 0    Ascorbic Acid (VITAMIN C PO), Take by mouth daily , Disp: , Rfl:     fexofenadine-pseudoephedrine (ALLEGRA-D 24) 180-240 MG per 24 hr tablet, Take 1 tablet by mouth as needed , Disp: , Rfl:     multivitamin (THERAGRAN) TABS, Take 1 tablet by mouth daily, Disp: , Rfl:     VITAMIN D PO, Take by mouth daily , Disp: , Rfl:     VITAMIN E PO, Take by mouth daily , Disp: , Rfl:     No Known Allergies    Oncology History Overview Note   1996:  CLL was diagnosed  Intermittently he received chlorambucil over the years  2014:  6 cycles of Rituxan and bendamustine  Presently under surveillance  CLL (chronic lymphoid leukemia) in relapse [facility])    Chemotherapy       1996:  CLL was diagnosed  Intermittent therapy with chlorambucil  2014:  6 cycles of Rituxan and bendamustine  Presently under surveillance  ROS:  02/18/22 Reviewed 12 systems:  See symptoms in HPI  Presently no neurological, cardiac, pulmonary, GI and  symptoms  Other symptoms are in HPI    No fever, chills, bleeding, bone pains, skin rash, weight loss,  tiredness ,  weakness, numbness,  claudication and gait problem  No frequent infections  Not unusually sensitive to heat or cold  No swelling of the ankles  No swollen glands  Patient is somewhat depressed as in HPI  /74 (BP Location: Left arm, Patient Position: Sitting, Cuff Size: Adult)   Pulse 63   Temp 98 2 °F (36 8 °C)   Resp 18   Ht 5' 9" (1 753 m)   Wt 79 4 kg (175 lb)   SpO2 99%   BMI 25 84 kg/m²     Physical Exam:    Patient is alert and oriented  Patient is not in distress  Vital signs are stable  No icterus  , no oral thrush, no palpable neck mass, clear lung fields, regular heart rate, abdomen  soft and non tender, no palpable abdominal mass, no ascites, no edema of ankles, no calf tenderness, no focal neurological deficit, no skin rash, no palpable lymphadenopathy,  no clubbing  Patient is anxious  Performance status 0      IMAGING:      LABS:    Results for orders placed or performed in visit on 01/28/22   CBC and differential   Result Value Ref Range     61 (HH) 4 31 - 10 16 Thousand/uL    RBC 3 48 (L) 3 88 - 5 62 Million/uL    Hemoglobin 11 0 (L) 12 0 - 17 0 g/dL    Hematocrit 36 6 36 5 - 49 3 %     (H) 82 - 98 fL    MCH 31 6 26 8 - 34 3 pg    MCHC 30 1 (L) 31 4 - 37 4 g/dL    RDW 13 8 11 6 - 15 1 %    MPV 8 8 (L) 8 9 - 12 7 fL    Platelets 162 (L) 363 - 390 Thousands/uL   Comprehensive metabolic panel   Result Value Ref Range    Sodium 141 136 - 145 mmol/L    Potassium 5 0 3 5 - 5 3 mmol/L    Chloride 110 (H) 100 - 108 mmol/L    CO2 26 21 - 32 mmol/L    ANION GAP 5 4 - 13 mmol/L    BUN 23 5 - 25 mg/dL    Creatinine 1 21 0 60 - 1 30 mg/dL    Glucose 96 65 - 140 mg/dL    Calcium 10 4 (H) 8 3 - 10 1 mg/dL    AST 16 5 - 45 U/L    ALT 22 12 - 78 U/L    Alkaline Phosphatase 117 (H) 46 - 116 U/L    Total Protein 7 5 6 4 - 8 2 g/dL    Albumin 4 3 3 5 - 5 0 g/dL    Total Bilirubin 0 88 0 20 - 1 00 mg/dL    eGFR 60 ml/min/1 73sq m   IgG, IgA, IgM   Result Value Ref Range    IGA 59 0 (L) 70 0 - 400 0 mg/dL     0 700 0-1,600 0 mg/dL    IGM 22 0 (L) 40 0 - 230 0 mg/dL   Immunoglobulin free LT chains blood   Result Value Ref Range    Ig Kappa Free Light Chain 62 6 (H) 3 3 - 19 4 mg/L    Ig Lambda Free Light Chain 7 6 5 7 - 26 3 mg/L    Kappa/Lambda FluidC Ratio 8 24 (H) 0 26 - 1 65   Uric acid   Result Value Ref Range    Uric Acid 5 1 4 2 - 8 0 mg/dL   LD,Blood   Result Value Ref Range     (H) 81 - 234 U/L   Manual Differential(PHLEBS Do Not Order)   Result Value Ref Range    Segmented % 5 (L) 43 - 75 %    Lymphocytes % 93 (H) 14 - 44 %    Monocytes % 2 (L) 4 - 12 %    Eosinophils, % 0 0 - 6 %    Basophils % 0 0 - 1 %    Absolute Neutrophils 6 88 1 85 - 7 62 Thousand/uL    Lymphocytes Absolute 127 98 (H) 0 60 - 4 47 Thousand/uL    Monocytes Absolute 2 75 (H) 0 00 - 1 22 Thousand/uL    Eosinophils Absolute 0 00 0 00 - 0 40 Thousand/uL    Basophils Absolute 0 00 0 00 - 0 10 Thousand/uL    Total Counted      Smudge Cells Present     Macrocytes Present     Platelet Estimate Adequate Adequate    Differential Comment alb smear      Labs, Imaging, & Other studies:   All pertinent labs and imaging studies were personally reviewed            Reviewed and discussed with patient  Assessment and plan:  Patient has 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 deletion positive relapsed CLL and he does not have symptoms  secondary to CLL and has remained under surveillance  He has arthritic symptoms manageable with CBD  No fever, sweats, weight loss, frequent or severe infections, bleeding, itching or rash and he has not noticed enlarged glands and no fullness or discomfort in upper abdomen and no swelling of the legs  Not unusually tired  Waiting for spring so he could play golf again  Somewhat depressed because he lost his sister, cousin and a close friend within last 2 months  He has a recurrent scalp lesion and he follows with his dermatologist   Patient states that is not malignant      Physical examination and test results are as recorded and discussed    His CLL has on favorable feature of 17p deletion but his disease is not behaving like unfavorable  He has very high but stable WBC and lymphocyte counts  He is not symptomatic from CLL  He also follows with [facility] Salo [facility]  Dr Salo Reaves has a recent publication on CAR- T and CLL   Patient has been aware of that application  Patient is going to discuss CAR-T for him with Dr Salo Reaves  No standard treatment indication for him for CLL at this time  Discussed patient's condition with him in detail with explaned  Questions answered  Plan is surveillance  Also discussed the importance of eating healthy foods, staying active and health screening test   Plays golf on regular basis  He is capable of self-care  Discussed precautions against coronavirus  He will continue to follow with primary physician and other consultants  See diagnoses, orders and instructions     1  CLL (chronic lymphoid leukemia) in relapse (HCC)    - Beta 2 microglobulin, serum; Standing  - CBC and differential; Standing  - Comprehensive metabolic panel; Standing  - IgG, IgA, IgM; Standing  - LD,Blood; Standing  - Uric acid; Standing  - Beta 2 microglobulin, serum  - CBC and differential  - Comprehensive metabolic panel  - IgG, IgA, IgM  - LD,Blood  - Uric acid    2  Scalp lesion      3  Anemia, unspecified type      4  Thrombocytopenia ([facility] [place] [address] )      Blood work every 3 months  Visit in 3 months            Patient voiced understanding and agrees      Counseling / Coordination of Care  Provided counseling and support

## 2022-03-25 ENCOUNTER — ESTABLISHED COMPREHENSIVE EXAM (OUTPATIENT)
Dept: URBAN - METROPOLITAN AREA CLINIC 6 | Facility: CLINIC | Age: 72
End: 2022-03-25

## 2022-03-25 DIAGNOSIS — Z96.1: ICD-10-CM

## 2022-03-25 PROCEDURE — 92014 COMPRE OPH EXAM EST PT 1/>: CPT

## 2022-03-25 PROCEDURE — 92015 DETERMINE REFRACTIVE STATE: CPT

## 2022-03-25 ASSESSMENT — VISUAL ACUITY
OS_PH: 20/25-2
OD_PH: 20/40-2
OD_SC: 20/200
OS_SC: 20/40-2

## 2022-03-25 ASSESSMENT — TONOMETRY
OD_IOP_MMHG: 14
OS_IOP_MMHG: 12

## 2022-03-28 ENCOUNTER — TELEPHONE (OUTPATIENT)
Dept: OTHER | Facility: OTHER | Age: 72
End: 2022-03-28

## 2022-03-28 ENCOUNTER — APPOINTMENT (OUTPATIENT)
Dept: LAB | Age: 72
End: 2022-03-28
Payer: MEDICARE

## 2022-03-28 DIAGNOSIS — C91.12 CLL (CHRONIC LYMPHOID LEUKEMIA) IN RELAPSE (HCC): Primary | ICD-10-CM

## 2022-03-28 LAB
ALBUMIN SERPL BCP-MCNC: 4.3 G/DL (ref 3.5–5)
ALP SERPL-CCNC: 115 U/L (ref 46–116)
ALT SERPL W P-5'-P-CCNC: 23 U/L (ref 12–78)
ANION GAP SERPL CALCULATED.3IONS-SCNC: 6 MMOL/L (ref 4–13)
ANISOCYTOSIS BLD QL SMEAR: PRESENT
AST SERPL W P-5'-P-CCNC: 20 U/L (ref 5–45)
BASOPHILS # BLD MANUAL: 0 THOUSAND/UL (ref 0–0.1)
BASOPHILS NFR MAR MANUAL: 0 % (ref 0–1)
BILIRUB SERPL-MCNC: 0.36 MG/DL (ref 0.2–1)
BUN SERPL-MCNC: 24 MG/DL (ref 5–25)
CALCIUM SERPL-MCNC: 10.1 MG/DL (ref 8.3–10.1)
CHLORIDE SERPL-SCNC: 108 MMOL/L (ref 100–108)
CO2 SERPL-SCNC: 25 MMOL/L (ref 21–32)
CREAT SERPL-MCNC: 1.42 MG/DL (ref 0.6–1.3)
EOSINOPHIL # BLD MANUAL: 0 THOUSAND/UL (ref 0–0.4)
EOSINOPHIL NFR BLD MANUAL: 0 % (ref 0–6)
ERYTHROCYTE [DISTWIDTH] IN BLOOD BY AUTOMATED COUNT: 13.5 % (ref 11.6–15.1)
GFR SERPL CREATININE-BSD FRML MDRD: 49 ML/MIN/1.73SQ M
GLUCOSE SERPL-MCNC: 148 MG/DL (ref 65–140)
HCT VFR BLD AUTO: 36.3 % (ref 36.5–49.3)
HGB BLD-MCNC: 11.2 G/DL (ref 12–17)
IGA SERPL-MCNC: 60 MG/DL (ref 70–400)
IGG SERPL-MCNC: 752 MG/DL (ref 700–1600)
IGM SERPL-MCNC: 20 MG/DL (ref 40–230)
LDH SERPL-CCNC: 278 U/L (ref 81–234)
LYMPHOCYTES # BLD AUTO: 140.07 THOUSAND/UL (ref 0.6–4.47)
LYMPHOCYTES # BLD AUTO: 93 % (ref 14–44)
MACROCYTES BLD QL AUTO: PRESENT
MCH RBC QN AUTO: 31.3 PG (ref 26.8–34.3)
MCHC RBC AUTO-ENTMCNC: 30.9 G/DL (ref 31.4–37.4)
MCV RBC AUTO: 101 FL (ref 82–98)
MONOCYTES # BLD AUTO: 1.51 THOUSAND/UL (ref 0–1.22)
MONOCYTES NFR BLD: 1 % (ref 4–12)
NEUTROPHILS # BLD MANUAL: 6.02 THOUSAND/UL (ref 1.85–7.62)
NEUTS BAND NFR BLD MANUAL: 1 % (ref 0–8)
NEUTS SEG NFR BLD AUTO: 3 % (ref 43–75)
OVALOCYTES BLD QL SMEAR: PRESENT
PLATELET # BLD AUTO: 140 THOUSANDS/UL (ref 149–390)
PLATELET BLD QL SMEAR: ABNORMAL
PMV BLD AUTO: 8.9 FL (ref 8.9–12.7)
POIKILOCYTOSIS BLD QL SMEAR: PRESENT
POLYCHROMASIA BLD QL SMEAR: PRESENT
POTASSIUM SERPL-SCNC: 4.5 MMOL/L (ref 3.5–5.3)
PROT SERPL-MCNC: 7.8 G/DL (ref 6.4–8.2)
RBC # BLD AUTO: 3.58 MILLION/UL (ref 3.88–5.62)
SMUDGE CELLS BLD QL SMEAR: PRESENT
SODIUM SERPL-SCNC: 139 MMOL/L (ref 136–145)
URATE SERPL-MCNC: 5.9 MG/DL (ref 4.2–8)
VARIANT LYMPHS # BLD AUTO: 2 %
WBC # BLD AUTO: 150.61 THOUSAND/UL (ref 4.31–10.16)

## 2022-03-28 PROCEDURE — 80053 COMPREHEN METABOLIC PANEL: CPT | Performed by: INTERNAL MEDICINE

## 2022-03-28 PROCEDURE — 36415 COLL VENOUS BLD VENIPUNCTURE: CPT | Performed by: INTERNAL MEDICINE

## 2022-03-28 PROCEDURE — 82784 ASSAY IGA/IGD/IGG/IGM EACH: CPT | Performed by: INTERNAL MEDICINE

## 2022-03-28 PROCEDURE — 85027 COMPLETE CBC AUTOMATED: CPT | Performed by: INTERNAL MEDICINE

## 2022-03-28 PROCEDURE — 85007 BL SMEAR W/DIFF WBC COUNT: CPT | Performed by: INTERNAL MEDICINE

## 2022-03-28 PROCEDURE — 83615 LACTATE (LD) (LDH) ENZYME: CPT | Performed by: INTERNAL MEDICINE

## 2022-03-28 PROCEDURE — 84550 ASSAY OF BLOOD/URIC ACID: CPT | Performed by: INTERNAL MEDICINE

## 2022-03-28 PROCEDURE — 82232 ASSAY OF BETA-2 PROTEIN: CPT | Performed by: INTERNAL MEDICINE

## 2022-03-28 NOTE — TELEPHONE ENCOUNTER
Lab Result:  61   Date/Time Drawn: 03/28/2022 1329   Ordering Provider: Dr Hammond Finders Name: Armida Alexis       The following critical/stat result was read back to the lab as stated above and [employer] to the on-call provider

## 2022-03-29 ENCOUNTER — HOSPITAL ENCOUNTER (OUTPATIENT)
Dept: RADIOLOGY | Facility: HOSPITAL | Age: 72
Discharge: HOME | End: 2022-03-29
Payer: MEDICARE

## 2022-03-29 ENCOUNTER — APPOINTMENT (OUTPATIENT)
Dept: LAB | Facility: HOSPITAL | Age: 72
End: 2022-03-29
Payer: MEDICARE

## 2022-03-29 DIAGNOSIS — I50.9 HEART FAILURE, UNSPECIFIED HF CHRONICITY, UNSPECIFIED HEART FAILURE TYPE (HCC): ICD-10-CM

## 2022-03-29 DIAGNOSIS — R06.02 SHORTNESS OF BREATH: ICD-10-CM

## 2022-03-29 LAB
NT-PROBNP SERPL-MCNC: 174 PG/ML
TSH SERPL DL<=0.05 MIU/L-ACNC: 0.48 UIU/ML (ref 0.36–3.74)

## 2022-03-29 PROCEDURE — 36415 COLL VENOUS BLD VENIPUNCTURE: CPT

## 2022-03-29 PROCEDURE — 84443 ASSAY THYROID STIM HORMONE: CPT

## 2022-03-29 PROCEDURE — 83880 ASSAY OF NATRIURETIC PEPTIDE: CPT

## 2022-03-29 PROCEDURE — 71046 X-RAY EXAM CHEST 2 VIEWS: CPT

## 2022-03-30 ENCOUNTER — TELEPHONE (OUTPATIENT)
Dept: HEMATOLOGY ONCOLOGY | Facility: CLINIC | Age: 72
End: 2022-03-30

## 2022-03-30 LAB — B2 MICROGLOB SERPL-MCNC: 2.8 MG/L (ref 0.6–2.4)

## 2022-03-30 NOTE — TELEPHONE ENCOUNTER
I left message for the patient on his answering machine or in voice box and requested a return call to discuss test results    I left my name and office phone number

## 2022-03-31 ENCOUNTER — DOCUMENTATION (OUTPATIENT)
Dept: HEMATOLOGY ONCOLOGY | Facility: CLINIC | Age: 72
End: 2022-03-31

## 2022-03-31 NOTE — TELEPHONE ENCOUNTER
Patient is returning Dr Aden's call, he indicates that any time is ok to call   He can be reached back at [phone]

## 2022-03-31 NOTE — PROGRESS NOTES
I spoke with patient about exertional dyspnea  He is getting workup from his internist   Will be going for echocardiogram   He already had EKG chest x-ray  TSH and BNP  BNP is high  Chest x-ray has not been read yet  I do not have report of EKG  If he does not have cardiac issue to explain the symptoms he will go on treatment for CLL and most likely acalabrutinib  Discussed all this with patient    He will let me know after his visit with his internist

## 2022-04-01 ENCOUNTER — HOSPITAL ENCOUNTER (OUTPATIENT)
Dept: NON INVASIVE DIAGNOSTICS | Facility: HOSPITAL | Age: 72
Discharge: HOME | End: 2022-04-01
Payer: MEDICARE

## 2022-04-01 VITALS
DIASTOLIC BLOOD PRESSURE: 74 MMHG | WEIGHT: 175 LBS | HEART RATE: 63 BPM | BODY MASS INDEX: 25.92 KG/M2 | HEIGHT: 69 IN | SYSTOLIC BLOOD PRESSURE: 124 MMHG

## 2022-04-01 DIAGNOSIS — I50.9 HEART FAILURE, UNSPECIFIED HF CHRONICITY, UNSPECIFIED HEART FAILURE TYPE (HCC): ICD-10-CM

## 2022-04-01 LAB
AV MEAN GRADIENT: 21 MMHG
AV PEAK GRADIENT: 38 MMHG
AV VALVE AREA: 1.32 CM2
AV VELOCITY RATIO: 0.35
DOP CALC AO PEAK VEL: 3.1 M/S
DOP CALC AO VTI: 67 CM
DOP CALC LVOT AREA: 3.14 CM2
DOP CALC LVOT DIAMETER: 2 CM
DOP CALC LVOT PEAK VEL VTI: 28.1 CM
DOP CALC LVOT PEAK VEL: 1.1 M/S
DOP CALC LVOT STROKE VOLUME: 88.23 CM3
SL CV LV EF: 65

## 2022-04-01 PROCEDURE — 93306 TTE W/DOPPLER COMPLETE: CPT

## 2022-04-01 PROCEDURE — 93306 TTE W/DOPPLER COMPLETE: CPT | Performed by: INTERNAL MEDICINE

## 2022-04-25 ENCOUNTER — OFFICE VISIT (OUTPATIENT)
Dept: CARDIOLOGY CLINIC | Facility: CLINIC | Age: 72
End: 2022-04-25
Payer: MEDICARE

## 2022-04-25 VITALS
HEART RATE: 74 BPM | OXYGEN SATURATION: 100 % | BODY MASS INDEX: 26.66 KG/M2 | SYSTOLIC BLOOD PRESSURE: 132 MMHG | DIASTOLIC BLOOD PRESSURE: 70 MMHG | HEIGHT: 69 IN | WEIGHT: 180 LBS

## 2022-04-25 DIAGNOSIS — R06.00 DYSPNEA ON EXERTION: ICD-10-CM

## 2022-04-25 DIAGNOSIS — R93.1 ABNORMAL ECHOCARDIOGRAM: Primary | ICD-10-CM

## 2022-04-25 DIAGNOSIS — I35.0 NONRHEUMATIC AORTIC VALVE STENOSIS: ICD-10-CM

## 2022-04-25 PROCEDURE — 93000 ELECTROCARDIOGRAM COMPLETE: CPT | Performed by: INTERNAL MEDICINE

## 2022-04-25 PROCEDURE — 99204 OFFICE O/P NEW MOD 45 MIN: CPT | Performed by: INTERNAL MEDICINE

## 2022-04-25 RX ORDER — CHLORTHALIDONE 25 MG/1
TABLET ORAL
Status: ON HOLD | COMMUNITY
Start: 2022-04-05 | End: 2022-06-15 | Stop reason: ALTCHOICE

## 2022-04-25 NOTE — PATIENT INSTRUCTIONS
You were seen today in the Cardiology office for aortic stenosis  We will repeat an echocardiogram and have a nuclear stress test performed  I will call you with the results as soon as they're available  Thank you for choosing [facility]  Please call our office or use [employer] with any questions

## 2022-04-25 NOTE — Clinical Note
I saw Mr  Edelmira Lemon for moderate aortic stenosis  He has new onset dyspnea with exertion and fatigue  From what I could gather, his CLL is stable but I did notice his WBC is 150k  Any thoughts on whether his CLL and/or possible hyperviscosity are potentially contributing here? Thank you in advance

## 2022-04-25 NOTE — PROGRESS NOTES
[facility]    CHIEF COMPLAINT:   Chief Complaint   Patient presents with    New Patient Visit    Abnormal ECG       HPI:  Karo Luo is a 80 y o  male with a past medical history of CLL who was referred for an abnormal echocardiogram  Briefly, he was diagnosed with CLL in 1996 treated with intermittent therapy with chlorambucil  In 2014 he had 6 cycles of Rituxan and bendamustine  He follows with [facility] regularly  He has been in his baseline state of health and generally stays active with outdoor lawn work including cutting the lawn with a push mower  Likes to play golf  Was not very active since last summer up until about 8 weeks ago  He went out one weekend to get ready for the golf season  He was doing some gardening/lawn work and felt very short of breath and fatigued  Also noticed this while going up and down steps  Reports a "little chest tightness" which happens "here and there " Complains of occasional lightheadedness  He denies any recent fever, chills, nausea, vomiting, abdominal pain, orthopnea, PND or LE swelling  He recently saw his primary care provider and an echocardiogram was performed which revealed LVEF 65%, mild-to-moderate concentric hypertrophy, normal diastolic function  Normal RV size and systolic function  Mildly dilated LA  There was moderate aortic stenosis with a mean gradient of 21 mmHg and calculated aortic valve area 1 32 cm2  No family history of premature coronary artery disease  Brother is over 300 lbs and had a heart attack & coronary stent - obese, drinks, smokes, late 46s      The following portions of the patient's history were reviewed and updated as appropriate: allergies, current medications, past family history, past medical history, past social history, past surgical history, and problem list     SINCE LAST OV I REVIEWED WITH THE PATIENT THE INTERIM LABS, TEST RESULTS, CONSULTANT(S) NOTES AND PERFORMED AN INTERIM REVIEW OF HISTORY    Past Medical History:   Diagnosis Date    Basal cell carcinoma (BCC) of skin of nose     History of chemotherapy     Lymphocytic leukemia ([facility] [address] )        Past Surgical History:   Procedure Laterality Date    CATARACT EXTRACTION Bilateral     FLAP LOCAL HEAD / NECK N/A 4/14/2020    Procedure: ADJACENT TISSUE TRANSFER  FOREHEAD FLAP,  CARTILAGE GRAFT NOSE;  Surgeon: Zora Zhang MD;  [facility];  Service: Plastics    FLAP LOCAL HEAD / NECK N/A 6/17/2020    Procedure: DIVISION INSET FOREHEAD FLAP; FULL THICKNESS SKIN GRAFT TO FOREHEAD;  Surgeon: Zora Zhang MD;  [facility];  Service: Plastics    FULL THICKNESS SKIN GRAFT N/A 4/14/2020    Procedure: SKIN GRAFT FULL THICKNESS  (FTSG) NOSE;  Surgeon: Zora Zhang MD;  [facility];  Service: Plastics    MOHS RECONSTRUCTION N/A 4/14/2020    Procedure: MOHS RECONSTRUCTION NOSE DEFECT;  Surgeon: Zora Zhang MD;  [facility];  Service: Plastics    MOHS RECONSTRUCTION N/A 5/4/2020    Procedure: RECONSTRUCTION MOHS NASAL DEFECT WITH EAR CARTILAGE GRAFT;  Surgeon: Zora Zhang MD;  [facility];  Service: Plastics    SHOULDER SURGERY      TIBIA FRACTURE SURGERY      TONSILLECTOMY         Social History     Socioeconomic History    Marital status:      Spouse name: Not on file    Number of children: Not on file    Years of education: Not on file    Highest education level: Not on file   Occupational History    Not on file   Tobacco Use    Smoking status: Never Smoker    Smokeless tobacco: Never Used   Vaping Use    Vaping Use: Never used   Substance and Sexual Activity    Alcohol use:  Yes    Drug use: No    Sexual activity: Not on file   Other Topics Concern    Not on file   Social History Narrative    Not on file     Social Determinants of Health     Financial Resource Strain: Not on file   Food Insecurity: Not on file Transportation Needs: Not on file   Physical Activity: Not on file   Stress: Not on file   Social Connections: Not on file   Intimate Partner Violence: Not on file   Housing Stability: Not on file       Family History   Problem Relation Age of Onset    No Known Problems Mother     No Known Problems Father        No Known Allergies    Current Outpatient Medications   Medication Sig Dispense Refill    Ascorbic Acid (VITAMIN C PO) Take by mouth daily       fexofenadine-pseudoephedrine (ALLEGRA-D 24) 180-240 MG per 24 hr tablet Take 1 tablet by mouth as needed       multivitamin (THERAGRAN) TABS Take 1 tablet by mouth daily      VITAMIN D PO Take by mouth daily       VITAMIN E PO Take by mouth daily       chlorthalidone 25 mg tablet TAKE 1/2 TABLET BY MOUTH DAILY OR AS DIRECTED (Patient not taking: Reported on 4/25/2022)       No current facility-administered medications for this visit  /70 (BP Location: Left arm, Patient Position: Sitting, Cuff Size: Standard)   Pulse 74   Ht 5' 9" (1 753 m)   Wt 81 6 kg (180 lb)   SpO2 100%   BMI 26 58 kg/m²     Review of Systems   All other systems reviewed and are negative  Physical Exam  Vitals and nursing note reviewed  Constitutional:       General: He is not in acute distress  Appearance: Normal appearance  He is well-developed and normal weight  He is not ill-appearing  HENT:      Head: Normocephalic and atraumatic  Eyes:      General: No scleral icterus  Extraocular Movements: Extraocular movements intact  Conjunctiva/sclera: Conjunctivae normal    Cardiovascular:      Rate and Rhythm: Normal rate and regular rhythm  Heart sounds: Murmur (systolic at RUSB) heard  Comments: very faint S1  Pulmonary:      Effort: Pulmonary effort is normal  No respiratory distress  Breath sounds: Normal breath sounds  No wheezing or rales  Abdominal:      General: Abdomen is flat   Bowel sounds are normal  There is no distension  Palpations: Abdomen is soft  Tenderness: There is no abdominal tenderness  Musculoskeletal:      Right lower leg: No edema  Left lower leg: No edema  Skin:     General: Skin is warm and dry  Coloration: Skin is not jaundiced or pale  Neurological:      Mental Status: He is alert  Lab Results   Component Value Date     08/01/2015    K 4 5 03/28/2022     03/28/2022    CO2 25 03/28/2022    BUN 24 03/28/2022    CREATININE 1 42 (H) 03/28/2022    GLUCOSE 96 08/01/2015    CALCIUM 10 1 03/28/2022    ALT 23 03/28/2022    AST 20 03/28/2022    INR 1 13 04/15/2020       Lab Results   Component Value Date     61 (HH) 03/28/2022    HGB 11 2 (L) 03/28/2022    HCT 36 3 (L) 03/28/2022     (L) 03/28/2022       Cardiac studies:   Results for orders placed or performed in visit on 04/25/22   POCT ECG    Impression    Normal sinus rhythm with sinus arrhythmia       TTE - 4/1/22:    Left Ventricle: Left ventricular cavity size is normal  Wall thickness is mildly increased  The left ventricular ejection fraction is 65%  Systolic function is normal  Wall motion is normal  Diastolic function is normal  There is mild to moderate concentric hypertrophy    Left Atrium: The atrium is mildly dilated    Aortic Valve: The aortic valve is trileaflet  The leaflets are moderately thickened  The leaflets are moderately calcified  There is severely reduced mobility  There is mild regurgitation  There is moderate stenosis  The aortic valve mean gradient is 21 0 mmHg  The aortic valve area is 1 32 cm2  The aortic valve velocity is increased due to stenosis    Mitral Valve: There is mild annular calcification    Tricuspid Valve: There is mild regurgitation      ASSESSMENT AND PLAN:  Jesus Caraballo was seen today for new patient visit and abnormal ecg      Diagnoses and all orders for this visit:    Abnormal echocardiogram  -     POCT ECG  -     NM myocardial perfusion spect (rx stress and/or rest); Future  Nonrheumatic aortic valve stenosis  -     Echo follow up/limited w/ contrast if indicated; Future  Dyspnea on exertion  -     NM myocardial perfusion spect (rx stress and/or rest); Future    80 year gentleman with the above mentioned history who presents for abnormal echocardiogram, ROMANO, and fatigue  Previously was quite active and had no physical limitations but this was last summer (2021)  He wasn't very active throughout the fall and winter time  About 8 weeks ago he developed new onset ROMANO and significant fatigue which is unusual for him  Echo revealed preserved LVEF and moderate aortic stenosis  His TTE was personally reviewed with him in the room  His dimensionless index is consistent with moderate aortic stenosis however, velocities and gradient are lower than expected  Additionally, by exam his murmur sounds >moderate with a very faint/almost inaudible S2  Given concomitant complaints of chest tightness and ROMANO which is not easily explained by moderate AS and preserved LVEF, I will repeat a limited TTE for AS quantification  He should also have NM stress testing performed to rule out obstructive coronary disease as a potential etiology  Should his work up still be inconclusive, he may need invasive testing  I am not sure if his CLL/leukocytosis are contributing but would need Oncology's input whether or not symptoms are related to CLL and/or if he has hyperviscosity as a potential contributing factor       Joss Alegre MD

## 2022-05-10 ENCOUNTER — TELEPHONE (OUTPATIENT)
Dept: OTHER | Facility: OTHER | Age: 72
End: 2022-05-10

## 2022-05-10 ENCOUNTER — APPOINTMENT (OUTPATIENT)
Dept: LAB | Age: 72
End: 2022-05-10
Payer: MEDICARE

## 2022-05-10 DIAGNOSIS — C91.12 CLL (CHRONIC LYMPHOID LEUKEMIA) IN RELAPSE (HCC): ICD-10-CM

## 2022-05-10 DIAGNOSIS — C91.10 CLL (CHRONIC LYMPHOCYTIC LEUKEMIA) (HCC): ICD-10-CM

## 2022-05-10 LAB
ALBUMIN SERPL BCP-MCNC: 4.3 G/DL (ref 3.5–5)
ALP SERPL-CCNC: 105 U/L (ref 46–116)
ALT SERPL W P-5'-P-CCNC: 28 U/L (ref 12–78)
ANION GAP SERPL CALCULATED.3IONS-SCNC: 7 MMOL/L (ref 4–13)
ANISOCYTOSIS BLD QL SMEAR: PRESENT
AST SERPL W P-5'-P-CCNC: 17 U/L (ref 5–45)
BASOPHILS # BLD MANUAL: 0 THOUSAND/UL (ref 0–0.1)
BASOPHILS NFR MAR MANUAL: 0 % (ref 0–1)
BILIRUB SERPL-MCNC: 0.32 MG/DL (ref 0.2–1)
BUN SERPL-MCNC: 28 MG/DL (ref 5–25)
CALCIUM SERPL-MCNC: 10.2 MG/DL (ref 8.3–10.1)
CHLORIDE SERPL-SCNC: 107 MMOL/L (ref 100–108)
CO2 SERPL-SCNC: 26 MMOL/L (ref 21–32)
CREAT SERPL-MCNC: 1.41 MG/DL (ref 0.6–1.3)
EOSINOPHIL # BLD MANUAL: 0 THOUSAND/UL (ref 0–0.4)
EOSINOPHIL NFR BLD MANUAL: 0 % (ref 0–6)
ERYTHROCYTE [DISTWIDTH] IN BLOOD BY AUTOMATED COUNT: 14 % (ref 11.6–15.1)
GFR SERPL CREATININE-BSD FRML MDRD: 50 ML/MIN/1.73SQ M
GLUCOSE SERPL-MCNC: 105 MG/DL (ref 65–140)
HCT VFR BLD AUTO: 34.2 % (ref 36.5–49.3)
HGB BLD-MCNC: 10 G/DL (ref 12–17)
LDH SERPL-CCNC: 254 U/L (ref 81–234)
LYMPHOCYTES # BLD AUTO: 127.16 THOUSAND/UL (ref 0.6–4.47)
LYMPHOCYTES # BLD AUTO: 93 % (ref 14–44)
MACROCYTES BLD QL AUTO: PRESENT
MCH RBC QN AUTO: 29.9 PG (ref 26.8–34.3)
MCHC RBC AUTO-ENTMCNC: 29.2 G/DL (ref 31.4–37.4)
MCV RBC AUTO: 102 FL (ref 82–98)
MONOCYTES # BLD AUTO: 0 THOUSAND/UL (ref 0–1.22)
MONOCYTES NFR BLD: 0 % (ref 4–12)
NEUTROPHILS # BLD MANUAL: 5.47 THOUSAND/UL (ref 1.85–7.62)
NEUTS BAND NFR BLD MANUAL: 1 % (ref 0–8)
NEUTS SEG NFR BLD AUTO: 3 % (ref 43–75)
PLATELET # BLD AUTO: 140 THOUSANDS/UL (ref 149–390)
PLATELET BLD QL SMEAR: ADEQUATE
PMV BLD AUTO: 8.9 FL (ref 8.9–12.7)
POTASSIUM SERPL-SCNC: 4.7 MMOL/L (ref 3.5–5.3)
PROT SERPL-MCNC: 7.1 G/DL (ref 6.4–8.2)
RBC # BLD AUTO: 3.35 MILLION/UL (ref 3.88–5.62)
SMUDGE CELLS BLD QL SMEAR: PRESENT
SODIUM SERPL-SCNC: 140 MMOL/L (ref 136–145)
URATE SERPL-MCNC: 5.6 MG/DL (ref 4.2–8)
VARIANT LYMPHS # BLD AUTO: 3 %
WBC # BLD AUTO: 136.73 THOUSAND/UL (ref 4.31–10.16)

## 2022-05-10 PROCEDURE — 80053 COMPREHEN METABOLIC PANEL: CPT

## 2022-05-10 PROCEDURE — 85007 BL SMEAR W/DIFF WBC COUNT: CPT

## 2022-05-10 PROCEDURE — 82784 ASSAY IGA/IGD/IGG/IGM EACH: CPT

## 2022-05-10 PROCEDURE — 84550 ASSAY OF BLOOD/URIC ACID: CPT

## 2022-05-10 PROCEDURE — 83521 IG LIGHT CHAINS FREE EACH: CPT

## 2022-05-10 PROCEDURE — 83615 LACTATE (LD) (LDH) ENZYME: CPT

## 2022-05-10 PROCEDURE — 85027 COMPLETE CBC AUTOMATED: CPT

## 2022-05-10 PROCEDURE — 36415 COLL VENOUS BLD VENIPUNCTURE: CPT

## 2022-05-10 PROCEDURE — 82232 ASSAY OF BETA-2 PROTEIN: CPT

## 2022-05-10 NOTE — TELEPHONE ENCOUNTER
Lab Result:  White Count: 136 73    Date/Time Drawn: 05/10/22 @ 1346    Ordering Provider: [facility] Mabel Jade [address]    Lab Personnel's Name: [facility]       The following critical/stat result was read back to the lab as stated above and [employer] to the on-call provider

## 2022-05-11 LAB
IGA SERPL-MCNC: 54 MG/DL (ref 70–400)
IGG SERPL-MCNC: 688 MG/DL (ref 700–1600)
IGM SERPL-MCNC: 17 MG/DL (ref 40–230)

## 2022-05-12 LAB
B2 MICROGLOB SERPL-MCNC: 2.8 MG/L (ref 0.6–2.4)
KAPPA LC FREE SER-MCNC: 77 MG/L (ref 3.3–19.4)
KAPPA LC FREE/LAMBDA FREE SER: 7.94 {RATIO} (ref 0.26–1.65)
LAMBDA LC FREE SERPL-MCNC: 9.7 MG/L (ref 5.7–26.3)

## 2022-05-13 ENCOUNTER — OFFICE VISIT (OUTPATIENT)
Dept: HEMATOLOGY ONCOLOGY | Facility: CLINIC | Age: 72
End: 2022-05-13
Payer: MEDICARE

## 2022-05-13 VITALS
HEART RATE: 126 BPM | TEMPERATURE: 97.6 F | WEIGHT: 177.5 LBS | RESPIRATION RATE: 17 BRPM | SYSTOLIC BLOOD PRESSURE: 122 MMHG | HEIGHT: 69 IN | BODY MASS INDEX: 26.29 KG/M2 | DIASTOLIC BLOOD PRESSURE: 78 MMHG | OXYGEN SATURATION: 97 %

## 2022-05-13 DIAGNOSIS — C91.12 CLL (CHRONIC LYMPHOID LEUKEMIA) IN RELAPSE (HCC): Primary | ICD-10-CM

## 2022-05-13 DIAGNOSIS — D69.6 THROMBOCYTOPENIA (HCC): ICD-10-CM

## 2022-05-13 DIAGNOSIS — D64.9 ANEMIA, UNSPECIFIED TYPE: ICD-10-CM

## 2022-05-13 PROCEDURE — 99214 OFFICE O/P EST MOD 30 MIN: CPT | Performed by: PHYSICIAN ASSISTANT

## 2022-05-13 NOTE — PROGRESS NOTES
Hematology/Oncology Outpatient Follow-up  Christin Amaral 80 y o  male 1950 [id number]    Date:  5/13/2022      Assessment and Plan:  1  CLL (chronic lymphoid leukemia) in relapse ([facility] [address] ),   2  Anemia, unspecified type, 3  Thrombocytopenia [facility])  30-year-old male presents for follow-up regarding history of CLL  Patient has been under surveillance  He does meet criteria for treatment, and has, with elevated white count, anemia and thrombocytopenia however he has wanted to continue observation  He has had new onset over the last 8 weeks of exertional dyspnea  Cardiac workup showed moderate aortic stenosis  He is undergoing continued cardiac workup  Reviewed that his hemoglobin has down trended over last 2 years  However has been between 10 and 11 over the last 1 year  It is unlikely that this is contributing to his exertional dyspnea over such a new onset time  We did review that we could begin treatment to see if this helps his symptoms however he wishes to proceed with cardiac workup and see what is offered to him as he would wish to proceed with surgery if this is offered  There was a question cardiology note regarding hyperviscosity  This would not be the case with his present situation  We also reviewed Edilma as this patient is not vaccinated against COVID  He is not interested at this time  He will keep us updated about his cardiac planning/status  We can start treatment with acalabrutinib at any time; as above he prefers to wait until cardiac issues are addressed  Follow up in 3 months     - CBC and differential; Standing  - Comprehensive metabolic panel; Standing  - LD,Blood; Standing  - Uric acid; Standing  - IgG, IgA, IgM; Standing  - Beta 2 microglobulin, serum; Standing      HPI:  80year-old male with history of CLL  [facility] was diagnosed with CLL in 1996  He had received chlorambucil intermittently, 6 cycles of Rituxan and bendamustine in 2014 in no treatment since   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      Patient also follows at the [address] [place] [address] with Dr Wai Brewer was last seen at [facility] in Aug 2019   He does not plan to follow up with Dr Henna Waddell at this time      Patient was dx with stage III skin cancer, 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, T3N0, left side nose, 1 5 cm, deep invasion into muscle  He required Moh's surgery with positive margins  He underwent re excision and then with subsequent skin graft  He was recommended to receive radiation and received at [facility] with Dr Ellen Garcia  Over the last 8 weeks patient had developed exertional dyspnea  He is unable to be active with his grandchildren, he cannot play golf  He saw PCP  He was referred to Cardiology  Echocardiogram showed moderate aortic stenosis  He is now recommended a repeat echocardiogram that appears to be more thorough as well as nuclear stress test   Patient then will be following up with Cardiology to determine if he will be undergoing any surgical intervention  Interval history:    ROS: Review of Systems   Constitutional: Negative for appetite change, chills, fatigue, fever and unexpected weight change  Respiratory: Positive for shortness of breath  Negative for cough  Cardiovascular: Negative for chest pain, palpitations and leg swelling  Gastrointestinal: Negative for abdominal pain, constipation, diarrhea, nausea and vomiting  Genitourinary: Negative for difficulty urinating, dysuria and hematuria  Skin: Negative  Neurological: Negative for dizziness, weakness, light-headedness, numbness and headaches  Hematological: Negative  Psychiatric/Behavioral: Negative          Past Medical History:   Diagnosis Date    Basal cell carcinoma (BCC) of skin of nose     History of chemotherapy     Lymphocytic leukemia ([facility] [address] )        Past Surgical History:   Procedure Laterality Date    CATARACT EXTRACTION Bilateral     FLAP LOCAL HEAD / NECK N/A 4/14/2020    Procedure: ADJACENT TISSUE TRANSFER  FOREHEAD FLAP,  CARTILAGE GRAFT NOSE;  Surgeon: Kayla Biggs MD;  [facility];  Service: Plastics    FLAP LOCAL HEAD / NECK N/A 6/17/2020    Procedure: DIVISION INSET FOREHEAD FLAP; FULL THICKNESS SKIN GRAFT TO FOREHEAD;  Surgeon: Kayla Biggs MD;  [facility];  Service: Plastics    FULL THICKNESS SKIN GRAFT N/A 4/14/2020    Procedure: SKIN GRAFT FULL THICKNESS  (FTSG) NOSE;  Surgeon: Kayla Biggs MD;  [facility];  Service: Plastics    MOHS RECONSTRUCTION N/A 4/14/2020    Procedure: MOHS RECONSTRUCTION NOSE DEFECT;  Surgeon: Kayla Biggs MD;  [facility];  Service: Plastics    MOHS RECONSTRUCTION N/A 5/4/2020    Procedure: RECONSTRUCTION MOHS NASAL DEFECT WITH EAR CARTILAGE GRAFT;  Surgeon: Kayla Biggs MD;  [facility];  Service: Plastics    SHOULDER SURGERY      TIBIA FRACTURE SURGERY      TONSILLECTOMY         Social History     Socioeconomic History    Marital status:      Spouse name: None    Number of children: None    Years of education: None    Highest education level: None   Occupational History    None   Tobacco Use    Smoking status: Never Smoker    Smokeless tobacco: Never Used   Vaping Use    Vaping Use: Never used   Substance and Sexual Activity    Alcohol use:  Yes    Drug use: No    Sexual activity: None   Other Topics Concern    None   Social History Narrative    None     Social Determinants of Health     Financial Resource Strain: Not on file   Food Insecurity: Not on file   Transportation Needs: Not on file   Physical Activity: Not on file   Stress: Not on file   Social Connections: Not on file   Intimate Partner Violence: Not on file   Housing Stability: Not on file       Family History   Problem Relation Age of Onset    No Known Problems Mother     No Known Problems Father        No Known Allergies      Current Outpatient Medications:    Ascorbic Acid (VITAMIN C PO), Take by mouth daily , Disp: , Rfl:     fexofenadine-pseudoephedrine (ALLEGRA-D 24) 180-240 MG per 24 hr tablet, Take 1 tablet by mouth as needed , Disp: , Rfl:     multivitamin (THERAGRAN) TABS, Take 1 tablet by mouth daily, Disp: , Rfl:     VITAMIN D PO, Take by mouth daily , Disp: , Rfl:     VITAMIN E PO, Take by mouth daily , Disp: , Rfl:     chlorthalidone 25 mg tablet, TAKE 1/2 TABLET BY MOUTH DAILY OR AS DIRECTED (Patient not taking: No sig reported), Disp: , Rfl:       Physical Exam:  /78 (BP Location: Left arm, Patient Position: Sitting, Cuff Size: Adult)   Pulse (!) 126   Temp 97 6 °F (36 4 °C)   Resp 17   Ht 5' 9" (1 753 m)   Wt 80 5 kg (177 lb 8 oz)   SpO2 97%   BMI 26 21 kg/m²     Physical Exam  Vitals reviewed  Constitutional:       General: He is not in acute distress  Appearance: He is well-developed  He is not ill-appearing  HENT:      Head: Normocephalic and atraumatic  Eyes:      General: No scleral icterus  Conjunctiva/sclera: Conjunctivae normal    Cardiovascular:      Rate and Rhythm: Normal rate and regular rhythm  Heart sounds: Normal heart sounds  No murmur heard  Pulmonary:      Effort: Pulmonary effort is normal  No respiratory distress  Breath sounds: Normal breath sounds  Chest:   Breasts:      Right: No axillary adenopathy or supraclavicular adenopathy  Left: No axillary adenopathy or supraclavicular adenopathy  Abdominal:      Palpations: Abdomen is soft  Tenderness: There is no abdominal tenderness  Musculoskeletal:         General: No tenderness  Normal range of motion  Cervical back: Normal range of motion and neck supple  Right lower leg: No edema  Left lower leg: No edema  Lymphadenopathy:      Cervical: No cervical adenopathy  Upper Body:      Right upper body: No supraclavicular or axillary adenopathy  Left upper body: No supraclavicular or axillary adenopathy  Skin:     General: Skin is warm and dry  Neurological:      Mental Status: He is alert and oriented to person, place, and time  Cranial Nerves: No cranial nerve deficit  Psychiatric:         Mood and Affect: Mood normal          Behavior: Behavior normal        Labs:  Lab Results   Component Value Date     73 (HH) 05/10/2022    HGB 10 0 (L) 05/10/2022    HCT 34 2 (L) 05/10/2022     (H) 05/10/2022     (L) 05/10/2022         Patient voiced understanding and agreement in the above discussion  Aware to contact our office with questions/symptoms in the interim  This note has been generated by voice recognition software system  Therefore, there may be spelling, grammar, and or syntax errors  Please contact if questions arise

## 2022-05-16 ENCOUNTER — TELEPHONE (OUTPATIENT)
Dept: NON INVASIVE DIAGNOSTICS | Facility: CLINIC | Age: 72
End: 2022-05-16

## 2022-05-18 ENCOUNTER — HOSPITAL ENCOUNTER (OUTPATIENT)
Dept: NON INVASIVE DIAGNOSTICS | Facility: CLINIC | Age: 72
Discharge: HOME | End: 2022-05-18
Payer: MEDICARE

## 2022-05-18 VITALS
HEIGHT: 69 IN | SYSTOLIC BLOOD PRESSURE: 122 MMHG | DIASTOLIC BLOOD PRESSURE: 78 MMHG | HEART RATE: 80 BPM | BODY MASS INDEX: 26.22 KG/M2 | WEIGHT: 177 LBS

## 2022-05-18 DIAGNOSIS — R93.1 ABNORMAL ECHOCARDIOGRAM: ICD-10-CM

## 2022-05-18 DIAGNOSIS — R06.00 DYSPNEA ON EXERTION: ICD-10-CM

## 2022-05-18 DIAGNOSIS — I35.0 NONRHEUMATIC AORTIC VALVE STENOSIS: ICD-10-CM

## 2022-05-18 LAB
AORTIC ROOT: 3 CM
AORTIC VALVE MEAN VELOCITY: 25.3 M/S
APICAL FOUR CHAMBER EJECTION FRACTION: 60 %
AV AREA BY CONTINUOUS VTI: 0.8 CM2
AV AREA PEAK VELOCITY: 1 CM2
AV LVOT MEAN GRADIENT: 2 MMHG
AV LVOT PEAK GRADIENT: 5 MMHG
AV MEAN GRADIENT: 28 MMHG
AV PEAK GRADIENT: 49 MMHG
AV VALVE AREA: 0.78 CM2
AV VELOCITY RATIO: 0.3
DOP CALC AO PEAK VEL: 3.48 M/S
DOP CALC AO VTI: 85 CM
DOP CALC LVOT AREA: 3.14 CM2
DOP CALC LVOT DIAMETER: 2 CM
DOP CALC LVOT PEAK VEL VTI: 21 CM
DOP CALC LVOT PEAK VEL: 1.06 M/S
DOP CALC LVOT STROKE INDEX: 34.7 ML/M2
DOP CALC LVOT STROKE VOLUME: 65.94 CM3
E WAVE DECELERATION TIME: 278 MS
FRACTIONAL SHORTENING: 31 (ref 28–44)
INTERVENTRICULAR SEPTUM IN DIASTOLE (PARASTERNAL SHORT AXIS VIEW): 0.9 CM
INTERVENTRICULAR SEPTUM: 0.9 CM (ref 0.6–1.1)
LEFT ATRIUM AREA SYSTOLE SINGLE PLANE A4C: 22.3 CM2
LEFT ATRIUM SIZE: 4.2 CM
LEFT INTERNAL DIMENSION IN SYSTOLE: 3.3 CM (ref 2.1–4)
LEFT VENTRICULAR INTERNAL DIMENSION IN DIASTOLE: 4.8 CM (ref 3.5–6)
LEFT VENTRICULAR POSTERIOR WALL IN END DIASTOLE: 0.9 CM
LEFT VENTRICULAR STROKE VOLUME: 61 ML
LVSV (TEICH): 61 ML
MAX DIASTOLIC BP: 76 MMHG
MAX HEART RATE: 123 BPM
MAX PREDICTED HEART RATE: 150 BPM
MAX. SYSTOLIC BP: 124 MMHG
MV E'TISSUE VEL-SEP: 8 CM/S
MV PEAK A VEL: 1.12 M/S
MV PEAK E VEL: 91 CM/S
MV STENOSIS PRESSURE HALF TIME: 81 MS
MV VALVE AREA P 1/2 METHOD: 2.72
NUC STRESS EJECTION FRACTION: 80 %
PROTOCOL NAME: NORMAL
PULMONARY REGURGITATION LATE DIASTOLIC VELOCITY: 0.02 M/S
RA PRESSURE ESTIMATED: 3 MMHG
RATE PRESSURE PRODUCT: NORMAL
REASON FOR TERMINATION: NORMAL
RIGHT ATRIUM AREA SYSTOLE A4C: 11.8 CM2
RIGHT VENTRICLE ID DIMENSION: 2.9 CM
RV PSP: 18 MMHG
SL CV LV EF: 60
SL CV PED ECHO LEFT VENTRICLE DIASTOLIC VOLUME (MOD BIPLANE) 2D: 106 ML
SL CV PED ECHO LEFT VENTRICLE SYSTOLIC VOLUME (MOD BIPLANE) 2D: 45 ML
SL CV REST NUCLEAR ISOTOPE DOSE: 10.29 MCI
SL CV STRESS NUCLEAR ISOTOPE DOSE: 32.4 MCI
SL CV STRESS RECOVERY BP: NORMAL MMHG
SL CV STRESS RECOVERY HR: 86 BPM
SL CV STRESS RECOVERY O2 SAT: 98 %
STRESS ANGINA INDEX: 0
STRESS BASELINE BP: NORMAL MMHG
STRESS BASELINE HR: 69 BPM
STRESS DUKE TREADMILL SCORE: 3
STRESS O2 SAT REST: 99 %
STRESS PEAK HR: 123 BPM
STRESS POST EXERCISE DUR MIN: 3 MIN
STRESS POST EXERCISE DUR SEC: 0 SEC
STRESS POST O2 SAT PEAK: 98 %
STRESS POST PEAK BP: 108 MMHG
STRESS ST DEPRESSION: 0 MM
STRESS TARGET HR: 123 BPM
STRESS/REST PERFUSION RATIO: 1.01
TARGET HR FORMULA: NORMAL
TEST INDICATION: NORMAL
TIME IN EXERCISE PHASE: NORMAL
TR MAX PG: 15 MMHG
TR PEAK VELOCITY: 1.9 M/S
TRICUSPID VALVE PEAK REGURGITATION VELOCITY: 1.91 M/S

## 2022-05-18 PROCEDURE — 93016 CV STRESS TEST SUPVJ ONLY: CPT | Performed by: INTERNAL MEDICINE

## 2022-05-18 PROCEDURE — 93308 TTE F-UP OR LMTD: CPT | Performed by: INTERNAL MEDICINE

## 2022-05-18 PROCEDURE — 93321 DOPPLER ECHO F-UP/LMTD STD: CPT | Performed by: INTERNAL MEDICINE

## 2022-05-18 PROCEDURE — 93018 CV STRESS TEST I&R ONLY: CPT | Performed by: INTERNAL MEDICINE

## 2022-05-18 PROCEDURE — 78452 HT MUSCLE IMAGE SPECT MULT: CPT | Performed by: INTERNAL MEDICINE

## 2022-05-18 PROCEDURE — 93308 TTE F-UP OR LMTD: CPT

## 2022-05-18 PROCEDURE — 93325 DOPPLER ECHO COLOR FLOW MAPG: CPT | Performed by: INTERNAL MEDICINE

## 2022-05-18 PROCEDURE — 93017 CV STRESS TEST TRACING ONLY: CPT

## 2022-05-18 PROCEDURE — 78452 HT MUSCLE IMAGE SPECT MULT: CPT

## 2022-05-18 PROCEDURE — G1004 CDSM NDSC: HCPCS

## 2022-05-18 RX ADMIN — REGADENOSON 0.4 MG: 0.08 INJECTION, SOLUTION INTRAVENOUS at 09:58

## 2022-05-20 ENCOUNTER — TELEPHONE (OUTPATIENT)
Dept: CARDIOLOGY CLINIC | Facility: CLINIC | Age: 72
End: 2022-05-20

## 2022-05-20 DIAGNOSIS — I35.0 SEVERE AORTIC STENOSIS: Primary | ICD-10-CM

## 2022-05-20 NOTE — TELEPHONE ENCOUNTER
Pt called and would like to speak to Dr Roby Farah  Reached out to Dr Roby Farah and he spoke with pt

## 2022-06-02 ENCOUNTER — OFFICE VISIT (OUTPATIENT)
Dept: CARDIAC SURGERY | Facility: CLINIC | Age: 72
End: 2022-06-02
Payer: MEDICARE

## 2022-06-02 VITALS
WEIGHT: 179 LBS | RESPIRATION RATE: 18 BRPM | HEIGHT: 69 IN | DIASTOLIC BLOOD PRESSURE: 64 MMHG | OXYGEN SATURATION: 100 % | BODY MASS INDEX: 26.51 KG/M2 | HEART RATE: 78 BPM | SYSTOLIC BLOOD PRESSURE: 137 MMHG

## 2022-06-02 DIAGNOSIS — Z13.6 ENCOUNTER FOR SPECIAL SCREENING EXAMINATION FOR CARDIOVASCULAR DISORDER: ICD-10-CM

## 2022-06-02 DIAGNOSIS — I35.0 SEVERE AORTIC STENOSIS: Primary | ICD-10-CM

## 2022-06-02 DIAGNOSIS — Z13.6 ENCOUNTER FOR SCREENING FOR STENOSIS OF CAROTID ARTERY: ICD-10-CM

## 2022-06-02 PROCEDURE — 99204 OFFICE O/P NEW MOD 45 MIN: CPT | Performed by: PHYSICIAN ASSISTANT

## 2022-06-02 NOTE — LETTER
June 2, 2022     Deepak Sandoval, [address] [place] [address]    Patient: Duke Keen   YOB: 1950   Date of Visit: 6/2/2022       Dear Dr Marco Antonio Gonzalez: Thank you for referring Teto Best to me for evaluation  Below are my notes for this consultation  If you have questions, please do not hesitate to call me  I look forward to following your patient along with you  Sincerely,        Alexis Tillman MD        CC: MD Ramón Kennedy MD Evalina Horse, PA-C  6/2/2022  9:30 AM  Attested  [facility] - Cardiothoracic Surgery   Duke Keen 80 y o  male MRN: [id number]    Physician Requesting Consult: Marco Antonio Gonzalez    Reason for Consult / Principal Problem: Aortic stenosis, Non-Rheumatic    History of Present Illness: Duke Keen is a 80y o  year old male who presents for initial outpatient surgical consultation for symptomatic severe aortic stenosis  Patient has a history of basal cell carcinoma of skin of nose, s/p Mohs surgery, CLL s/p chemotherapy in 2014  He follows with Dr Madie Abrams of [place] [address] Hematology/Oncology  Manan Almazan has had symptoms of fatigue and shortness of breath with activity for about 3 months  He is able to play with his grandchildren or do yard work for about 10 minutes before having to take a break due to shortness of breath  He denies SOB at rest and orthopnea  He tries to golf about 2 times a week, but has been unable to do so  He also notes that he's needed coffee in the morning lately due to the fatigue he's been experiencing  He saw Dr Marco Antonio Gonzalez from Cardiology and had an ECHO and stress test on 5/18  Stress test was negative, but ECHO showed severe AS, with EF 60%  He is here today to discuss TAVR  Patient does not smoke, and uses alcohol occasionally  He has not seen his dentist since 2019       Past Medical History:  Past Medical History:   Diagnosis Date    Basal cell carcinoma (BCC) of skin of nose     History of chemotherapy     Lymphocytic leukemia ([facility] [address] )          Past Surgical History:   Past Surgical History:   Procedure Laterality Date    CATARACT EXTRACTION Bilateral     FLAP LOCAL HEAD / NECK N/A 4/14/2020    Procedure: ADJACENT TISSUE TRANSFER  FOREHEAD FLAP,  CARTILAGE GRAFT NOSE;  Surgeon: Elvira Eubanks MD;  [facility];  Service: Plastics    FLAP LOCAL HEAD / NECK N/A 6/17/2020    Procedure: DIVISION INSET FOREHEAD FLAP; FULL THICKNESS SKIN GRAFT TO FOREHEAD;  Surgeon: Elvira Eubanks MD;  [facility];  Service: Plastics    FULL THICKNESS SKIN GRAFT N/A 4/14/2020    Procedure: SKIN GRAFT FULL THICKNESS  (FTSG) NOSE;  Surgeon: Elvira Eubanks MD;  [facility];  Service: Plastics    MOHS RECONSTRUCTION N/A 4/14/2020    Procedure: MOHS RECONSTRUCTION NOSE DEFECT;  Surgeon: Elvira Eubanks MD;  [facility];  Service: Plastics    MOHS RECONSTRUCTION N/A 5/4/2020    Procedure: RECONSTRUCTION MOHS NASAL DEFECT WITH EAR CARTILAGE GRAFT;  Surgeon: Elvira Eubanks MD;  [facility];  Service: Plastics    SHOULDER SURGERY      TIBIA FRACTURE SURGERY      TONSILLECTOMY         Family History:  Family History   Problem Relation Age of Onset    No Known Problems Mother     No Known Problems Father        Social History:  Social History     Substance and Sexual Activity   Alcohol Use Yes    Comment: rare     Social History     Substance and Sexual Activity   Drug Use No     Social History     Tobacco Use   Smoking Status Never Smoker   Smokeless Tobacco Never Used         Home Medications:   Prior to Admission medications    Medication Sig Start Date End Date Taking?  Authorizing Provider   Ascorbic Acid (VITAMIN C PO) Take by mouth daily    Yes Historical Provider, MD   fexofenadine-pseudoephedrine (ALLEGRA-D 24) 180-240 MG per 24 hr tablet Take 1 tablet by mouth as needed    Yes Historical Provider, MD   multivitamin (THERAGRAN) TABS Take 1 tablet by mouth daily   Yes Historical Provider, MD   VITAMIN D PO Take by mouth daily    Yes Historical Provider, MD   VITAMIN E PO Take by mouth daily    Yes Historical Provider, MD   chlorthalidone 25 mg tablet TAKE 1/2 TABLET BY MOUTH DAILY OR AS DIRECTED  Patient not taking: No sig reported 4/5/22   Historical Provider, MD       Allergies:  No Known Allergies    Review of Systems:  Review of Systems - General ROS: positive for  - fatigue  Psychological ROS: negative  Ophthalmic ROS: negative  ENT ROS: negative  Allergy and Immunology ROS: negative  Hematological and Lymphatic ROS: negative  Endocrine ROS: negative  Breast ROS: negative  Respiratory ROS: positive for - shortness of breath  Cardiovascular ROS: positive for - dyspnea on exertion, chest tightness  Gastrointestinal ROS: no abdominal pain, change in bowel habits, or black or bloody stools  Genito-Urinary ROS: negative  Musculoskeletal ROS: negative  Neurological ROS: negative  Dermatological ROS: negative     Vital Signs:     Vitals:    06/02/22 0804 06/02/22 0806   BP: 141/67 137/64   BP Location: Left arm Right arm   Patient Position: Sitting Sitting   Cuff Size: Standard    Pulse: 78    Resp: 18    SpO2: 100%    Weight: 81 2 kg (179 lb)    Height: 5' 9" (1 753 m)        Physical Exam:    General: NAD, pleasant   HEENT/NECK:  PERRLA  No jugular venous distention  Cardiac:Regular rate and rhythm, Murmur noted    Carotid arteries: no bruits  Pulmonary:  Breath sounds clear bilaterally  Abdomen:  Non-tender, Non-distended  Positive bowel sounds  Upper extremities: palpable radial pulses; brisk capillary refill  Lower extremities: Extremities warm/dry  PT/DP pulses 2+ bilaterally  No edema B/L  Neuro: Alert and oriented X 3  Sensation is grossly intact  No focal deficits  Musculoskeletal: no edema noted, ROM intact  Skin: Warm/Dry, without rashes or lesions        Lab Results:               Invalid input(s): LABGLOM      No results found for: HGBA1C  No results found for: CKTOTAL, CKMB, CKMBINDEX, TROPONINI    Imaging Studies:     Echocardiogram:       Left Ventricle: Left ventricular cavity size is normal  Wall thickness is normal  The left ventricular ejection fraction is 60%  Systolic function is normal  Wall motion is normal  Diastolic function is normal     Left Atrium: The atrium is mildly dilated    Aortic Valve: The aortic valve is trileaflet  The leaflets are moderately calcified  There is severely reduced mobility  There is mild regurgitation  There is severe stenosis  The aortic valve peak velocity is 3 48 m/s  The aortic valve mean gradient is 28 mmHg  The DVI is 0 24  The aortic valve area is 0 8 cm2  The aortic valve velocity is increased due to stenosis but lower than expected due to the presence of decreased flow    Mitral Valve: There is mild annular calcification  There is mild regurgitation    Pulmonic Valve: There is mild regurgitation  I have personally reviewed pertinent reports  TAVR evaluation Assessment:     Yousuf Byron: III    5 Meter Walk: 5 sec, 4 sec, 5 sec    STS risk score (preliminary): 2 7    KCCQ-12 completed    Assessment:  Patient Active Problem List    Diagnosis Date Noted    Aortic stenosis, severe 06/02/2022    Severe aortic stenosis 06/02/2022    Encounter for screening for stenosis of carotid artery 06/02/2022    Encounter for special screening examination for cardiovascular disorder 06/02/2022    Scalp lesion 02/18/2022    Anemia 02/18/2022    Thrombocytopenia ([facility] [address] ) 02/18/2022    S/P forehead flap 05/14/2020    Basal cell carcinoma (BCC) of nasal sidewall 04/22/2020    Mohs defect of nose 04/22/2020    CLL (chronic lymphoid leukemia) in relapse ([facility] [address] ) 03/15/2018     Severe aortic stenosis; Ongoing TAVR workup    Plan:    Roya Wood has symptomatic severe aortic stenosis   They will undergo the following testing for transcatheter aortic valve replacement: Gated CTA of the chest/abdomen/pelvis, 3D PARISA, cardiac catheterization, dental clearance, pulmonary function tests with ABG, and carotid artery ultrasound  Once these studies have been completed, Roya Wood will follow up in our office to review the results and to be evaluated to confirm the suitability of proceeding with transcatheter aortic valve replacment  Roya Wood was comfortable with our recommendations, and their questions were answered to their satisfaction  Thank you for allowing us to participate in the care of this patient  The patient was referred to gastroenterology for consideration of routine colonoscopy screening of all patients aged 54-65  Gastroenterology evaluation will not be necessary prior to any open heart procedures, and can be completed following surgical recovery  SIGNATURE: Desiree Esparza PA-C  DATE: June 2, 2022  TIME: 9:25 AM  Attestation signed by Nancy Kitchen MD at 6/2/2022 10:01 AM:  Patient seen and evaluated with Beau Luna PA-C  I agree with the above assessment and plan with the following additions  The patient is a 78-year-old man with symptomatic severe aortic stenosis, I explained the diagnosis to the patient the treatment options, I recommend aortic valve replacement, we spoke about SAVR and TAVR, he chose TAVR, which I think is a good option for him as well  He will undergo preoperative testing and will return to schedule an elective operation  He would be an adequate candidate for open surgery if he were to have significant coronary artery disease as well  This note was completed in part utilizing [employer] direct voice recognition software  Grammatical errors, random word insertion, spelling mistakes, and incomplete sentences may be an occasional consequence of the system secondary to software limitations, ambient noise and hardware issues  At the time of dictation, efforts were made to edit, clarify and /or correct errors    Please read the chart carefully and recognize, using context, where substitutions have occurred  If you have any questions or concerns about the context, text or information contained within the body of this dictation, please contact myself, the provider, for further clarification

## 2022-06-02 NOTE — PROGRESS NOTES
[facility] - Cardiothoracic Surgery   Raymond Echols 80 y o  male MRN: [id number]    Physician Requesting Consult: Maia Calhoun    Reason for Consult / Principal Problem: Aortic stenosis, Non-Rheumatic    History of Present Illness: Raymond Echols is a 80y o  year old male who presents for initial outpatient surgical consultation for symptomatic severe aortic stenosis  Patient has a history of basal cell carcinoma of skin of nose, s/p Mohs surgery, CLL s/p chemotherapy in 2014  He follows with Dr Kendra Richardson of [place] [address] Hematology/Oncology  Toan Even has had symptoms of fatigue and shortness of breath with activity for about 3 months  He is able to play with his grandchildren or do yard work for about 10 minutes before having to take a break due to shortness of breath  He denies SOB at rest and orthopnea  He tries to golf about 2 times a week, but has been unable to do so  He also notes that he's needed coffee in the morning lately due to the fatigue he's been experiencing  He saw Dr Maia Calhoun from Cardiology and had an ECHO and stress test on 5/18  Stress test was negative, but ECHO showed severe AS, with EF 60%  He is here today to discuss TAVR  Patient does not smoke, and uses alcohol occasionally  He has not seen his dentist since 2019       Past Medical History:  Past Medical History:   Diagnosis Date    Basal cell carcinoma (BCC) of skin of nose     History of chemotherapy     Lymphocytic leukemia ([facility] [address] )          Past Surgical History:   Past Surgical History:   Procedure Laterality Date    CATARACT EXTRACTION Bilateral     FLAP LOCAL HEAD / NECK N/A 4/14/2020    Procedure: ADJACENT TISSUE TRANSFER  FOREHEAD FLAP,  CARTILAGE GRAFT NOSE;  Surgeon: Lc Jaramillo MD;  [facility];  Service: Plastics    FLAP LOCAL HEAD / NECK N/A 6/17/2020    Procedure: DIVISION INSET FOREHEAD FLAP; FULL THICKNESS SKIN GRAFT TO FOREHEAD;  Surgeon: Lc Jaramillo MD;  [facility];  Service: Plastics    FULL THICKNESS SKIN GRAFT N/A 4/14/2020    Procedure: SKIN GRAFT FULL THICKNESS  (FTSG) NOSE;  Surgeon: Salo Hoffman MD;  [facility];  Service: Plastics    MOHS RECONSTRUCTION N/A 4/14/2020    Procedure: MOHS RECONSTRUCTION NOSE DEFECT;  Surgeon: Salo Hoffman MD;  [facility];  Service: Plastics    MOHS RECONSTRUCTION N/A 5/4/2020    Procedure: RECONSTRUCTION MOHS NASAL DEFECT WITH EAR CARTILAGE GRAFT;  Surgeon: Salo Hoffman MD;  [facility];  Service: Plastics    SHOULDER SURGERY      TIBIA FRACTURE SURGERY      TONSILLECTOMY         Family History:  Family History   Problem Relation Age of Onset    No Known Problems Mother     No Known Problems Father        Social History:  Social History     Substance and Sexual Activity   Alcohol Use Yes    Comment: rare     Social History     Substance and Sexual Activity   Drug Use No     Social History     Tobacco Use   Smoking Status Never Smoker   Smokeless Tobacco Never Used         Home Medications:   Prior to Admission medications    Medication Sig Start Date End Date Taking?  Authorizing Provider   Ascorbic Acid (VITAMIN C PO) Take by mouth daily    Yes Historical Provider, MD   fexofenadine-pseudoephedrine (ALLEGRA-D 24) 180-240 MG per 24 hr tablet Take 1 tablet by mouth as needed    Yes Historical Provider, MD   multivitamin (THERAGRAN) TABS Take 1 tablet by mouth daily   Yes Historical Provider, MD   VITAMIN D PO Take by mouth daily    Yes Historical Provider, MD   VITAMIN E PO Take by mouth daily    Yes Historical Provider, MD   chlorthalidone 25 mg tablet TAKE 1/2 TABLET BY MOUTH DAILY OR AS DIRECTED  Patient not taking: No sig reported 4/5/22   Historical Provider, MD       Allergies:  No Known Allergies    Review of Systems:  Review of Systems - General ROS: positive for  - fatigue  Psychological ROS: negative  Ophthalmic ROS: negative  ENT ROS: negative  Allergy and Immunology ROS: negative  Hematological and Lymphatic ROS: negative  Endocrine ROS: negative  Breast ROS: negative  Respiratory ROS: positive for - shortness of breath  Cardiovascular ROS: positive for - dyspnea on exertion, chest tightness  Gastrointestinal ROS: no abdominal pain, change in bowel habits, or black or bloody stools  Genito-Urinary ROS: negative  Musculoskeletal ROS: negative  Neurological ROS: negative  Dermatological ROS: negative     Vital Signs:     Vitals:    06/02/22 0804 06/02/22 0806   BP: 141/67 137/64   BP Location: Left arm Right arm   Patient Position: Sitting Sitting   Cuff Size: Standard    Pulse: 78    Resp: 18    SpO2: 100%    Weight: 81 2 kg (179 lb)    Height: 5' 9" (1 753 m)        Physical Exam:    General: NAD, pleasant   HEENT/NECK:  PERRLA  No jugular venous distention  Cardiac:Regular rate and rhythm, Murmur noted    Carotid arteries: no bruits  Pulmonary:  Breath sounds clear bilaterally  Abdomen:  Non-tender, Non-distended  Positive bowel sounds  Upper extremities: palpable radial pulses; brisk capillary refill  Lower extremities: Extremities warm/dry  PT/DP pulses 2+ bilaterally  No edema B/L  Neuro: Alert and oriented X 3  Sensation is grossly intact  No focal deficits  Musculoskeletal: no edema noted, ROM intact  Skin: Warm/Dry, without rashes or lesions  Lab Results:               Invalid input(s): LABGLOM      No results found for: HGBA1C  No results found for: CKTOTAL, CKMB, CKMBINDEX, TROPONINI    Imaging Studies:     Echocardiogram:       Left Ventricle: Left ventricular cavity size is normal  Wall thickness is normal  The left ventricular ejection fraction is 60%  Systolic function is normal  Wall motion is normal  Diastolic function is normal     Left Atrium: The atrium is mildly dilated    Aortic Valve: The aortic valve is trileaflet  The leaflets are moderately calcified  There is severely reduced mobility  There is mild regurgitation   There is severe stenosis  The aortic valve peak velocity is 3 48 m/s  The aortic valve mean gradient is 28 mmHg  The DVI is 0 24  The aortic valve area is 0 8 cm2  The aortic valve velocity is increased due to stenosis but lower than expected due to the presence of decreased flow    Mitral Valve: There is mild annular calcification  There is mild regurgitation    Pulmonic Valve: There is mild regurgitation  I have personally reviewed pertinent reports  TAVR evaluation Assessment:     Mabel Ennis 122: III    5 Meter Walk: 5 sec, 4 sec, 5 sec    STS risk score (preliminary): 2 7    KCCQ-12 completed    Assessment:  Patient Active Problem List    Diagnosis Date Noted    Aortic stenosis, severe 06/02/2022    Severe aortic stenosis 06/02/2022    Encounter for screening for stenosis of carotid artery 06/02/2022    Encounter for special screening examination for cardiovascular disorder 06/02/2022    Scalp lesion 02/18/2022    Anemia 02/18/2022    Thrombocytopenia ([facility] [address] ) 02/18/2022    S/P forehead flap 05/14/2020    Basal cell carcinoma (BCC) of nasal sidewall 04/22/2020    Mohs defect of nose 04/22/2020    CLL (chronic lymphoid leukemia) in relapse ([facility] [address] ) 03/15/2018     Severe aortic stenosis; Ongoing TAVR workup    Plan:    Lesly Joel has symptomatic severe aortic stenosis  They will undergo the following testing for transcatheter aortic valve replacement: Gated CTA of the chest/abdomen/pelvis, 3D PARISA, cardiac catheterization, dental clearance, pulmonary function tests with ABG, and carotid artery ultrasound  Once these studies have been completed, Lesly Joel will follow up in our office to review the results and to be evaluated to confirm the suitability of proceeding with transcatheter aortic valve replacment  Lesly Joel was comfortable with our recommendations, and their questions were answered to their satisfaction  Thank you for allowing us to participate in the care of this patient  The patient was referred to gastroenterology for consideration of routine colonoscopy screening of all patients aged 54-65  Gastroenterology evaluation will not be necessary prior to any open heart procedures, and can be completed following surgical recovery      SIGNATURE: Sammie Gagnon PA-C  DATE: June 2, 2022  TIME: 9:25 AM

## 2022-06-03 ENCOUNTER — TELEPHONE (OUTPATIENT)
Dept: CARDIOLOGY CLINIC | Facility: CLINIC | Age: 72
End: 2022-06-03

## 2022-06-03 ENCOUNTER — PREP FOR PROCEDURE (OUTPATIENT)
Dept: CARDIOLOGY CLINIC | Facility: CLINIC | Age: 72
End: 2022-06-03

## 2022-06-03 DIAGNOSIS — I35.0 SEVERE AORTIC STENOSIS: Primary | ICD-10-CM

## 2022-06-03 NOTE — TELEPHONE ENCOUNTER
----- Message from Tonya Matthews sent at 6/2/2022 12:08 PM EDT -----  Regarding: Cath  Please schedule patient for:     Pre TAVR Cardiac Cath: to be done in the next few weeks  Patient has medicare as primary and had bloodwork done 5/10    To be done at: [facility]     To be done by:     Please address any questions regarding this request to Rupali Coates or Cynthia Menjivar,     Thank you

## 2022-06-09 ENCOUNTER — HOSPITAL ENCOUNTER (OUTPATIENT)
Dept: RADIOLOGY | Facility: HOSPITAL | Age: 72
Discharge: HOME | End: 2022-06-09
Payer: MEDICARE

## 2022-06-09 ENCOUNTER — HOSPITAL ENCOUNTER (OUTPATIENT)
Dept: NON INVASIVE DIAGNOSTICS | Facility: HOSPITAL | Age: 72
Discharge: HOME | End: 2022-06-09
Payer: MEDICARE

## 2022-06-09 DIAGNOSIS — I35.0 SEVERE AORTIC STENOSIS: ICD-10-CM

## 2022-06-09 DIAGNOSIS — Z13.6 ENCOUNTER FOR SPECIAL SCREENING EXAMINATION FOR CARDIOVASCULAR DISORDER: ICD-10-CM

## 2022-06-09 DIAGNOSIS — Z13.6 ENCOUNTER FOR SCREENING FOR STENOSIS OF CAROTID ARTERY: ICD-10-CM

## 2022-06-09 PROCEDURE — 74174 CTA ABD&PLVS W/CONTRAST: CPT

## 2022-06-09 PROCEDURE — 75572 CT HRT W/3D IMAGE: CPT

## 2022-06-09 PROCEDURE — 93880 EXTRACRANIAL BILAT STUDY: CPT

## 2022-06-09 PROCEDURE — G1004 CDSM NDSC: HCPCS

## 2022-06-09 PROCEDURE — 93880 EXTRACRANIAL BILAT STUDY: CPT | Performed by: SURGERY

## 2022-06-09 RX ADMIN — IODIXANOL 120 ML: 320 INJECTION, SOLUTION INTRAVASCULAR at 10:51

## 2022-06-09 NOTE — NURSING NOTE
Ultrasound dynamic guidance was used for peripheral line insertion due to difficult non-ultrasound guided peripheral intravenous line insertion, attempted once by RT Ike Pike  Per patient in the past was stuck 6 times for a large IV because he is a difficult access  Patient aware of the risks which included pain, bleeding, and infection  The patient was prepped using standard aseptic and ultrasound guided IV procedures  Using direct visualization of the intravenous needle, the vessel was successfully cannulated with return of blood and advancement of the catheter  The catheter was secured in the standard technique with Tegaderm  Bard Power midline 18 gauge placed in left basilic vein   No complication or complaints verbalized by patient  CTA of chest, abdomen and Pelvis (TAVR) was then started  Post Scan IV will be removed by RT

## 2022-06-11 ENCOUNTER — APPOINTMENT (OUTPATIENT)
Dept: LAB | Age: 72
End: 2022-06-11
Payer: MEDICARE

## 2022-06-11 ENCOUNTER — TELEPHONE (OUTPATIENT)
Dept: OTHER | Facility: OTHER | Age: 72
End: 2022-06-11

## 2022-06-11 LAB
ALBUMIN SERPL BCP-MCNC: 4.1 G/DL (ref 3.5–5)
ALP SERPL-CCNC: 137 U/L (ref 46–116)
ALT SERPL W P-5'-P-CCNC: 106 U/L (ref 12–78)
ANION GAP SERPL CALCULATED.3IONS-SCNC: 2 MMOL/L (ref 4–13)
ANISOCYTOSIS BLD QL SMEAR: PRESENT
AST SERPL W P-5'-P-CCNC: 84 U/L (ref 5–45)
BASOPHILS # BLD MANUAL: 0 THOUSAND/UL (ref 0–0.1)
BASOPHILS NFR MAR MANUAL: 0 % (ref 0–1)
BILIRUB SERPL-MCNC: 0.44 MG/DL (ref 0.2–1)
BUN SERPL-MCNC: 25 MG/DL (ref 5–25)
CALCIUM SERPL-MCNC: 10.7 MG/DL (ref 8.3–10.1)
CHLORIDE SERPL-SCNC: 108 MMOL/L (ref 100–108)
CO2 SERPL-SCNC: 28 MMOL/L (ref 21–32)
CREAT SERPL-MCNC: 1.36 MG/DL (ref 0.6–1.3)
EOSINOPHIL # BLD MANUAL: 1.58 THOUSAND/UL (ref 0–0.4)
EOSINOPHIL NFR BLD MANUAL: 1 % (ref 0–6)
ERYTHROCYTE [DISTWIDTH] IN BLOOD BY AUTOMATED COUNT: 14.3 % (ref 11.6–15.1)
GFR SERPL CREATININE-BSD FRML MDRD: 52 ML/MIN/1.73SQ M
GLUCOSE SERPL-MCNC: 109 MG/DL (ref 65–140)
HCT VFR BLD AUTO: 35.3 % (ref 36.5–49.3)
HGB BLD-MCNC: 10.3 G/DL (ref 12–17)
LYMPHOCYTES # BLD AUTO: 142.12 THOUSAND/UL (ref 0.6–4.47)
LYMPHOCYTES # BLD AUTO: 90 % (ref 14–44)
MACROCYTES BLD QL AUTO: PRESENT
MCH RBC QN AUTO: 29.9 PG (ref 26.8–34.3)
MCHC RBC AUTO-ENTMCNC: 29.2 G/DL (ref 31.4–37.4)
MCV RBC AUTO: 103 FL (ref 82–98)
MONOCYTES # BLD AUTO: 1.58 THOUSAND/UL (ref 0–1.22)
MONOCYTES NFR BLD: 1 % (ref 4–12)
NEUTROPHILS # BLD MANUAL: 4.74 THOUSAND/UL (ref 1.85–7.62)
NEUTS SEG NFR BLD AUTO: 3 % (ref 43–75)
PLATELET # BLD AUTO: 143 THOUSANDS/UL (ref 149–390)
PLATELET BLD QL SMEAR: ABNORMAL
PMV BLD AUTO: 8.8 FL (ref 8.9–12.7)
POTASSIUM SERPL-SCNC: 5.2 MMOL/L (ref 3.5–5.3)
PROT SERPL-MCNC: 7.3 G/DL (ref 6.4–8.2)
RBC # BLD AUTO: 3.44 MILLION/UL (ref 3.88–5.62)
RBC MORPH BLD: PRESENT
SMUDGE CELLS BLD QL SMEAR: PRESENT
SODIUM SERPL-SCNC: 138 MMOL/L (ref 136–145)
VARIANT LYMPHS # BLD AUTO: 5 %
WBC # BLD AUTO: 157.91 THOUSAND/UL (ref 4.31–10.16)

## 2022-06-11 PROCEDURE — 80053 COMPREHEN METABOLIC PANEL: CPT

## 2022-06-11 PROCEDURE — 85027 COMPLETE CBC AUTOMATED: CPT

## 2022-06-11 PROCEDURE — 85007 BL SMEAR W/DIFF WBC COUNT: CPT

## 2022-06-11 PROCEDURE — 36415 COLL VENOUS BLD VENIPUNCTURE: CPT

## 2022-06-11 NOTE — TELEPHONE ENCOUNTER
Lab Result: critical   Date/Time Drawn: 06/11/2022 @ 1106   Ordering Provider: Dr Arpita Salinas Name: Anthony @ [facility] [phone]       The following critical/stat result was read back to the lab as stated above and [employer] to the on-call provider

## 2022-06-15 ENCOUNTER — HOSPITAL ENCOUNTER (OUTPATIENT)
Facility: HOSPITAL | Age: 72
Setting detail: OUTPATIENT SURGERY
Discharge: HOME | End: 2022-06-15
Attending: INTERNAL MEDICINE | Admitting: INTERNAL MEDICINE
Payer: MEDICARE

## 2022-06-15 VITALS
BODY MASS INDEX: 25.92 KG/M2 | WEIGHT: 175 LBS | HEART RATE: 69 BPM | DIASTOLIC BLOOD PRESSURE: 57 MMHG | HEIGHT: 69 IN | TEMPERATURE: 98.1 F | SYSTOLIC BLOOD PRESSURE: 110 MMHG | RESPIRATION RATE: 17 BRPM | OXYGEN SATURATION: 95 %

## 2022-06-15 DIAGNOSIS — I35.0 SEVERE AORTIC STENOSIS: ICD-10-CM

## 2022-06-15 LAB
ANION GAP SERPL CALCULATED.3IONS-SCNC: 1 MMOL/L (ref 4–13)
ANION GAP SERPL CALCULATED.3IONS-SCNC: 2 MMOL/L (ref 4–13)
ATRIAL RATE: 71 BPM
BUN SERPL-MCNC: 22 MG/DL (ref 5–25)
BUN SERPL-MCNC: 23 MG/DL (ref 5–25)
CALCIUM SERPL-MCNC: 9.3 MG/DL (ref 8.3–10.1)
CALCIUM SERPL-MCNC: 9.4 MG/DL (ref 8.3–10.1)
CHLORIDE SERPL-SCNC: 108 MMOL/L (ref 100–108)
CHLORIDE SERPL-SCNC: 109 MMOL/L (ref 100–108)
CO2 SERPL-SCNC: 27 MMOL/L (ref 21–32)
CO2 SERPL-SCNC: 28 MMOL/L (ref 21–32)
CREAT SERPL-MCNC: 1.29 MG/DL (ref 0.6–1.3)
CREAT SERPL-MCNC: 1.4 MG/DL (ref 0.6–1.3)
GFR SERPL CREATININE-BSD FRML MDRD: 50 ML/MIN/1.73SQ M
GFR SERPL CREATININE-BSD FRML MDRD: 55 ML/MIN/1.73SQ M
GLUCOSE P FAST SERPL-MCNC: 102 MG/DL (ref 65–99)
GLUCOSE P FAST SERPL-MCNC: 103 MG/DL (ref 65–99)
GLUCOSE SERPL-MCNC: 102 MG/DL (ref 65–140)
GLUCOSE SERPL-MCNC: 103 MG/DL (ref 65–140)
P AXIS: 49 DEGREES
POTASSIUM SERPL-SCNC: 5.8 MMOL/L (ref 3.5–5.3)
POTASSIUM SERPL-SCNC: 6.3 MMOL/L (ref 3.5–5.3)
PR INTERVAL: 198 MS
QRS AXIS: 67 DEGREES
QRSD INTERVAL: 80 MS
QT INTERVAL: 382 MS
QTC INTERVAL: 415 MS
SODIUM SERPL-SCNC: 137 MMOL/L (ref 136–145)
SODIUM SERPL-SCNC: 138 MMOL/L (ref 136–145)
T WAVE AXIS: 50 DEGREES
VENTRICULAR RATE: 71 BPM

## 2022-06-15 PROCEDURE — 99153 MOD SED SAME PHYS/QHP EA: CPT | Performed by: INTERNAL MEDICINE

## 2022-06-15 PROCEDURE — 93005 ELECTROCARDIOGRAM TRACING: CPT

## 2022-06-15 PROCEDURE — NC001 PR NO CHARGE: Performed by: INTERNAL MEDICINE

## 2022-06-15 PROCEDURE — 99152 MOD SED SAME PHYS/QHP 5/>YRS: CPT | Performed by: INTERNAL MEDICINE

## 2022-06-15 PROCEDURE — 80048 BASIC METABOLIC PNL TOTAL CA: CPT | Performed by: INTERNAL MEDICINE

## 2022-06-15 PROCEDURE — C1894 INTRO/SHEATH, NON-LASER: HCPCS | Performed by: INTERNAL MEDICINE

## 2022-06-15 PROCEDURE — C1769 GUIDE WIRE: HCPCS | Performed by: INTERNAL MEDICINE

## 2022-06-15 PROCEDURE — 93454 CORONARY ARTERY ANGIO S&I: CPT | Performed by: INTERNAL MEDICINE

## 2022-06-15 PROCEDURE — 93010 ELECTROCARDIOGRAM REPORT: CPT | Performed by: INTERNAL MEDICINE

## 2022-06-15 RX ORDER — MIDAZOLAM HYDROCHLORIDE 2 MG/2ML
INJECTION, SOLUTION INTRAMUSCULAR; INTRAVENOUS AS NEEDED
Status: DISCONTINUED | OUTPATIENT
Start: 2022-06-15 | End: 2022-06-15 | Stop reason: HOSPADM

## 2022-06-15 RX ORDER — FENTANYL CITRATE 50 UG/ML
INJECTION, SOLUTION INTRAMUSCULAR; INTRAVENOUS AS NEEDED
Status: DISCONTINUED | OUTPATIENT
Start: 2022-06-15 | End: 2022-06-15 | Stop reason: HOSPADM

## 2022-06-15 RX ORDER — HEPARIN SODIUM 1000 [USP'U]/ML
INJECTION, SOLUTION INTRAVENOUS; SUBCUTANEOUS AS NEEDED
Status: DISCONTINUED | OUTPATIENT
Start: 2022-06-15 | End: 2022-06-15 | Stop reason: HOSPADM

## 2022-06-15 RX ORDER — ONDANSETRON 2 MG/ML
4 INJECTION INTRAMUSCULAR; INTRAVENOUS EVERY 6 HOURS PRN
Status: DISCONTINUED | OUTPATIENT
Start: 2022-06-15 | End: 2022-06-15 | Stop reason: HOSPADM

## 2022-06-15 RX ORDER — SODIUM CHLORIDE 9 MG/ML
125 INJECTION, SOLUTION INTRAVENOUS CONTINUOUS
Status: DISCONTINUED | OUTPATIENT
Start: 2022-06-15 | End: 2022-06-15 | Stop reason: HOSPADM

## 2022-06-15 RX ORDER — SODIUM CHLORIDE 9 MG/ML
50 INJECTION, SOLUTION INTRAVENOUS CONTINUOUS
Status: DISCONTINUED | OUTPATIENT
Start: 2022-06-15 | End: 2022-06-15

## 2022-06-15 RX ORDER — NITROGLYCERIN 20 MG/100ML
INJECTION INTRAVENOUS AS NEEDED
Status: DISCONTINUED | OUTPATIENT
Start: 2022-06-15 | End: 2022-06-15 | Stop reason: HOSPADM

## 2022-06-15 RX ORDER — LIDOCAINE HYDROCHLORIDE 10 MG/ML
INJECTION, SOLUTION EPIDURAL; INFILTRATION; INTRACAUDAL; PERINEURAL AS NEEDED
Status: DISCONTINUED | OUTPATIENT
Start: 2022-06-15 | End: 2022-06-15 | Stop reason: HOSPADM

## 2022-06-15 RX ORDER — ASPIRIN 81 MG/1
324 TABLET, CHEWABLE ORAL ONCE
Status: COMPLETED | OUTPATIENT
Start: 2022-06-15 | End: 2022-06-15

## 2022-06-15 RX ORDER — VERAPAMIL HCL 2.5 MG/ML
AMPUL (ML) INTRAVENOUS AS NEEDED
Status: DISCONTINUED | OUTPATIENT
Start: 2022-06-15 | End: 2022-06-15 | Stop reason: HOSPADM

## 2022-06-15 RX ORDER — ACETAMINOPHEN 325 MG/1
650 TABLET ORAL EVERY 4 HOURS PRN
Status: DISCONTINUED | OUTPATIENT
Start: 2022-06-15 | End: 2022-06-15 | Stop reason: HOSPADM

## 2022-06-15 RX ADMIN — ACETAMINOPHEN 650 MG: 325 TABLET, FILM COATED ORAL at 12:19

## 2022-06-15 RX ADMIN — ASPIRIN 81 MG CHEWABLE TABLET 324 MG: 81 TABLET CHEWABLE at 08:24

## 2022-06-15 RX ADMIN — SODIUM CHLORIDE 243.6 ML: 0.9 INJECTION, SOLUTION INTRAVENOUS at 08:18

## 2022-06-15 NOTE — H&P
Consultation - Interventional Cardiology Team  Sussy Veal 80 y o  male MRN: [id number]  Unit/Bed#: [facility] CATH LAB ROOM Encounter: [id number]      Consults  PCP: Leatha Camacho MD     History of Present Illness   Physician Requesting Consult: Blanca Ewing MD  Reason for Consult / Principal Problem: Aortic Stenosis    HPI: Sussy Vela is a 80y o  year old male with a history of CLL s/p Chemo (1996 treated with intermittent therapy with chlorambucil  In 2014 he had 6 cycles of Rituxan and Bendamustine), BCC of the nose who presents for preoperative evaluation with [facility] for symptomatic severe aortic stenosis with NAMITA of 0 8, DVI 0 24, MG 28 and PV 3 5m/s  Review of Systems  Review of system was conducted and was negative except for as stated in the HPI        Historical Information   Past Medical History:   Diagnosis Date    Basal cell carcinoma (BCC) of skin of nose     History of chemotherapy     Lymphocytic leukemia ([facility] [address] )      Past Surgical History:   Procedure Laterality Date    CATARACT EXTRACTION Bilateral     FLAP LOCAL HEAD / NECK N/A 4/14/2020    Procedure: ADJACENT TISSUE TRANSFER  FOREHEAD FLAP,  CARTILAGE GRAFT NOSE;  Surgeon: Venice Wise MD;  Location: [facility] MAIN OR;  Service: Plastics    FLAP LOCAL HEAD / NECK N/A 6/17/2020    Procedure: DIVISION INSET FOREHEAD FLAP; FULL THICKNESS SKIN GRAFT TO FOREHEAD;  Surgeon: Venice Wise MD;  Location: [facility] MAIN OR;  Service: Plastics    FULL THICKNESS SKIN GRAFT N/A 4/14/2020    Procedure: SKIN GRAFT FULL THICKNESS  (FTSG) NOSE;  Surgeon: Venice Wise MD;  Location: [facility] MAIN OR;  Service: Plastics    MOHS RECONSTRUCTION N/A 4/14/2020    Procedure: MOHS RECONSTRUCTION NOSE DEFECT;  Surgeon: Venice Wise MD;  Location: [facility] MAIN OR;  Service: Plastics    MOHS RECONSTRUCTION N/A 5/4/2020    Procedure: RECONSTRUCTION MOHS NASAL DEFECT WITH EAR CARTILAGE GRAFT;  Surgeon: Venice Wise MD; Location: [facility] MAIN OR;  Service: Plastics    SHOULDER SURGERY      TIBIA FRACTURE SURGERY      TONSILLECTOMY       Social History     Substance and Sexual Activity   Alcohol Use Yes    Comment: rare     Social History     Substance and Sexual Activity   Drug Use No     Social History     Tobacco Use   Smoking Status Never Smoker   Smokeless Tobacco Never Used     Family History:   Family History   Problem Relation Age of Onset    No Known Problems Mother     No Known Problems Father        Meds/Allergies   Hospital Medications:   Current Facility-Administered Medications   Medication Dose Route Frequency    aspirin chewable tablet 324 mg  324 mg Oral Once    sodium chloride 0 9 % bolus 243 6 mL  3 mL/kg Intravenous Once    Followed by    sodium chloride 0 9 % infusion  50 mL/hr Intravenous Continuous     Home Medications:   Medications Prior to Admission   Medication    Ascorbic Acid (VITAMIN C PO)    fexofenadine-pseudoephedrine (ALLEGRA-D 24) 180-240 MG per 24 hr tablet    multivitamin (THERAGRAN) TABS    VITAMIN D PO    VITAMIN E PO       No Known Allergies    Objective   Vitals: There were no vitals taken for this visit          Invasive Devices  Report    None                 Physical Exam  GEN: Rachel Gutierrez appears well, alert and oriented x 3, pleasant and cooperative   HEENT:  Normocephalic, atraumatic, anicteric, moist mucous membranes  NECK: No JVD or carotid bruits   HEART: reg rhythm, reg rate, normal S1 with faint S2 (almost inaudible), systolic ejection murmur at RUSB  LUNGS: Clear to auscultation bilaterally; no wheezes, rales, or rhonchi; respiration nonlabored   ABDOMEN:  Normoactive bowel sounds, soft, no tenderness, no distention  EXTREMITIES: peripheral pulses palpable; no edema  NEURO: no gross focal findings; cranial nerves grossly intact   SKIN:  Dry, intact, warm to touch    Lab Results:   CBC with diff:   Results from last 7 days   Lab Units 06/11/22  0833   WBC Thousand/uL 157 91*   RBC Million/uL 3 44*   HEMOGLOBIN g/dL 10 3*   HEMATOCRIT % 35 3*   MCV fL 103*   MCH pg 29 9   MCHC g/dL 29 2*   RDW % 14 3   MPV fL 8 8*   PLATELETS Thousands/uL 143*     CMP:   Results from last 7 days   Lab Units 06/11/22  0833   SODIUM mmol/L 138   CHLORIDE mmol/L 108   CO2 mmol/L 28   BUN mg/dL 25   CREATININE mg/dL 1 36*   CALCIUM mg/dL 10 7*   AST U/L 84*   ALT U/L 106*   ALK PHOS U/L 137*   EGFR ml/min/1 73sq m 52     HS Troponin: No results found for: HSTNI, HSTNI0, HSTNI2, HSTNI4  BNP:   Results from last 7 days   Lab Units 06/11/22  0833   POTASSIUM mmol/L 5 2   CHLORIDE mmol/L 108   CO2 mmol/L 28   BUN mg/dL 25   CREATININE mg/dL 1 36*   CALCIUM mg/dL 10 7*   EGFR ml/min/1 73sq m 52     Coags:     TSH:     Magnesium:     Lipid Profile:             Results from last 7 days   Lab Units 06/11/22  0833   POTASSIUM mmol/L 5 2   CO2 mmol/L 28   CHLORIDE mmol/L 108   BUN mg/dL 25   CREATININE mg/dL 1 36*     Results from last 7 days   Lab Units 06/11/22  0833   HEMOGLOBIN g/dL 10 3*   HEMATOCRIT % 35 3*   PLATELETS Thousands/uL 143*             Imaging: I have personally reviewed pertinent reports  Results for orders placed during the hospital encounter of 05/18/22    NM myocardial perfusion spect (rx stress and/or rest)    Interpretation Summary    Stress ECG: No ST deviation is noted  The stress ECG is negative for ischemia after pharmacologic vasodilation, without reproduction of symptoms    Stress Function: Left ventricular function post-stress is normal  Post-stress ejection fraction is 80 %    Perfusion: There are no perfusion defects    Stress Combined Conclusion: The ECG and SPECT imaging portions of the stress study are concordant with no evidence of stress induced myocardial ischemia   Left ventricular perfusion is normal     Results for orders placed during the hospital encounter of 04/01/22    Echo complete w/ contrast if indicated    Interpretation Summary    Left Ventricle: Left ventricular cavity size is normal  Wall thickness is mildly increased  The left ventricular ejection fraction is 65%  Systolic function is normal  Wall motion is normal  Diastolic function is normal  There is mild to moderate concentric hypertrophy    Left Atrium: The atrium is mildly dilated    Aortic Valve: The aortic valve is trileaflet  The leaflets are moderately thickened  The leaflets are moderately calcified  There is severely reduced mobility  There is mild regurgitation  There is moderate stenosis  The aortic valve mean gradient is 21 0 mmHg  The aortic valve area is 1 32 cm2  The aortic valve velocity is increased due to stenosis    Mitral Valve: There is mild annular calcification    Tricuspid Valve: There is mild regurgitation  VTE Prophylaxis: Heparin       Assessment/Plan     Assessment:    1  Symptomatic Severe Aortic Stenosis  2  CLL s/p Chemotherapy (1996 treated with intermittent therapy with chlorambucil  In 2014 he had 6 cycles of Rituxan and Bendamustine)  3  BCC of Nose    Plan:  - Will proceed with LHC  - GDMT based on cath findings  - IVF Hydration based on contrast dye use      Case discussed and reviewed with Dr Dominik Butler who agrees with my assessment and plan  Thank you for involving us in the care of your patient  Cristian Ly MD  Cardiology Fellow PGY-5    ==========================================================================================    Counseling / Coordination of Care  Total floor / unit time spent today 1 hour minutes  Greater than 50% of total time was spent with the patient and / or family counseling and / or coordination of care  A description of the counseling / coordination of care:         Epic/ Allscripts/Care Everywhere records reviewed:     ** Please Note: Fluency DirectDictation voice to text software may have been used in the creation of this document   **

## 2022-06-15 NOTE — DISCHARGE INSTRUCTIONS
1  Please see the post cardiac catheterization dishcarge instructions  No heavy lifting, greater than 10 lbs  or strenuous  activity for 48 hrs  2 Remove band aid tomorrow  Shower and wash area- wrist gently with soap and water- beginning tomorrow  Rinse and pat dry  Apply new water seal band aid  Repeat this process for 5 days  No powders, creams lotions or antibiotic ointments  for 5 days  No tub baths, hot tubs or swimming for 5 days  3  Please call our office ([phone]) if you have any fever, redness, swelling, discharge from your wrist access site      4 No driving for 1 day clear

## 2022-06-23 DIAGNOSIS — D69.6 THROMBOCYTOPENIA (HCC): ICD-10-CM

## 2022-06-23 DIAGNOSIS — C91.12 CLL (CHRONIC LYMPHOID LEUKEMIA) IN RELAPSE (HCC): Primary | ICD-10-CM

## 2022-06-27 ENCOUNTER — TELEPHONE (OUTPATIENT)
Dept: HEMATOLOGY ONCOLOGY | Facility: CLINIC | Age: 72
End: 2022-06-27

## 2022-06-27 NOTE — TELEPHONE ENCOUNTER
Reschedule Appointment     Who is calling in Patient    Doctor Appointment Scheduled with Tadeo Martinez date and time 08/08 at [address] date and time 09/08 at 10:00am   Location [facility]   Patient verbalized understanding    yes

## 2022-07-05 ENCOUNTER — APPOINTMENT (OUTPATIENT)
Dept: LAB | Age: 72
End: 2022-07-05
Payer: MEDICARE

## 2022-07-05 DIAGNOSIS — C91.12 CLL (CHRONIC LYMPHOID LEUKEMIA) IN RELAPSE (HCC): ICD-10-CM

## 2022-07-05 DIAGNOSIS — D69.6 THROMBOCYTOPENIA (HCC): ICD-10-CM

## 2022-07-05 LAB
APTT PPP: 25 SECONDS (ref 23–37)
INR PPP: 1.09 (ref 0.84–1.19)
PROTHROMBIN TIME: 13.7 SECONDS (ref 11.6–14.5)

## 2022-07-05 PROCEDURE — 85027 COMPLETE CBC AUTOMATED: CPT

## 2022-07-05 PROCEDURE — 85730 THROMBOPLASTIN TIME PARTIAL: CPT

## 2022-07-05 PROCEDURE — 85610 PROTHROMBIN TIME: CPT

## 2022-07-05 PROCEDURE — 36415 COLL VENOUS BLD VENIPUNCTURE: CPT

## 2022-07-05 PROCEDURE — 82784 ASSAY IGA/IGD/IGG/IGM EACH: CPT

## 2022-07-05 PROCEDURE — 84550 ASSAY OF BLOOD/URIC ACID: CPT

## 2022-07-05 PROCEDURE — 80053 COMPREHEN METABOLIC PANEL: CPT

## 2022-07-05 PROCEDURE — 85007 BL SMEAR W/DIFF WBC COUNT: CPT

## 2022-07-05 PROCEDURE — 83615 LACTATE (LD) (LDH) ENZYME: CPT

## 2022-07-06 ENCOUNTER — TELEPHONE (OUTPATIENT)
Dept: HEMATOLOGY ONCOLOGY | Facility: CLINIC | Age: 72
End: 2022-07-06

## 2022-07-06 LAB
ALBUMIN SERPL BCP-MCNC: 4.1 G/DL (ref 3.5–5)
ALP SERPL-CCNC: 108 U/L (ref 46–116)
ALT SERPL W P-5'-P-CCNC: 24 U/L (ref 12–78)
ANION GAP SERPL CALCULATED.3IONS-SCNC: 10 MMOL/L (ref 4–13)
AST SERPL W P-5'-P-CCNC: 20 U/L (ref 5–45)
BASOPHILS # BLD MANUAL: 0 THOUSAND/UL (ref 0–0.1)
BASOPHILS NFR MAR MANUAL: 0 % (ref 0–1)
BILIRUB SERPL-MCNC: 0.42 MG/DL (ref 0.2–1)
BUN SERPL-MCNC: 27 MG/DL (ref 5–25)
CALCIUM SERPL-MCNC: 10.2 MG/DL (ref 8.3–10.1)
CHLORIDE SERPL-SCNC: 106 MMOL/L (ref 100–108)
CO2 SERPL-SCNC: 25 MMOL/L (ref 21–32)
CREAT SERPL-MCNC: 1.41 MG/DL (ref 0.6–1.3)
EOSINOPHIL # BLD MANUAL: 0 THOUSAND/UL (ref 0–0.4)
EOSINOPHIL NFR BLD MANUAL: 0 % (ref 0–6)
ERYTHROCYTE [DISTWIDTH] IN BLOOD BY AUTOMATED COUNT: 14.1 % (ref 11.6–15.1)
GFR SERPL CREATININE-BSD FRML MDRD: 50 ML/MIN/1.73SQ M
GLUCOSE SERPL-MCNC: 85 MG/DL (ref 65–140)
HCT VFR BLD AUTO: 35.5 % (ref 36.5–49.3)
HGB BLD-MCNC: 10.7 G/DL (ref 12–17)
IGG SERPL-MCNC: 746 MG/DL (ref 700–1600)
LDH SERPL-CCNC: 270 U/L (ref 81–234)
LYMPHOCYTES # BLD AUTO: 150.33 THOUSAND/UL (ref 0.6–4.47)
LYMPHOCYTES # BLD AUTO: 94 % (ref 14–44)
MACROCYTES BLD QL AUTO: PRESENT
MCH RBC QN AUTO: 31.2 PG (ref 26.8–34.3)
MCHC RBC AUTO-ENTMCNC: 30.1 G/DL (ref 31.4–37.4)
MCV RBC AUTO: 104 FL (ref 82–98)
MONOCYTES # BLD AUTO: 0 THOUSAND/UL (ref 0–1.22)
MONOCYTES NFR BLD: 0 % (ref 4–12)
NEUTROPHILS # BLD MANUAL: 6.4 THOUSAND/UL (ref 1.85–7.62)
NEUTS SEG NFR BLD AUTO: 4 % (ref 43–75)
PLATELET # BLD AUTO: 132 THOUSANDS/UL (ref 149–390)
PLATELET BLD QL SMEAR: ABNORMAL
PMV BLD AUTO: 8.8 FL (ref 8.9–12.7)
POIKILOCYTOSIS BLD QL SMEAR: PRESENT
POTASSIUM SERPL-SCNC: 4.7 MMOL/L (ref 3.5–5.3)
PROT SERPL-MCNC: 7.3 G/DL (ref 6.4–8.2)
RBC # BLD AUTO: 3.43 MILLION/UL (ref 3.88–5.62)
RBC MORPH BLD: PRESENT
SMUDGE CELLS BLD QL SMEAR: PRESENT
SODIUM SERPL-SCNC: 141 MMOL/L (ref 136–145)
URATE SERPL-MCNC: 5.9 MG/DL (ref 4.2–8)
VARIANT LYMPHS # BLD AUTO: 2 %
WBC # BLD AUTO: 159.93 THOUSAND/UL (ref 4.31–10.16)

## 2022-07-07 ENCOUNTER — APPOINTMENT (OUTPATIENT)
Dept: URGENT CARE | Age: 72
End: 2022-07-07
Payer: MEDICARE

## 2022-07-07 ENCOUNTER — OFFICE VISIT (OUTPATIENT)
Dept: CARDIAC SURGERY | Facility: CLINIC | Age: 72
End: 2022-07-07
Payer: MEDICARE

## 2022-07-07 ENCOUNTER — APPOINTMENT (OUTPATIENT)
Dept: LAB | Age: 72
DRG: 267 | End: 2022-07-07
Payer: MEDICARE

## 2022-07-07 VITALS
HEIGHT: 69 IN | DIASTOLIC BLOOD PRESSURE: 67 MMHG | TEMPERATURE: 97.1 F | SYSTOLIC BLOOD PRESSURE: 143 MMHG | RESPIRATION RATE: 20 BRPM | HEART RATE: 72 BPM | OXYGEN SATURATION: 100 % | WEIGHT: 178 LBS | BODY MASS INDEX: 26.36 KG/M2

## 2022-07-07 DIAGNOSIS — I35.0 SEVERE AORTIC STENOSIS: ICD-10-CM

## 2022-07-07 DIAGNOSIS — Z01.812 ENCOUNTER FOR PRE-OPERATIVE LABORATORY TESTING: ICD-10-CM

## 2022-07-07 DIAGNOSIS — Z11.59 SCREENING FOR VIRAL DISEASE: ICD-10-CM

## 2022-07-07 DIAGNOSIS — I35.0 SEVERE AORTIC STENOSIS: Primary | ICD-10-CM

## 2022-07-07 LAB
ABO GROUP BLD: NORMAL
BLD GP AB SCN SERPL QL: NEGATIVE
RH BLD: POSITIVE
SPECIMEN EXPIRATION DATE: NORMAL

## 2022-07-07 PROCEDURE — U0003 INFECTIOUS AGENT DETECTION BY NUCLEIC ACID (DNA OR RNA); SEVERE ACUTE RESPIRATORY SYNDROME CORONAVIRUS 2 (SARS-COV-2) (CORONAVIRUS DISEASE [COVID-19]), AMPLIFIED PROBE TECHNIQUE, MAKING USE OF HIGH THROUGHPUT TECHNOLOGIES AS DESCRIBED BY CMS-2020-01-R: HCPCS

## 2022-07-07 PROCEDURE — 36415 COLL VENOUS BLD VENIPUNCTURE: CPT

## 2022-07-07 PROCEDURE — 99214 OFFICE O/P EST MOD 30 MIN: CPT | Performed by: THORACIC SURGERY (CARDIOTHORACIC VASCULAR SURGERY)

## 2022-07-07 PROCEDURE — 86900 BLOOD TYPING SEROLOGIC ABO: CPT

## 2022-07-07 PROCEDURE — 86901 BLOOD TYPING SEROLOGIC RH(D): CPT

## 2022-07-07 PROCEDURE — 86850 RBC ANTIBODY SCREEN: CPT

## 2022-07-07 PROCEDURE — 87081 CULTURE SCREEN ONLY: CPT

## 2022-07-07 PROCEDURE — 86920 COMPATIBILITY TEST SPIN: CPT

## 2022-07-07 PROCEDURE — U0005 INFEC AGEN DETEC AMPLI PROBE: HCPCS

## 2022-07-07 RX ORDER — CEFAZOLIN SODIUM 2 G/50ML
2000 SOLUTION INTRAVENOUS ONCE
Status: CANCELLED | OUTPATIENT
Start: 2022-07-07 | End: 2022-07-07

## 2022-07-07 RX ORDER — CHLORHEXIDINE GLUCONATE 0.12 MG/ML
15 RINSE ORAL ONCE
Status: CANCELLED | OUTPATIENT
Start: 2022-07-07 | End: 2022-07-07

## 2022-07-07 NOTE — H&P
Pre-Op History & Physical - Cardiothoracic Surgery   Florian Rodriguez 80 y o  male MRN: [id number]    Physician Requesting Consult: Dr Gabino Beard    Reason for Consult / Principal Problem: Aortic stenosis, Non-Rheumatic    History of Present Illness: Florian Rodriguez is a 80y o  year old male who was previously evaluated in our office by GENA Monge  for transcatheter aortic valve replacement  During this initial consultation, arrangements were made for the following studies to be completed: Gated CTA of the chest/abdomen/pelvis, cardiac catheterization, dental clearance and carotid artery ultrasound  Florian Rodriguez now presents to review the results of these tests and obtain a second surgeon to confirm the suitability of proceeding with transcatheter aortic valve replacment  In review Pavel Goldsmith is a 80 yr old male with PMHx notable for AS, CLL s/p chemo (2014) now in relapse, thrombocytopenia, anemia and BCC (nose) s/p Moh's  He has been symptomatic for several months with progressive fatigue and exertional dyspnea  He denies chest pain, lightheadedness, syncope, palpitation or fluid retention  Echo demonstrates severe AS  Cardiac cath with 50% m LAD stenosis; carotid duplex WNL, CTA with cholelithiasis, nephrolithiasis and splenomegaly  Patient is non-smoker  Up to date with dental care    No covid vaccinations  Past Medical History:  Past Medical History:   Diagnosis Date    Basal cell carcinoma (BCC) of skin of nose     History of chemotherapy     Lymphocytic leukemia ([facility] [address] )          Past Surgical History:   Past Surgical History:   Procedure Laterality Date    CARDIAC CATHETERIZATION N/A 6/15/2022    Procedure: Cardiac Coronary Angiogram;  Surgeon: Bria Baig MD;  Location: [facility] CARDIAC CATH LAB;   Service: Cardiology    CATARACT EXTRACTION Bilateral     FLAP LOCAL HEAD / NECK N/A 4/14/2020    Procedure: ADJACENT TISSUE TRANSFER  FOREHEAD FLAP,  CARTILAGE GRAFT NOSE; Surgeon: Winifred Pimentel MD;  Location: [facility] MAIN OR;  Service: Plastics    FLAP LOCAL HEAD / NECK N/A 6/17/2020    Procedure: DIVISION INSET FOREHEAD FLAP; FULL THICKNESS SKIN GRAFT TO FOREHEAD;  Surgeon: Winifred Pimentel MD;  Location: [facility] MAIN OR;  Service: Plastics    FULL THICKNESS SKIN GRAFT N/A 4/14/2020    Procedure: SKIN GRAFT FULL THICKNESS  (FTSG) NOSE;  Surgeon: Winifred Pimentel MD;  Location: [facility] MAIN OR;  Service: Plastics    MOHS RECONSTRUCTION N/A 4/14/2020    Procedure: MOHS RECONSTRUCTION NOSE DEFECT;  Surgeon: Winifred Pimentel MD;  Location: [facility] MAIN OR;  Service: Plastics    MOHS RECONSTRUCTION N/A 5/4/2020    Procedure: RECONSTRUCTION MOHS NASAL DEFECT WITH EAR CARTILAGE GRAFT;  Surgeon: Winifred Pimentel MD;  Location: [facility] MAIN OR;  Service: Plastics    SHOULDER SURGERY      TIBIA FRACTURE SURGERY      TONSILLECTOMY           Family History:  Family History   Problem Relation Age of Onset    No Known Problems Mother     No Known Problems Father          Social History:    Social History     Substance and Sexual Activity   Alcohol Use Yes    Comment: rare     Social History     Substance and Sexual Activity   Drug Use No     Social History     Tobacco Use   Smoking Status Never Smoker   Smokeless Tobacco Never Used       Home Medications:   Prior to Admission medications    Medication Sig Start Date End Date Taking? Authorizing Provider   Ascorbic Acid (VITAMIN C PO) Take by mouth daily    Yes Historical Provider, MD   fexofenadine-pseudoephedrine (ALLEGRA-D 24) 180-240 MG per 24 hr tablet Take 1 tablet by mouth as needed    Yes Historical Provider, MD   multivitamin (THERAGRAN) TABS Take 1 tablet by mouth daily   Yes Historical Provider, MD   VITAMIN D PO Take by mouth daily    Yes Historical Provider, MD   VITAMIN E PO Take by mouth daily    Yes Historical Provider, MD   chlorhexidine (PERIDEX) 0 12 % solution 1/2 OZ   TWICE DAILY 6/21/22   Historical Provider, MD       Allergies:  No Known Allergies    Review of Systems:  Review of Systems - History obtained from chart review and the patient  General ROS: positive for  - fatigue and change in activity tolerance  negative for - chills or fever  Psychological ROS: negative  Ophthalmic ROS: negative  ENT ROS: negative  Allergy and Immunology ROS: negative  Hematological and Lymphatic ROS: negative  Endocrine ROS: negative  Breast ROS: negative  Respiratory ROS: positive for - shortness of breath  negative for - cough, hemoptysis, orthopnea or pleuritic pain  Cardiovascular ROS: positive for - dyspnea on exertion and murmur  negative for - chest pain, edema, irregular heartbeat, loss of consciousness, orthopnea, palpitations or paroxysmal nocturnal dyspnea  Gastrointestinal ROS: no abdominal pain, change in bowel habits, or black or bloody stools  Genito-Urinary ROS: no dysuria, trouble voiding, or hematuria  Musculoskeletal ROS: negative  Neurological ROS: no TIA or stroke symptoms  Dermatological ROS: negative    Vital Signs:     Vitals:    07/07/22 1129 07/07/22 1132   BP: 136/62 143/67   BP Location: Left arm Right arm   Patient Position: Sitting Sitting   Cuff Size: Standard    Pulse: 72    Resp: 20    Temp: (!) 97 1 °F (36 2 °C)    SpO2: 100%    Weight: 80 7 kg (178 lb)    Height: 5' 9" (1 753 m)        Physical Exam:    General: Alert, oriented, well developed, no acute distress  HEENT/NECK:  PERRLA  No jugular venous distention  Cardiac:Regular rate and rhythm, III/VI harsh systolic murmur RUSB  Carotids: 1+ pulses, no bruits   Pulmonary:  Breath sounds clear bilaterally  Abdomen:  Non-tender, Non-distended  Positive bowel sounds  Upper extremities: 2+ radial pulses; brisk capillary refill  Lower extremities: Extremities warm/dry  PT/DP pulses 2+ bilaterally  No edema B/L  Neuro: Alert and oriented X 3  Sensation is grossly intact  No focal deficits    Musculoskeletal: MAEE, stable gait  Skin: Warm/Dry, without rashes or lesions  Lab Results:     Results from last 7 days   Lab Units 07/05/22  1501   WBC Thousand/uL 159 93*   HEMOGLOBIN g/dL 10 7*   HEMATOCRIT % 35 5*   PLATELETS Thousands/uL 132*     Results from last 7 days   Lab Units 07/05/22  1501   POTASSIUM mmol/L 4 7   CHLORIDE mmol/L 106   CO2 mmol/L 25   BUN mg/dL 27*   CREATININE mg/dL 1 41*   CALCIUM mg/dL 10 2*     Results from last 7 days   Lab Units 07/05/22  1501   INR  1 09   PTT seconds 25       Imaging Studies:     Echocardiogram: 5/18/22      Left Ventricle: Left ventricular cavity size is normal  Wall thickness is normal  The left ventricular ejection fraction is 60%  Systolic function is normal  Wall motion is normal  Diastolic function is normal     Left Atrium: The atrium is mildly dilated    Aortic Valve: The aortic valve is trileaflet  The leaflets are moderately calcified  There is severely reduced mobility  There is mild regurgitation  There is severe stenosis  The aortic valve peak velocity is 3 48 m/s  The aortic valve mean gradient is 28 mmHg  The DVI is 0 24  The aortic valve area is 0 8 cm2  The aortic valve velocity is increased due to stenosis but lower than expected due to the presence of decreased flow    Mitral Valve: There is mild annular calcification  There is mild regurgitation    Pulmonic Valve: There is mild regurgitation      Gated CTA of the chest/abdomen/pelvis: 6/9/22    VASCULAR STRUCTURES:       Annulus: diameter 29 3 x 23 6 mm      area: 531 3 sq mm    Annulus to LCA: 10 9 mm    Annulus to RCA: 11 2 mm    Minimal diameter right iliofemoral segment: 6 7 mm    Minimal diameter left iliofemoral segment: 6 4 mm     The ascending aorta is nonaneurysmal measuring 36 mm with minimal atherosclerosis  There is a type I aortic arch with bovine branching anatomy and no stenosis in the visualized great vessels  The aortic arch is nonaneurysmal with mild atherosclerosis     The descending thoracic aorta is nonaneurysmal with mild atherosclerosis      The abdominal aorta is nonaneurysmal with mild atherosclerosis  Celiac, superior mesenteric, and inferior mesenteric arteries are patent  Atherosclerotic calcifications at the origins of bilateral renal arteries  Bilateral iliofemoral arteries are   nonaneurysmal with mild atherosclerosis      Cardiac findings: There is moderate calcification of the aortic valve  The left ventricle is normal   The ventricular septum is normal   No prior valvular surgery  No prior bypass surgery  No pericardial effusion  No cardiac mass or thrombus  EC/15/22    NSR    Cardiac catheterization: 6/15/22    · Left Anterior Descending: The vessel was visualized by angiography and is large  Mid LAD lesion is 50% stenosed  WILLIAN flow is 3  The lesion is focal   · Ramus Intermedius: The vessel was visualized by angiography, is moderate in size and is angiographically normal   · Left Circumflex: The vessel was visualized by angiography, is moderate in size and is angiographically normal   · Right Coronary Artery: The vessel was visualized by angiography, is large, is angiographically normal and dominant  Carotid artery ultrasound: 22    RIGHT:  There is <50% stenosis noted in the internal carotid artery  Plaque is  heterogenous and irregular  Vertebral artery flow is antegrade  There is no significant subclavian artery  disease  LEFT:  There is <50% stenosis noted in the internal carotid artery  Plaque is  heterogenous and irregular  Vertebral artery flow is antegrade  There is no significant subclavian artery  Disease        I have personally reviewed pertinent films in PACS     PCP, Cardiology and Hem/Onc notes reviewed    TAVR evaluation Assessment:     5 Meter Walk Test:      Attempt 1: 5 sec   Attempt 2: 4 sec   Attempt 3: 5 sec    STS Risk Score: 1 7%    NYHC: III    KCCQ-12 completed    Assessment:  Patient Active Problem List    Diagnosis Date Noted    Aortic stenosis, severe 06/02/2022    Severe aortic stenosis 06/02/2022    Encounter for screening for stenosis of carotid artery 06/02/2022    Encounter for special screening examination for cardiovascular disorder 06/02/2022    Scalp lesion 02/18/2022    Anemia 02/18/2022    Th[facility]ocyt[facility] ) 02/18/2022    S/P forehead flap 05/14/2020    Basal cell carcinoma (BCC) of nasal sidewall 04/22/2020    Mohs defect of nose 04/22/2020    CLL (chronic lymphoid leukemia) in relapse ([facility] ) 03/15/2018     Severe aortic stenosis;  TAVR workup completed    Plan:    Danny Singleton has symptomatic severe aortic stenosis  They have undergone testing for transcatheter aortic valve replacement  The results of these studies have been interpreted by the multidisciplinary TAVR team who have determined the patient to be Intermediate risk for open aortic valve replacement based on other pre-existing comobidities not reflected in the STS risk score  The risks, benefits, and alternatives to TAVR were discussed in detail with the patient today  They understand and wish to proceed with transfemoral transcatheter aortic valve replacement  Informed consent was obtained and a date for the intervention has been set  Danny Singleton was comfortable with our recommendations, and their questions were answered to their satisfaction  The following preoperative instructions were provided at the conclusion of their consultation:     1  You will receive a phone call from the hospital between 2:00 PM and 8:00 PM the day prior to surgery to confirm arrival time and location  For surgery on Mondays, you will receive a call on Friday  2  Do not drink or eat anything after midnight the night before surgery  That includes no water, candy, gum, lozenges, Lifesavers, etc  We recommend you not eat any salty or fatty snack food, consume alcohol or smoke the night before surgery  3  Continue taking aspirin but only 81 mg daily    4  If you take Coumadin and/or Plavix, discontinue it 5 days before surgery  5  If you are diabetic, do not take any of your diabetic pills the morning of surgery  If you take Lantus insulin, you may take it at your regularly scheduled time the day before surgery  Do not take any other insulins the morning of surgery  6  The 2 nights before surgery, take a shower each night using the special antiseptic soap or soap sponges you received from the office or [facility] your hair with regular shampoo and rinse completely before using the antiseptic sponges  Use the sponge to wash from your neck down, with special attention to the armpits and groin area  Do not use any other soap or cleanser on your skin  Do not use lotions, powder, deodorant or perfume of any kind on your skin after you shower  Use clean bed linens and wear clean pajamas after your shower  7  You will be prescribed Mupirocin nasal ointment  Apply to both nostrils twice a day for 5 days prior to surgery  8  Do not take a shower the morning of her surgery; you'll be given a special" bath" at the hospital   9  Notify the CT surgery office if you develop a cold, sore throat, cough, fever or other health issues before your surgery    10  Other medication changes included the following: stop Vitamin E and Multivitamin now     SIGNATURE: CANDE Roca  DATE: July 7, 2022  TIME: 12:02 PM

## 2022-07-07 NOTE — LETTER
July 7, 2022     Almita Willoughby, [address] [address] [place] [address]    Patient: Carey High   YOB: 1950   Date of Visit: 7/7/2022       Dear Dr Kayla Dupree: Thank you for referring Abeba Gonsales to me for evaluation  Below are my notes for this consultation  If you have questions, please do not hesitate to call me  I look forward to following your patient along with you  Sincerely,        Estefania Foster MD        CC: MD Miladys Neves MD Henrene Ouch, [address] [place] [address]  7/7/2022 12:27 PM  Attested  Pre-Op History & Physical - Cardiothoracic Surgery   Carey High 80 y o  male MRN: [id number]    Physician Requesting Consult: Dr Kayla Dupree    Reason for Consult / Principal Problem: Aortic stenosis, Non-Rheumatic    History of Present Illness: Carey High is a 80y o  year old male who was previously evaluated in our office by GENA Denis  for transcatheter aortic valve replacement  During this initial consultation, arrangements were made for the following studies to be completed: Gated CTA of the chest/abdomen/pelvis, cardiac catheterization, dental clearance and carotid artery ultrasound  Carey High now presents to review the results of these tests and obtain a second surgeon to confirm the suitability of proceeding with transcatheter aortic valve replacment  In review Abeba Gonsales is a 80 yr old male with PMHx notable for AS, CLL s/p chemo (2014) now in relapse, thrombocytopenia, anemia and BCC (nose) s/p Moh's  He has been symptomatic for several months with progressive fatigue and exertional dyspnea  He denies chest pain, lightheadedness, syncope, palpitation or fluid retention  Echo demonstrates severe AS  Cardiac cath with 50% m LAD stenosis; carotid duplex WNL, CTA with cholelithiasis, nephrolithiasis and splenomegaly  Patient is non-smoker  Up to date with dental care    No covid vaccinations        Past Medical History:  Past Medical History:   Diagnosis Date    Basal cell carcinoma (BCC) of skin of nose     History of chemotherapy     Lymphocytic leukemia ([facility] [address] )          Past Surgical History:   Past Surgical History:   Procedure Laterality Date    CARDIAC CATHETERIZATION N/A 6/15/2022    Procedure: Cardiac Coronary Angiogram;  Surgeon: Rylie Franz MD;  [facility];   Service: Cardiology    CATARACT EXTRACTION Bilateral     FLAP LOCAL HEAD / NECK N/A 4/14/2020    Procedure: ADJACENT TISSUE TRANSFER  FOREHEAD FLAP,  CARTILAGE GRAFT NOSE;  Surgeon: Delaney Epperson MD;  Location: [facility];  Service: Plastics    FLAP LOCAL HEAD / NECK N/A 6/17/2020    Procedure: DIVISION INSET FOREHEAD FLAP; FULL THICKNESS SKIN GRAFT TO FOREHEAD;  Surgeon: Delaney Epperson MD;  Location[facility];  Service: Plastics    FULL THICKNESS SKIN GRAFT N/A 4/14/2020    Procedure: SKIN GRAFT FULL THICKNESS  (FTSG) NOSE;  Surgeon: Delaney Epperson MD;  [facility];  Service: Plastics    MOHS RECONSTRUCTION N/A 4/14/2020    Procedure: MOHS RECONSTRUCTION NOSE DEFECT;  Surgeon: Delaney Epperson MD;  [facility];  Service: Plastics    MOHS RECONSTRUCTION N/A 5/4/2020    Procedure: RECONSTRUCTION MOHS NASAL DEFECT WITH EAR CARTILAGE GRAFT;  Surgeon: Delaney Epperson MD;  [facility];  Service: Plastics    SHOULDER SURGERY      TIBIA FRACTURE SURGERY      TONSILLECTOMY           Family History:  Family History   Problem Relation Age of Onset    No Known Problems Mother     No Known Problems Father          Social History:    Social History     Substance and Sexual Activity   Alcohol Use Yes    Comment: rare     Social History     Substance and Sexual Activity   Drug Use No     Social History     Tobacco Use   Smoking Status Never Smoker   Smokeless Tobacco Never Used       Home Medications:   Prior to Admission medications    Medication Sig Start Date End Date Taking? Authorizing Provider   Ascorbic Acid (VITAMIN C PO) Take by mouth daily    Yes Historical Provider, MD   fexofenadine-pseudoephedrine (ALLEGRA-D 24) 180-240 MG per 24 hr tablet Take 1 tablet by mouth as needed    Yes Historical Provider, MD   multivitamin (THERAGRAN) TABS Take 1 tablet by mouth daily   Yes Historical Provider, MD   VITAMIN D PO Take by mouth daily    Yes Historical Provider, MD   VITAMIN E PO Take by mouth daily    Yes Historical Provider, MD   chlorhexidine (PERIDEX) 0 12 % solution 1/2 OZ   TWICE DAILY 6/21/22   Historical Provider, MD       Allergies:  No Known Allergies    Review of Systems:  Review of Systems - History obtained from chart review and the patient  General ROS: positive for  - fatigue and change in activity tolerance  negative for - chills or fever  Psychological ROS: negative  Ophthalmic ROS: negative  ENT ROS: negative  Allergy and Immunology ROS: negative  Hematological and Lymphatic ROS: negative  Endocrine ROS: negative  Breast ROS: negative  Respiratory ROS: positive for - shortness of breath  negative for - cough, hemoptysis, orthopnea or pleuritic pain  Cardiovascular ROS: positive for - dyspnea on exertion and murmur  negative for - chest pain, edema, irregular heartbeat, loss of consciousness, orthopnea, palpitations or paroxysmal nocturnal dyspnea  Gastrointestinal ROS: no abdominal pain, change in bowel habits, or black or bloody stools  Genito-Urinary ROS: no dysuria, trouble voiding, or hematuria  Musculoskeletal ROS: negative  Neurological ROS: no TIA or stroke symptoms  Dermatological ROS: negative    Vital Signs:     Vitals:    07/07/22 1129 07/07/22 1132   BP: 136/62 143/67   BP Location: Left arm Right arm   Patient Position: Sitting Sitting   Cuff Size: Standard    Pulse: 72    Resp: 20    Temp: (!) 97 1 °F (36 2 °C)    SpO2: 100%    Weight: 80 7 kg (178 lb)    Height: 5' 9" (1 753 m)        Physical Exam:    General: Alert, oriented, well developed, no acute distress  HEENT/NECK:  PERRLA  No jugular venous distention  Cardiac:Regular rate and rhythm, III/VI harsh systolic murmur RUSB  Carotids: 1+ pulses, no bruits   Pulmonary:  Breath sounds clear bilaterally  Abdomen:  Non-tender, Non-distended  Positive bowel sounds  Upper extremities: 2+ radial pulses; brisk capillary refill  Lower extremities: Extremities warm/dry  PT/DP pulses 2+ bilaterally  No edema B/L  Neuro: Alert and oriented X 3  Sensation is grossly intact  No focal deficits  Musculoskeletal: MAEE, stable gait  Skin: Warm/Dry, without rashes or lesions  Lab Results:     Results from last 7 days   Lab Units 07/05/22  1501   WBC Thousand/uL 159 93*   HEMOGLOBIN g/dL 10 7*   HEMATOCRIT % 35 5*   PLATELETS Thousands/uL 132*     Results from last 7 days   Lab Units 07/05/22  1501   POTASSIUM mmol/L 4 7   CHLORIDE mmol/L 106   CO2 mmol/L 25   BUN mg/dL 27*   CREATININE mg/dL 1 41*   CALCIUM mg/dL 10 2*     Results from last 7 days   Lab Units 07/05/22  1501   INR  1 09   PTT seconds 25       Imaging Studies:     Echocardiogram: 5/18/22      Left Ventricle: Left ventricular cavity size is normal  Wall thickness is normal  The left ventricular ejection fraction is 60%  Systolic function is normal  Wall motion is normal  Diastolic function is normal     Left Atrium: The atrium is mildly dilated    Aortic Valve: The aortic valve is trileaflet  The leaflets are moderately calcified  There is severely reduced mobility  There is mild regurgitation  There is severe stenosis  The aortic valve peak velocity is 3 48 m/s  The aortic valve mean gradient is 28 mmHg  The DVI is 0 24  The aortic valve area is 0 8 cm2  The aortic valve velocity is increased due to stenosis but lower than expected due to the presence of decreased flow    Mitral Valve: There is mild annular calcification  There is mild regurgitation    Pulmonic Valve:  There is mild regurgitation      Gated CTA of the chest/abdomen/pelvis: 22    VASCULAR STRUCTURES:       Annulus: diameter 29 3 x 23 6 mm      area: 531 3 sq mm    Annulus to LCA: 10 9 mm    Annulus to RCA: 11 2 mm    Minimal diameter right iliofemoral segment: 6 7 mm    Minimal diameter left iliofemoral segment: 6 4 mm     The ascending aorta is nonaneurysmal measuring 36 mm with minimal atherosclerosis  There is a type I aortic arch with bovine branching anatomy and no stenosis in the visualized great vessels  The aortic arch is nonaneurysmal with mild atherosclerosis  The descending thoracic aorta is nonaneurysmal with mild atherosclerosis      The abdominal aorta is nonaneurysmal with mild atherosclerosis  Celiac, superior mesenteric, and inferior mesenteric arteries are patent  Atherosclerotic calcifications at the origins of bilateral renal arteries  Bilateral iliofemoral arteries are   nonaneurysmal with mild atherosclerosis      Cardiac findings: There is moderate calcification of the aortic valve  The left ventricle is normal   The ventricular septum is normal   No prior valvular surgery  No prior bypass surgery  No pericardial effusion  No cardiac mass or thrombus  EC/15/22    NSR    Cardiac catheterization: 6/15/22    · Left Anterior Descending: The vessel was visualized by angiography and is large  Mid LAD lesion is 50% stenosed  WILLIAN flow is 3  The lesion is focal   · Ramus Intermedius: The vessel was visualized by angiography, is moderate in size and is angiographically normal   · Left Circumflex: The vessel was visualized by angiography, is moderate in size and is angiographically normal   · Right Coronary Artery: The vessel was visualized by angiography, is large, is angiographically normal and dominant  Carotid artery ultrasound: 22    RIGHT:  There is <50% stenosis noted in the internal carotid artery  Plaque is  heterogenous and irregular  Vertebral artery flow is antegrade   There is no significant subclavian artery  disease  LEFT:  There is <50% stenosis noted in the internal carotid artery  Plaque is  heterogenous and irregular  Vertebral artery flow is antegrade  There is no significant subclavian artery  Disease  I have personally reviewed pertinent films in PACS     PCP, Cardiology and Hem/Onc notes reviewed    TAVR evaluation Assessment:     5 Meter Walk Test:      Attempt 1: 5 sec   Attempt 2: 4 sec   Attempt 3: 5 sec    STS Risk Score: 1 7%    Mabel Ennis 122: III    KCCQ-12 completed    Assessment:  Patient Active Problem List    Diagnosis Date Noted    Aortic stenosis, severe 06/02/2022    Severe aortic stenosis 06/02/2022    Encounter for screening for stenosis of carotid artery 06/02/2022    Encounter for special screening examination for cardiovascular disorder 06/02/2022    Scalp lesion 02/18/2022    Anemia 02/18/2022    Thrombocytopenia ([facility] [address] ) 02/18/2022    S/P forehead flap 05/14/2020    Basal cell carcinoma (BCC) of nasal sidewall 04/22/2020    Mohs defect of nose 04/22/2020    CLL (chronic lymphoid leukemia) in relapse ([facility] [address] ) 03/15/2018     Severe aortic stenosis;  TAVR workup completed    Plan:    Cleveland Vazquez has symptomatic severe aortic stenosis  They have undergone testing for transcatheter aortic valve replacement  The results of these studies have been interpreted by the multidisciplinary TAVR team who have determined the patient to be Intermediate risk for open aortic valve replacement based on other pre-existing comobidities not reflected in the STS risk score  The risks, benefits, and alternatives to TAVR were discussed in detail with the patient today  They understand and wish to proceed with transfemoral transcatheter aortic valve replacement  Informed consent was obtained and a date for the intervention has been set  Cleveland Vazquez was comfortable with our recommendations, and their questions were answered to their satisfaction        The following preoperative instructions were provided at the conclusion of their consultation:     1  You will receive a phone call from the hospital between 2:00 PM and 8:00 PM the day prior to surgery to confirm arrival time and location  For surgery on Mondays, you will receive a call on Friday  2  Do not drink or eat anything after midnight the night before surgery  That includes no water, candy, gum, lozenges, Lifesavers, etc  We recommend you not eat any salty or fatty snack food, consume alcohol or smoke the night before surgery  3  Continue taking aspirin but only 81 mg daily  4  If you take Coumadin and/or Plavix, discontinue it 5 days before surgery  5  If you are diabetic, do not take any of your diabetic pills the morning of surgery  If you take Lantus insulin, you may take it at your regularly scheduled time the day before surgery  Do not take any other insulins the morning of surgery  6  The 2 nights before surgery, take a shower each night using the special antiseptic soap or soap sponges you received from the office or hospital  Gumaro Hoe your hair with regular shampoo and rinse completely before using the antiseptic sponges  Use the sponge to wash from your neck down, with special attention to the armpits and groin area  Do not use any other soap or cleanser on your skin  Do not use lotions, powder, deodorant or perfume of any kind on your skin after you shower  Use clean bed linens and wear clean pajamas after your shower  7  You will be prescribed Mupirocin nasal ointment  Apply to both nostrils twice a day for 5 days prior to surgery  8  Do not take a shower the morning of her surgery; you'll be given a special" bath" at the hospital   9  Notify the CT surgery office if you develop a cold, sore throat, cough, fever or other health issues before your surgery    10  Other medication changes included the following: stop Vitamin E and Multivitamin now     SIGNATURE: CANDE Potter  DATE: July 7, 2022  TIME: 12:02 PM  Attestation signed by Harvey Iniguez MD at 7/7/2022 12:32 PM:  Patient seen and evaluated with 10 Faraz Chau / RENARD  I agree with the above assessment and plan with the following additions  The patient is a 73-year-old man with symptomatic severe aortic stenosis, I had evaluated him and recommended TAVR  He has completed preoperative testing and return to schedule surgery  I reviewed the diagnosis once again and indication for surgery, I explained the procedure, benefits, risks and possible complications  He understands and agrees to proceed with surgery  He will return for an elective TAVR  This note was completed in part utilizing [employer] direct voice recognition software  Grammatical errors, random word insertion, spelling mistakes, and incomplete sentences may be an occasional consequence of the system secondary to software limitations, ambient noise and hardware issues  At the time of dictation, efforts were made to edit, clarify and /or correct errors  Please read the chart carefully and recognize, using context, where substitutions have occurred  If you have any questions or concerns about the context, text or information contained within the body of this dictation, please contact myself, the provider, for further clarification

## 2022-07-07 NOTE — H&P (VIEW-ONLY)
Pre-Op History & Physical - Cardiothoracic Surgery   Gumaro Olivia 80 y o  male MRN: [id number]    Physician Requesting Consult: Dr Brant Bull    Reason for Consult / Principal Problem: Aortic stenosis, Non-Rheumatic    History of Present Illness: Gumaro Olivia is a 80y o  year old male who was previously evaluated in our office by GENA Quintanilla  for transcatheter aortic valve replacement  During this initial consultation, arrangements were made for the following studies to be completed: Gated CTA of the chest/abdomen/pelvis, cardiac catheterization, dental clearance and carotid artery ultrasound  Gumaro Olivia now presents to review the results of these tests and obtain a second surgeon to confirm the suitability of proceeding with transcatheter aortic valve replacment  In review Mary Ann Overton is a 80 yr old male with PMHx notable for AS, CLL s/p chemo (2014) now in relapse, thrombocytopenia, anemia and BCC (nose) s/p Moh's  He has been symptomatic for several months with progressive fatigue and exertional dyspnea  He denies chest pain, lightheadedness, syncope, palpitation or fluid retention  Echo demonstrates severe AS  Cardiac cath with 50% m LAD stenosis; carotid duplex WNL, CTA with cholelithiasis, nephrolithiasis and splenomegaly  Patient is non-smoker  Up to date with dental care    No covid vaccinations  Past Medical History:  Past Medical History:   Diagnosis Date    Basal cell carcinoma (BCC) of skin of nose     History of chemotherapy     Lymphocytic leukemia ([facility] [address] )          Past Surgical History:   Past Surgical History:   Procedure Laterality Date    CARDIAC CATHETERIZATION N/A 6/15/2022    Procedure: Cardiac Coronary Angiogram;  Surgeon: Niesha Brooks MD;  [facility];   Service: Cardiology    CATARACT EXTRACTION Bilateral     FLAP LOCAL HEAD / NECK N/A 4/14/2020    Procedure: ADJACENT TISSUE TRANSFER  FOREHEAD FLAP,  CARTILAGE GRAFT NOSE; Surgeon: Cady Cardenas MD;  Location: [facility];  Service: Plastics    FLAP LOCAL HEAD / NECK N/A 6/17/2020    Procedure: DIVISION INSET FOREHEAD FLAP; FULL THICKNESS SKIN GRAFT TO FOREHEAD;  Surgeon: Cady Cardenas MD;  Location[facility];  Service: Plastics    FULL THICKNESS SKIN GRAFT N/A 4/14/2020    Procedure: SKIN GRAFT FULL THICKNESS  (FTSG) NOSE;  Surgeon: Cady Cardenas MD;  [facility];  Service: Plastics    MOHS RECONSTRUCTION N/A 4/14/2020    Procedure: MOHS RECONSTRUCTION NOSE DEFECT;  Surgeon: Cady Cardenas MD;  [facility];  Service: Plastics    MOHS RECONSTRUCTION N/A 5/4/2020    Procedure: RECONSTRUCTION MOHS NASAL DEFECT WITH EAR CARTILAGE GRAFT;  Surgeon: Cady Cardenas MD;  [facility];  Service: Plastics    SHOULDER SURGERY      TIBIA FRACTURE SURGERY      TONSILLECTOMY           Family History:  Family History   Problem Relation Age of Onset    No Known Problems Mother     No Known Problems Father          Social History:    Social History     Substance and Sexual Activity   Alcohol Use Yes    Comment: rare     Social History     Substance and Sexual Activity   Drug Use No     Social History     Tobacco Use   Smoking Status Never Smoker   Smokeless Tobacco Never Used       Home Medications:   Prior to Admission medications    Medication Sig Start Date End Date Taking? Authorizing Provider   Ascorbic Acid (VITAMIN C PO) Take by mouth daily    Yes Historical Provider, MD   fexofenadine-pseudoephedrine (ALLEGRA-D 24) 180-240 MG per 24 hr tablet Take 1 tablet by mouth as needed    Yes Historical Provider, MD   multivitamin (THERAGRAN) TABS Take 1 tablet by mouth daily   Yes Historical Provider, MD   VITAMIN D PO Take by mouth daily    Yes Historical Provider, MD   VITAMIN E PO Take by mouth daily    Yes Historical Provider, MD   chlorhexidine (PERIDEX) 0 12 % solution 1/2 OZ   TWICE DAILY 6/21/22   Historical Provider, MD       Allergies:  No Known Allergies    Review of Systems:  Review of Systems - History obtained from chart review and the patient  General ROS: positive for  - fatigue and change in activity tolerance  negative for - chills or fever  Psychological ROS: negative  Ophthalmic ROS: negative  ENT ROS: negative  Allergy and Immunology ROS: negative  Hematological and Lymphatic ROS: negative  Endocrine ROS: negative  Breast ROS: negative  Respiratory ROS: positive for - shortness of breath  negative for - cough, hemoptysis, orthopnea or pleuritic pain  Cardiovascular ROS: positive for - dyspnea on exertion and murmur  negative for - chest pain, edema, irregular heartbeat, loss of consciousness, orthopnea, palpitations or paroxysmal nocturnal dyspnea  Gastrointestinal ROS: no abdominal pain, change in bowel habits, or black or bloody stools  Genito-Urinary ROS: no dysuria, trouble voiding, or hematuria  Musculoskeletal ROS: negative  Neurological ROS: no TIA or stroke symptoms  Dermatological ROS: negative    Vital Signs:     Vitals:    07/07/22 1129 07/07/22 1132   BP: 136/62 143/67   BP Location: Left arm Right arm   Patient Position: Sitting Sitting   Cuff Size: Standard    Pulse: 72    Resp: 20    Temp: (!) 97 1 °F (36 2 °C)    SpO2: 100%    Weight: 80 7 kg (178 lb)    Height: 5' 9" (1 753 m)        Physical Exam:    General: Alert, oriented, well developed, no acute distress  HEENT/NECK:  PERRLA  No jugular venous distention  Cardiac:Regular rate and rhythm, III/VI harsh systolic murmur RUSB  Carotids: 1+ pulses, no bruits   Pulmonary:  Breath sounds clear bilaterally  Abdomen:  Non-tender, Non-distended  Positive bowel sounds  Upper extremities: 2+ radial pulses; brisk capillary refill  Lower extremities: Extremities warm/dry  PT/DP pulses 2+ bilaterally  No edema B/L  Neuro: Alert and oriented X 3  Sensation is grossly intact  No focal deficits    Musculoskeletal: MAEE, stable gait  Skin: Warm/Dry, without rashes or lesions  Lab Results:     Results from last 7 days   Lab Units 07/05/22  1501   WBC Thousand/uL 159 93*   HEMOGLOBIN g/dL 10 7*   HEMATOCRIT % 35 5*   PLATELETS Thousands/uL 132*     Results from last 7 days   Lab Units 07/05/22  1501   POTASSIUM mmol/L 4 7   CHLORIDE mmol/L 106   CO2 mmol/L 25   BUN mg/dL 27*   CREATININE mg/dL 1 41*   CALCIUM mg/dL 10 2*     Results from last 7 days   Lab Units 07/05/22  1501   INR  1 09   PTT seconds 25       Imaging Studies:     Echocardiogram: 5/18/22      Left Ventricle: Left ventricular cavity size is normal  Wall thickness is normal  The left ventricular ejection fraction is 60%  Systolic function is normal  Wall motion is normal  Diastolic function is normal     Left Atrium: The atrium is mildly dilated    Aortic Valve: The aortic valve is trileaflet  The leaflets are moderately calcified  There is severely reduced mobility  There is mild regurgitation  There is severe stenosis  The aortic valve peak velocity is 3 48 m/s  The aortic valve mean gradient is 28 mmHg  The DVI is 0 24  The aortic valve area is 0 8 cm2  The aortic valve velocity is increased due to stenosis but lower than expected due to the presence of decreased flow    Mitral Valve: There is mild annular calcification  There is mild regurgitation    Pulmonic Valve: There is mild regurgitation      Gated CTA of the chest/abdomen/pelvis: 6/9/22    VASCULAR STRUCTURES:       Annulus: diameter 29 3 x 23 6 mm      area: 531 3 sq mm    Annulus to LCA: 10 9 mm    Annulus to RCA: 11 2 mm    Minimal diameter right iliofemoral segment: 6 7 mm    Minimal diameter left iliofemoral segment: 6 4 mm     The ascending aorta is nonaneurysmal measuring 36 mm with minimal atherosclerosis  There is a type I aortic arch with bovine branching anatomy and no stenosis in the visualized great vessels  The aortic arch is nonaneurysmal with mild atherosclerosis     The descending thoracic aorta is nonaneurysmal with mild atherosclerosis      The abdominal aorta is nonaneurysmal with mild atherosclerosis  Celiac, superior mesenteric, and inferior mesenteric arteries are patent  Atherosclerotic calcifications at the origins of bilateral renal arteries  Bilateral iliofemoral arteries are   nonaneurysmal with mild atherosclerosis      Cardiac findings: There is moderate calcification of the aortic valve  The left ventricle is normal   The ventricular septum is normal   No prior valvular surgery  No prior bypass surgery  No pericardial effusion  No cardiac mass or thrombus  EC/15/22    NSR    Cardiac catheterization: 6/15/22    · Left Anterior Descending: The vessel was visualized by angiography and is large  Mid LAD lesion is 50% stenosed  WILLIAN flow is 3  The lesion is focal   · Ramus Intermedius: The vessel was visualized by angiography, is moderate in size and is angiographically normal   · Left Circumflex: The vessel was visualized by angiography, is moderate in size and is angiographically normal   · Right Coronary Artery: The vessel was visualized by angiography, is large, is angiographically normal and dominant  Carotid artery ultrasound: 22    RIGHT:  There is <50% stenosis noted in the internal carotid artery  Plaque is  heterogenous and irregular  Vertebral artery flow is antegrade  There is no significant subclavian artery  disease  LEFT:  There is <50% stenosis noted in the internal carotid artery  Plaque is  heterogenous and irregular  Vertebral artery flow is antegrade  There is no significant subclavian artery  Disease        I have personally reviewed pertinent films in PACS     PCP, Cardiology and Hem/Onc notes reviewed    TAVR evaluation Assessment:     5 Meter Walk Test:      Attempt 1: 5 sec   Attempt 2: 4 sec   Attempt 3: 5 sec    STS Risk Score: 1 7%    NYHC: III    KCCQ-12 completed    Assessment:  Patient Active Problem List    Diagnosis Date Noted    Aortic stenosis, severe 06/02/2022    Severe aortic stenosis 06/02/2022    Encounter for screening for stenosis of carotid artery 06/02/2022    Encounter for special screening examination for cardiovascular disorder 06/02/2022    Scalp lesion 02/18/2022    Anemia 02/18/2022    Thrombocytopenia ([facility] ) 02/18/2022    S/P forehead flap 05/14/2020    Basal cell carcinoma (BCC) of nasal sidewall 04/22/2020    Mohs defect of nose 04/22/2020    CLL (chronic lymphoid leukemia) in relapse ([facility] ) 03/15/2018     Severe aortic stenosis;  TAVR workup completed    Plan:    Tracey Murray has symptomatic severe aortic stenosis  They have undergone testing for transcatheter aortic valve replacement  The results of these studies have been interpreted by the multidisciplinary TAVR team who have determined the patient to be Intermediate risk for open aortic valve replacement based on other pre-existing comobidities not reflected in the STS risk score  The risks, benefits, and alternatives to TAVR were discussed in detail with the patient today  They understand and wish to proceed with transfemoral transcatheter aortic valve replacement  Informed consent was obtained and a date for the intervention has been set  Tracey Murray was comfortable with our recommendations, and their questions were answered to their satisfaction  The following preoperative instructions were provided at the conclusion of their consultation:     1  You will receive a phone call from the hospital between 2:00 PM and 8:00 PM the day prior to surgery to confirm arrival time and location  For surgery on Mondays, you will receive a call on Friday  2  Do not drink or eat anything after midnight the night before surgery  That includes no water, candy, gum, lozenges, Lifesavers, etc  We recommend you not eat any salty or fatty snack food, consume alcohol or smoke the night before surgery  3  Continue taking aspirin but only 81 mg daily    4  If you take Coumadin and/or Plavix, discontinue it 5 days before surgery  5  If you are diabetic, do not take any of your diabetic pills the morning of surgery  If you take Lantus insulin, you may take it at your regularly scheduled time the day before surgery  Do not take any other insulins the morning of surgery  6  The 2 nights before surgery, take a shower each night using the special antiseptic soap or soap sponges you received from the office or hospital  Alex Fernando your hair with regular shampoo and rinse completely before using the antiseptic sponges  Use the sponge to wash from your neck down, with special attention to the armpits and groin area  Do not use any other soap or cleanser on your skin  Do not use lotions, powder, deodorant or perfume of any kind on your skin after you shower  Use clean bed linens and wear clean pajamas after your shower  7  You will be prescribed Mupirocin nasal ointment  Apply to both nostrils twice a day for 5 days prior to surgery  8  Do not take a shower the morning of her surgery; you'll be given a special" bath" at the hospital   9  Notify the CT surgery office if you develop a cold, sore throat, cough, fever or other health issues before your surgery    10  Other medication changes included the following: stop Vitamin E and Multivitamin now     SIGNATURE: CANDE Lopez  DATE: July 7, 2022  TIME: 12:02 PM

## 2022-07-07 NOTE — PROGRESS NOTES
Pre-Op History & Physical - Cardiothoracic Surgery   Tracey Murray 80 y o  male MRN: [id number]    Physician Requesting Consult: Dr Morgan Ramires    Reason for Consult / Principal Problem: Aortic stenosis, Non-Rheumatic    History of Present Illness: Tracey Murray is a 80y o  year old male who was previously evaluated in our office by GENA Marroquin  for transcatheter aortic valve replacement  During this initial consultation, arrangements were made for the following studies to be completed: Gated CTA of the chest/abdomen/pelvis, cardiac catheterization, dental clearance and carotid artery ultrasound  Tracey Murray now presents to review the results of these tests and obtain a second surgeon to confirm the suitability of proceeding with transcatheter aortic valve replacment  In review Paul Mckeon is a 80 yr old male with PMHx notable for AS, CLL s/p chemo (2014) now in relapse, thrombocytopenia, anemia and BCC (nose) s/p Moh's  He has been symptomatic for several months with progressive fatigue and exertional dyspnea  He denies chest pain, lightheadedness, syncope, palpitation or fluid retention  Echo demonstrates severe AS  Cardiac cath with 50% m LAD stenosis; carotid duplex WNL, CTA with cholelithiasis, nephrolithiasis and splenomegaly  Patient is non-smoker  Up to date with dental care    No covid vaccinations  Past Medical History:  Past Medical History:   Diagnosis Date    Basal cell carcinoma (BCC) of skin of nose     History of chemotherapy     Lymphocytic leukemia ([facility] [address] )          Past Surgical History:   Past Surgical History:   Procedure Laterality Date    CARDIAC CATHETERIZATION N/A 6/15/2022    Procedure: Cardiac Coronary Angiogram;  Surgeon: Harjeet Waller MD;  [facility];   Service: Cardiology    CATARACT EXTRACTION Bilateral     FLAP LOCAL HEAD / NECK N/A 4/14/2020    Procedure: ADJACENT TISSUE TRANSFER  FOREHEAD FLAP,  CARTILAGE GRAFT NOSE; Surgeon: Leann Locke MD;  Location: [facility];  Service: Plastics    FLAP LOCAL HEAD / NECK N/A 6/17/2020    Procedure: DIVISION INSET FOREHEAD FLAP; FULL THICKNESS SKIN GRAFT TO FOREHEAD;  Surgeon: Leann Locke MD;  [facility];  Service: Plastics    FULL THICKNESS SKIN GRAFT N/A 4/14/2020    Procedure: SKIN GRAFT FULL THICKNESS  (FTSG) NOSE;  Surgeon: Leann Locke MD;  [facility];  Service: Plastics    MOHS RECONSTRUCTION N/A 4/14/2020    Procedure: MOHS RECONSTRUCTION NOSE DEFECT;  Surgeon: Leann Locke MD;  [facility];  Service: Plastics    MOHS RECONSTRUCTION N/A 5/4/2020    Procedure: RECONSTRUCTION MOHS NASAL DEFECT WITH EAR CARTILAGE GRAFT;  Surgeon: Leann Locke MD;  [facility];  Service: Plastics    SHOULDER SURGERY      TIBIA FRACTURE SURGERY      TONSILLECTOMY           Family History:  Family History   Problem Relation Age of Onset    No Known Problems Mother     No Known Problems Father          Social History:    Social History     Substance and Sexual Activity   Alcohol Use Yes    Comment: rare     Social History     Substance and Sexual Activity   Drug Use No     Social History     Tobacco Use   Smoking Status Never Smoker   Smokeless Tobacco Never Used       Home Medications:   Prior to Admission medications    Medication Sig Start Date End Date Taking? Authorizing Provider   Ascorbic Acid (VITAMIN C PO) Take by mouth daily    Yes Historical Provider, MD   fexofenadine-pseudoephedrine (ALLEGRA-D 24) 180-240 MG per 24 hr tablet Take 1 tablet by mouth as needed    Yes Historical Provider, MD   multivitamin (THERAGRAN) TABS Take 1 tablet by mouth daily   Yes Historical Provider, MD   VITAMIN D PO Take by mouth daily    Yes Historical Provider, MD   VITAMIN E PO Take by mouth daily    Yes Historical Provider, MD   chlorhexidine (PERIDEX) 0 12 % solution 1/2 OZ   TWICE DAILY 6/21/22   Historical Provider, MD       Allergies:  No Known Allergies    Review of Systems:  Review of Systems - History obtained from chart review and the patient  General ROS: positive for  - fatigue and change in activity tolerance  negative for - chills or fever  Psychological ROS: negative  Ophthalmic ROS: negative  ENT ROS: negative  Allergy and Immunology ROS: negative  Hematological and Lymphatic ROS: negative  Endocrine ROS: negative  Breast ROS: negative  Respiratory ROS: positive for - shortness of breath  negative for - cough, hemoptysis, orthopnea or pleuritic pain  Cardiovascular ROS: positive for - dyspnea on exertion and murmur  negative for - chest pain, edema, irregular heartbeat, loss of consciousness, orthopnea, palpitations or paroxysmal nocturnal dyspnea  Gastrointestinal ROS: no abdominal pain, change in bowel habits, or black or bloody stools  Genito-Urinary ROS: no dysuria, trouble voiding, or hematuria  Musculoskeletal ROS: negative  Neurological ROS: no TIA or stroke symptoms  Dermatological ROS: negative    Vital Signs:     Vitals:    07/07/22 1129 07/07/22 1132   BP: 136/62 143/67   BP Location: Left arm Right arm   Patient Position: Sitting Sitting   Cuff Size: Standard    Pulse: 72    Resp: 20    Temp: (!) 97 1 °F (36 2 °C)    SpO2: 100%    Weight: 80 7 kg (178 lb)    Height: 5' 9" (1 753 m)        Physical Exam:    General: Alert, oriented, well developed, no acute distress  HEENT/NECK:  PERRLA  No jugular venous distention  Cardiac:Regular rate and rhythm, III/VI harsh systolic murmur RUSB  Carotids: 1+ pulses, no bruits   Pulmonary:  Breath sounds clear bilaterally  Abdomen:  Non-tender, Non-distended  Positive bowel sounds  Upper extremities: 2+ radial pulses; brisk capillary refill  Lower extremities: Extremities warm/dry  PT/DP pulses 2+ bilaterally  No edema B/L  Neuro: Alert and oriented X 3  Sensation is grossly intact  No focal deficits    Musculoskeletal: MAEE, stable gait  Skin: Warm/Dry, without rashes or lesions  Lab Results:     Results from last 7 days   Lab Units 07/05/22  1501   WBC Thousand/uL 159 93*   HEMOGLOBIN g/dL 10 7*   HEMATOCRIT % 35 5*   PLATELETS Thousands/uL 132*     Results from last 7 days   Lab Units 07/05/22  1501   POTASSIUM mmol/L 4 7   CHLORIDE mmol/L 106   CO2 mmol/L 25   BUN mg/dL 27*   CREATININE mg/dL 1 41*   CALCIUM mg/dL 10 2*     Results from last 7 days   Lab Units 07/05/22  1501   INR  1 09   PTT seconds 25       Imaging Studies:     Echocardiogram: 5/18/22      Left Ventricle: Left ventricular cavity size is normal  Wall thickness is normal  The left ventricular ejection fraction is 60%  Systolic function is normal  Wall motion is normal  Diastolic function is normal     Left Atrium: The atrium is mildly dilated    Aortic Valve: The aortic valve is trileaflet  The leaflets are moderately calcified  There is severely reduced mobility  There is mild regurgitation  There is severe stenosis  The aortic valve peak velocity is 3 48 m/s  The aortic valve mean gradient is 28 mmHg  The DVI is 0 24  The aortic valve area is 0 8 cm2  The aortic valve velocity is increased due to stenosis but lower than expected due to the presence of decreased flow    Mitral Valve: There is mild annular calcification  There is mild regurgitation    Pulmonic Valve: There is mild regurgitation      Gated CTA of the chest/abdomen/pelvis: 6/9/22    VASCULAR STRUCTURES:       Annulus: diameter 29 3 x 23 6 mm      area: 531 3 sq mm    Annulus to LCA: 10 9 mm    Annulus to RCA: 11 2 mm    Minimal diameter right iliofemoral segment: 6 7 mm    Minimal diameter left iliofemoral segment: 6 4 mm     The ascending aorta is nonaneurysmal measuring 36 mm with minimal atherosclerosis  There is a type I aortic arch with bovine branching anatomy and no stenosis in the visualized great vessels  The aortic arch is nonaneurysmal with mild atherosclerosis     The descending thoracic aorta is nonaneurysmal with mild atherosclerosis      The abdominal aorta is nonaneurysmal with mild atherosclerosis  Celiac, superior mesenteric, and inferior mesenteric arteries are patent  Atherosclerotic calcifications at the origins of bilateral renal arteries  Bilateral iliofemoral arteries are   nonaneurysmal with mild atherosclerosis      Cardiac findings: There is moderate calcification of the aortic valve  The left ventricle is normal   The ventricular septum is normal   No prior valvular surgery  No prior bypass surgery  No pericardial effusion  No cardiac mass or thrombus  EC/15/22    NSR    Cardiac catheterization: 6/15/22    · Left Anterior Descending: The vessel was visualized by angiography and is large  Mid LAD lesion is 50% stenosed  WILLIAN flow is 3  The lesion is focal   · Ramus Intermedius: The vessel was visualized by angiography, is moderate in size and is angiographically normal   · Left Circumflex: The vessel was visualized by angiography, is moderate in size and is angiographically normal   · Right Coronary Artery: The vessel was visualized by angiography, is large, is angiographically normal and dominant  Carotid artery ultrasound: 22    RIGHT:  There is <50% stenosis noted in the internal carotid artery  Plaque is  heterogenous and irregular  Vertebral artery flow is antegrade  There is no significant subclavian artery  disease  LEFT:  There is <50% stenosis noted in the internal carotid artery  Plaque is  heterogenous and irregular  Vertebral artery flow is antegrade  There is no significant subclavian artery  Disease        I have personally reviewed pertinent films in PACS     PCP, Cardiology and Hem/Onc notes reviewed    TAVR evaluation Assessment:     5 Meter Walk Test:      Attempt 1: 5 sec   Attempt 2: 4 sec   Attempt 3: 5 sec    STS Risk Score: 1 7%    NYHC: III    KCCQ-12 completed    Assessment:  Patient Active Problem List    Diagnosis Date Noted    Aortic stenosis, severe 06/02/2022    Severe aortic stenosis 06/02/2022    Encounter for screening for stenosis of carotid artery 06/02/2022    Encounter for special screening examination for cardiovascular disorder 06/02/2022    Scalp lesion 02/18/2022    Anemia 02/18/2022    Thrombocytopenia ([facility] ) 02/18/2022    S/P forehead flap 05/14/2020    Basal cell carcinoma (BCC) of nasal sidewall 04/22/2020    Mohs defect of nose 04/22/2020    CLL (chronic lymphoid leukemia) in relapse ([facility] ) 03/15/2018     Severe aortic stenosis;  TAVR workup completed    Plan:    Ana Cuenca has symptomatic severe aortic stenosis  They have undergone testing for transcatheter aortic valve replacement  The results of these studies have been interpreted by the multidisciplinary TAVR team who have determined the patient to be Intermediate risk for open aortic valve replacement based on other pre-existing comobidities not reflected in the STS risk score  The risks, benefits, and alternatives to TAVR were discussed in detail with the patient today  They understand and wish to proceed with transfemoral transcatheter aortic valve replacement  Informed consent was obtained and a date for the intervention has been set  Ana Cuenca was comfortable with our recommendations, and their questions were answered to their satisfaction  The following preoperative instructions were provided at the conclusion of their consultation:     1  You will receive a phone call from the hospital between 2:00 PM and 8:00 PM the day prior to surgery to confirm arrival time and location  For surgery on Mondays, you will receive a call on Friday  2  Do not drink or eat anything after midnight the night before surgery  That includes no water, candy, gum, lozenges, Lifesavers, etc  We recommend you not eat any salty or fatty snack food, consume alcohol or smoke the night before surgery  3  Continue taking aspirin but only 81 mg daily    4  If you take Coumadin and/or Plavix, discontinue it 5 days before surgery  5  If you are diabetic, do not take any of your diabetic pills the morning of surgery  If you take Lantus insulin, you may take it at your regularly scheduled time the day before surgery  Do not take any other insulins the morning of surgery  6  The 2 nights before surgery, take a shower each night using the special antiseptic soap or soap sponges you received from the office or [facility] Sera your hair with regular shampoo and rinse completely before using the antiseptic sponges  Use the sponge to wash from your neck down, with special attention to the armpits and groin area  Do not use any other soap or cleanser on your skin  Do not use lotions, powder, deodorant or perfume of any kind on your skin after you shower  Use clean bed linens and wear clean pajamas after your shower  7  You will be prescribed Mupirocin nasal ointment  Apply to both nostrils twice a day for 5 days prior to surgery  8  Do not take a shower the morning of her surgery; you'll be given a special" bath" at the hospital   9  Notify the CT surgery office if you develop a cold, sore throat, cough, fever or other health issues before your surgery    10  Other medication changes included the following: stop Vitamin E and Multivitamin now     SIGNATURE: CANDE Roca  DATE: July 7, 2022  TIME: 12:02 PM

## 2022-07-07 NOTE — PRE-PROCEDURE INSTRUCTIONS
Pre-Surgery Instructions:   Medication Instructions    Ascorbic Acid (VITAMIN C PO) Instructions provided by MD Parson Coursejessica fexofenadine-pseudoephedrine (ALLEGRA-D 24) 180-240 MG per 24 hr tablet Instructions provided by MD    multivitamin (THERAGRAN) TABS Instructions provided by MD Parson Coursejessica mupirocin (BACTROBAN) 2 % ointment Instructions provided by MD Parson Coursejessica VITAMIN D PO Instructions provided by MD Blank Ang VITAMIN E PO Instructions provided by MD   Have you had / have a sore throat? No  Have you had / have a cough less than 1 week? No  Have you had / have a fever greater than 100 0 - 100  4? No  Are you experiencing any shortness of breath? No    Review with patient via phone medications and showering instructions was receive from surgeon office and verbalize understanding  [facility] call with surgery schedule time

## 2022-07-08 LAB
MRSA NOSE QL CULT: NORMAL
SARS-COV-2 RNA RESP QL NAA+PROBE: NEGATIVE

## 2022-07-11 RX ORDER — HEPARIN SODIUM 1000 [USP'U]/ML
400 INJECTION, SOLUTION INTRAVENOUS; SUBCUTANEOUS ONCE
Status: CANCELLED | OUTPATIENT
Start: 2022-07-12

## 2022-07-12 ENCOUNTER — ANESTHESIA (OUTPATIENT)
Dept: PERIOP | Facility: HOSPITAL | Age: 72
DRG: 267 | End: 2022-07-12
Payer: MEDICARE

## 2022-07-12 ENCOUNTER — APPOINTMENT (INPATIENT)
Dept: NON INVASIVE DIAGNOSTICS | Facility: HOSPITAL | Age: 72
DRG: 267 | End: 2022-07-12
Payer: MEDICARE

## 2022-07-12 ENCOUNTER — HOSPITAL ENCOUNTER (INPATIENT)
Facility: HOSPITAL | Age: 72
LOS: 1 days | Discharge: HOME | DRG: 267 | End: 2022-07-13
Attending: THORACIC SURGERY (CARDIOTHORACIC VASCULAR SURGERY) | Admitting: THORACIC SURGERY (CARDIOTHORACIC VASCULAR SURGERY)
Payer: MEDICARE

## 2022-07-12 ENCOUNTER — APPOINTMENT (INPATIENT)
Dept: RADIOLOGY | Facility: HOSPITAL | Age: 72
DRG: 267 | End: 2022-07-12
Payer: MEDICARE

## 2022-07-12 ENCOUNTER — ANESTHESIA EVENT (OUTPATIENT)
Dept: PERIOP | Facility: HOSPITAL | Age: 72
DRG: 267 | End: 2022-07-12
Payer: MEDICARE

## 2022-07-12 DIAGNOSIS — Z95.2 S/P TAVR (TRANSCATHETER AORTIC VALVE REPLACEMENT): ICD-10-CM

## 2022-07-12 DIAGNOSIS — I35.0 SEVERE AORTIC STENOSIS: ICD-10-CM

## 2022-07-12 DIAGNOSIS — I35.9 NONRHEUMATIC AORTIC VALVE DISORDER, UNSPECIFIED: ICD-10-CM

## 2022-07-12 DIAGNOSIS — I35.0 SEVERE AORTIC STENOSIS: Primary | ICD-10-CM

## 2022-07-12 DIAGNOSIS — I35.0 AORTIC STENOSIS, SEVERE: Primary | ICD-10-CM

## 2022-07-12 PROBLEM — D69.3 CHRONIC IDIOPATHIC THROMBOCYTOPENIA (HCC): Chronic | Status: ACTIVE | Noted: 2022-07-12

## 2022-07-12 PROBLEM — E78.5 HYPERLIPIDEMIA: Chronic | Status: ACTIVE | Noted: 2022-07-12

## 2022-07-12 PROBLEM — D64.9 CHRONIC ANEMIA: Chronic | Status: ACTIVE | Noted: 2022-07-12

## 2022-07-12 PROBLEM — D69.3 CHRONIC IDIOPATHIC THROMBOCYTOPENIA (HCC): Chronic | Status: RESOLVED | Noted: 2022-07-12 | Resolved: 2022-07-12

## 2022-07-12 LAB
ABO GROUP BLD: NORMAL
ANION GAP SERPL CALCULATED.3IONS-SCNC: 2 MMOL/L (ref 4–13)
AORTIC VALVE ANNULUS: 2.2 CM
AORTIC VALVE MEAN VELOCITY: 10.6 M/S
AV AREA BY CONTINUOUS VTI: 3 CM2
AV AREA PEAK VELOCITY: 3.2 CM2
AV LVOT MEAN GRADIENT: 4 MMHG
AV LVOT PEAK GRADIENT: 9 MMHG
AV MEAN GRADIENT: 5 MMHG
AV PEAK GRADIENT: 13 MMHG
BASE EXCESS BLDA CALC-SCNC: -2 MMOL/L (ref -2–3)
BASE EXCESS BLDA CALC-SCNC: -4 MMOL/L (ref -2–3)
BASE EXCESS BLDA CALC-SCNC: -5 MMOL/L (ref -2–3)
BUN SERPL-MCNC: 25 MG/DL (ref 5–25)
CA-I BLD-SCNC: 1.26 MMOL/L (ref 1.12–1.32)
CA-I BLD-SCNC: 1.29 MMOL/L (ref 1.12–1.32)
CA-I BLD-SCNC: 1.34 MMOL/L (ref 1.12–1.32)
CALCIUM SERPL-MCNC: 8.3 MG/DL (ref 8.3–10.1)
CHLORIDE SERPL-SCNC: 115 MMOL/L (ref 100–108)
CO2 SERPL-SCNC: 22 MMOL/L (ref 21–32)
CREAT SERPL-MCNC: 1.18 MG/DL (ref 0.6–1.3)
DOP CALC AO VTI: 25.2 CM
DOP CALC LVOT AREA: 3.8 CM2
DOP CALC LVOT DIAMETER: 2.2 CM
DOP CALC LVOT PEAK VEL VTI: 20.2 CM
DOP CALC LVOT PEAK VEL: 1.47 M/S
DOP CALC LVOT STROKE INDEX: 39.5 ML/M2
DOP CALC LVOT STROKE VOLUME: 77 CM3
GFR SERPL CREATININE-BSD FRML MDRD: 62 ML/MIN/1.73SQ M
GLUCOSE SERPL-MCNC: 103 MG/DL (ref 65–140)
GLUCOSE SERPL-MCNC: 108 MG/DL (ref 65–140)
GLUCOSE SERPL-MCNC: 110 MG/DL (ref 65–140)
GLUCOSE SERPL-MCNC: 115 MG/DL (ref 65–140)
GLUCOSE SERPL-MCNC: 120 MG/DL (ref 65–140)
GLUCOSE SERPL-MCNC: 120 MG/DL (ref 65–140)
GLUCOSE SERPL-MCNC: 125 MG/DL (ref 65–140)
GLUCOSE SERPL-MCNC: 136 MG/DL (ref 65–140)
HCO3 BLDA-SCNC: 21.4 MMOL/L (ref 22–28)
HCO3 BLDA-SCNC: 22 MMOL/L (ref 22–28)
HCO3 BLDA-SCNC: 23.4 MMOL/L (ref 24–30)
HCT VFR BLD AUTO: 28.8 % (ref 36.5–49.3)
HCT VFR BLD CALC: 24 % (ref 36.5–49.3)
HCT VFR BLD CALC: 25 % (ref 36.5–49.3)
HCT VFR BLD CALC: 30 % (ref 36.5–49.3)
HGB BLD-MCNC: 8.3 G/DL (ref 12–17)
HGB BLDA-MCNC: 10.2 G/DL (ref 12–17)
HGB BLDA-MCNC: 8.2 G/DL (ref 12–17)
HGB BLDA-MCNC: 8.5 G/DL (ref 12–17)
KCT BLD-ACNC: 122 SEC (ref 89–137)
KCT BLD-ACNC: 140 SEC (ref 89–137)
KCT BLD-ACNC: 302 SEC (ref 89–137)
PCO2 BLD: 23 MMOL/L (ref 21–32)
PCO2 BLD: 23 MMOL/L (ref 21–32)
PCO2 BLD: 25 MMOL/L (ref 21–32)
PCO2 BLD: 38.7 MM HG (ref 36–44)
PCO2 BLD: 41.6 MM HG (ref 42–50)
PCO2 BLD: 49.1 MM HG (ref 36–44)
PH BLD: 7.26 [PH] (ref 7.35–7.45)
PH BLD: 7.35 [PH] (ref 7.35–7.45)
PH BLD: 7.36 [PH] (ref 7.3–7.4)
PLATELET # BLD AUTO: 107 THOUSANDS/UL (ref 149–390)
PMV BLD AUTO: 8.7 FL (ref 8.9–12.7)
PO2 BLD: 159 MM HG (ref 75–129)
PO2 BLD: 188 MM HG (ref 75–129)
PO2 BLD: 45 MM HG (ref 35–45)
POTASSIUM BLD-SCNC: 4 MMOL/L (ref 3.5–5.3)
POTASSIUM BLD-SCNC: 4 MMOL/L (ref 3.5–5.3)
POTASSIUM BLD-SCNC: 4.5 MMOL/L (ref 3.5–5.3)
POTASSIUM SERPL-SCNC: 5.2 MMOL/L (ref 3.5–5.3)
RH BLD: POSITIVE
SAO2 % BLD FROM PO2: 100 % (ref 60–85)
SAO2 % BLD FROM PO2: 79 % (ref 60–85)
SAO2 % BLD FROM PO2: 99 % (ref 60–85)
SODIUM BLD-SCNC: 141 MMOL/L (ref 136–145)
SODIUM BLD-SCNC: 141 MMOL/L (ref 136–145)
SODIUM BLD-SCNC: 143 MMOL/L (ref 136–145)
SODIUM SERPL-SCNC: 139 MMOL/L (ref 136–145)
SPECIMEN SOURCE: ABNORMAL
SPECIMEN SOURCE: NORMAL

## 2022-07-12 PROCEDURE — 33361 REPLACE AORTIC VALVE PERQ: CPT | Performed by: THORACIC SURGERY (CARDIOTHORACIC VASCULAR SURGERY)

## 2022-07-12 PROCEDURE — C1769 GUIDE WIRE: HCPCS | Performed by: THORACIC SURGERY (CARDIOTHORACIC VASCULAR SURGERY)

## 2022-07-12 PROCEDURE — 82803 BLOOD GASES ANY COMBINATION: CPT

## 2022-07-12 PROCEDURE — 85014 HEMATOCRIT: CPT | Performed by: PHYSICIAN ASSISTANT

## 2022-07-12 PROCEDURE — 86900 BLOOD TYPING SEROLOGIC ABO: CPT | Performed by: THORACIC SURGERY (CARDIOTHORACIC VASCULAR SURGERY)

## 2022-07-12 PROCEDURE — C1894 INTRO/SHEATH, NON-LASER: HCPCS | Performed by: THORACIC SURGERY (CARDIOTHORACIC VASCULAR SURGERY)

## 2022-07-12 PROCEDURE — 80048 BASIC METABOLIC PNL TOTAL CA: CPT | Performed by: PHYSICIAN ASSISTANT

## 2022-07-12 PROCEDURE — 93306 TTE W/DOPPLER COMPLETE: CPT

## 2022-07-12 PROCEDURE — 85014 HEMATOCRIT: CPT

## 2022-07-12 PROCEDURE — 93355 ECHO TRANSESOPHAGEAL (TEE): CPT

## 2022-07-12 PROCEDURE — 71045 X-RAY EXAM CHEST 1 VIEW: CPT

## 2022-07-12 PROCEDURE — 02HV33Z INSERTION OF INFUSION DEVICE INTO SUPERIOR VENA CAVA, PERCUTANEOUS APPROACH: ICD-10-PCS | Performed by: STUDENT IN AN ORGANIZED HEALTH CARE EDUCATION/TRAINING PROGRAM

## 2022-07-12 PROCEDURE — C1751 CATH, INF, PER/CENT/MIDLINE: HCPCS | Performed by: THORACIC SURGERY (CARDIOTHORACIC VASCULAR SURGERY)

## 2022-07-12 PROCEDURE — 94760 N-INVAS EAR/PLS OXIMETRY 1: CPT

## 2022-07-12 PROCEDURE — 85049 AUTOMATED PLATELET COUNT: CPT | Performed by: PHYSICIAN ASSISTANT

## 2022-07-12 PROCEDURE — 33361 REPLACE AORTIC VALVE PERQ: CPT | Performed by: INTERNAL MEDICINE

## 2022-07-12 PROCEDURE — C1760 CLOSURE DEV, VASC: HCPCS | Performed by: THORACIC SURGERY (CARDIOTHORACIC VASCULAR SURGERY)

## 2022-07-12 PROCEDURE — 93005 ELECTROCARDIOGRAM TRACING: CPT

## 2022-07-12 PROCEDURE — 84295 ASSAY OF SERUM SODIUM: CPT

## 2022-07-12 PROCEDURE — 82948 REAGENT STRIP/BLOOD GLUCOSE: CPT

## 2022-07-12 PROCEDURE — 82947 ASSAY GLUCOSE BLOOD QUANT: CPT

## 2022-07-12 PROCEDURE — 85018 HEMOGLOBIN: CPT | Performed by: PHYSICIAN ASSISTANT

## 2022-07-12 PROCEDURE — 82330 ASSAY OF CALCIUM: CPT

## 2022-07-12 PROCEDURE — 86901 BLOOD TYPING SEROLOGIC RH(D): CPT | Performed by: THORACIC SURGERY (CARDIOTHORACIC VASCULAR SURGERY)

## 2022-07-12 PROCEDURE — 02RF38Z REPLACEMENT OF AORTIC VALVE WITH ZOOPLASTIC TISSUE, PERCUTANEOUS APPROACH: ICD-10-PCS | Performed by: THORACIC SURGERY (CARDIOTHORACIC VASCULAR SURGERY)

## 2022-07-12 PROCEDURE — 84132 ASSAY OF SERUM POTASSIUM: CPT

## 2022-07-12 PROCEDURE — 85347 COAGULATION TIME ACTIVATED: CPT

## 2022-07-12 PROCEDURE — NC001 PR NO CHARGE: Performed by: PHYSICIAN ASSISTANT

## 2022-07-12 DEVICE — PERCLOSE™ PROSTYLE™ SUTURE-MEDIATED CLOSURE AND REPAIR SYSTEM
Type: IMPLANTABLE DEVICE | Site: GROIN | Status: FUNCTIONAL
Brand: PERCLOSE™ PROSTYLE™

## 2022-07-12 DEVICE — VALVE TAVR SAPIEN 3 ULTRA W/ CMNDR DLV SYS 26MM: Type: IMPLANTABLE DEVICE | Site: HEART | Status: FUNCTIONAL

## 2022-07-12 RX ORDER — ONDANSETRON 2 MG/ML
4 INJECTION INTRAMUSCULAR; INTRAVENOUS EVERY 6 HOURS PRN
Status: DISCONTINUED | OUTPATIENT
Start: 2022-07-12 | End: 2022-07-13 | Stop reason: HOSPADM

## 2022-07-12 RX ORDER — PANTOPRAZOLE SODIUM 40 MG/1
40 TABLET, DELAYED RELEASE ORAL DAILY
Status: DISCONTINUED | OUTPATIENT
Start: 2022-07-12 | End: 2022-07-13 | Stop reason: HOSPADM

## 2022-07-12 RX ORDER — ONDANSETRON 2 MG/ML
4 INJECTION INTRAMUSCULAR; INTRAVENOUS ONCE AS NEEDED
Status: DISCONTINUED | OUTPATIENT
Start: 2022-07-12 | End: 2022-07-12 | Stop reason: HOSPADM

## 2022-07-12 RX ORDER — CEFAZOLIN SODIUM 2 G/50ML
SOLUTION INTRAVENOUS AS NEEDED
Status: DISCONTINUED | OUTPATIENT
Start: 2022-07-12 | End: 2022-07-12

## 2022-07-12 RX ORDER — ONDANSETRON 2 MG/ML
INJECTION INTRAMUSCULAR; INTRAVENOUS AS NEEDED
Status: DISCONTINUED | OUTPATIENT
Start: 2022-07-12 | End: 2022-07-12

## 2022-07-12 RX ORDER — CLOPIDOGREL BISULFATE 75 MG/1
75 TABLET ORAL DAILY
Status: DISCONTINUED | OUTPATIENT
Start: 2022-07-12 | End: 2022-07-13 | Stop reason: HOSPADM

## 2022-07-12 RX ORDER — INSULIN LISPRO 100 [IU]/ML
1-6 INJECTION, SOLUTION INTRAVENOUS; SUBCUTANEOUS
Status: DISCONTINUED | OUTPATIENT
Start: 2022-07-12 | End: 2022-07-13 | Stop reason: HOSPADM

## 2022-07-12 RX ORDER — CEFAZOLIN SODIUM 2 G/50ML
2000 SOLUTION INTRAVENOUS EVERY 8 HOURS
Status: COMPLETED | OUTPATIENT
Start: 2022-07-12 | End: 2022-07-13

## 2022-07-12 RX ORDER — LIDOCAINE HYDROCHLORIDE 20 MG/ML
INJECTION, SOLUTION EPIDURAL; INFILTRATION; INTRACAUDAL; PERINEURAL AS NEEDED
Status: DISCONTINUED | OUTPATIENT
Start: 2022-07-12 | End: 2022-07-12

## 2022-07-12 RX ORDER — SODIUM CHLORIDE 9 MG/ML
INJECTION, SOLUTION INTRAVENOUS CONTINUOUS PRN
Status: DISCONTINUED | OUTPATIENT
Start: 2022-07-12 | End: 2022-07-12

## 2022-07-12 RX ORDER — DEXAMETHASONE SODIUM PHOSPHATE 10 MG/ML
INJECTION, SOLUTION INTRAMUSCULAR; INTRAVENOUS AS NEEDED
Status: DISCONTINUED | OUTPATIENT
Start: 2022-07-12 | End: 2022-07-12

## 2022-07-12 RX ORDER — ACETAMINOPHEN 325 MG/1
650 TABLET ORAL EVERY 4 HOURS PRN
Status: DISCONTINUED | OUTPATIENT
Start: 2022-07-12 | End: 2022-07-13 | Stop reason: HOSPADM

## 2022-07-12 RX ORDER — FENTANYL CITRATE 50 UG/ML
INJECTION, SOLUTION INTRAMUSCULAR; INTRAVENOUS AS NEEDED
Status: DISCONTINUED | OUTPATIENT
Start: 2022-07-12 | End: 2022-07-12

## 2022-07-12 RX ORDER — CEFAZOLIN SODIUM 2 G/50ML
2000 SOLUTION INTRAVENOUS ONCE
Status: DISCONTINUED | OUTPATIENT
Start: 2022-07-12 | End: 2022-07-12 | Stop reason: HOSPADM

## 2022-07-12 RX ORDER — POLYETHYLENE GLYCOL 3350 17 G/17G
17 POWDER, FOR SOLUTION ORAL DAILY
Status: DISCONTINUED | OUTPATIENT
Start: 2022-07-13 | End: 2022-07-13 | Stop reason: HOSPADM

## 2022-07-12 RX ORDER — SODIUM CHLORIDE, SODIUM LACTATE, POTASSIUM CHLORIDE, CALCIUM CHLORIDE 600; 310; 30; 20 MG/100ML; MG/100ML; MG/100ML; MG/100ML
100 INJECTION, SOLUTION INTRAVENOUS CONTINUOUS
Status: DISCONTINUED | OUTPATIENT
Start: 2022-07-12 | End: 2022-07-13 | Stop reason: HOSPADM

## 2022-07-12 RX ORDER — HEPARIN SODIUM 5000 [USP'U]/ML
5000 INJECTION, SOLUTION INTRAVENOUS; SUBCUTANEOUS EVERY 8 HOURS SCHEDULED
Status: DISCONTINUED | OUTPATIENT
Start: 2022-07-13 | End: 2022-07-13 | Stop reason: HOSPADM

## 2022-07-12 RX ORDER — ESMOLOL HYDROCHLORIDE 10 MG/ML
INJECTION INTRAVENOUS AS NEEDED
Status: DISCONTINUED | OUTPATIENT
Start: 2022-07-12 | End: 2022-07-12

## 2022-07-12 RX ORDER — HEPARIN SODIUM 1000 [USP'U]/ML
INJECTION, SOLUTION INTRAVENOUS; SUBCUTANEOUS AS NEEDED
Status: DISCONTINUED | OUTPATIENT
Start: 2022-07-12 | End: 2022-07-12

## 2022-07-12 RX ORDER — CHLORHEXIDINE GLUCONATE 0.12 MG/ML
15 RINSE ORAL 2 TIMES DAILY
Status: DISCONTINUED | OUTPATIENT
Start: 2022-07-12 | End: 2022-07-13 | Stop reason: HOSPADM

## 2022-07-12 RX ORDER — ATORVASTATIN CALCIUM 20 MG/1
20 TABLET, FILM COATED ORAL
Status: DISCONTINUED | OUTPATIENT
Start: 2022-07-12 | End: 2022-07-13 | Stop reason: HOSPADM

## 2022-07-12 RX ORDER — SODIUM CHLORIDE, SODIUM LACTATE, POTASSIUM CHLORIDE, CALCIUM CHLORIDE 600; 310; 30; 20 MG/100ML; MG/100ML; MG/100ML; MG/100ML
INJECTION, SOLUTION INTRAVENOUS CONTINUOUS PRN
Status: DISCONTINUED | OUTPATIENT
Start: 2022-07-12 | End: 2022-07-12

## 2022-07-12 RX ORDER — PROPOFOL 10 MG/ML
INJECTION, EMULSION INTRAVENOUS AS NEEDED
Status: DISCONTINUED | OUTPATIENT
Start: 2022-07-12 | End: 2022-07-12

## 2022-07-12 RX ORDER — ACETAMINOPHEN 650 MG/1
650 SUPPOSITORY RECTAL EVERY 4 HOURS PRN
Status: DISCONTINUED | OUTPATIENT
Start: 2022-07-12 | End: 2022-07-13 | Stop reason: HOSPADM

## 2022-07-12 RX ORDER — ROCURONIUM BROMIDE 10 MG/ML
INJECTION, SOLUTION INTRAVENOUS AS NEEDED
Status: DISCONTINUED | OUTPATIENT
Start: 2022-07-12 | End: 2022-07-12

## 2022-07-12 RX ORDER — CHLORHEXIDINE GLUCONATE 0.12 MG/ML
15 RINSE ORAL ONCE
Status: COMPLETED | OUTPATIENT
Start: 2022-07-12 | End: 2022-07-12

## 2022-07-12 RX ORDER — SODIUM CHLORIDE, SODIUM GLUCONATE, SODIUM ACETATE, POTASSIUM CHLORIDE, MAGNESIUM CHLORIDE, SODIUM PHOSPHATE, DIBASIC, AND POTASSIUM PHOSPHATE .53; .5; .37; .037; .03; .012; .00082 G/100ML; G/100ML; G/100ML; G/100ML; G/100ML; G/100ML; G/100ML
50 INJECTION, SOLUTION INTRAVENOUS CONTINUOUS
Status: DISPENSED | OUTPATIENT
Start: 2022-07-12 | End: 2022-07-12

## 2022-07-12 RX ORDER — PROTAMINE SULFATE 10 MG/ML
INJECTION, SOLUTION INTRAVENOUS AS NEEDED
Status: DISCONTINUED | OUTPATIENT
Start: 2022-07-12 | End: 2022-07-12

## 2022-07-12 RX ORDER — INSULIN LISPRO 100 [IU]/ML
1-5 INJECTION, SOLUTION INTRAVENOUS; SUBCUTANEOUS
Status: DISCONTINUED | OUTPATIENT
Start: 2022-07-12 | End: 2022-07-13 | Stop reason: HOSPADM

## 2022-07-12 RX ORDER — ASPIRIN 81 MG/1
81 TABLET, CHEWABLE ORAL DAILY
Status: DISCONTINUED | OUTPATIENT
Start: 2022-07-12 | End: 2022-07-13 | Stop reason: HOSPADM

## 2022-07-12 RX ORDER — FENTANYL CITRATE/PF 50 MCG/ML
50 SYRINGE (ML) INJECTION
Status: DISCONTINUED | OUTPATIENT
Start: 2022-07-12 | End: 2022-07-12 | Stop reason: HOSPADM

## 2022-07-12 RX ORDER — BISACODYL 10 MG
10 SUPPOSITORY, RECTAL RECTAL DAILY PRN
Status: DISCONTINUED | OUTPATIENT
Start: 2022-07-12 | End: 2022-07-13 | Stop reason: HOSPADM

## 2022-07-12 RX ADMIN — CHLORHEXIDINE GLUCONATE 15 ML: 1.2 SOLUTION ORAL at 17:30

## 2022-07-12 RX ADMIN — Medication 12.5 MG: at 21:24

## 2022-07-12 RX ADMIN — CLOPIDOGREL BISULFATE 75 MG: 75 TABLET ORAL at 12:01

## 2022-07-12 RX ADMIN — SODIUM CHLORIDE, SODIUM GLUCONATE, SODIUM ACETATE, POTASSIUM CHLORIDE, MAGNESIUM CHLORIDE, SODIUM PHOSPHATE, DIBASIC, AND POTASSIUM PHOSPHATE 50 ML/HR: .53; .5; .37; .037; .03; .012; .00082 INJECTION, SOLUTION INTRAVENOUS at 09:26

## 2022-07-12 RX ADMIN — SODIUM CHLORIDE: 0.9 INJECTION, SOLUTION INTRAVENOUS at 07:32

## 2022-07-12 RX ADMIN — FENTANYL CITRATE 50 MCG: 50 INJECTION INTRAMUSCULAR; INTRAVENOUS at 07:27

## 2022-07-12 RX ADMIN — MUPIROCIN 1 APPLICATION: 20 OINTMENT TOPICAL at 21:24

## 2022-07-12 RX ADMIN — CHLORHEXIDINE GLUCONATE 15 ML: 1.2 SOLUTION ORAL at 05:52

## 2022-07-12 RX ADMIN — ATORVASTATIN CALCIUM 20 MG: 20 TABLET, FILM COATED ORAL at 17:30

## 2022-07-12 RX ADMIN — ESMOLOL HYDROCHLORIDE 30 MG: 100 INJECTION, SOLUTION INTRAVENOUS at 08:17

## 2022-07-12 RX ADMIN — FENTANYL CITRATE 50 MCG: 50 INJECTION INTRAMUSCULAR; INTRAVENOUS at 08:32

## 2022-07-12 RX ADMIN — SUGAMMADEX 160 MG: 100 INJECTION, SOLUTION INTRAVENOUS at 08:22

## 2022-07-12 RX ADMIN — ACETAMINOPHEN 650 MG: 325 TABLET, FILM COATED ORAL at 12:03

## 2022-07-12 RX ADMIN — CEFAZOLIN SODIUM 2000 MG: 2 SOLUTION INTRAVENOUS at 07:47

## 2022-07-12 RX ADMIN — ONDANSETRON 4 MG: 2 INJECTION INTRAMUSCULAR; INTRAVENOUS at 08:29

## 2022-07-12 RX ADMIN — FENTANYL CITRATE 50 MCG: 50 INJECTION INTRAMUSCULAR; INTRAVENOUS at 08:37

## 2022-07-12 RX ADMIN — FENTANYL CITRATE 50 MCG: 50 INJECTION INTRAMUSCULAR; INTRAVENOUS at 07:23

## 2022-07-12 RX ADMIN — SODIUM CHLORIDE, SODIUM LACTATE, POTASSIUM CHLORIDE, AND CALCIUM CHLORIDE: .6; .31; .03; .02 INJECTION, SOLUTION INTRAVENOUS at 07:15

## 2022-07-12 RX ADMIN — DEXAMETHASONE SODIUM PHOSPHATE 8 MG: 10 INJECTION, SOLUTION INTRAMUSCULAR; INTRAVENOUS at 08:02

## 2022-07-12 RX ADMIN — PROTAMINE SULFATE 20 MG: 10 INJECTION, SOLUTION INTRAVENOUS at 08:19

## 2022-07-12 RX ADMIN — ASPIRIN 81 MG CHEWABLE TABLET 81 MG: 81 TABLET CHEWABLE at 12:01

## 2022-07-12 RX ADMIN — LIDOCAINE HYDROCHLORIDE 100 MG: 20 INJECTION, SOLUTION EPIDURAL; INFILTRATION; INTRACAUDAL; PERINEURAL at 07:32

## 2022-07-12 RX ADMIN — PROPOFOL 50 MG: 10 INJECTION, EMULSION INTRAVENOUS at 07:34

## 2022-07-12 RX ADMIN — PROTAMINE SULFATE 30 MG: 10 INJECTION, SOLUTION INTRAVENOUS at 08:20

## 2022-07-12 RX ADMIN — ROCURONIUM BROMIDE 50 MG: 10 INJECTION INTRAVENOUS at 07:32

## 2022-07-12 RX ADMIN — PANTOPRAZOLE SODIUM 40 MG: 40 TABLET, DELAYED RELEASE ORAL at 12:01

## 2022-07-12 RX ADMIN — NICARDIPINE HYDROCHLORIDE 5 MG/HR: 2.5 INJECTION, SOLUTION INTRAVENOUS at 08:16

## 2022-07-12 RX ADMIN — MUPIROCIN 1 APPLICATION: 20 OINTMENT TOPICAL at 05:52

## 2022-07-12 RX ADMIN — PROPOFOL 50 MG: 10 INJECTION, EMULSION INTRAVENOUS at 07:32

## 2022-07-12 RX ADMIN — HEPARIN SODIUM 11000 UNITS: 1000 INJECTION INTRAVENOUS; SUBCUTANEOUS at 08:06

## 2022-07-12 RX ADMIN — CEFAZOLIN SODIUM 2000 MG: 2 SOLUTION INTRAVENOUS at 17:30

## 2022-07-12 NOTE — INTERVAL H&P NOTE
H&P reviewed  After examining the patient I find no changes in the patients condition since the H&P had been written  Vitals:    07/12/22 0547   BP: 130/75   Pulse: 68   Resp: 20   Temp: (!) 97 1 °F (36 2 °C)   SpO2: 98%     Anticipated Length of Stay:  Patient will be admitted on an Inpatient basis with an anticipated length of stay of  greater than 2 midnights  Justification for Hospital Stay:  Post surgical recovery following open heart surgery

## 2022-07-12 NOTE — ANESTHESIA PROCEDURE NOTES
Arterial Line Insertion  Performed by: Yasmine Cordero MD  Authorized by: Yasmine Cordero MD   Consent: Verbal consent obtained  Written consent obtained  Risks and benefits: risks, benefits and alternatives were discussed  Consent given by: patient  Patient understanding: patient states understanding of the procedure being performed  Patient consent: the patient's understanding of the procedure matches consent given  Procedure consent: procedure consent matches procedure scheduled  Required items: required blood products, implants, devices, and special equipment available  Patient identity confirmed: verbally with patient, arm band and provided demographic data  Preparation: Patient was prepped and draped in the usual sterile fashion  Indications: hemodynamic monitoring  Orientation:  Left  Location: radial artery  Sedation:  Patient sedated: yes  Analgesia: see MAR for details  Vitals: Vital signs were monitored during sedation  Procedure Details:  Needle gauge: 20  Seldinger technique: Seldinger technique used  Number of attempts: 1    Post-procedure:  Post-procedure: dressing applied  Waveform: good waveform  Post-procedure CNS: normal and unchanged  Patient tolerance: Patient tolerated the procedure well with no immediate complications  Comments: Single skin and vessel puncture  Easy thread of wires  No apparent hematoma or complications  Ultrasound guided

## 2022-07-12 NOTE — CONSULTS
Consultation - Raad Gutierrez 80 y o  male MRN: [id number]  Unit/Bed#: [facility] 410-01 Encounter: [id number]      Assessment/Plan  Aortic stenosis  S/p TAVR  CT surgery managing    Frailty   Clinical Frail Scale: 4- Vulnerable  Not dependent for daily help, symptoms limit activity  Feeling slowed up, tired during the day  PT/OT  Albumin 4 1  Keep physically, mentally and socially active    Cognitive screening  No reported memory loss  TSH 0 483 (3/29/22)  Recommend check Vitamin B12  maintain neuro protective lifestyle :   Keep physically, mentally and socially active   Maintain  physical exercise- likes to golf   Recommend Mediterranean diet   Monitor good control of blood pressure, blood sugar and cholestrerol      Fall prevention  Recommend fall prevention/ balance training such as Matter of Balance or Jonah Chi or yoga  Keep physically active, pt likes to golf  Fall prevention hand outs from cdc gov/steadi given    Insomnia  Related to hospitalization  First line is behavioral therapy  Avoid sedative hypnotics such as benzodiazepines and benadryl  Encourage staying awake during the day  Encourage daytime activity, morning exercise  Decrease or eliminate day time naps  Avoid caffiene, alcohol especially during late afternoon and evening hours  Establish a night time routine  Recommend melatonin 3mg po QHS      Delirium precautions  Alert and oriented x 3  Avoid deliriogenic medications such as tramadol, benzodiazepines, anticholinergics,  Benadryl  Treat pain, See geriatric pain protocol  Monitor for constipation and urinary retention  Encourage early and frequent moblization, OOB  Encourage Hydration/ Nutrition  Implement sleep hygiene, limit night time interuptions, group activities    Advanced care planning  Full code           History of Present Illness   Physician Requesting Consult: Sg Patel MD  Reason for Consult / Principal Problem: aortic stenosis  Hx and PE limited by: NA  HPI: Danette Duque Kalpana Mehta is a 80y o  year old male who presents with aortic stenosis  He is admitted with transcatheter aortic valve replacement surgery  He has hx of leukemia, BCC, chemotherapy    Prior to arrival he lives at home alone  He is independent with IADLs and ADLS  He ambulates independently  He denies issues with vision, hearing or dentition  He denies issues with urination or constipation  He likes to golf  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  He visits often with his grandchildren  Upon exam pt is lying in bed  He is alert and oriented x 3  Inpatient consult to Gerontology  Consult performed by: CANDE Denton  Consult ordered by: Nayeli Downing PA-C          Review of Systems   Constitutional: Negative for unexpected weight change  HENT: Negative for hearing loss  Eyes: Negative for visual disturbance  Respiratory: Negative for cough  Cardiovascular: Negative for chest pain  Gastrointestinal: Negative for constipation  Genitourinary: Negative for difficulty urinating  Musculoskeletal: Negative for gait problem  Neurological: Negative for weakness  Psychiatric/Behavioral: Negative for sleep disturbance  Historical Information   Past Medical History:   Diagnosis Date    Basal cell carcinoma (BCC) of skin of nose     History of chemotherapy     Lymphocytic leukemia ([facility] [address] )      Past Surgical History:   Procedure Laterality Date    CARDIAC CATHETERIZATION N/A 6/15/2022    Procedure: Cardiac Coronary Angiogram;  Surgeon: Ondina Denson MD;  Location: [facility] CARD[facility]AC CATH [facility];   Service: Cardiology    CATARACT EXTRACTION Bilateral     FLAP LOCAL HEAD / NECK N/A 4/14/2020    Procedure: ADJACENT TISSUE TRANSFER  FOREHEAD FLAP,  CARTILAGE GRAFT NOSE;  Surgeon: Soila Barraza MD;  Location: [facility] MAIN OR;  Service: Plastics    FLAP LOCAL HEAD / NECK N/A 6/17/2020    Procedure: DIVISION INSET FOREHEAD FLAP; FULL THICKNESS SKIN GRAFT TO FOREHEAD;  Surgeon: Magda Bernstein Dixie Sinclair MD;  Location: [facility] MAIN OR;  Service: Plastics    FULL THICKNESS SKIN GRAFT N/A 4/14/2020    Procedure: SKIN GRAFT FULL THICKNESS  (FTSG) NOSE;  Surgeon: Cady Cardenas MD;  Location: [facility] MAIN OR;  Service: Plastics    MOHS RECONSTRUCTION N/A 4/14/2020    Procedure: MOHS RECONSTRUCTION NOSE DEFECT;  Surgeon: Cady Cardenas MD;  Location: [facility] MAIN OR;  Service: Plastics    MOHS RECONSTRUCTION N/A 5/4/2020    Procedure: RECONSTRUCTION MOHS NASAL DEFECT WITH EAR CARTILAGE GRAFT;  Surgeon: Cady Cardenas MD;  Location: [facility] MAIN OR;  Service: Plastics    SHOULDER SURGERY      TIBIA FRACTURE SURGERY      TONSILLECTOMY       Social History   Social History     Substance and Sexual Activity   Alcohol Use Yes    Comment: rare     Social History     Substance and Sexual Activity   Drug Use Never     Social History     Tobacco Use   Smoking Status Never Smoker   Smokeless Tobacco Never Used         Family History:   Family History   Problem Relation Age of Onset    No Known Problems Mother     No Known Problems Father        Meds/Allergies   Current meds:   Current Facility-Administered Medications   Medication Dose Route Frequency    acetaminophen (TYLENOL) rectal suppository 650 mg  650 mg Rectal Q4H PRN    acetaminophen (TYLENOL) tablet 650 mg  650 mg Oral Q4H PRN    aspirin chewable tablet 81 mg  81 mg Oral Daily    atorvastatin (LIPITOR) tablet 20 mg  20 mg Oral Daily With Dinner    bisacodyl (DULCOLAX) rectal suppository 10 mg  10 mg Rectal Daily PRN    ceFAZolin (ANCEF) IVPB (premix in dextrose) 2,000 mg 50 mL  2,000 mg Intravenous Q8H    chlorhexidine (PERIDEX) 0 12 % oral rinse 15 mL  15 mL Mouth/Throat BID    clopidogrel (PLAVIX) tablet 75 mg  75 mg Oral Daily    [START ON 7/13/2022] heparin (porcine) subcutaneous injection 5,000 Units  5,000 Units Subcutaneous Q8H [facility]    insulin lispro (HumaLOG) 100 units/mL subcutaneous injection 1-5 Units  1-5 Units Subcutaneous HS    insulin lispro (HumaLOG) 100 units/mL subcutaneous injection 1-6 Units  1-6 Units Subcutaneous TID AC    lactated ringers infusion  100 mL/hr Intravenous Continuous    metoprolol tartrate (LOPRESSOR) partial tablet 12 5 mg  12 5 mg Oral Q12H [facility]    multi-electrolyte (PLASMALYTE-A/ISOLYTE-S PH 7 4) IV solution  50 mL/hr Intravenous Continuous    mupirocin (BACTROBAN) 2 % nasal ointment 1 application  1 application Nasal I08H [facility]    niCARdipine (CARDENE) 25 mg (STANDARD CONCENTRATION) in sodium chloride 0 9% 250 mL  1-15 mg/hr Intravenous Titrated    ondansetron (ZOFRAN) injection 4 mg  4 mg Intravenous Q6H PRN    pantoprazole (PROTONIX) EC tablet 40 mg  40 mg Oral Daily    [START ON 7/13/2022] polyethylene glycol (MIRALAX) packet 17 g  17 g Oral Daily      Current PTA meds:  Medications Prior to Admission   Medication    Ascorbic Acid (VITAMIN C PO)    fexofenadine-pseudoephedrine (ALLEGRA-D 24) 180-240 MG per 24 hr tablet    multivitamin (THERAGRAN) TABS    mupirocin (BACTROBAN) 2 % ointment    VITAMIN D PO    VITAMIN E PO    chlorhexidine (PERIDEX) 0 12 % solution        No Known Allergies    Objective   Vitals: Blood pressure 120/65, pulse 76, temperature 97 7 °F (36 5 °C), temperature source Oral, resp  rate 18, height 5' 9" (1 753 m), weight 78 9 kg (174 lb), SpO2 97 %  ,Body mass index is 25 7 kg/m²  Physical Exam  Vitals and nursing note reviewed  HENT:      Head: Normocephalic  Nose: No congestion  Mouth/Throat:      Mouth: Mucous membranes are moist    Eyes:      General:         Right eye: No discharge  Left eye: No discharge  Cardiovascular:      Rate and Rhythm: Normal rate and regular rhythm  Pulses: Normal pulses  Pulmonary:      Effort: Pulmonary effort is normal       Breath sounds: Normal breath sounds  Abdominal:      General: Bowel sounds are normal       Palpations: Abdomen is soft  Musculoskeletal:         General: Normal range of motion  Cervical back: Normal range of motion  Skin:     General: Skin is warm and dry  Neurological:      Mental Status: He is alert and oriented to person, place, and time  Mental status is at baseline  Psychiatric:         Mood and Affect: Mood normal          Lab Results:   Results from last 7 days   Lab Units 07/12/22  0854   HEMOGLOBIN g/dL 8 3*   HEMATOCRIT % 28 8*   PLATELETS Thousands/uL 107*        Results from last 7 days   Lab Units 07/12/22  0854 07/12/22  0840   POTASSIUM mmol/L 5 2  --    CHLORIDE mmol/L 115*  --    CO2 mmol/L 22  --    CO2, I-STAT mmol/L  --  23   BUN mg/dL 25  --    CREATININE mg/dL 1 18  --    CALCIUM mg/dL 8 3  --    GLUCOSE, ISTAT mg/dl  --  110       Imaging Studies: I have personally reviewed pertinent reports  EKG, Pathology, and Other Studies: I have personally reviewed pertinent reports      VTE Prophylaxis: Sequential compression device (Venodyne)     Code Status: Level 1 - Full Code

## 2022-07-12 NOTE — OP NOTE
OPERATIVE REPORT  PATIENT NAME: Radha Bennett    :  1950  MRN: [id number]  Pt Location: [facility] HYBRID OR ROOM 02    SURGERY DATE: 2022    SURGEON: [facility] MANDA Herrera MD    CO-SURGEON: Chely Michelle MD    ASSISTANT: Christine Mclaughlin PA-C    ADDITIONAL ASSISTANT: N/A    PREOPERATIVE DIAGNOSIS: Symptomatic severe aortic stenosis  POSTOPERATIVE DIAGNOSIS: Symptomatic severe aortic stenosis  NYHA Class: 2  CCS Class: 1    PROCEDURE:   Transcatheter aortic valve replacement with a 26 mm Gu CLINT 3 bioprosthetic valve via left transfemoral approach  CARDIOPULMONARY BYPASS TIME: 0 minutes  ANESTHESIA Dr Camilo Dickson, general endotracheal anesthesia with transesophageal echocardiogram guidance  INDICATIONS:  The patient is a 80y o  year-old male with clinical and echocardiographic findings consistent with severe aortic stenosis  The patient was evaluated in our heart valve center, we recommended the procedure described previously  FINDINGS:  1  Intraoperative transesophageal echocardiogram revealed severe aortic stenosis  2  Final transesophageal echocardiogram demonstrated prosthetic valve with normal function trace perivalvular leak  OPERATIVE TECHNIQUE:    The patient was taken to the operating room and placed supine on the operating table  Following the satisfactory induction of general anesthesia and placement of monitoring lines, the patient was prepped and draped in the usual sterile fashion  A time-out procedure was performed  The left common femoral vein was accessed percutaneously using Seldinger technique and fluoroscopy, a balloon-tip temporary pacing catheter was inserted and advanced into the right ventricle and its capture was confirmed  The left common femoral artery was accessed percutaneously using Seldinger technique and fluoroscopy  Two (2) Perclose sutures were deployed in the standard fashion  The patient was systemically heparinized   A pigtail catheter was advanced to the right coronary cusp, an aortogram was performed to determine proper angle and orientation for valve deployment  A Lunderquist extra-stiff wire was positioned in the ascending aorta over an exchange catheter  The sheath for the delivery system was inserted  The stiff wire was removed over a catheter, the aortic valve was crossed with a 0 035 straight-tip wire, this was then exchanged for a curved tip extra stiff wire  A 26 mm CLINT 3 valve delivery system was advanced through the delivery sheath into the aorta, the delivery system was configured for deployment  The valve on the delivery system was then advanced and the aortic valve was crossed  At this point, the catheter was desheathed in the standard fashion  The valve was positioned appropriately using a combination of fluoroscopy and transesophageal echocardiogram guidance  During an episode of rapid pacing, balloon deployment of the valve was performed  Following deployment, the position of the valve was confirmed by fluoroscopy and echocardiography and its position appeared appropriate with trace perivalvular leak  The valve delivery system was subsequently removed followed by removal of the sheath while the Perclose sutures were secured and direct pressure was held  Protamine was administered with normalization of the ACT  Pressure was released, without evidence of active bleeding  The left common femoral vein sheath was removed and pressure was held  COMPLICATIONS: None    PACKS/TUBES/DRAINS: None  EBL: 50 cc  TRANSFUSION: None  SPECIMENS: None      As the attending surgeon, I was present and scrubbed for all critical portions of this procedure  There was no qualified surgical resident available  Sponge, needle and instrument counts were reported as correct by the nursing staff   A cardiology co-surgeon was required as is a CMS requirement      SIGNATURE: Lary Elizabeth MD  DATE: July 12, 2022  TIME: 9:09 AM

## 2022-07-12 NOTE — ANESTHESIA PREPROCEDURE EVALUATION
Procedure:  REPLACEMENT AORTIC VALVE TRANSCATHETER (TAVR) TRANSFEMORAL W/ 26MM LEE CLINT S3 ULTRA VALVE(ACCESS ON LEFT) PARISA (N/A Chest)  CARDIAC TAVR (N/A Chest)    Relevant Problems   CARDIO   (+) Aortic stenosis, severe   (+) Severe aortic stenosis      HEMATOLOGY   (+) Anemia   (+) Thrombocytopenia (HCC)      Other   (+) CLL (chronic lymphoid leukemia) in relapse (HCC)        Physical Exam    Airway    Mallampati score: II  TM Distance: >3 FB  Neck ROM: full     Dental       Cardiovascular      Pulmonary      Other Findings        Anesthesia Plan  ASA Score- 4     Anesthesia Type- general with ASA Monitors  Additional Monitors: arterial line and central venous line  Airway Plan: ETT  Comment: PARISA  Plan Factors-Exercise tolerance (METS): <4 METS  Chart reviewed  EKG reviewed  Imaging results reviewed  Existing labs reviewed  Patient summary reviewed  Patient is not a current smoker  Patient did not smoke on day of surgery  Obstructive sleep apnea risk education given perioperatively  Induction- intravenous  Postoperative Plan- Plan for postoperative opioid use  Planned trial extubation    Informed Consent- Anesthetic plan and risks discussed with patient  I personally reviewed this patient with the CRNA  Discussed and agreed on the Anesthesia Plan with the CRNA  [place] Risks/benefits and alternatives discussed with Rich including possible PONV, sore throat, damage to teeth/lips/gums, and possibility of rare anesthetic and surgical emergencies including but not limited to heart attack, stroke, and/or death  Patient has no history of dysphagia, diverticula, esophageal dysmotility, strictures, stents, or varices  Additional risks associated with PARISA probe placement discussed including but not limited to soft tissue injury to oropharynx, dental injury, thermal injury to esophagus, and/or perforation of pharynx or esophagus      Additional risks of central line placement discussed including but not limited to infection, bleeding, inadvertent arterial cannulation, and/or pneumothorax  Preinduction ABP; post-induction TLC and PAC; PARISA and ICU post-operatively

## 2022-07-12 NOTE — ANESTHESIA PROCEDURE NOTES
Procedure Performed: PARISA Anesthesia  Start Time:  7/12/2022 7:44 AM        Preanesthesia Checklist    Patient identified, IV assessed, risks and benefits discussed, monitors and equipment assessed, procedure being performed at surgeon's request and anesthesia consent obtained  Procedure    Diagnostic Indications for PARISA:  hemodynamic monitoring  Type of PARISA: interventional PARISA with real time guidance of intracardiac procedure  Images Saved: ultrasound permanent image saved  Physician Requesting Echo: Marce Seaman MD   Location performed: OR  Intubated  Bite block not placed  Heart visualized  Insertion of PARISA Probe:  Atraumatic  Probe Type:  Multiplane  Modalities:  2D only, color flow mapping, 3D, continuous wave Doppler and pulse wave Doppler  Echocardiographic and Doppler Measurements    PREPROCEDURE    LEFT VENTRICLE:  Systolic Function: normal  Ejection Fraction: 60%  Cavity size: normal        RIGHT VENTRICLE:  Systolic Function: normal   Cavity size normal               AORTIC VALVE:  Leaflets: trileaflet  Leaflet motions restricted  Stenosis: severe  Mean Gradient: 27 mmHg  Regurgitation: trace  MITRAL VALVE:  Leaflets: calcified and MAC present  Leaflet Motions: normal  Regurgitation: mild  Stenosis: none  TRICUSPID VALVE:  Leaflets: normal  Leaflet Motions: normal   Regurgitation: trace  PULMONIC VALVE:  Leaflets: normal           ASCENDING AORTA:  Size:  normal   Dissection not present  AORTIC ARCH:  Size:  normal   dissection not present  Grade 3: atheroma protruding < 0 5 cm into lumen  DESCENDING AORTA:  Size: normal   Dissection not present  Grade 3: atheroma protruding < 0 5 cm into lumen          RIGHT ATRIUM:  Size:  normal  No spontaneous echo contrast     LEFT ATRIUM:  Size: normal  No spontaneous echo contrast     LEFT ATRIAL APPENDAGE:  Size: normal  No spontaneous echo contrast         ATRIAL SEPTUM:  Intra-atrial septal morphology: normal  VENTRICULAR SEPTUM:  Intra-ventricular septum morphology: normal              OTHER FINDINGS:  Pericardium:  normal  Pleural Effusion:  none  POSTPROCEDURE    LEFT VENTRICLE: Unchanged   Ejection Fraction: 60 %  RIGHT VENTRICLE: Unchanged   AORTIC VALVE:   Leaflets: bioprosthetic  Stenosis: none  Mean Gradient: 5 mmHg  Regurgitation: none  Valve Size: 26 mm  MITRAL VALVE:   Leaflets: native  Regurgitation: trace  TRICUSPID VALVE: Unchanged   PULMONIC VALVE: Unchanged             ATRIA: Unchanged   AORTA: Unchanged   Dissection: Dissection not present  REMOVAL:  Probe Removal: atraumatic  ECHOCARDIOGRAM COMMENTS:  Post TAVR: NAMITA 3 05 cm^2, mean gradient 5 mmHg, trace PVL x1

## 2022-07-13 ENCOUNTER — APPOINTMENT (INPATIENT)
Dept: RADIOLOGY | Facility: HOSPITAL | Age: 72
DRG: 267 | End: 2022-07-13
Payer: MEDICARE

## 2022-07-13 VITALS
DIASTOLIC BLOOD PRESSURE: 56 MMHG | HEIGHT: 69 IN | SYSTOLIC BLOOD PRESSURE: 113 MMHG | RESPIRATION RATE: 18 BRPM | WEIGHT: 177.6 LBS | HEART RATE: 69 BPM | OXYGEN SATURATION: 99 % | TEMPERATURE: 98 F | BODY MASS INDEX: 26.31 KG/M2

## 2022-07-13 LAB
ABO GROUP BLD BPU: NORMAL
ANION GAP SERPL CALCULATED.3IONS-SCNC: 5 MMOL/L (ref 4–13)
AORTIC ROOT: 1.8 CM
AORTIC VALVE MEAN VELOCITY: 12.3 M/S
APICAL FOUR CHAMBER EJECTION FRACTION: 66 %
ASCENDING AORTA: 3.2 CM
ATRIAL RATE: 74 BPM
ATRIAL RATE: 75 BPM
ATRIAL RATE: 78 BPM
ATRIAL RATE: 78 BPM
AV AREA BY CONTINUOUS VTI: 2.2 CM2
AV AREA PEAK VELOCITY: 2 CM2
AV LVOT MEAN GRADIENT: 4.8 MMHG
AV LVOT PEAK GRADIENT: 9 MMHG
AV MEAN GRADIENT: 7.7 MMHG
AV PEAK GRADIENT: 15 MMHG
AV VALVE AREA: 2.18 CM2
AV VELOCITY RATIO: 0.79
BPU ID: NORMAL
BUN SERPL-MCNC: 23 MG/DL (ref 5–25)
CALCIUM SERPL-MCNC: 9 MG/DL (ref 8.3–10.1)
CHLORIDE SERPL-SCNC: 108 MMOL/L (ref 100–108)
CO2 SERPL-SCNC: 25 MMOL/L (ref 21–32)
CREAT SERPL-MCNC: 1.22 MG/DL (ref 0.6–1.3)
CROSSMATCH: NORMAL
DOP CALC AO PEAK VEL: 1.9 M/S
DOP CALC AO VTI: 40.72 CM
DOP CALC LVOT AREA: 2.54 CM2
DOP CALC LVOT DIAMETER: 1.8 CM
DOP CALC LVOT PEAK VEL VTI: 34.91 CM
DOP CALC LVOT PEAK VEL: 1.5 M/S
DOP CALC LVOT STROKE INDEX: 47.7 ML/M2
DOP CALC LVOT STROKE VOLUME: 88.79
E WAVE DECELERATION TIME: 317 MS
ERYTHROCYTE [DISTWIDTH] IN BLOOD BY AUTOMATED COUNT: 14.5 % (ref 11.6–15.1)
FRACTIONAL SHORTENING: 34 (ref 28–44)
GFR SERPL CREATININE-BSD FRML MDRD: 59 ML/MIN/1.73SQ M
GLUCOSE SERPL-MCNC: 108 MG/DL (ref 65–140)
GLUCOSE SERPL-MCNC: 108 MG/DL (ref 65–140)
GLUCOSE SERPL-MCNC: 118 MG/DL (ref 65–140)
HCT VFR BLD AUTO: 30.1 % (ref 36.5–49.3)
HGB BLD-MCNC: 8.7 G/DL (ref 12–17)
INTERVENTRICULAR SEPTUM IN DIASTOLE (PARASTERNAL SHORT AXIS VIEW): 0.8 CM
INTERVENTRICULAR SEPTUM: 0.8 CM (ref 0.6–1.1)
LAAS-AP2: 21.5 CM2
LAAS-AP4: 25.1 CM2
LEFT ATRIUM SIZE: 3.7 CM
LEFT INTERNAL DIMENSION IN SYSTOLE: 3.1 CM (ref 2.1–4)
LEFT VENTRICULAR INTERNAL DIMENSION IN DIASTOLE: 4.7 CM (ref 3.5–6)
LEFT VENTRICULAR POSTERIOR WALL IN END DIASTOLE: 1 CM
LEFT VENTRICULAR STROKE VOLUME: 63 ML
LVSV (TEICH): 63 ML
MAGNESIUM SERPL-MCNC: 2.4 MG/DL (ref 1.6–2.6)
MCH RBC QN AUTO: 29.7 PG (ref 26.8–34.3)
MCHC RBC AUTO-ENTMCNC: 28.9 G/DL (ref 31.4–37.4)
MCV RBC AUTO: 103 FL (ref 82–98)
MV E'TISSUE VEL-SEP: 8 CM/S
MV PEAK A VEL: 1.18 M/S
MV PEAK E VEL: 102 CM/S
MV STENOSIS PRESSURE HALF TIME: 92 MS
MV VALVE AREA P 1/2 METHOD: 2.39
P AXIS: 63 DEGREES
P AXIS: 79 DEGREES
P AXIS: 82 DEGREES
PLATELET # BLD AUTO: 117 THOUSANDS/UL (ref 149–390)
PMV BLD AUTO: 8.7 FL (ref 8.9–12.7)
POTASSIUM SERPL-SCNC: 4.1 MMOL/L (ref 3.5–5.3)
PR INTERVAL: 199 MS
PR INTERVAL: 202 MS
PR INTERVAL: 202 MS
PR INTERVAL: 210 MS
QRS AXIS: 68 DEGREES
QRS AXIS: 82 DEGREES
QRS AXIS: 84 DEGREES
QRS AXIS: 85 DEGREES
QRSD INTERVAL: 76 MS
QRSD INTERVAL: 78 MS
QRSD INTERVAL: 82 MS
QRSD INTERVAL: 88 MS
QT INTERVAL: 375 MS
QT INTERVAL: 386 MS
QT INTERVAL: 400 MS
QT INTERVAL: 408 MS
QTC INTERVAL: 419 MS
QTC INTERVAL: 440 MS
QTC INTERVAL: 444 MS
QTC INTERVAL: 465 MS
RBC # BLD AUTO: 2.93 MILLION/UL (ref 3.88–5.62)
RIGHT ATRIUM AREA SYSTOLE A4C: 12.6 CM2
RIGHT VENTRICLE ID DIMENSION: 3.8 CM
SL CV LEFT ATRIUM LENGTH A2C: 6.2 CM
SL CV LV EF: 65
SL CV PED ECHO LEFT VENTRICLE DIASTOLIC VOLUME (MOD BIPLANE) 2D: 102 ML
SL CV PED ECHO LEFT VENTRICLE SYSTOLIC VOLUME (MOD BIPLANE) 2D: 39 ML
SODIUM SERPL-SCNC: 138 MMOL/L (ref 136–145)
T WAVE AXIS: 108 DEGREES
T WAVE AXIS: 62 DEGREES
T WAVE AXIS: 76 DEGREES
T WAVE AXIS: 78 DEGREES
UNIT DISPENSE STATUS: NORMAL
UNIT PRODUCT CODE: NORMAL
UNIT PRODUCT VOLUME: 350 ML
UNIT RH: NORMAL
VENTRICULAR RATE: 74 BPM
VENTRICULAR RATE: 75 BPM
VENTRICULAR RATE: 78 BPM
VENTRICULAR RATE: 78 BPM
WBC # BLD AUTO: 195.04 THOUSAND/UL (ref 4.31–10.16)

## 2022-07-13 PROCEDURE — 83735 ASSAY OF MAGNESIUM: CPT | Performed by: PHYSICIAN ASSISTANT

## 2022-07-13 PROCEDURE — 99238 HOSP IP/OBS DSCHRG MGMT 30/<: CPT | Performed by: PHYSICIAN ASSISTANT

## 2022-07-13 PROCEDURE — 99223 1ST HOSP IP/OBS HIGH 75: CPT | Performed by: INTERNAL MEDICINE

## 2022-07-13 PROCEDURE — 93010 ELECTROCARDIOGRAM REPORT: CPT | Performed by: INTERNAL MEDICINE

## 2022-07-13 PROCEDURE — 93306 TTE W/DOPPLER COMPLETE: CPT | Performed by: INTERNAL MEDICINE

## 2022-07-13 PROCEDURE — 85027 COMPLETE CBC AUTOMATED: CPT | Performed by: PHYSICIAN ASSISTANT

## 2022-07-13 PROCEDURE — 97166 OT EVAL MOD COMPLEX 45 MIN: CPT

## 2022-07-13 PROCEDURE — 99222 1ST HOSP IP/OBS MODERATE 55: CPT | Performed by: INTERNAL MEDICINE

## 2022-07-13 PROCEDURE — 97162 PT EVAL MOD COMPLEX 30 MIN: CPT

## 2022-07-13 PROCEDURE — 80048 BASIC METABOLIC PNL TOTAL CA: CPT | Performed by: PHYSICIAN ASSISTANT

## 2022-07-13 PROCEDURE — 82948 REAGENT STRIP/BLOOD GLUCOSE: CPT

## 2022-07-13 PROCEDURE — 93005 ELECTROCARDIOGRAM TRACING: CPT

## 2022-07-13 PROCEDURE — 71045 X-RAY EXAM CHEST 1 VIEW: CPT

## 2022-07-13 PROCEDURE — NC001 PR NO CHARGE: Performed by: PHYSICIAN ASSISTANT

## 2022-07-13 RX ORDER — ASPIRIN 81 MG/1
81 TABLET, CHEWABLE ORAL DAILY
Qty: 90 TABLET | Refills: 2 | Status: SHIPPED | OUTPATIENT
Start: 2022-07-13 | End: 2022-10-11

## 2022-07-13 RX ORDER — PANTOPRAZOLE SODIUM 40 MG/1
40 TABLET, DELAYED RELEASE ORAL DAILY
Qty: 30 TABLET | Refills: 0 | Status: SHIPPED | OUTPATIENT
Start: 2022-07-13 | End: 2022-07-27 | Stop reason: ALTCHOICE

## 2022-07-13 RX ORDER — ATORVASTATIN CALCIUM 20 MG/1
20 TABLET, FILM COATED ORAL
Qty: 90 TABLET | Refills: 2 | Status: SHIPPED | OUTPATIENT
Start: 2022-07-13 | End: 2022-10-11

## 2022-07-13 RX ORDER — CLOPIDOGREL BISULFATE 75 MG/1
75 TABLET ORAL DAILY
Qty: 90 TABLET | Refills: 0 | Status: SHIPPED | OUTPATIENT
Start: 2022-07-13 | End: 2022-10-11

## 2022-07-13 RX ORDER — ACETAMINOPHEN 325 MG/1
650 TABLET ORAL EVERY 4 HOURS PRN
Qty: 100 TABLET | Refills: 0 | Status: SHIPPED | OUTPATIENT
Start: 2022-07-13

## 2022-07-13 RX ORDER — DOCUSATE SODIUM 100 MG/1
100 CAPSULE, LIQUID FILLED ORAL 2 TIMES DAILY
Qty: 60 CAPSULE | Refills: 0 | Status: SHIPPED | OUTPATIENT
Start: 2022-07-13 | End: 2022-07-27 | Stop reason: SINTOL

## 2022-07-13 RX ADMIN — CEFAZOLIN SODIUM 2000 MG: 2 SOLUTION INTRAVENOUS at 08:27

## 2022-07-13 RX ADMIN — Medication 12.5 MG: at 08:27

## 2022-07-13 RX ADMIN — CEFAZOLIN SODIUM 2000 MG: 2 SOLUTION INTRAVENOUS at 00:10

## 2022-07-13 RX ADMIN — ASPIRIN 81 MG CHEWABLE TABLET 81 MG: 81 TABLET CHEWABLE at 08:29

## 2022-07-13 RX ADMIN — MUPIROCIN 1 APPLICATION: 20 OINTMENT TOPICAL at 08:27

## 2022-07-13 RX ADMIN — HEPARIN SODIUM 5000 UNITS: 5000 INJECTION INTRAVENOUS; SUBCUTANEOUS at 05:26

## 2022-07-13 RX ADMIN — CHLORHEXIDINE GLUCONATE 15 ML: 1.2 SOLUTION ORAL at 08:27

## 2022-07-13 RX ADMIN — PANTOPRAZOLE SODIUM 40 MG: 40 TABLET, DELAYED RELEASE ORAL at 08:27

## 2022-07-13 RX ADMIN — CLOPIDOGREL BISULFATE 75 MG: 75 TABLET ORAL at 08:27

## 2022-07-13 RX ADMIN — POLYETHYLENE GLYCOL 3350 17 G: 17 POWDER, FOR SOLUTION ORAL at 08:27

## 2022-07-13 NOTE — DISCHARGE SUMMARY
Discharge Summary - Cardiothoracic Surgery   Cheryl Villalpando 80 y o  male MRN: [id number]  Unit/Bed#: [facility] 410-01 Encounter: [id number]    Admission Date: 7/12/2022     Discharge Date: 07/13/22    Admitting Diagnosis: Severe aortic stenosis [I35 0]    Primary Discharge Diagnosis:   Aortic stenosis, Non-Rheumatic  S/P transfemoral transcatheter aortic valve replacement;    Secondary Discharge Diagnosis:   CAD, CHF, Hyperlipidemia, Anemia, CLL- radiation 2014 @ [place] (now in relapse), thrombocytopenia, basal cell carcinoma-nose, chronic scalp lesion, depression, s/p B/L cataracts, MOHS, tonsillectomy, shoulder sx    Attending: GENA Ruiz  Consulting Physician(s):   Cardiology  gerontology    Procedures Performed:   Procedure(s):  REPLACEMENT AORTIC VALVE TRANSCATHETER (TAVR) TRANSFEMORAL W/ 26MM LEE CLINT S3 ULTRA VALVE(ACCESS ON LEFT) PARISA  CARDIAC TAVR     Hospital Course: The patient was seen in consultation prior to this admission for evaluation of Aortic stenosis, Non-Rheumatic  Risks and benefits of transfemoral transcatheter aortic valve replacement were discussed in detail, and patient was agreeable  Routine preoperative evaluation was completed and informed consent was obtained prior to admission  7/12: Elective admission for TF TAVR # 26mm via L approach  Extubated and transferred to PACU supported with Cardene 5mg  DP pulses palpable b/l  ECG shows NSR with sinus arrhythmia  Gerontology and cardiology consulted  Started on lopressor 12 5 mg BID       7/13: Weaned off cardene and NC  No complaints, denies CP or SOB, using IS  Passing flatus, urinating without issues  Ambulating without issues  NSR, HR/BP well controlled, continue lopressor  Net neg 354 with 2 3 L of UOP  Labs stable  CXR without ptx/effusion  Echo with well seated valve, no PVL  Groin without hematoma, DP 2+, no edema  Patient in good condition for discharge  Patient in agreement with plan and follow-up appointment  Condition at Discharge:   good     Discharge Physical Exam:    Please see the documented physical exam from this morning's progress note for details  Discharge Data:  Results from last 7 days   Lab Units 07/13/22 0457 07/12/22  0854 07/12/22  0840   WBC Thousand/uL 195 04*  --   --    HEMOGLOBIN g/dL 8 7* 8 3*  --    I STAT HEMOGLOBIN g/dl  --   --  8 2*   HEMATOCRIT % 30 1* 28 8*  --    HEMATOCRIT, ISTAT %  --   --  24*   PLATELETS Thousands/uL 117* 107*  --      Results from last 7 days   Lab Units 07/13/22 0457 07/12/22  0854 07/12/22  0840   POTASSIUM mmol/L 4 1 5 2  --    CHLORIDE mmol/L 108 115*  --    CO2 mmol/L 25 22  --    CO2, I-STAT mmol/L  --   --  23   BUN mg/dL 23 25  --    CREATININE mg/dL 1 22 1 18  --    GLUCOSE, ISTAT mg/dl  --   --  110   CALCIUM mg/dL 9 0 8 3  --            Discharge instructions/Information to patient and family:   See after visit summary for information provided to patient and family  Jordyn Bower was educated on restrictions regarding driving and lifting, and techniques of proper incisional care  They were specifically counselled on signs and symptoms of an incisional infection, and advised to contact our service immediately should they develop fevers, sweats, chill, redness or drainage at the site of any incisions  Provisions for Follow-Up Care:  See after visit summary for information related to follow-up care and any pertinent home health orders  Disposition:  See After Visit Summary for discharge disposition information  Planned Readmission:   No    Discharge Medications:  See after visit summary for reconciled discharge medications provided to patient and family  Jordyn Bower was provided contact information and scheduled a follow up appointment with GENA Parra  Additionally, follow up appointments have been scheduled for their primary care physician and primary cardiologist   Contact information was provided      Dalia Johnson PATSY Gutierrez was counseled on the importance of avoiding tobacco products  As with all patients whom have undergone open heart surgery, tobacco cessation medication was contraindicated at the time of discharge  ACE/ARB was Contraindicated secondary to hypotension    Beta Blocker was Prescribed at discharge    Aspirin was Prescribed at discharge    Statin was Prescribed at discharge      The patient was not discharged on ongoing diuretic therapy, due to being net neg , euvolemic on exam, and making good UOP with normal EF  The patient was informed that following their postoperative surgical evaluation, they will be referred to outpatient cardiac rehabilitation  They were counseled that this program is run by specialists who will help them safely strengthen their heart and prevent more heart disease  Cardiac rehabilitation will include exercise, relaxation, stress management, and heart-healthy nutrition  Caregivers will also check to make sure their medication regimen is working  During this admission, the patient was questioned on their use of tobacco, alcohol, and illicit/non-prescription drug use in the  previous 24 months  [facility] states that they have not used any of these substances in this time frame  I spent 30 minutes discharging the patient  This time was spent on the day of discharge  I had direct contact with the patient on the day of discharge  Additional documentation is required if more than 30 minutes were spent on discharge       SIGNATURE: Brandon Villagomez PA-C  DATE: July 13, 2022  TIME: 7:02 AM

## 2022-07-13 NOTE — RESTORATIVE TECHNICIAN NOTE
Restorative Technician Note      Patient Name: Alvina Krishnan     Restorative Tech Visit Date: 7/13/2022  Note Type: Mobility  Patient Position Upon Consult: Bedside chair  Activity Performed: Ambulated  Assistive Device: Other (Comment) (none)  Patient Position at End of Consult: All needs within reach;  Bedside chair

## 2022-07-13 NOTE — DISCHARGE INSTRUCTIONS
Transcatheter Aortic Valve Replacement   AMBULATORY CARE:   What you need to know about a TAVR:   A TAVR is a procedure to replace your aortic valve  It is done without removing your old valve  The aortic valve is between the left ventricle and the aorta  The left ventricle is the lower left chamber of your heart  The aorta is a blood vessel that pumps blood to your brain and body  The aortic valve opens and closes to let blood flow from your heart to the rest of your body  TAVR may be used to replace your aortic valve if open heart surgery is not safe for you  Your valve will be replaced with a tissue valve  The tissue may be taken from an animal, such as a pig or cow  What will happen before a TAVR:   You may need an echocardiogram, angiogram, EKG, or CT scan before your procedure  These tests will help your healthcare provider plan your procedure  They will also make sure a TAVR is safe for you  You may need to stop taking blood thinner medicine several days before your procedure  This will prevent heavy bleeding during your procedure  Your healthcare provider will talk to you about how to prepare for your procedure  He may tell you not to eat or drink anything after midnight on the day of your procedure  He will tell you what medicines to take or not take on the day of your procedure  You may stay in the hospital 2 to 5 days after your procedure  Arrange for someone to drive you home and stay with you  This person can call 911 if you have problems at home  What will happen during a TAVR:   You may be given general anesthesia to keep you asleep and free from pain during your procedure  You may instead be given IV sedation and local anesthesia  IV sedation will make you feel calm and relaxed during the procedure  With local anesthesia, you may still feel pressure or pushing during your procedure, but you should not feel any pain   You may be given an antibiotic through your IV to prevent a bacterial infection  Tell healthcare providers if you have ever had an allergic reaction to an antibiotic  Your healthcare provider will make an incision in your neck, groin, or chest  He will guide a catheter with a balloon on the end through a blood vessel and into your heart  He will inflate the balloon in the opening of your valve  This balloon make space for your new valve to fit inside the old one  The balloon will be deflated and the catheter will be removed  Your healthcare provider will insert another catheter into your heart  This catheter will hold a balloon, a stent, and the new valve  The new valve will be inside of the stent  When the catheter reaches your valve, your healthcare provider will expand the balloon  The balloon will push your old valve against the walls of your heart  The stent and new valve will be put in the old valve's position  The balloon will be deflated  The stent will continue to hold your old valve out of the way  It will also keep your new valve in the correct place  Your healthcare provider will remove the catheter and wire  He may use clamps, stitches, or other devices to close the incision  Pressure will be applied to the incision for several minutes to stop any bleeding  A pressure bandage or other pressure device may be placed over the incision to help prevent more bleeding  What will happen after a TAVR:   Healthcare providers will monitor your vital signs and pulses in your arm or leg, closely  They will check your pressure bandage for bleeding or swelling  You will need to lie flat with your leg or arm straight for 2 to 4 hours  Do not  get out of bed until healthcare providers says it is okay  Arm or leg movements can cause serious bleeding  You may need blood tests, a chest x-ray, an EKG, or an echocardiogram before you leave the hospital  These tests will make sure your valve is working correctly   You will need to take blood thinner medicine for at least 6 months after your procedure  You may need to take aspirin for the rest of your life  These medicines will prevent blood clots from forming near your valve  Risks of a TAVR:  You may have bruising or pain where the catheter was  You may get an infection or bleed more than expected  The catheter may cause a hole in your heart or blood vessels  A blood clot may form around your valve  The clot may travel to your brain and cause a stroke  Your heartbeat may become irregular during or after a TAVR  You may need medicines or procedures to treat this  Your new valve may not work correctly  You may need another procedure to replace the valve  Follow up with your doctor as directed:  Write down your questions so you remember to ask them during your visits  © Copyright [employer] 2022 Information is for End User's use only and may not be sold, redistributed or otherwise used for commercial purposes  All illustrations and images included in CareNotes® are the copyrighted property of A D A M , Inc  or [facility]  The above information is an  only  It is not intended as medical advice for individual conditions or treatments  Talk to your doctor, nurse or pharmacist before following any medical regimen to see if it is safe and effective for you  Heart Healthy Diet   WHAT YOU NEED TO KNOW:   A heart healthy diet is an eating plan low in unhealthy fats and sodium (salt)  The plan is high in healthy fats and fiber  A heart healthy diet helps improve your cholesterol levels and lowers your risk for heart disease and stroke  A dietitian will teach you how to read and understand food labels  DISCHARGE INSTRUCTIONS:   Heart healthy diet guidelines to follow:   Choose foods that contain healthy fats  Unsaturated fats  include monounsaturated and polyunsaturated fats  Unsaturated fat is found in foods such as soybean, canola, olive, corn, and safflower oils   It is also found in soft tub margarine that is made with liquid vegetable oil  Omega-3 fat  is found in certain fish, such as salmon, tuna, and trout, and in walnuts and flaxseed  Eat fish high in omega-3 fats at least 2 times a week  Get 20 to 30 grams of fiber each day  Fruits, vegetables, whole-grain foods, and legumes (cooked beans) are good sources of fiber  Limit or do not have unhealthy fats  Cholesterol  is found in animal foods, such as eggs and lobster, and in dairy products made from whole milk  Limit cholesterol to less than 200 mg each day  Saturated fat  is found in meats, such as bahena and hamburger  It is also found in chicken or turkey skin, whole milk, and butter  Limit saturated fat to less than 7% of your total daily calories  Trans fat  is found in packaged foods, such as potato chips and cookies  It is also in hard margarine, some fried foods, and shortening  Do not eat foods that contain trans fats  Limit sodium as directed  You may be told to limit sodium to 2,000 to 2,300 mg each day  Choose low-sodium or no-salt-added foods  Add little or no salt to food you prepare  Use herbs and spices in place of salt         Include the following in your heart healthy plan:  Ask your dietitian or healthcare provider how many servings to have from each of the following food groups:  Grains:      Whole-wheat breads, cereals, and pastas, and brown rice    Low-fat, low-sodium crackers and chips    Vegetables:      Broccoli, green beans, green peas, and spinach    Collards, kale, and lima beans    Carrots, sweet potatoes, tomatoes, and peppers    Canned vegetables with no salt added    Fruits:      Bananas, peaches, pears, and pineapple    Grapes, raisins, and dates    Oranges, tangerines, grapefruit, orange juice, and grapefruit juice    Apricots, mangoes, melons, and papaya    Raspberries and strawberries    Canned fruit with no added sugar    Low-fat dairy:      Nonfat (skim) milk, 1% milk, and low-fat almond, cashew, or soy milks fortified with calcium    Low-fat cheese, regular or frozen yogurt, and cottage cheese    Meats and proteins:      Lean cuts of beef and pork (loin, leg, round), skinless chicken and turkey    Legumes, soy products, egg whites, or nuts    Limit or do not include the following in your heart healthy plan:   Unhealthy fats and oils:      Whole or 2% milk, cream cheese, sour cream, or cheese    High-fat cuts of beef (T-bone steaks, ribs), chicken or turkey with skin, and organ meats such as liver    Butter, stick margarine, shortening, and cooking oils such as coconut or palm oil    Foods and liquids high in sodium:      Packaged foods, such as frozen dinners, cookies, macaroni and cheese, and cereals with more than 300 mg of sodium per serving    Vegetables with added sodium, such as instant potatoes, vegetables with added sauces, or regular canned vegetables    Cured or smoked meats, such as hot dogs, bahena, and sausage    High-sodium ketchup, barbecue sauce, salad dressing, pickles, olives, soy sauce, or miso    Foods and liquids high in sugar:      Candy, cake, cookies, pies, or doughnuts    Soft drinks (soda), sports drinks, or sweetened tea    Canned or dry mixes for cakes, soups, sauces, or gravies    Other healthy heart guidelines:   Do not smoke  Nicotine and other chemicals in cigarettes and cigars can cause lung and heart damage  Ask your healthcare provider for information if you currently smoke and need help to quit  E-cigarettes or smokeless tobacco still contain nicotine  Talk to your healthcare provider before you use these products  Limit or do not drink alcohol as directed  Alcohol can damage your heart and raise your blood pressure  Your healthcare provider may give you specific daily and weekly limits  The general recommended limit is 1 drink a day for women 21 or older and for men 72 or older  Do not have more than 3 drinks in a day or 7 in a week   The recommended limit is 2 drinks a day for men 24to 59years of age  Do not have more than 4 drinks in a day or 14 in a week  A drink of alcohol is 12 ounces of beer, 5 ounces of wine, or 1½ ounces of liquor  Exercise regularly  Exercise can help you maintain a healthy weight and improve your blood pressure and cholesterol levels  Regular exercise can also decrease your risk for heart problems  Ask your healthcare provider about the best exercise plan for you  Do not start an exercise program without asking your healthcare provider  Follow up with your doctor or cardiologist as directed:  Write down your questions so you remember to ask them during your visits  © Copyright [employer] 2022 Information is for End User's use only and may not be sold, redistributed or otherwise used for commercial purposes  All illustrations and images included in CareNotes® are the copyrighted property of [employer]  or [employer]   The above information is an  only  It is not intended as medical advice for individual conditions or treatments  Talk to your doctor, nurse or pharmacist before following any medical regimen to see if it is safe and effective for you

## 2022-07-13 NOTE — OCCUPATIONAL THERAPY NOTE
Occupational Therapy Evaluation     Patient Name: Carey High  GOVCL'I Date: 7/13/2022  Problem List  Principal Problem: Aortic stenosis, severe  Active Problems:    CLL (chronic lymphoid leukemia) in relapse (HCC)    Thrombocytopenia (HCC)    S/P TAVR (transcatheter aortic valve replacement)    Hyperlipidemia    Chronic anemia    Past Medical History  Past Medical History:   Diagnosis Date    Basal cell carcinoma (BCC) of skin of nose     History of chemotherapy     Lymphocytic leukemia ([facility] [address] )      Past Surgical History  Past Surgical History:   Procedure Laterality Date    CARDIAC CATHETERIZATION N/A 6/15/2022    Procedure: Cardiac Coronary Angiogram;  Surgeon: Bianka Moy MD;  [facility]: [facility];   Service: Cardiology    CATARACT EXTRACTION Bilateral     FLAP LOCAL HEAD / NECK N/A 4/14/2020    Procedure: ADJACENT TISSUE TRANSFER  FOREHEAD FLAP,  CARTILAGE GRAFT NOSE;  Surgeon: Sherie Ramesh MD;  Location: [facility];  Service: Plastics    FLAP LOCAL HEAD / NECK N/A 6/17/2020    Procedure: DIVISION INSET FOREHEAD FLAP; FULL THICKNESS SKIN GRAFT TO FOREHEAD;  Surgeon: Sherie Ramesh MD;  Location: [facility];  Service: Plastics    FULL THICKNESS SKIN GRAFT N/A 4/14/2020    Procedure: SKIN GRAFT FULL THICKNESS  (FTSG) NOSE;  Surgeon: Sherie Ramesh MD;  Location: [facility];  Service: Plastics    MOHS RECONSTRUCTION N/A 4/14/2020    Procedure: MOHS RECONSTRUCTION NOSE DEFECT;  Surgeon: Sherie Ramesh MD;  [facility]: [facility];  Service: Plastics    MOHS RECONSTRUCTION N/A 5/4/2020    Procedure: RECONSTRUCTION MOHS NASAL DEFECT WITH EAR CARTILAGE GRAFT;  Surgeon: Sherie Ramesh MD;  [facility]: [facility];  Service: Plastics    SHOULDER SURGERY      TIBIA FRACTURE SURGERY      TONSILLECTOMY             07/13/22 0811   OT Last Visit   OT Visit Date 07/13/22   Note Type   Note type Evaluation   Restrictions/Precautions   Other Precautions Cardiac/sternal;Telemetry;Multiple lines   Pain Assessment   Pain Assessment Tool 0-10   Pain Score No Pain   Home Living   Type of [address] Two level;Stairs to enter with rails   Bathroom Shower/Tub Tub/shower unit   Bathroom Toilet Standard   Bathroom Equipment   (denies)   [address]   (denies)   Additional Comments Pt reports living in a split level home with 10 steps to reach main level  Prior Function   Level of [place] Independent with ADLs and functional mobility   Lives With Alone   Receives Help From Family   ADL Assistance Independent   IADLs Independent   Falls in the last 6 months 0   Vocational Full time employment   Lifestyle   Autonomy Pt reports being I with ADLS, IADLS and mobility without device PTA  (+)    Reciprocal Relationships Pt lives alone but has suppotrive family that comes over daily   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   Intrinsic Gratification Enjoys [employer]   Subjective   Subjective Pt reports that he has no concerns about returning home upon d/c  ADL   Where Assessed Chair   Eating Assistance 7  Independent   Grooming Assistance 5  Supervision/Setup   UB Bathing Assistance 5  Supervision/Setup   LB Bathing Assistance 5  Supervision/Setup   UB Dressing Assistance 5  Supervision/Setup   LB Dressing Assistance 5  Supervision/Setup   Toileting Assistance  5  Supervision/Setup   Transfers   Sit to Stand 6  Modified independent   Stand to Sit 6  Modified independent   Functional Mobility   Functional Mobility 5  Supervision   Additional Comments Pt demonstrated household mobility with no device or rest breaks     Balance   Static Sitting Good   Dynamic Sitting Good   Static Standing Fair +   Dynamic Standing Fair +   Ambulatory Fair   Activity Tolerance   Activity Tolerance Patient tolerated treatment well   Medical Staff Made Aware Seen with PT 2* medical complexity/ instability   Nurse Made Aware RN confirmed okay to see pt   RUE Assessment   RUE Assessment WFL   LUE Assessment   LUE Assessment WFL   Cognition   Overall Cognitive Status WFL   Arousal/Participation Alert; Cooperative   Attention Within functional limits   Orientation Level Oriented X4   Memory Within functional limits   Following Commands Follows all commands and directions without difficulty   Comments Pt is very pleasant and cooperative  Pt participated in cardiac packet this session  Assessment   Assessment Pt is a 80 y o  male [facility] w/ severe aortic stenosis s/p TAVR on 7/12/22  Pt  has a past medical history of Basal cell carcinoma (BCC) of skin of nose, History of chemotherapy, and Lymphocytic leukemia ([facility] [address] )  Pt with active OT orders and ambulate  orders  Pt resides in a 2 story home alone  Pt reports that he has supportive family able to assist as needed upon d/c  Pt was I w/  ADLS and IADLS, (+) drove, & required no use of DME PTA  Currently pt is mod I for functional transfers and supervision for functional mobility and ADLS overall  Based on the aforementioned OT evaluation, functional performance deficits, and assessments, pt has been identified as a moderate complexity evaluation  From OT standpoint, anticipate d/c home with family support  Recommend continued participation in [facility] and functional mobility with staff  No further acute OT needs, d/c OT     Goals   Patient Goals To return home today   Recommendation   OT Discharge Recommendation No rehabilitation needs  (Home with increased social support as needed)   AM-PAC Daily Activity Inpatient   Lower Body Dressing 4   Bathing 4   Toileting 4   Upper Body Dressing 4   Grooming 4   Eating 4   Daily Activity Raw Score 24   Daily Activity Standardized Score (Calc for Raw Score >=11) 57 54   AM-PAC Applied Cognition Inpatient   Following a Speech/Presentation 4   Understanding Ordinary Conversation 4   Taking Medications 4   Remembering Where Things Are Placed or Put Away 4 Remembering List of 4-5 Errands 4   Taking Care of Complicated Tasks 4   Applied Cognition Raw Score 24   Applied Cognition Standardized Score 62 21   Modified Mandan Scale   Modified [place] Scale 2       MCKAYLA Woodson, OTR/L

## 2022-07-13 NOTE — CONSULTS
Consultation - Cardiology Team One  Radha Bennett 80 y o  male MRN: [id number]  Unit/Bed#: [facility] 410-01 Encounter: [id number]    Inpatient consult to Cardiology  Consult performed by: CANDE Osorio  Consult ordered by: Christine Mclaughlin PA-C          Physician Requesting Consult: Urmila Woo MD  Reason for Consult / Principal Problem: s/p TAVR       Assessment    1  Severe aortic stenosis  2  Non obstructive CAD  3  CLL     Plan    POD #1 s/p TAVR with a 26 mm Gu CLINT 3 bioprosthetic valve via left transfemoral approach  Final transesophageal echocardiogram demonstrated prosthetic valve with normal function trace perivalvular leak  On aspirin and plavix  BP stable: 122/62, off cardene gtt  On metoprolol 12 5 mg PO BID   Patient will follow up with Eusebio De as scheduled, 7/27/2022    History of Present Illness   HPI: Radha Bennett is a 80y o  year old male who has a history of symptomatic aortic stenosis, non obstructive CAD and CLL  He follows with cardiologist Dr Nori Hassan  He [facility] 7/12/2022 for elective TAVR  He is now POD #1 s/p TAVR with a 26 mm Gu CLINT 3 bioprosthetic valve via left transfemoral approach  Final transesophageal echocardiogram demonstrated prosthetic valve with normal function trace perivalvular leak  Cardiac cath 6/15/2022 showed mid LAD 50% stenosis  EKG reviewed personally:   Normal sinus rhythm  Ventricular rate 78 beats per minute  MT interval 202 milliseconds  QRS D 82 milliseconds  QT interval 386 milliseconds  QTC interval 440 milliseconds    Telemetry reviewed personally:   Normal sinus rhythm     Review of Systems   Constitutional: Negative for chills, fever and malaise/fatigue  HENT: Negative for congestion  Cardiovascular: Negative for chest pain, dyspnea on exertion, leg swelling, orthopnea and palpitations  Respiratory: Negative for shortness of breath  Musculoskeletal: Negative for falls  Gastrointestinal: Negative for bloating, nausea and vomiting  Neurological: Negative for dizziness and light-headedness  Psychiatric/Behavioral: Negative for altered mental status  All other systems reviewed and are negative  Historical Information   Past Medical History:   Diagnosis Date    Basal cell carcinoma (BCC) of skin of nose     History of chemotherapy     Lymphocytic leukemia ([facility] [address] )      Past Surgical History:   Procedure Laterality Date    CARDIAC CATHETERIZATION N/A 6/15/2022    Procedure: Cardiac Coronary Angiogram;  Surgeon: Mark Mcfadden MD;  Location: [facility] CARD[facility]AC [facility]ATH [facility];   Service: Cardiology    CATARACT EXTRACTION Bilateral     FLAP LOCAL HEAD / NECK N/A 4/14/2020    Procedure: ADJACENT TISSUE TRANSFER  FOREHEAD FLAP,  CARTILAGE GRAFT NOSE;  Surgeon: Willy Lange MD;  Location: [facility] MAIN OR;  Service: Plastics    FLAP LOCAL HEAD / NECK N/A 6/17/2020    Procedure: DIVISION INSET FOREHEAD FLAP; FULL THICKNESS SKIN GRAFT TO FOREHEAD;  Surgeon: Willy Lange MD;  Location: [facility] MA[facility];  Service: Plastics    FULL THICKNESS SKIN GRAFT N/A 4/14/2020    Procedure: SKIN GRAFT FULL THICKNESS  (FTSG) NOSE;  Surgeon: Willy Lange MD;  Location: [facility];  Service: Plastics    MOHS RECONSTRUCTION N/A 4/14/2020    Procedure: MOHS RECONSTRUCTION NOSE DEFECT;  Surgeon: Willy Lange MD;  Location: [facility];  Service: Plastics    MOHS RECONSTRUCTION N/A 5/4/2020    Procedure: RECONSTRUCTION MOHS NASAL DEFECT WITH EAR CARTILAGE GRAFT;  Surgeon: Willy Lange MD;  Location: [facility] MA[facility];  Service: Plastics    SHOULDER SURGERY      TIBIA FRACTURE SURGERY      TONSILLECTOMY       Social History     Substance and Sexual Activity   Alcohol Use Yes    Comment: rare     Social History     Substance and Sexual Activity   Drug Use Never     Social History     Tobacco Use   Smoking Status Never Smoker   Smokeless Tobacco Never Used     Family History:   Family History   Problem Relation Age of Onset    Stroke Mother     No Known Problems Father        Meds/Allergies   all current active meds have been reviewed and current meds:   Current Facility-Administered Medications   Medication Dose Route Frequency    acetaminophen (TYLENOL) rectal suppository 650 mg  650 mg Rectal Q4H PRN    acetaminophen (TYLENOL) tablet 650 mg  650 mg Oral Q4H PRN    aspirin chewable tablet 81 mg  81 mg Oral Daily    atorvastatin (LIPITOR) tablet 20 mg  20 mg Oral Daily With Dinner    bisacodyl (DULCOLAX) rectal suppository 10 mg  10 mg Rectal Daily PRN    chlorhexidine (PERIDEX) 0 12 % oral rinse 15 mL  15 mL Mouth/Throat BID    clopidogrel (PLAVIX) tablet 75 mg  75 mg Oral Daily    heparin (porcine) subcutaneous injection 5,000 Units  5,000 Units Subcutaneous Q8H [address]    insulin lispro (HumaLOG) 100 units/mL subcutaneous injection 1-5 Units  1-5 Units Subcutaneous HS    insulin lispro (HumaLOG) 100 units/mL subcutaneous injection 1-6 Units  1-6 Units Subcutaneous TID AC    lactated ringers infusion  100 mL/hr Intravenous Continuous    metoprolol tartrate (LOPRESSOR) partial tablet 12 5 mg  12 5 mg Oral Q12H GRANT    mupirocin (BACTROBAN) 2 % nasal ointment 1 application  1 application Nasal O34M [address]    niCARdipine (CARDENE) 25 mg (STANDARD CONCENTRATION) in sodium chloride 0 9% 250 mL  1-15 mg/hr Intravenous Titrated    ondansetron (ZOFRAN) injection 4 mg  4 mg Intravenous Q6H PRN    pantoprazole (PROTONIX) EC tablet 40 mg  40 mg Oral Daily    polyethylene glycol (MIRALAX) packet 17 g  17 g Oral Daily     lactated ringers, 100 mL/hr, Last Rate: Stopped (07/12/22 0923)  niCARdipine, 1-15 mg/hr, Last Rate: Stopped (07/12/22 0958)        No Known Allergies    Objective   Vitals: Blood pressure 120/58, pulse 80, temperature 98 8 °F (37 1 °C), resp  rate 16, height 5' 9" (1 753 m), weight 80 6 kg (177 lb 9 6 oz), SpO2 99 %  ,     Body mass index is 26 23 kg/m²  , Systolic (82LKS), OVQ:665 , Min:108 , VFL:738     Diastolic (23AZY), VLP:25, Min:56, Max:74      Intake/Output Summary (Last 24 hours) at 7/13/2022 1050  Last data filed at 7/13/2022 0700  Gross per 24 hour   Intake 704 17 ml   Output 1950 ml   Net -1245 83 ml     Weight (last 2 days)     Date/Time Weight    07/13/22 0552 80 6 (177 6)    07/12/22 1100 78 9 (174)    07/12/22 0547 78 9 (174)        Invasive Devices  Report    Peripheral Intravenous Line  Duration           Peripheral IV 07/12/22 Left;Dorsal (posterior) Hand 1 day    Peripheral IV 07/12/22 Right;Dorsal (posterior) Forearm 1 day              Physical Exam  Constitutional:       General: He is not in acute distress  HENT:      Head: Normocephalic  Mouth/Throat:      Mouth: Mucous membranes are moist    Cardiovascular:      Rate and Rhythm: Normal rate and regular rhythm  Pulses: Normal pulses  Pulmonary:      Effort: Pulmonary effort is normal  No respiratory distress  Breath sounds: Normal breath sounds  Abdominal:      General: Bowel sounds are normal       Palpations: Abdomen is soft  Musculoskeletal:         General: No swelling  Normal range of motion  Cervical back: Neck supple  Skin:     General: Skin is warm and dry  Capillary Refill: Capillary refill takes less than 2 seconds  Comments: L groin ecchymotic  No bleeding or hematoma    Neurological:      General: No focal deficit present  Mental Status: He is alert and oriented to person, place, and time     Psychiatric:         Mood and Affect: Mood normal            LABORATORY RESULTS:      CBC with diff:   Results from last 7 days   Lab Units 07/13/22  0457 07/12/22  0854 07/12/22  0840 07/12/22  0815 07/12/22  0744   WBC Thousand/uL 195 04*  --   --   --   --    HEMOGLOBIN g/dL 8 7* 8 3*  --   --   --    I STAT HEMOGLOBIN g/dl  --   --  8 2* 8 5* 10 2*   HEMATOCRIT % 30 1* 28 8*  --   --   --    HEMATOCRIT, ISTAT %  --   --  24* 25* 30*   MCV fL 103*  --   --   --   --    PLATELETS Thousands/uL 117* 107*  --   --   --    MCH pg 29 7  --   --   --   --    MCHC g/dL 28 9*  --   --   --   --    RDW % 14 5  --   --   --   --    MPV fL 8 7* 8 7*  --   --   --        CMP:  Results from last 7 days   Lab Units 07/13/22 0457 07/12/22  0854 07/12/22  0840 07/12/22  0815 07/12/22  0744   POTASSIUM mmol/L 4 1 5 2  --   --   --    CHLORIDE mmol/L 108 115*  --   --   --    CO2 mmol/L 25 22  --   --   --    CO2, I-STAT mmol/L  --   --  23 23 25   BUN mg/dL 23 25  --   --   --    CREATININE mg/dL 1 22 1 18  --   --   --    GLUCOSE, ISTAT mg/dl  --   --  110 108 103   CALCIUM mg/dL 9 0 8 3  --   --   --    EGFR ml/min/1 73sq m 59 62  --   --   --        BMP:  Results from last 7 days   Lab Units 07/13/22 0457 07/12/22  0854 07/12/22  0840 07/12/22  0815 07/12/22  0744   POTASSIUM mmol/L 4 1 5 2  --   --   --    CHLORIDE mmol/L 108 115*  --   --   --    CO2 mmol/L 25 22  --   --   --    CO2, I-STAT mmol/L  --   --  23 23 25   BUN mg/dL 23 25  --   --   --    CREATININE mg/dL 1 22 1 18  --   --   --    GLUCOSE, ISTAT mg/dl  --   --  110 108 103   CALCIUM mg/dL 9 0 8 3  --   --   --           Lab Results   Component Value Date    NTBNP 174 (H) 03/29/2022       Results from last 7 days   Lab Units 07/13/22 0457   MAGNESIUM mg/dL 2 4         Lipid Profile:   No results found for: CHOL  No results found for: HDL  No results found for: LDLCALC  No results found for: TRIG      Cardiac testing:   No results found for this or any previous visit  No results found for this or any previous visit  No valid procedures specified  No results found for this or any previous visit  Imaging:  Cardiac catheterization    Result Date: 6/15/2022  Narrative: · Left Anterior Descending: The vessel was visualized by angiography and is large  Mid LAD lesion is 50% stenosed  WILLIAN flow is 3   The lesion is focal  · Ramus Intermedius: The vessel was visualized by angiography, is moderate in size and is angiographically normal  · Left Circumflex: The vessel was visualized by angiography, is moderate in size and is angiographically normal  · Right Coronary Artery: The vessel was visualized by angiography, is large, is angiographically normal and dominant  PARISA Anesthesia    Result Date: 7/12/2022  Narrative: Maurilio Hutchinson MD     7/12/2022  8:33 AM Procedure Performed: PARISA Anesthesia Start Time:  7/12/2022 7:44 AM Preanesthesia Checklist Patient identified, IV assessed, risks and benefits discussed, monitors and equipment assessed, procedure being performed at surgeon's request and anesthesia consent obtained  Procedure Diagnostic Indications for PARISA:  hemodynamic monitoring  Type of PARISA: interventional PARISA with real time guidance of intracardiac procedure  Images Saved: ultrasound permanent image saved  Physician Requesting Echo: Danitza Reddy MD   Location performed: OR  Intubated  Bite block not placed  Heart visualized  Insertion of PARISA Probe:  Atraumatic  Probe Type:  Multiplane  Modalities:  2D only, color flow mapping, 3D, continuous wave Doppler and pulse wave Doppler  Echocardiographic and Doppler Measurements PREPROCEDURE LEFT VENTRICLE: Systolic Function: normal  Ejection Fraction: 60%  Cavity size: normal    RIGHT VENTRICLE: Systolic Function: normal   Cavity size normal   AORTIC VALVE: Leaflets: trileaflet  Leaflet motions restricted  Stenosis: severe  Mean Gradient: 27 mmHg  Regurgitation: trace  MITRAL VALVE: Leaflets: calcified and MAC present  Leaflet Motions: normal  Regurgitation: mild  Stenosis: none  TRICUSPID VALVE: Leaflets: normal  Leaflet Motions: normal   Regurgitation: trace  PULMONIC VALVE: Leaflets: normal   ASCENDING AORTA: Size:  normal   Dissection not present  AORTIC ARCH: Size:  normal   dissection not present  Grade 3: atheroma protruding < 0 5 cm into lumen  DESCENDING AORTA: Size: normal   Dissection not present   Grade 3: atheroma protruding < 0 5 cm into lumen  RIGHT ATRIUM: Size:  normal  No spontaneous echo contrast  LEFT ATRIUM: Size: normal  No spontaneous echo contrast  LEFT ATRIAL APPENDAGE: Size: normal  No spontaneous echo contrast ATRIAL SEPTUM: Intra-atrial septal morphology: normal   VENTRICULAR SEPTUM: Intra-ventricular septum morphology: normal  OTHER FINDINGS: Pericardium:  normal  Pleural Effusion:  none  POSTPROCEDURE LEFT VENTRICLE: Unchanged   Ejection Fraction: 60 %  RIGHT VENTRICLE: Unchanged   AORTIC VALVE: Leaflets: bioprosthetic  Stenosis: none  Mean Gradient: 5 mmHg  Regurgitation: none  Valve Size: 26 mm  MITRAL VALVE: Leaflets: native  Regurgitation: trace  TRICUSPID VALVE: Unchanged   PULMONIC VALVE: Unchanged   ATRIA: Unchanged   AORTA: Unchanged   Dissection: Dissection not present  REMOVAL: Probe Removal: atraumatic  ECHOCARDIOGRAM COMMENTS: Post TAVR: NAMITA 3 05 cm^2, mean gradient 5 mmHg, trace PVL x1  XR chest portable ICU    Result Date: 7/12/2022  Narrative: CHEST INDICATION:   S/P Transcatheter aortic valve replacement  COMPARISON:  Chest radiograph 3/29/2022  EXAM PERFORMED/VIEWS:  XR CHEST PORTABLE ICU FINDINGS:  TAVR  Right IJ central venous catheter tip overlying the distal SVC  Cardiomediastinal silhouette appears unremarkable  The lungs are clear  No pneumothorax or pleural effusion  Osseous structures appear within normal limits for patient age  Impression: No acute cardiopulmonary disease  Workstation performed: [id number]     PARISA intraop interventional w/realtime guidance of cardiac procedures    Result Date: 7/12/2022  Narrative: This order contains the linked images for the PARISA that was performed by the Anesthesiologist   Please see the  CARDIAC PARISA ANESTHESIA procedure for results  Thank you for allowing us to participate in this patient's care  This pt will follow up with Dr Aria Reed once discharged  Counseling / Coordination of Care  Total floor / unit time spent today 45 minutes  Greater than 50% of total time was spent with the patient and / or family counseling and / or coordination of care  A description of the counseling / coordination of care: Review of history, current assessment, development of a plan  Code Status: Level 1 - Full Code    ** Please Note: Dragon 360 Dictation voice to text software may have been used in the creation of this document   **

## 2022-07-13 NOTE — PROGRESS NOTES
Progress Note - Cardiothoracic Surgery   [place] [facility] [place] 80 [place]  [place] MRN: [id number]  Unit/Bed#: [facility] 410-01 Encounter: [id number]    Aortic stenosis, Non-Rheumatic  S/P transfemoral transcatheter aortic valve replacement; POD # 1    24 Hour Events: Weaned off cardene and NC  No complaints, denies CP or SOB, using IS  Passing flatus, urinating without issues  Medications:   Scheduled Meds:  Current Facility-Administered Medications   Medication Dose Route Frequency Provider Last Rate    acetaminophen  650 mg Rectal Q4H PRN Jose Carlos Jan, RENARD      acetaminophen  650 mg Oral Q4H PRN Jose Carlos Montoya PA-C      aspirin  81 mg Oral Daily [facility], [place]      atorvastatin  20 mg Oral Daily With [address] [place] [address]      bisacodyl  10 mg Rectal Daily PRN Jose Carlos Montoya PA-C      cefazolin  2,000 mg Intravenous [address] 2,000 mg (07/13/22 0010)    chlorhexidine  15 mL Mouth/Throat BID Jose Carlos Montoya PA-C      clopidogrel  75 mg Oral Daily Jose Carlos Montoya PA-C      heparin (porcine)  5,000 Units Subcutaneous [place], [place]      insulin lispro  1-5 Units Subcutaneous HS [facility]      insulin lispro  1-6 Units Subcutaneous TID  Rachael Smith PA-C      lactated ringers  100 mL/hr Intravenous Continuous Atlnorberto Echeverria CRNA Stopped (07/12/22 9886)    metoprolol tartrate  12 5 mg Oral Q12H [facility] Yuniel Guzman PA-C      mupirocin  1 application Nasal M51S [facility] RENARD Smith      niCARdipine  1-15 mg/hr Intravenous Titrated Jose Carlos Montoya PA-C Stopped (07/12/22 0958)    ondansetron  4 mg Intravenous Q6H PRN Jose Carlos Montoya PA-C      pantoprazole  40 mg Oral Daily Jose Carlos Montoya PA-C      polyethylene glycol  17 g Oral Daily Jose Carlos Montoya PA-C       Continuous Infusions:lactated ringers, 100 mL/hr, Last Rate: Stopped (07/12/22 0923)  niCARdipine, 1-15 mg/hr, Last Rate: Stopped (07/12/22 0958)      PRN Meds:   acetaminophen    acetaminophen    bisacodyl    ondansetron    Vitals:   Vitals:    07/13/22 0200 07/13/22 0300 07/13/22 0400 07/13/22 0552   BP: 108/56 129/59 138/62    Pulse: 70 70 80    Resp: 21 14 19    Temp:  98 8 °F (37 1 °C)     TempSrc:       SpO2: 98% 98% 97%    Weight:    80 6 kg (177 lb 9 6 oz)   Height:       119/59    Telemetry: NSR; Heart Rate: 70    Hemodynamics:       Respiratory:   SpO2: SpO2: 97 %; Room Air    Intake/Output:     Intake/Output Summary (Last 24 hours) at 7/13/2022 0620  Last data filed at 7/13/2022 0402  Gross per 24 hour   Intake 1970 83 ml   Output 2325 ml   Net -354 17 ml        Weights:   Weight (last 2 days)     Date/Time Weight    07/13/22 0552 80 6 (177 6)    07/12/22 1100 78 9 (174)    07/12/22 0547 78 9 (174)        Results:   Results from last 7 days   Lab Units 07/13/22  0457 07/12/22  0854 07/12/22  0840   WBC Thousand/uL 195 04*  --   --    HEMOGLOBIN g/dL 8 7* 8 3*  --    I STAT HEMOGLOBIN g/dl  --   --  8 2*   HEMATOCRIT % 30 1* 28 8*  --    HEMATOCRIT, ISTAT %  --   --  24*   PLATELETS Thousands/uL 117* 107*  --    Chronic Anemia (baseline ~10)  CLL- radiation 2014 @ [place] (now in relapse)  Chronic thrombocytopenia (baseline 130)    Results from last 7 days   Lab Units 07/13/22  0457 07/12/22  0854 07/12/22  0840   POTASSIUM mmol/L 4 1 5 2  --    CHLORIDE mmol/L 108 115*  --    CO2 mmol/L 25 22  --    CO2, I-STAT mmol/L  --   --  23   BUN mg/dL 23 25  --    CREATININE mg/dL 1 22 1 18  --    GLUCOSE, ISTAT mg/dl  --   --  110   CALCIUM mg/dL 9 0 8 3  --    no hx of CKD      Point of care glucose: 108 - 136 (no hx of DM)    Studies:    CXR: 7/13 No ptx/effusion, line in good placement, atelectasis in LLL   Official read pending        EKG: NSR, rate 78    Echo: read pending    I have personally reviewed pertinent films in PACS    Invasive Lines/Tubes:  Invasive Devices  Report    Central Venous Catheter Line  Duration           CVC Central Lines 07/12/22 Triple <1 day Peripheral Intravenous Line  Duration           Peripheral IV 07/12/22 Left;Dorsal (posterior) Hand <1 day    Peripheral IV 07/12/22 Right;Dorsal (posterior) Forearm <1 day                Physical Exam:    HEENT/NECK:  Normocephalic  Atraumatic  No jugular venous distention  Cardiac: Regular rate and rhythm and No murmurs/rubs/gallops  Pulmonary:  Breath sounds clear bilaterally and No rales/rhonchi/wheezes  Abdomen:  Non-tender, Non-distended and Normal bowel sounds  Incisions: Groin puncture sites clean, dry, and intact without hematoma  Extremities: Extremities warm/dry, DP pulses 2+ bilaterally and No edema B/L  Neuro: Alert and oriented X 3 and No focal deficits  Skin: Warm/Dry, without rashes or lesions  Assessment:  Principal Problem: Aortic stenosis, severe  Active Problems:    CLL (chronic lymphoid leukemia) in relapse (HCC)    Thrombocytopenia (HCC)    S/P TAVR (transcatheter aortic valve replacement)    Hyperlipidemia    Chronic anemia       Aortic stenosis, Non-Rheumatic  S/P transfemoral transcatheter aortic valve replacement; POD # 1    Plan:    1  Cardiac:   NSR; HR/BP well-controlled  Continue Lopressor, 25mg PO BID  Central IV access no longer required; Remove central venous catheter today  ASA/Plavix  Consult cardiology for postoperative medical management  Follow up transthoracic echocardiogram today  Continue Statin therapy  Continue DVT prophylaxis    2  Pulmonary:   Extubated  CXR findings: No ptx/effusion, line in good placement, atelectasis in LLL  Official read pending  Good Room air oxygen saturation; Continue incentive spirometry/Coughing/Deep breathing exercises    3  Renal:   Intake/Output net: -354 mL/24 hours  Post op Creatinine stable; Follow up labs prn    4  Neuro:  Neurologically intact; No active issues  Incisional pain well-controlled; Continue prn Tylenol    5     GI:  Tolerating TLC 2 3 gm sodium diet  Maintain 1800 mL daily fluid restriction   Continue daily stool softeners and prn suppository  Continue GI prophylaxis    6  Endo:   Glucose well-controlled  Continue Insulin sliding scale coverage    7  Hematology:    Chronic Anemia (baseline ~10), hgb- 8 7   CLL- radiation 2014 @ [facility] (now in relapse)   Chronic thrombocytopenia (baseline 130), plts 117    8     Disposition:   Gerontology consultation ordered for routine assessment of TAVR patient over 72years old  Transfer from step down to telemetry level of care today, Discharge today    VTE Pharmacologic Prophylaxis: Heparin  VTE Mechanical Prophylaxis: sequential compression device    Bedside rounds completed with supervising physician  Plan of care discussed with bedside nurse    SIGNATURE: Román Sharp PA-C  DATE: July 13, 2022  TIME: 6:20 AM

## 2022-07-13 NOTE — PHYSICAL THERAPY NOTE
Physical Therapy Evaluation    Patient's Name: Wale Moreno    Admitting Diagnosis  Severe aortic stenosis [I35 0]    Problem List  Patient Active Problem List   Diagnosis    CLL (chronic lymphoid leukemia) in relapse (HCC)    Basal cell carcinoma (BCC) of nasal sidewall    Mohs defect of nose    S/P forehead flap    Scalp lesion    Anemia    Thrombocytopenia (HCC)    Aortic stenosis, severe    Severe aortic stenosis    Encounter for screening for stenosis of carotid artery    Encounter for special screening examination for cardiovascular disorder    S/P TAVR (transcatheter aortic valve replacement)    Hyperlipidemia    Chronic anemia       Past Medical History  Past Medical History:   Diagnosis Date    Basal cell carcinoma (BCC) of skin of nose     History of chemotherapy     Lymphocytic leukemia ([facility] [address] )        Past Surgical History  Past Surgical History:   Procedure Laterality Date    CARDIAC CATHETERIZATION N/A 6/15/2022    Procedure: Cardiac Coronary Angiogram;  Surgeon: Justin Montalvo MD;  Location: [facility];   Service: Cardiology    CATARACT EXTRACTION Bilateral     FLAP LOCAL HEAD / NECK N/A 4/14/2020    Procedure: ADJACENT TISSUE TRANSFER  FOREHEAD FLAP,  CARTILAGE GRAFT NOSE;  Surgeon: Will Shin MD;  Location: [facility];  Service: Plastics    FLAP LOCAL HEAD / NECK N/A 6/17/2020    Procedure: DIVISION INSET FOREHEAD FLAP; FULL THICKNESS SKIN GRAFT TO FOREHEAD;  Surgeon: Will Shin MD;  Location: [facility];  Service: Plastics    FULL THICKNESS SKIN GRAFT N/A 4/14/2020    Procedure: SKIN GRAFT FULL THICKNESS  (FTSG) NOSE;  Surgeon: Will Shin MD;  [facility]: [facility];  Service: Plastics    MOHS RECONSTRUCTION N/A 4/14/2020    Procedure: MOHS RECONSTRUCTION NOSE DEFECT;  Surgeon: Will Shin MD;  [facility];  Service: Plastics    MOHS RECONSTRUCTION N/A 5/4/2020    Procedure: RECONSTRUCTION MOHS NASAL DEFECT WITH EAR CARTILAGE GRAFT;  Surgeon: Leann Locke MD;  Location: [facility];  Service: Plastics    SHOULDER SURGERY      TIBIA FRACTURE SURGERY      TONSILLECTOMY          07/13/22 0812   PT Last Visit   PT Visit Date 07/13/22   Note Type   Note type Evaluation   Pain Assessment   Pain Assessment Tool 0-10   Pain Score No Pain   Restrictions/Precautions   Other Precautions Cardiac/sternal;Telemetry;Multiple lines   Home Living   Type of [address] Two level;Stairs to enter with rails  (split-level)   Home Equipment Other (Comment)  (dennies)   Additional Comments Pt lives in a split-level home with 10 steps between levels   Pt was ambulating independently without AD PTA   Prior Function   Level of [place] Independent with ADLs and functional mobility   Lives With Alone   Receives Help From Family   ADL Assistance Independent   IADLs Independent   Falls in the last 6 months 0   Comments pt lives alone, but reports that his son works from his house so he is there to help as needed   Cognition   Overall Cognitive Status WFL   Orientation Level Oriented X4   Memory Within functional limits   Following Commands Follows all commands and directions without difficulty   RLE Assessment   RLE Assessment WFL   LLE Assessment   LLE Assessment WFL   Bed Mobility   Additional Comments pt seated OOB upon arrival   Transfers   Sit to Stand 6  Modified independent   Stand to Sit 6  Modified independent   Additional Comments transfers without AD   Ambulation/Elevation   Gait pattern Step through pattern   Gait Assistance 5  Supervision   Assistive Device None   Distance 280ft   Stair Management Assistance 5  Supervision   Stair Management Technique One rail R;Foreward   Number of Stairs 3  (limited by lines)   Balance   Static Sitting Good   Dynamic Sitting Good   Static Standing Fair +   Dynamic Standing Fair +   Ambulatory Fair   Activity Tolerance   Activity Tolerance Patient tolerated treatment well   Medical Staff Made Aware Seen with OT 2* pt's medical complexity and multiple comorbidities  PT focus on functional transfers, gait, and endurance   Nurse Made Aware ok to see per RN   Assessment   Prognosis Good   Assessment Pt is a 80 y o  male seen for PT evaluation s/p admit to [facility] on 7/12/2022  Pt was admitted with a primary dx of: severe aortic stenosis  Pt is POD#1 s/p TAVR  PT now consulted for assessment of mobility and d/c needs  Pt with Up with assistance, Ambulate, PT evaluation orders  Pts current comorbidities effecting treatment include: basal cell carcinoma, hx of chemotherapy, lymphocytic leukemia  Pts current clinical presentation is Evolving (medium complexity) due to Ongoing medical management for primary dx, Ongoing telemetry monitoring, Trending lab values, s/p surgical intervention  Prior to admission, pt was living alone in a split-level home with 10 steps between levels  Pt is independent with ADLs and ambulates without any AD  Upon evaluation, pt currently is at mod (I) level for transfers; supervision for ambulation 280 ft w/ no AD; and supervision for navigating 3 steps up/down with single HR  Pt presents at PT eval functioning at/near baseline mobility level  No acute PT needs identified, PT signing off  Pt would benefit from continued ambulation with nursing and restorative staff as able  At conclusion of PT session pt returned back in chair with phone and call bell within reach  Pt denies any further questions at this time  The patient's AM-PAC Basic Mobility Inpatient Short Form Raw Score is 22  A Raw score of greater than 16 suggests the patient may benefit from discharge to home  Please also refer to the recommendation of the Physical Therapist for safe discharge planning  Recommend home with no PT needs upon hospital D/C     Barriers to Discharge None   Goals   Patient Goals to go home today   Plan   PT Frequency Other (Comment)  (eval only, D/C PT)   Recommendation   PT Discharge Recommendation No rehabilitation needs   AM-PAC Basic Mobility Inpatient   Turning in Bed Without Bedrails 4   Lying on Back to Sitting on Edge of Flat Bed 4   Moving Bed to Chair 4   Standing Up From Chair 4   Walk in Room 3   Climb 3-5 Stairs 3   Basic Mobility Inpatient Raw Score 22   Basic Mobility Standardized Score 47 4   Highest Level Of Mobility   JH-HLM Goal 7: Walk 25 feet or more   JH-HLM Achieved 8: Walk 250 feet ot more   Modified Lea Scale   Modified [place] Scale 2   End of Consult   Patient Position at End of Consult Bedside chair; All needs within reach       Geovanni Chavez, PT, DPT

## 2022-07-15 ENCOUNTER — TELEPHONE (OUTPATIENT)
Dept: CARDIAC SURGERY | Facility: CLINIC | Age: 72
End: 2022-07-15

## 2022-07-15 NOTE — TELEPHONE ENCOUNTER
Spoke with Susana Hedrick today as follow up from TAVR, performed on 7/12  He was discharged home on 7/13  He states that he felt "winded" and fatigued yesterday, but is feeling much better today  He has been weighing himself daily, utilizing his IS, and walking  His left groin is bruised, but no bleeding or drainage noted  Questions answered  Reviewed upcoming appointments  He will see us in the office with Dr Michelle Cain on 8/11

## 2022-07-26 PROBLEM — E78.00 PURE HYPERCHOLESTEROLEMIA: Status: ACTIVE | Noted: 2022-07-26

## 2022-07-26 NOTE — PROGRESS NOTES
Cardiology  Hospital Follow Up   Office Visit Note  Zhou Gaming   80 y o    male   MRN: [id number]  [address]  [address] [place] [id number] [phone]    PCP: Paulina Whatley MD  Cardiologist: Dr Demetra Smith            Summary of recommendations  Heart healthy diet  Educational information provided  Lifelong SBE prophylaxis  Encouraged participation in cardiac rehab after cleared by his surgery team  Follow up will be scheduled with Dr Demetra Smith 3  months, fasting lipid profile prior to        Assessment/plan  Severe aortic stenosis status post 26 mmTF TAVR 7/12/22  Left-sided approach  · Final transesophageal echocardiogram demonstrated prosthetic valve with normal function trace perivalvular leak  · On aspirin, Plavix  · Lifelong SBE prophylaxis  CAD, nonobstructive dsiease  On aspirin, statin, beta-blocker  /82 -on metoprolol tartrate 12 5 mg q 12  Not previously on medication  Hyperlipidemia, on atorvastatin 40 mg daily  Will request a lipid profile  CLL  Diagnosed 1996  intermittent therapy with chlorambucil  Cardiac testing  · TTE  4/1/22 LVEF 65%, mild-to-moderate concentric hypertrophy, normal diastolic function  Normal RV size and systolic function  Mildly dilated LA  There was moderate aortic stenosis with a mean gradient of 21 mmHg and calculated aortic valve area 1 32 cm2  · TTE ( limited) 5/18/22     EF 60%  Systolic function normal   Wall motion normal   Diastolic function normal   Mild AI  Severe aortic stenosis  The aortic valve peak velocity is 3 48 m/s  The aortic valve mean gradient is 28 mmHg  The DVI is 0 24  The aortic valve area is 0 8 cm2  The aortic valve velocity is increased due to stenosis but lower than expected due to the presence of decreased flow    Mild MR   · NM myocardial SPECT 5/18/22 The ECG and SPECT imaging portions of the stress study are concordant with no evidence of stress induced myocardial ischemia  Left ventricular perfusion is normal   · [facility] 6/15/22  · Left Anterior Descending: The vessel was visualized by angiography and is large  Mid LAD lesion is 50% stenosed  WILLIAN flow is 3  The lesion is focal   · Ramus Intermedius: The vessel was visualized by angiography, is moderate in size and is angiographically normal   · Left Circumflex: The vessel was visualized by angiography, is moderate in size and is angiographically normal   · Right Coronary Artery: The vessel was visualized by angiography, is large, is angiographically normal and dominant  · TTE 7/12/22  EF 65%  Systolic function normal   Diastolic function normal There is an Gu CLINT 3 Ultra 26 mm TAVR bioprosthetic valve  The prosthetic valve appears well-seated  There is no evidence of paravalvular regurgitation  There is no evidence of transvalvular regurgitation  The gradient recorded across the prosthetic aortic valve is within the expected range  The aortic valve peak velocity is 1 9 m/s  The aortic valve mean gradient is 7 7 mmHg  The DVI is 0 79  The aortic valve area is 2 18 cm2  Mild MR  Mild TR  ANN Batista is a 79 yo male with CLL, recent referred to Dr Omkar Gonsales after an abnormal echocardiogram ordered by his PCP  He was found have increased fatigue, dyspnea  for which a nuclear stress test and echocardiogram were requested  He was found to have severe aortic stenosis, on referred to CT surgery for consideration of TAVR  His stress test showed no ischemia    Adm 7/12-7/13/22  Elective TAVR, uncomplicated  Final transesophageal echocardiogram demonstrated prosthetic valve with normal function trace perivalvular leak    7/27/22  Hospital follow-up after TAVR  He reports his dyspnea is greatly improved  He is not 100% back to normal   He is anxious to begin golfing  He does have significant ecchymosis at his access site and his left forearm  Overall, they are improving    He does note that this is kind of usual for him as he does have CLL  We discussed the benefit of cardiac rehab  He is interested in part taking, once cleared by CT surgery  He has been fairly sedentary since March, given he had rather severe symptoms and was limited by his dyspnea  His EKG shows normal sinus rhythm 67 beats per minute   His blood pressure is 136/82  He wondered how long he will need to be on these medications  I discussed the importance of adherence to his medical therapy as he did have a recent valve replacement  We discussed the importance of lifelong SBE prophylaxis  We had briefly discussed his cardiac medications  Will also order a lipid profile for him prior to the next visit which can be reviewed at that time        I have spent 25 minutes with Patient  today in which greater than 50% of this time was spent in counseling/coordination of care regarding Intructions for management, Patient and family education, Importance of tx compliance and Risk factor reductions  Assessment  Diagnoses and all orders for this visit:    Hospital discharge follow-up    Severe aortic stenosis    S/P TAVR (transcatheter aortic valve replacement)  -     POCT ECG    Pure hypercholesterolemia  -     Lipid panel; Future    Stage 3 chronic kidney disease, unspecified whether stage 3a or 3b CKD (HCC)    Aortic stenosis, severe          Past Medical History:   Diagnosis Date    Basal cell carcinoma (BCC) of skin of nose     History of chemotherapy     Lymphocytic leukemia ([facility] [address] )        Review of Systems   Constitutional: Negative for chills  Cardiovascular: Negative for chest pain, claudication, cyanosis, dyspnea on exertion, irregular heartbeat, leg swelling, near-syncope, orthopnea, palpitations, paroxysmal nocturnal dyspnea and syncope  Respiratory: Positive for shortness of breath  Negative for cough  Skin:        Ecchymosis at access site, left forearm   Gastrointestinal: Negative for heartburn and nausea     Neurological: Negative for dizziness, focal weakness, headaches, light-headedness and weakness  All other systems reviewed and are negative  No Known Allergies    Current Outpatient Medications:     acetaminophen (TYLENOL) 325 mg tablet, Take 2 tablets (650 mg total) by mouth every 4 (four) hours as needed for fever or moderate pain (temperature greater than 101 F), Disp: 100 tablet, Rfl: 0    Ascorbic Acid (VITAMIN C PO), Take by mouth daily , Disp: , Rfl:     aspirin 81 mg chewable tablet, Chew 1 tablet (81 mg total) daily, Disp: 90 tablet, Rfl: 2    atorvastatin (LIPITOR) 20 mg tablet, Take 1 tablet (20 mg total) by mouth daily with dinner, Disp: 90 tablet, Rfl: 2    clopidogrel (PLAVIX) 75 mg tablet, Take 1 tablet (75 mg total) by mouth daily, Disp: 90 tablet, Rfl: 0    fexofenadine-pseudoephedrine (ALLEGRA-D 24) 180-240 MG per 24 hr tablet, Take 1 tablet by mouth as needed , Disp: , Rfl:     metoprolol tartrate (LOPRESSOR) 25 mg tablet, Take 0 5 tablets (12 5 mg total) by mouth every 12 (twelve) hours, Disp: 90 tablet, Rfl: 2    VITAMIN D PO, Take by mouth daily , Disp: , Rfl:     VITAMIN E PO, Take by mouth daily , Disp: , Rfl:         Social History     Socioeconomic History    Marital status:       Spouse name: Not on file    Number of children: Not on file    Years of education: Not on file    Highest education level: Not on file   Occupational History    Not on file   Tobacco Use    Smoking status: Never Smoker    Smokeless tobacco: Never Used   Vaping Use    Vaping Use: Never used   Substance and Sexual Activity    Alcohol use: Not Currently     Comment: rare    Drug use: Never    Sexual activity: Not on file   Other Topics Concern    Not on file   Social History Narrative    Not on file     Social Determinants of Health     Financial Resource Strain: Not on file   Food Insecurity: Not on file   Transportation Needs: Not on file   Physical Activity: Not on file   Stress: Not on file   Social Connections: Not on file   Intimate Partner Violence: Not on file   Housing Stability: Not on file       Family History   Problem Relation Age of Onset    Stroke Mother     No Known Problems Father        Physical Exam  Vitals and nursing note reviewed  Constitutional:       General: He is not in acute distress  HENT:      Head: Normocephalic and atraumatic  Eyes:      Conjunctiva/sclera: Conjunctivae normal    Cardiovascular:      Rate and Rhythm: Normal rate and regular rhythm  Pulses: Intact distal pulses  Heart sounds: Normal heart sounds  Pulmonary:      Effort: Pulmonary effort is normal       Breath sounds: Normal breath sounds  Abdominal:      General: Bowel sounds are normal       Palpations: Abdomen is soft  Musculoskeletal:         General: Normal range of motion  Cervical back: Normal range of motion and neck supple  Skin:     General: Skin is warm and dry  Findings: Ecchymosis present  Comments: Ecchymosis and access sites  Overall, improving  Area soft, pulses intact   Neurological:      Mental Status: He is alert and oriented to person, place, and time  Vitals: Blood pressure 136/82, pulse 72, height 5' 9" (1 753 m), weight 81 6 kg (180 lb), SpO2 100 %  Wt Readings from Last 3 Encounters:   07/27/22 81 6 kg (180 lb)   07/13/22 80 6 kg (177 lb 9 6 oz)   07/07/22 80 7 kg (178 lb)         Labs & Results:  Lab Results   Component Value Date     04 (HH) 07/13/2022    HGB 8 7 (L) 07/13/2022    HCT 30 1 (L) 07/13/2022     (H) 07/13/2022     (L) 07/13/2022     No results found for: BNP  No components found for: CHEM  No results found for: CKTOTAL, TROPONINI, TROPONINT, CKMBINDEX  No results found for this or any previous visit  No results found for this or any previous visit  This note was completed in part utilizing [employer] fluency direct voice recognition software     Grammatical errors, random word insertion, spelling mistakes, and incomplete sentences may be an occasional consequence of the system secondary to software limitations, ambient noise and hardware issues  At the time of dictation, efforts were made to edit, clarify and /or correct errors  Please read the chart carefully and recognize, using context, where substitutions have occurred    If you have any questions or concerns about the context, text or information contained within the body of this dictation, please contact myself, the provider, for further clarification

## 2022-07-27 ENCOUNTER — OFFICE VISIT (OUTPATIENT)
Dept: CARDIOLOGY CLINIC | Facility: CLINIC | Age: 72
End: 2022-07-27
Payer: MEDICARE

## 2022-07-27 VITALS
HEIGHT: 69 IN | WEIGHT: 180 LBS | OXYGEN SATURATION: 100 % | HEART RATE: 72 BPM | BODY MASS INDEX: 26.66 KG/M2 | SYSTOLIC BLOOD PRESSURE: 136 MMHG | DIASTOLIC BLOOD PRESSURE: 82 MMHG

## 2022-07-27 DIAGNOSIS — N18.30 STAGE 3 CHRONIC KIDNEY DISEASE, UNSPECIFIED WHETHER STAGE 3A OR 3B CKD (HCC): ICD-10-CM

## 2022-07-27 DIAGNOSIS — I35.0 AORTIC STENOSIS, SEVERE: ICD-10-CM

## 2022-07-27 DIAGNOSIS — E78.00 PURE HYPERCHOLESTEROLEMIA: ICD-10-CM

## 2022-07-27 DIAGNOSIS — Z95.2 S/P TAVR (TRANSCATHETER AORTIC VALVE REPLACEMENT): ICD-10-CM

## 2022-07-27 DIAGNOSIS — Z09 HOSPITAL DISCHARGE FOLLOW-UP: Primary | ICD-10-CM

## 2022-07-27 DIAGNOSIS — I35.0 SEVERE AORTIC STENOSIS: ICD-10-CM

## 2022-07-27 PROCEDURE — 93000 ELECTROCARDIOGRAM COMPLETE: CPT | Performed by: INTERNAL MEDICINE

## 2022-07-27 PROCEDURE — 1124F ACP DISCUSS-NO DSCNMKR DOCD: CPT | Performed by: INTERNAL MEDICINE

## 2022-07-27 PROCEDURE — 99214 OFFICE O/P EST MOD 30 MIN: CPT | Performed by: INTERNAL MEDICINE

## 2022-07-27 NOTE — PATIENT INSTRUCTIONS

## 2022-07-27 NOTE — LETTER
July 27, 2022     Tanya Alcarza MD  [address] [place] [id number]    Patient: Radha Cazares   YOB: 1950   Date of Visit: 7/27/2022       Dear Dr Tidwell: Thank you for referring Jose Manuel Fiore to me for evaluation  Below are my notes for this consultation  If you have questions, please do not hesitate to call me  I look forward to following your patient along with you  Sincerely,        CANDE Paul        CC: MD Antonio Maddox, [address]  7/27/2022 12:01 PM  Sign when [facility] Visit  Cardiology  [facility] Follow Up   Office Visit Note  Radha Cazares   80 y o    male   MRN: [id number]  [address]  [address] [facility],[address]  [place] [address]  [phone]    PCP: Tanya Alcaraz MD  Cardiologist: Dr Claudette Bloomer            Summary of recommendations  Heart healthy diet  Educational information provided  Lifelong SBE prophylaxis  Encouraged participation in cardiac rehab after cleared by his surgery team  Follow up will be scheduled with Dr Claudette Bloomer 3  months, fasting lipid profile prior to        Assessment/plan  Severe aortic stenosis status post 26 mmTF TAVR 7/12/22  Left-sided approach  · Final transesophageal echocardiogram demonstrated prosthetic valve with normal function trace perivalvular leak  · On aspirin, Plavix  · Lifelong SBE prophylaxis  CAD, nonobstructive dsiease  On aspirin, statin, beta-blocker  /82 -on metoprolol tartrate 12 5 mg q 12  Not previously on medication  Hyperlipidemia, on atorvastatin 40 mg daily  Will request a lipid profile  CLL  Diagnosed 1996  intermittent therapy with chlorambucil  Cardiac testing  · TTE  4/1/22 LVEF 65%, mild-to-moderate concentric hypertrophy, normal diastolic function  Normal RV size and systolic function  Mildly dilated LA  There was moderate aortic stenosis with a mean gradient of 21 mmHg and calculated aortic valve area 1 32 cm2    · TTE ( limited) 5/18/22     EF 60%  Systolic function normal   Wall motion normal   Diastolic function normal   Mild AI  Severe aortic stenosis  The aortic valve peak velocity is 3 48 m/s  The aortic valve mean gradient is 28 mmHg  The DVI is 0 24  The aortic valve area is 0 8 cm2  The aortic valve velocity is increased due to stenosis but lower than expected due to the presence of decreased flow  Mild MR   · NM myocardial SPECT 5/18/22 The ECG and SPECT imaging portions of the stress study are concordant with no evidence of stress induced myocardial ischemia  Left ventricular perfusion is normal   · LHC 6/15/22  · Left Anterior Descending: The vessel was visualized by angiography and is large  Mid LAD lesion is 50% stenosed  WILLIAN flow is 3  The lesion is focal   · Ramus Intermedius: The vessel was visualized by angiography, is moderate in size and is angiographically normal   · Left Circumflex: The vessel was visualized by angiography, is moderate in size and is angiographically normal   · Right Coronary Artery: The vessel was visualized by angiography, is large, is angiographically normal and dominant  · TTE 7/12/22  EF 65%  Systolic function normal   Diastolic function normal There is an Gu CLINT 3 Ultra 26 mm TAVR bioprosthetic valve  The prosthetic valve appears well-seated  There is no evidence of paravalvular regurgitation  There is no evidence of transvalvular regurgitation  The gradient recorded across the prosthetic aortic valve is within the expected range  The aortic valve peak velocity is 1 9 m/s  The aortic valve mean gradient is 7 7 mmHg  The DVI is 0 79  The aortic valve area is 2 18 cm2  Mild MR  Mild TR  ANN Izquierdo is a 81 yo male with CLL, recent referred to Dr Bill Varela after an abnormal echocardiogram ordered by his PCP  He was found have increased fatigue, dyspnea  for which a nuclear stress test and echocardiogram were requested    He was found to have severe aortic stenosis, on referred to CT surgery for consideration of TAVR  His stress test showed no ischemia    Adm 7/12-7/13/22  Elective TAVR, uncomplicated  Final transesophageal echocardiogram demonstrated prosthetic valve with normal function trace perivalvular leak    7/27/22  Hospital follow-up after TAVR  He reports his dyspnea is greatly improved  He is not 100% back to normal   He is anxious to begin golfing  He does have significant ecchymosis at his access site and his left forearm  Overall, they are improving  He does note that this is kind of usual for him as he does have CLL  We discussed the benefit of cardiac rehab  He is interested in part taking, once cleared by CT surgery  He has been fairly sedentary since March, given he had rather severe symptoms and was limited by his dyspnea  His EKG shows normal sinus rhythm 67 beats per minute   His blood pressure is 136/82  He wondered how long he will need to be on these medications  I discussed the importance of adherence to his medical therapy as he did have a recent valve replacement  We discussed the importance of lifelong SBE prophylaxis  We had briefly discussed his cardiac medications  Will also order a lipid profile for him prior to the next visit which can be reviewed at that time        I have spent 25 minutes with Patient  today in which greater than 50% of this time was spent in counseling/coordination of care regarding Intructions for management, Patient and family education, Importance of tx compliance and Risk factor reductions  Assessment  Diagnoses and all orders for this visit:    Hospital discharge follow-up    Severe aortic stenosis    S/P TAVR (transcatheter aortic valve replacement)  -     POCT ECG    Pure hypercholesterolemia  -     Lipid panel;  Future    Stage 3 chronic kidney disease, unspecified whether stage 3a or 3b CKD (HCC)    Aortic stenosis, severe          Past Medical History:   Diagnosis Date    Basal cell carcinoma (BCC) of skin of nose     History of chemotherapy     Lymphocytic leukemia ([facility] [address] )        Review of Systems   Constitutional: Negative for chills  Cardiovascular: Negative for chest pain, claudication, cyanosis, dyspnea on exertion, irregular heartbeat, leg swelling, near-syncope, orthopnea, palpitations, paroxysmal nocturnal dyspnea and syncope  Respiratory: Positive for shortness of breath  Negative for cough  Skin:        Ecchymosis at access site, left forearm   Gastrointestinal: Negative for heartburn and nausea  Neurological: Negative for dizziness, focal weakness, headaches, light-headedness and weakness  All other systems reviewed and are negative  No Known Allergies    Current Outpatient Medications:     acetaminophen (TYLENOL) 325 mg tablet, Take 2 tablets (650 mg total) by mouth every 4 (four) hours as needed for fever or moderate pain (temperature greater than 101 F), Disp: 100 tablet, Rfl: 0    Ascorbic Acid (VITAMIN C PO), Take by mouth daily , Disp: , Rfl:     aspirin 81 mg chewable tablet, Chew 1 tablet (81 mg total) daily, Disp: 90 tablet, Rfl: 2    atorvastatin (LIPITOR) 20 mg tablet, Take 1 tablet (20 mg total) by mouth daily with dinner, Disp: 90 tablet, Rfl: 2    clopidogrel (PLAVIX) 75 mg tablet, Take 1 tablet (75 mg total) by mouth daily, Disp: 90 tablet, Rfl: 0    fexofenadine-pseudoephedrine (ALLEGRA-D 24) 180-240 MG per 24 hr tablet, Take 1 tablet by mouth as needed , Disp: , Rfl:     metoprolol tartrate (LOPRESSOR) 25 mg tablet, Take 0 5 tablets (12 5 mg total) by mouth every 12 (twelve) hours, Disp: 90 tablet, Rfl: 2    VITAMIN D PO, Take by mouth daily , Disp: , Rfl:     VITAMIN E PO, Take by mouth daily , Disp: , Rfl:         Social History     Socioeconomic History    Marital status:       Spouse name: Not on file    Number of children: Not on file    Years of education: Not on file    Highest education level: Not on file   Occupational History    Not on file   Tobacco Use    Smoking status: Never Smoker    Smokeless tobacco: Never Used   Vaping Use    Vaping Use: Never used   Substance and Sexual Activity    Alcohol use: Not Currently     Comment: rare    Drug use: Never    Sexual activity: Not on file   Other Topics Concern    Not on file   Social History Narrative    Not on file     Social Determinants of Health     Financial Resource Strain: Not on file   Food Insecurity: Not on file   Transportation Needs: Not on file   Physical Activity: Not on file   Stress: Not on file   Social Connections: Not on file   Intimate Partner Violence: Not on file   Housing Stability: Not on file       Family History   Problem Relation Age of Onset    Stroke Mother     No Known Problems Father        Physical Exam  Vitals and nursing note reviewed  Constitutional:       General: He is not in acute distress  HENT:      Head: Normocephalic and atraumatic  Eyes:      Conjunctiva/sclera: Conjunctivae normal    Cardiovascular:      Rate and Rhythm: Normal rate and regular rhythm  Pulses: Intact distal pulses  Heart sounds: Normal heart sounds  Pulmonary:      Effort: Pulmonary effort is normal       Breath sounds: Normal breath sounds  Abdominal:      General: Bowel sounds are normal       Palpations: Abdomen is soft  Musculoskeletal:         General: Normal range of motion  Cervical back: Normal range of motion and neck supple  Skin:     General: Skin is warm and dry  Findings: Ecchymosis present  Comments: Ecchymosis and access sites  Overall, improving  Area soft, pulses intact   Neurological:      Mental Status: He is alert and oriented to person, place, and time  Vitals: Blood pressure 136/82, pulse 72, height 5' 9" (1 753 m), weight 81 6 kg (180 lb), SpO2 100 %     Wt Readings from Last 3 Encounters:   07/27/22 81 6 kg (180 lb)   07/13/22 80 6 kg (177 lb 9 6 oz)   07/07/22 80 7 kg (178 lb)         Labs & Results:  Lab Results   Component Value Date     04 (HH) 07/13/2022    HGB 8 7 (L) 07/13/2022    HCT 30 1 (L) 07/13/2022     (H) 07/13/2022     (L) 07/13/2022     No results found for: BNP  No components found for: CHEM  No results found for: CKTOTAL, TROPONINI, TROPONINT, CKMBINDEX  No results found for this or any previous visit  No results found for this or any previous visit  This note was completed in part utilizing [employer] direct voice recognition software  Grammatical errors, random word insertion, spelling mistakes, and incomplete sentences may be an occasional consequence of the system secondary to software limitations, ambient noise and hardware issues  At the time of dictation, efforts were made to edit, clarify and /or correct errors  Please read the chart carefully and recognize, using context, where substitutions have occurred    If you have any questions or concerns about the context, text or information contained within the body of this dictation, please contact myself, the provider, for further clarification

## 2022-08-11 ENCOUNTER — HOSPITAL ENCOUNTER (OUTPATIENT)
Dept: NON INVASIVE DIAGNOSTICS | Facility: HOSPITAL | Age: 72
Discharge: HOME | End: 2022-08-11
Payer: MEDICARE

## 2022-08-11 ENCOUNTER — OFFICE VISIT (OUTPATIENT)
Dept: CARDIAC SURGERY | Facility: CLINIC | Age: 72
End: 2022-08-11
Payer: MEDICARE

## 2022-08-11 ENCOUNTER — APPOINTMENT (OUTPATIENT)
Dept: LAB | Facility: HOSPITAL | Age: 72
End: 2022-08-11
Payer: MEDICARE

## 2022-08-11 VITALS
RESPIRATION RATE: 20 BRPM | BODY MASS INDEX: 26.22 KG/M2 | DIASTOLIC BLOOD PRESSURE: 66 MMHG | HEART RATE: 62 BPM | OXYGEN SATURATION: 100 % | SYSTOLIC BLOOD PRESSURE: 130 MMHG | HEIGHT: 69 IN | WEIGHT: 177 LBS | TEMPERATURE: 96.4 F

## 2022-08-11 VITALS
SYSTOLIC BLOOD PRESSURE: 136 MMHG | WEIGHT: 180 LBS | DIASTOLIC BLOOD PRESSURE: 82 MMHG | BODY MASS INDEX: 26.66 KG/M2 | HEART RATE: 72 BPM | HEIGHT: 69 IN

## 2022-08-11 DIAGNOSIS — Z95.2 S/P TAVR (TRANSCATHETER AORTIC VALVE REPLACEMENT): ICD-10-CM

## 2022-08-11 DIAGNOSIS — I35.0 SEVERE AORTIC STENOSIS: ICD-10-CM

## 2022-08-11 DIAGNOSIS — E78.00 PURE HYPERCHOLESTEROLEMIA: ICD-10-CM

## 2022-08-11 DIAGNOSIS — E87.5 HYPERKALEMIA: Primary | ICD-10-CM

## 2022-08-11 DIAGNOSIS — Z95.2 S/P TAVR (TRANSCATHETER AORTIC VALVE REPLACEMENT): Primary | ICD-10-CM

## 2022-08-11 LAB
ANION GAP SERPL CALCULATED.3IONS-SCNC: 4 MMOL/L (ref 4–13)
AORTIC ROOT: 2.7 CM
AORTIC VALVE MEAN VELOCITY: 13.7 M/S
APICAL FOUR CHAMBER EJECTION FRACTION: 52 %
ASCENDING AORTA: 3.9 CM
AV AREA BY CONTINUOUS VTI: 1.6 CM2
AV AREA PEAK VELOCITY: 1.7 CM2
AV LVOT MEAN GRADIENT: 3 MMHG
AV LVOT PEAK GRADIENT: 7 MMHG
AV MEAN GRADIENT: 8 MMHG
AV PEAK GRADIENT: 16 MMHG
AV VALVE AREA: 1.56 CM2
AV VELOCITY RATIO: 0.65
BASOPHILS # BLD MANUAL: 0 THOUSAND/UL (ref 0–0.1)
BASOPHILS NFR MAR MANUAL: 0 % (ref 0–1)
BUN SERPL-MCNC: 29 MG/DL (ref 5–25)
CALCIUM SERPL-MCNC: 9.9 MG/DL (ref 8.3–10.1)
CHLORIDE SERPL-SCNC: 111 MMOL/L (ref 96–108)
CHOLEST SERPL-MCNC: 122 MG/DL
CO2 SERPL-SCNC: 24 MMOL/L (ref 21–32)
CREAT SERPL-MCNC: 1.71 MG/DL (ref 0.6–1.3)
DOP CALC AO PEAK VEL: 1.97 M/S
DOP CALC AO VTI: 43.29 CM
DOP CALC LVOT AREA: 2.54 CM2
DOP CALC LVOT DIAMETER: 1.8 CM
DOP CALC LVOT PEAK VEL VTI: 26.6 CM
DOP CALC LVOT PEAK VEL: 1.28 M/S
DOP CALC LVOT STROKE INDEX: 33.8 ML/M2
DOP CALC LVOT STROKE VOLUME: 67.65 CM3
E WAVE DECELERATION TIME: 235 MS
EOSINOPHIL # BLD MANUAL: 0 THOUSAND/UL (ref 0–0.4)
EOSINOPHIL NFR BLD MANUAL: 0 % (ref 0–6)
ERYTHROCYTE [DISTWIDTH] IN BLOOD BY AUTOMATED COUNT: 14.2 % (ref 11.6–15.1)
FRACTIONAL SHORTENING: 35 % (ref 28–44)
GFR SERPL CREATININE-BSD FRML MDRD: 39 ML/MIN/1.73SQ M
GLUCOSE SERPL-MCNC: 107 MG/DL (ref 65–140)
HCT VFR BLD AUTO: 34.4 % (ref 36.5–49.3)
HDLC SERPL-MCNC: 36 MG/DL
HGB BLD-MCNC: 10 G/DL (ref 12–17)
INTERVENTRICULAR SEPTUM IN DIASTOLE (PARASTERNAL SHORT AXIS VIEW): 0.9 CM
INTERVENTRICULAR SEPTUM: 0.9 CM (ref 0.6–1.1)
LAAS-AP2: 23.2 CM2
LAAS-AP4: 25.3 CM2
LDLC SERPL CALC-MCNC: 67 MG/DL (ref 0–100)
LEFT ATRIUM SIZE: 3.9 CM
LEFT INTERNAL DIMENSION IN SYSTOLE: 3 CM (ref 2.1–4)
LEFT VENTRICULAR INTERNAL DIMENSION IN DIASTOLE: 4.6 CM (ref 3.5–6)
LEFT VENTRICULAR POSTERIOR WALL IN END DIASTOLE: 1.1 CM
LEFT VENTRICULAR STROKE VOLUME: 60 ML
LVSV (TEICH): 60 ML
LYMPHOCYTES # BLD AUTO: 135.34 THOUSAND/UL (ref 0.6–4.47)
LYMPHOCYTES # BLD AUTO: 91 % (ref 14–44)
MCH RBC QN AUTO: 30.1 PG (ref 26.8–34.3)
MCHC RBC AUTO-ENTMCNC: 29.1 G/DL (ref 31.4–37.4)
MCV RBC AUTO: 104 FL (ref 82–98)
MONOCYTES # BLD AUTO: 1.49 THOUSAND/UL (ref 0–1.22)
MONOCYTES NFR BLD: 1 % (ref 4–12)
MV E'TISSUE VEL-SEP: 9 CM/S
MV PEAK A VEL: 0.93 M/S
MV PEAK E VEL: 96 CM/S
MV STENOSIS PRESSURE HALF TIME: 68 MS
MV VALVE AREA P 1/2 METHOD: 3.24 CM2
NEUTROPHILS # BLD MANUAL: 8.92 THOUSAND/UL (ref 1.85–7.62)
NEUTS BAND NFR BLD MANUAL: 1 % (ref 0–8)
NEUTS SEG NFR BLD AUTO: 5 % (ref 43–75)
NONHDLC SERPL-MCNC: 86 MG/DL
PLATELET # BLD AUTO: 133 THOUSANDS/UL (ref 149–390)
PLATELET BLD QL SMEAR: ABNORMAL
PMV BLD AUTO: 9 FL (ref 8.9–12.7)
POTASSIUM SERPL-SCNC: 6.7 MMOL/L (ref 3.5–5.3)
RBC # BLD AUTO: 3.32 MILLION/UL (ref 3.88–5.62)
RIGHT ATRIAL 2D VOLUME: 56 ML
RIGHT ATRIUM AREA SYSTOLE A4C: 19.1 CM2
RIGHT VENTRICLE ID DIMENSION: 4 CM
SL CV LEFT ATRIUM LENGTH A2C: 5.7 CM
SL CV LV EF: 65
SL CV PED ECHO LEFT VENTRICLE DIASTOLIC VOLUME (MOD BIPLANE) 2D: 95 ML
SL CV PED ECHO LEFT VENTRICLE SYSTOLIC VOLUME (MOD BIPLANE) 2D: 35 ML
SODIUM SERPL-SCNC: 139 MMOL/L (ref 135–147)
TR MAX PG: 20 MMHG
TR PEAK VELOCITY: 2.3 M/S
TRICUSPID VALVE PEAK REGURGITATION VELOCITY: 2.25 M/S
TRIGL SERPL-MCNC: 94 MG/DL
VARIANT LYMPHS # BLD AUTO: 2 %
WBC # BLD AUTO: 148.72 THOUSAND/UL (ref 4.31–10.16)

## 2022-08-11 PROCEDURE — 80048 BASIC METABOLIC PNL TOTAL CA: CPT

## 2022-08-11 PROCEDURE — 80061 LIPID PANEL: CPT

## 2022-08-11 PROCEDURE — 93005 ELECTROCARDIOGRAM TRACING: CPT

## 2022-08-11 PROCEDURE — 85007 BL SMEAR W/DIFF WBC COUNT: CPT

## 2022-08-11 PROCEDURE — 93306 TTE W/DOPPLER COMPLETE: CPT

## 2022-08-11 PROCEDURE — 85027 COMPLETE CBC AUTOMATED: CPT

## 2022-08-11 PROCEDURE — 99214 OFFICE O/P EST MOD 30 MIN: CPT | Performed by: NURSE PRACTITIONER

## 2022-08-11 PROCEDURE — 93306 TTE W/DOPPLER COMPLETE: CPT | Performed by: INTERNAL MEDICINE

## 2022-08-11 PROCEDURE — 36415 COLL VENOUS BLD VENIPUNCTURE: CPT

## 2022-08-11 NOTE — LETTER
August 11, 2022     Kendal Lopez, [address]    Patient: Antelmo Wasserman   YOB: 1950   Date of Visit: 8/11/2022       Dear Dr Marsha Lo: Thank you for referring Leilani Cummins to me for evaluation  Below are my notes for this consultation  If you have questions, please do not hesitate to call me  I look forward to following your patient along with you  Sincerely,        Mirta Maria MD        CC: MD Bogdan Quesada CRNP  8/11/2022 11:09 AM  Attested   POST OP FOLLOW UP VISIT S/P TAVR    Procedure: S/P Transcatheter aortic valve replacement with a 26 mm Gu CLINT 3 bioprosthetic valve via left transfemoral approach, performed on 7/12/22  History: Antelmo Wasserman is a 80y o  year old male who presents to our office today for routine follow up care from transfemoral transcatheter aortic valve replacement  Patient's PMHx is notable for AS, CLL s/p chemo (2014) now in relapse, thrombocytopenia, anemia and BCC (nose) s/p Moh's  Patient tolerated his TAVR procedure well  Post o Echo stable  No new conduction delays noted on ECG or telemetry  He was discharged to home on POD #1  Patient has had out patient f/u with Cardiology  Today he reports he is doing well  He has been playing golf  He reports  occasional SOB with swinging his golf club, otherwise, denies ROMANO, chest pian, palpitations, lightheadedness, fatigue or edema  He denies issues with his groin site healing  He reports increased  Bruising since he is taking Plavix       Review of System:     History obtained from chart review and the patient  General ROS: negative  Psychological ROS: negative  Ophthalmic ROS: negative  ENT ROS: negative  Allergy and Immunology ROS: negative  Hematological and Lymphatic ROS: positive for - bruising  negative for - bleeding problems, blood clots or jaundice  Endocrine ROS: negative  Breast ROS: negative  Respiratory ROS: no cough, shortness of breath, or wheezing  Cardiovascular ROS: no chest pain or dyspnea on exertion  Gastrointestinal ROS: no abdominal pain, change in bowel habits, or black or bloody stools  Genito-Urinary ROS: no dysuria, trouble voiding, or hematuria  Musculoskeletal ROS: negative  Neurological ROS: no TIA or stroke symptoms  Dermatological ROS: negative    Vital Signs:     Vitals:    08/11/22 1031   BP: 130/66   BP Location: Left arm   Patient Position: Sitting   Cuff Size: Standard   Pulse: 62   Resp: 20   Temp: (!) 96 4 °F (35 8 °C)   SpO2: 100%   Weight: 80 3 kg (177 lb)   Height: 5' 9" (1 753 m)       Home Medications:     Prior to Admission medications    Medication Sig Start Date End Date Taking? Authorizing Provider   acetaminophen (TYLENOL) 325 mg tablet Take 2 tablets (650 mg total) by mouth every 4 (four) hours as needed for fever or moderate pain (temperature greater than 101 F) 7/13/22  Yes Beto Botello PA-C   Ascorbic Acid (VITAMIN C PO) Take by mouth daily    Yes Historical Provider, MD   aspirin 81 mg chewable tablet Chew 1 tablet (81 mg total) daily 7/13/22 10/11/22 Yes Beto Botello PA-C   atorvastatin (LIPITOR) 20 mg tablet Take 1 tablet (20 mg total) by mouth daily with dinner 7/13/22 10/11/22 Yes Beto Botello PA-C   clopidogrel (PLAVIX) 75 mg tablet Take 1 tablet (75 mg total) by mouth daily 7/13/22 10/11/22 Yes Nellie Horn PA-C   fexofenadine-pseudoephedrine (ALLEGRA-D 24) 180-240 MG per 24 hr tablet Take 1 tablet by mouth as needed    Yes Historical Provider, MD   metoprolol tartrate (LOPRESSOR) 25 mg tablet Take 0 5 tablets (12 5 mg total) by mouth every 12 (twelve) hours 7/13/22 10/11/22 Yes Beto Botello PA-C   VITAMIN D PO Take by mouth daily    Yes Historical Provider, MD   VITAMIN E PO Take by mouth daily    Yes Historical Provider, MD       Physical Exam:    General: Alert, oriented, well developed, no acute distress  HEENT/NECK:  PERRLA  No jugular venous distention      Cardiac:Regular rate and rhythm, no murmurs rubs or gallops  Pulmonary:Breath sounds clear bilaterally  Abdomen:  Non-tender, Non-distended  Positive bowel sounds  Upper extremities: 2+ radial pulses; brisk capillary refill  Lower extremities: Extremities warm/dry  Left femoral pulse 1+, no hematoma; PT/DP pulses 1+ bilaterally  No edema B/L  Incisions: Inguinal puncture site is clean, dry, and intact  Musculoskeletal: MAEE, stable gait  Neuro: Alert and oriented X 3  Sensation is grossly intact  No focal deficits  Skin: Warm/Dry, without rashes or lesions      Lab Results:     Results from last 7 days   Lab Units 08/11/22  1022   WBC Thousand/uL 148 72*   HEMOGLOBIN g/dL 10 0*   HEMATOCRIT % 34 4*   PLATELETS Thousands/uL 133*     BMP PENDING    Imaging Studies:     Transthoracic Echocardiogram: today  PRELIMINARY:  Left Ventricle Measurements    Function/Volumes   A4C EF 52 %         LVOT stroke volume 67 65 cm3         LVOT stroke volume index 33 8 ml/m2         Dimensions   LVIDd 4 6 cm         LVIDS 3 cm         IVSd 0 9 cm         LVPWd 1 1 cm         LVOT area 2 54 cm2         FS 35 %         Diastolic Filling   MV E' Tissue Velocity Septal 9 cm/s         E wave deceleration time 235 ms         MV Peak E Favian 96 cm/s         MV Peak A Favian 0 93 m/s          Report Measurements   AV LVOT peak gradient 7 mmHg              Interventricular Septum Measurements    Shunt Ratio   LVOT peak VTI 26 6 cm         LVOT peak favian 1 28 m/s              Right Ventricle Measurements    Dimensions   RVID d 4 cm               Left Atrium Measurements    Dimensions   LA size 3 9 cm         LA length (A2C) 5 7 cm               Right Atrium Measurements    Dimensions   RA 2D Volume 56 mL         RAA A4C 19 1 cm2               Atrial Septum Measurements    Shunt Ratio   LVOT peak VTI 26 6 cm         LVOT peak favian 1 28 m/s               Aortic Valve Measurements    Stenosis   Aortic valve peak velocity 1 97 m/s         LVOT peak favian 1 28 m/s         Ao VTI 43 29 cm         LVOT peak VTI 26 6 cm         AV mean gradient 8 mmHg         LVOT mn grad 3 mmHg         AV peak gradient 16 mmHg         AV LVOT peak gradient 7 mmHg         Dimensionless velociy index 0 65          Area/Dimensions   AV valve area 1 56 cm2         AV area by cont VTI 1 6 cm2         AV area peak favian 1 7 cm2         LVOT diameter 1 8 cm         LVOT area 2 54 cm2               Mitral Valve Measurements    Stenosis   MV stenosis pressure 1/2 time 68 ms         MV valve area p 1/2 method 3 24 cm2               Tricuspid Valve Measurements    RVSP Parameters   TR Peak Favian 2 3 m/s         Triscuspid Valve Regurgitation Peak Gradient 20 mmHg               Aorta Measurements    Aortic Dimensions   Ao root 2 7 cm         Asc Ao 3 9 cm                EKG: TODAY    pending    I have personally reviewed pertinent films in PACS     Cardiology notes reviewed    TAVR evaluation Assessment:     Mabel Ennis 122: I    Assessment:   Aortic stenosis, Non-Rheumatic  S/P transfemoral transcatheter aortic valve replacement    Florian Drain is making good progress in their post-op recovery  They are at NYHA functional class I  Left groin incision is well healed  Weight and VS are stable  Recent echocardiogram reports is pending at this time but assessment of imaging reveals normal TAVR function and no obvious PVL  Reviewed with patient  ECG report pending;BMP pending & CBC stable (WBC's elevated but at patients baseline w/ h/o CLL)   Plan:   Medications reviewed with patient  Recommended Plavix therapy after TAVR is for 90 days only but Aspirin 81 mg daily is lifelong for antiplatelets therapy for bioprosthetic valve  Benefits of participating in cardiac rehabilitation discussed with patient and they are cleared to proceed with the program     May resume driving and all normal activities      Florian Drain will return for one year follow-up in our office with repeat echocardiogram, ECG, CBC & BMP  Our office will contact patient to schedule appointment  They have been advised to maintain routine follow up with their cardiologist and PCP for ongoing medical care  Patient was comfortable with our recommendations and their questions were answered to their satisfaction  Routine GI consultation for colonoscopy previously ordered  CANDE Ahmadi  8/11/22  11:03 AM  Attestation signed by Debria Hatchet, MD at 8/11/2022 11:17 AM:  Patient seen and evaluated with [place] / RENARD  I agree with the above assessment and plan with the following additions  The patient is a 78-year-old man 1 month status post TAVR  He is recovering well without complications  Plan:  Follow up with cardiology and PCP  Cardiac rehab  Follow up in valve center in 1 yr    This note was completed in part utilizing [employer] direct voice recognition software  Grammatical errors, random word insertion, spelling mistakes, and incomplete sentences may be an occasional consequence of the system secondary to software limitations, ambient noise and hardware issues  At the time of dictation, efforts were made to edit, clarify and /or correct errors  Please read the chart carefully and recognize, using context, where substitutions have occurred  If you have any questions or concerns about the context, text or information contained within the body of this dictation, please contact myself, the provider, for further clarification

## 2022-08-11 NOTE — PROGRESS NOTES
Patient seen in our office today for routine 30 day post TAVR follow up  Routine CBC and BMP obtained for visit  BMP with hyperkalemia and elevated creatinine  Lab Results   Component Value Date    SODIUM 139 08/11/2022    K 6 7 (HH) 08/11/2022     (H) 08/11/2022    CO2 24 08/11/2022    BUN 29 (H) 08/11/2022    CREATININE 1 71 (H) 08/11/2022    GLUC 107 08/11/2022    CALCIUM 9 9 08/11/2022     PATIENT IS NOT ON ANY DIURETIC, POTASSIUM SUPPLEMENT, ACE INHIBITOR OR ARB  Normal physical exam  Patient w/o complaints  Suspect blood specimen hemolyzed  Spoke with patient and advised to increase oral fluid intake today and go back to lab tomorrow for repeat BMP  PCP will be notified to follow up  Patient to call PCP tomorrow after visit to lab to f/u results

## 2022-08-11 NOTE — PROGRESS NOTES
POST OP FOLLOW UP VISIT S/P TAVR    Procedure: S/P Transcatheter aortic valve replacement with a 26 mm Gu CLINT 3 bioprosthetic valve via left transfemoral approach, performed on 7/12/22  History: Carey High is a 80y o  year old male who presents to our office today for routine follow up care from transfemoral transcatheter aortic valve replacement  Patient's PMHx is notable for AS, CLL s/p chemo (2014) now in relapse, thrombocytopenia, anemia and BCC (nose) s/p Moh's  Patient tolerated his TAVR procedure well  Post o Echo stable  No new conduction delays noted on ECG or telemetry  He was discharged to home on POD #1  Patient has had out patient f/u with Cardiology  Today he reports he is doing well  He has been playing golf  He reports  occasional SOB with swinging his golf club, otherwise, denies ROMANO, chest pian, palpitations, lightheadedness, fatigue or edema  He denies issues with his groin site healing  He reports increased  Bruising since he is taking Plavix       Review of System:     History obtained from chart review and the patient  General ROS: negative  Psychological ROS: negative  Ophthalmic ROS: negative  ENT ROS: negative  Allergy and Immunology ROS: negative  Hematological and Lymphatic ROS: positive for - bruising  negative for - bleeding problems, blood clots or jaundice  Endocrine ROS: negative  Breast ROS: negative  Respiratory ROS: no cough, shortness of breath, or wheezing  Cardiovascular ROS: no chest pain or dyspnea on exertion  Gastrointestinal ROS: no abdominal pain, change in bowel habits, or black or bloody stools  Genito-Urinary ROS: no dysuria, trouble voiding, or hematuria  Musculoskeletal ROS: negative  Neurological ROS: no TIA or stroke symptoms  Dermatological ROS: negative    Vital Signs:     Vitals:    08/11/22 1031   BP: 130/66   BP Location: Left arm   Patient Position: Sitting   Cuff Size: Standard   Pulse: 62   Resp: 20   Temp: (!) 96 4 °F (35 8 °C) SpO2: 100%   Weight: 80 3 kg (177 lb)   Height: 5' 9" (1 753 m)       Home Medications:     Prior to Admission medications    Medication Sig Start Date End Date Taking? Authorizing Provider   acetaminophen (TYLENOL) 325 mg tablet Take 2 tablets (650 mg total) by mouth every 4 (four) hours as needed for fever or moderate pain (temperature greater than 101 F) 7/13/22  Yes Claudeen Southerly, PA-C   Ascorbic Acid (VITAMIN C PO) Take by mouth daily    Yes Historical Provider, MD   aspirin 81 mg chewable tablet Chew 1 tablet (81 mg total) daily 7/13/22 10/11/22 Yes Claudeen Southerly, PA-C   atorvastatin (LIPITOR) 20 mg tablet Take 1 tablet (20 mg total) by mouth daily with dinner 7/13/22 10/11/22 Yes Claudeen Southerly, PA-C   clopidogrel (PLAVIX) 75 mg tablet Take 1 tablet (75 mg total) by mouth daily 7/13/22 10/11/22 Yes Nellie Horn PA-C   fexofenadine-pseudoephedrine (ALLEGRA-D 24) 180-240 MG per 24 hr tablet Take 1 tablet by mouth as needed    Yes Historical Provider, MD   metoprolol tartrate (LOPRESSOR) 25 mg tablet Take 0 5 tablets (12 5 mg total) by mouth every 12 (twelve) hours 7/13/22 10/11/22 Yes Claudeen Southerly, PA-C   VITAMIN D PO Take by mouth daily    Yes Historical Provider, MD   VITAMIN E PO Take by mouth daily    Yes Historical Provider, MD       Physical Exam:    General: Alert, oriented, well developed, no acute distress  HEENT/NECK:  PERRLA  No jugular venous distention  Cardiac:Regular rate and rhythm, no murmurs rubs or gallops  Pulmonary:Breath sounds clear bilaterally  Abdomen:  Non-tender, Non-distended  Positive bowel sounds  Upper extremities: 2+ radial pulses; brisk capillary refill  Lower extremities: Extremities warm/dry  Left femoral pulse 1+, no hematoma; PT/DP pulses 1+ bilaterally  No edema B/L  Incisions: Inguinal puncture site is clean, dry, and intact  Musculoskeletal: MAEE, stable gait  Neuro: Alert and oriented X 3  Sensation is grossly intact    No focal deficits  Skin: Warm/Dry, without rashes or lesions      Lab Results:     Results from last 7 days   Lab Units 08/11/22  1022   WBC Thousand/uL 148 72*   HEMOGLOBIN g/dL 10 0*   HEMATOCRIT % 34 4*   PLATELETS Thousands/uL 133*     BMP PENDING    Imaging Studies:     Transthoracic Echocardiogram: today  PRELIMINARY:  Left Ventricle Measurements    Function/Volumes   A4C EF 52 %         LVOT stroke volume 67 65 cm3         LVOT stroke volume index 33 8 ml/m2         Dimensions   LVIDd 4 6 cm         LVIDS 3 cm         IVSd 0 9 cm         LVPWd 1 1 cm         LVOT area 2 54 cm2         FS 35 %         Diastolic Filling   MV E' Tissue Velocity Septal 9 cm/s         E wave deceleration time 235 ms         MV Peak E Favian 96 cm/s         MV Peak A Favian 0 93 m/s          Report Measurements   AV LVOT peak gradient 7 mmHg              Interventricular Septum Measurements    Shunt Ratio   LVOT peak VTI 26 6 cm         LVOT peak favian 1 28 m/s              Right Ventricle Measurements    Dimensions   RVID d 4 cm               Left Atrium Measurements    Dimensions   LA size 3 9 cm         LA length (A2C) 5 7 cm               Right Atrium Measurements    Dimensions   RA 2D Volume 56 mL         RAA A4C 19 1 cm2               Atrial Septum Measurements    Shunt Ratio   LVOT peak VTI 26 6 cm         LVOT peak favian 1 28 m/s               Aortic Valve Measurements    Stenosis   Aortic valve peak velocity 1 97 m/s         LVOT peak favian 1 28 m/s         Ao VTI 43 29 cm         LVOT peak VTI 26 6 cm         AV mean gradient 8 mmHg         LVOT mn grad 3 mmHg         AV peak gradient 16 mmHg         AV LVOT peak gradient 7 mmHg         Dimensionless velociy index 0 65          Area/Dimensions   AV valve area 1 56 cm2         AV area by cont VTI 1 6 cm2         AV area peak favian 1 7 cm2         LVOT diameter 1 8 cm         LVOT area 2 54 cm2               Mitral Valve Measurements    Stenosis   MV stenosis pressure 1/2 time 68 ms         MV valve area p 1/2 method 3 24 cm2 Tricuspid Valve Measurements    RVSP Parameters   TR Peak Favian 2 3 m/s         Triscuspid Valve Regurgitation Peak Gradient 20 mmHg               Aorta Measurements    Aortic Dimensions   Ao root 2 7 cm         Asc Ao 3 9 cm                EKG: TODAY    pending    I have personally reviewed pertinent films in PACS     Cardiology notes reviewed    TAVR evaluation Assessment:     Gay Amaya: I    Assessment:   Aortic stenosis, Non-Rheumatic  S/P transfemoral transcatheter aortic valve replacement    Zaina Shaw is making good progress in their post-op recovery  They are at NYHA functional class I  Left groin incision is well healed  Weight and VS are stable  Recent echocardiogram reports is pending at this time but assessment of imaging reveals normal TAVR function and no obvious PVL  Reviewed with patient  ECG report pending;BMP pending & CBC stable (WBC's elevated but at patients baseline w/ h/o CLL)   Plan:   Medications reviewed with patient  Recommended Plavix therapy after TAVR is for 90 days only but Aspirin 81 mg daily is lifelong for antiplatelets therapy for bioprosthetic valve  Benefits of participating in cardiac rehabilitation discussed with patient and they are cleared to proceed with the program     May resume driving and all normal activities  Zaina Shaw will return for one year follow-up in our office with repeat echocardiogram, ECG, CBC & BMP  Our office will contact patient to schedule appointment  They have been advised to maintain routine follow up with their cardiologist and PCP for ongoing medical care  Patient was comfortable with our recommendations and their questions were answered to their satisfaction  Routine GI consultation for colonoscopy previously ordered       CANDE Jaime  8/11/22  11:03 AM

## 2022-08-12 ENCOUNTER — APPOINTMENT (OUTPATIENT)
Dept: LAB | Age: 72
End: 2022-08-12
Payer: MEDICARE

## 2022-08-12 DIAGNOSIS — E87.5 HYPERKALEMIA: ICD-10-CM

## 2022-08-12 LAB
ANION GAP SERPL CALCULATED.3IONS-SCNC: 6 MMOL/L (ref 4–13)
ATRIAL RATE: 60 BPM
BUN SERPL-MCNC: 25 MG/DL (ref 5–25)
CALCIUM SERPL-MCNC: 9.7 MG/DL (ref 8.3–10.1)
CHLORIDE SERPL-SCNC: 107 MMOL/L (ref 96–108)
CO2 SERPL-SCNC: 24 MMOL/L (ref 21–32)
CREAT SERPL-MCNC: 1.62 MG/DL (ref 0.6–1.3)
GFR SERPL CREATININE-BSD FRML MDRD: 42 ML/MIN/1.73SQ M
GLUCOSE SERPL-MCNC: 97 MG/DL (ref 65–140)
P AXIS: 28 DEGREES
POTASSIUM SERPL-SCNC: 5.1 MMOL/L (ref 3.5–5.3)
PR INTERVAL: 202 MS
QRS AXIS: 67 DEGREES
QRSD INTERVAL: 80 MS
QT INTERVAL: 410 MS
QTC INTERVAL: 410 MS
SODIUM SERPL-SCNC: 137 MMOL/L (ref 135–147)
T WAVE AXIS: 40 DEGREES
VENTRICULAR RATE: 60 BPM

## 2022-08-12 PROCEDURE — 93010 ELECTROCARDIOGRAM REPORT: CPT | Performed by: INTERNAL MEDICINE

## 2022-08-12 PROCEDURE — 36415 COLL VENOUS BLD VENIPUNCTURE: CPT

## 2022-08-12 PROCEDURE — 80048 BASIC METABOLIC PNL TOTAL CA: CPT

## 2022-08-16 ENCOUNTER — CLINICAL SUPPORT (OUTPATIENT)
Dept: CARDIAC REHAB | Age: 72
End: 2022-08-16
Payer: MEDICARE

## 2022-08-16 DIAGNOSIS — Z95.2 S/P TAVR (TRANSCATHETER AORTIC VALVE REPLACEMENT): ICD-10-CM

## 2022-08-16 DIAGNOSIS — I35.0 AORTIC STENOSIS, SEVERE: ICD-10-CM

## 2022-08-16 PROCEDURE — 93797 PHYS/QHP OP CAR RHAB WO ECG: CPT

## 2022-08-16 NOTE — PROGRESS NOTES
CARDIAC REHAB ASSESSMENT    Today's date: 2022  Patient name: Marjorie Claire     : 1950       MRN: [id number]  PCP: Jonathan Hilliard MD  Referring Physician: Dianne Sen MD  Cardiologist: Baljinder Narvaez MD  Surgeon: Samir Marinelli MD  Dx:   Encounter Diagnoses   Name Primary?  S/P TAVR (transcatheter aortic valve replacement)     Aortic stenosis, severe        Date of onset: 22  Cultural needs: none    Weight    Wt Readings from Last 1 Encounters:   22 81 6 kg (180 lb)      Height:   Ht Readings from Last 1 Encounters:   22 5' 9" (1 753 m)     Medical History:   Past Medical History:   Diagnosis Date    Basal cell carcinoma (BCC) of skin of nose     History of chemotherapy     History of colonoscopy 2012    Lymphocytic leukemia ([facility] [address] )          Physical Limitations: none    Fall Risk: Low   Comments: Ambulates with a steady gait with no assist device and Denies a fall in the past 6 months    Anginal Equivalent: None/denies angina   NTG use: No prescription    Risk Factors   Cholesterol: No  Smoking: Never used  HTN: No  DM: No  Obesity: No   Inactivity: No  Stress:  perceived  stress: 1/10   Stressors: doesn't worry about many things   Goals for Stress Management:Spend time with family and go to the park, play ball with grandkids, video games, movies    Family History:  Family History   Problem Relation Age of Onset    Stroke Mother     No Known Problems Father        Allergies: Patient has no known allergies    ETOH:   Social History     Substance and Sexual Activity   Alcohol Use Not Currently    Comment: rare         Current Medications:   Current Outpatient Medications   Medication Sig Dispense Refill    acetaminophen (TYLENOL) 325 mg tablet Take 2 tablets (650 mg total) by mouth every 4 (four) hours as needed for fever or moderate pain (temperature greater than 101 F) 100 tablet 0    Ascorbic Acid (VITAMIN C PO) Take by mouth daily       aspirin 81 mg chewable tablet Chew 1 tablet (81 mg total) daily 90 tablet 2    atorvastatin (LIPITOR) 20 mg tablet Take 1 tablet (20 mg total) by mouth daily with dinner 90 tablet 2    clopidogrel (PLAVIX) 75 mg tablet Take 1 tablet (75 mg total) by mouth daily 90 tablet 0    fexofenadine-pseudoephedrine (ALLEGRA-D 24) 180-240 MG per 24 hr tablet Take 1 tablet by mouth as needed       metoprolol tartrate (LOPRESSOR) 25 mg tablet Take 0 5 tablets (12 5 mg total) by mouth every 12 (twelve) hours 90 tablet 2    VITAMIN D PO Take by mouth daily       VITAMIN E PO Take by mouth daily        No current facility-administered medications for this visit  Functional Status Prior to Diagnosis for Treatment   Occupation: full time job out of the house  Recreation: [email or web address]  ADLs: limited by Dyspnea  [place]: No limitations  Exercise: none  Other: split level - winded after 2 steps    Current Functional Status  Occupation: has returned unrestricted/regular duty  Recreation: golfing - sligtly winded  ADLs:Capable of performing light to moderate ADLs  [place]: No limitations  Exercise: walking occasionally (hot)  Other:  Has resumed activities now that he couldn't do prior to TAVR - chopping branches, pressure washing, rests when SOB  Can walk up the whole flight of steps    Patient Specific Goals:  Return to golfing 18 holes without SOB, swinging the golf club without SOB,     Short Term Program Goals: increased strength improved energy/stamina with ADLs exercise 120-150 mins/wk    Long Term Goals: Improved functional capacity  Improved Quality of Life - [facility] score reduced  improved Rate Your Plate Score    Ability to reach goals/rehabilitation potential:  Excellent    Projected return to function: 12 weeks  Objective tests: sub-max TM ETT      Nutritional   Reviewed details of Rate your Plate  Discussed key elements of heart healthy eating   Reviewed patient goals for dietary modifications and their clinical implications  Reviewed most recent lipid profile     Take-out  Eats one big meal at lunch  Cereal  One cup of coffee  Not enough water  Not many meats - occ hoagie  Baked penne  Chinese food  [place]  [place] almost all the time    Goals for dietary modification: choose lean cuts of meat  poultry without the skin  low fat ground meat and poultry  eliminate processed meats  reduce portions of meat to 3 oz  increase fish intake  more meatless meals  low fat dairy   reduced fat cheese  increase whole grains  increase fruits and vegetables  eliminate butter  low sodium  improved snack choices  more nuts/seeds  reduce sweets/frozen desserts  heathier choices while dining out      Emotional/Social  Patient reports he/she is coping well with good social support and denies depression or anxiety  Reports sufficient emotional support    Marital status:   Son, daughter, 3 grandkids    Domestic Violence Screening: No    Comments: sudden onset of symptoms 6- 8 months - called PCP = referred to cardiologist

## 2022-08-16 NOTE — PROGRESS NOTES
Cardiac Rehabilitation Plan of Care   Initial Care Plan          Today's date: 2022   # of Exercise Sessions Completed: Initial Evaluation Today  Patient name: Alisson Grossman      : 1950  Age: 80 y o  MRN: [id number]  Referring Physician: Sridhar Dallas PA-C  Cardiologist: Susana Harrell MD  Provider: Jossy  Clinician: Puma Johnson, MS, CEP, CCRP      Dx:   Encounter Diagnoses   Name Primary?  S/P TAVR (transcatheter aortic valve replacement)     Aortic stenosis, severe      Date of onset: 22      SUMMARY OF PROGRESS:  Today is Raghavendra's initial evaluation to begin Cardiac Rehab post TAVR on 22  The patient does not currently follow a formal exercise program at home  He has returned to golfing  Reports he continues to have some dyspnea with activity as well as leg fatigue  He has resumed light to moderate ADLs reporting dyspnea with activity  He rests as needed  Depression screening using the PHQ-9 interprets the patient's score of  0  as  0 =No Depression  HALEY-7 screening tool for anxiety suggests 0  0-4  = Not anxious  When addressed, the patient denies having depression/anxiety  Patient reports excellent social/emotional support from his son and daughter  Information to begin using [facility] was provided as well as contact information for counseling through [employer]  PHQ-9 score will be reassessed in 30 days  The patient is a non-smoker  Patient admits to 100% medication compliance  The patient completed an initial submaximal TM ETT  The patient completed 1:30 minutes of stage 1 (2 2METs) with test termination of RHR +30 and ECG  Pt went into Afib with exercise and returned to NSR in recovery  Resting  /64  Patient denied symptoms during exercise      Patient was counseled on exercise guidelines to achieve a minimum of 150 mins/wk of moderate intensity (RPE 4-6) exercise and encouraged to add 1-2 days of exercise on opposite days of cardiac rehab as tolerated  We discussed current dietary habits and goals of heart healthy eating for lipid management  Patient's goals include: return to golf unrestricted, swing the golf club without SOB  The patient's CAD risk factors include:  inactivity  His education will focus on lifestyle modification/education specific to His needs  Patient will attend group education classes on heart healthy eating, reading food labels, stress management, risk factor reduction, understanding heart disease and common heart medications  Patient will attend 35 monitored exercise sessions, 3x/wk for 12-18 weeks beginning August 19, 2022  Medication compliance: Yes   Comments: Pt reports to be compliant with medications  Fall Risk: Low   Comments: Ambulates with a steady gait with no assist device and Denies a fall in the past 6 months    EKG Interpretation: NSR, PACs at rest, exercise induced afib, NSR in recovery      EXERCISE ASSESSMENT and PLAN    Exercise Prescription:      Frequency: 3 days/week Supplement with home exercise 2+ days/wk as tolerated       Minutes: 30-40         METS: 2 0-3 8            HR: 100-130   RPE: 4-5         Modalities: Treadmill, UBE, Lifecycle and Recumbent bike      30 Day Goals for Exercise Progression:    Frequency: 3 days/week of cardiac rehab     Supplement with home exercise 2+ days/wk as tolerated    Minutes: 40                            >150 mins/wk of moderate intensity exercise   METS: 2 2-4 0   HR: 100-130    RPE: 4-6   Modalities: Treadmill, Airdyne bike, UBE, Lifecycle and Rower    Strength training:   Will be added following 2-3 weeks of monitored exercise sessions   Modalities: Leg Press, Chest Press, Pull Downs and Lateral Raise    Home Exercise: none, has returned to golfing    Goals: 10% improvement in functional capacity - based on max METs achieved in fitness assessment, Reduced dyspnea with physical activity  0-1/10, improved DASI score by 10%, Increase in exercise capacity by 40% - based on peak METs tolerated in cardiac rehab exercise session, Exercise 5 days/wk, >150mins/wk of moderate intensity exercise, Resume ADLs with increased strength, Attend Rehab regularly, Start a walking program and swing the golf club hard without SOB    Progression Toward Goals:  Reviewed Pt goals and determined plan of care, Will continue to educate and progress as tolerated  Education: benefit of exercise for CAD risk factors, AHA guidelines to achieve >150 mins/wk of moderate exercise and RPE scale   Plan:education on home exercise guidelines, home exercise 30+ mins 2 days opposite CR and Education class: Risk Factors for Heart Disease  Readiness to change: Preparation:  (Getting ready to change)       NUTRITION ASSESSMENT AND PLAN    Weight control:    Starting weight: 178   Current weight:     Waist circumference:    Startin   Current:      Diabetes: N/A    Lipid management: Discussed diet and lipid management and Last lipid profile 22  Chol 122  TRG 94  HDL 36  LDL 67    Goals:reduced BMI to < 25, HDL >40, Improved Rate Your Plate score  >52, choose lean meat (93-95%), eliminate processed meats, increase intake of fish, shellfish, reduce cheese intake or use reduced-fat, eat 3 or more servings of whole grains a day, Eat 4-5 cups of fruits and vegetables daily, use olive or canola oil in baking, choose low sodium processed foods, eliminate butter, Increase intake of nuts and seeds, seldom eat or choose low fat ice-cream, fruit juice bars or frozen yogurt , eliminate or choose low-fat sweets and daily saturated fat intake <7%/13g    Progression Toward Goals: Reviewed Pt goals and determined plan of care, Will continue to educate and progress as tolerated      Education: heart healthy eating  low sodium diet  hydration  healthy choices while dining out  Plan: Education class: Reading Food Labels, Education Class: Heart Healthy Eating, replace butter with soft spreads made with olive oil, canola or yogurt, replace refined grain bread with whole grain bread, replace unhealthy snacks with fruits & vegs, reduce red meat 1x/wk, switch to skim or 1% milk, eat fewer desserts and sweets, avoid processed foods, learn how to read food labels, replace sugar with stevia or truvia and keep added daily sugar <25g/day  Readiness to change: Action:  (Changing behavior)      PSYCHOSOCIAL ASSESSMENT AND PLAN    Emotional:  Depression assessment:  PHQ-9 = 0  0 =No Depression            Anxiety measure:  HALEY-7 = 0  0-4  = Not anxious  Self-reported stress level:  1  Social support: Excellent and Patient reports excellent emotional/social support from family - son and daughter    Goals:  Physical Fitness in [facility] Score < 3 and Overall Health in [place] [id number] Score < 3    Progression Toward Goals: Reviewed Pt goals and determined plan of care, Will continue to educate and progress as tolerated  Education: benefits of a positive support system and stress management techniques  Plan: Class: Stress and Your Health, Class: Relaxation, Exercise, Enjoy a hobby, Enjoy family, Return to previous social activity and golf without SOB  Readiness to change: Preparation:  (Getting ready to change)       OTHER CORE COMPONENTS     Tobacco:   Social History     Tobacco Use   Smoking Status Never Smoker   Smokeless Tobacco Never Used       Tobacco Use Intervention:   N/A:  Patient is a non-smoker     Anginal Symptoms:  None   NTG use: No prescription    Blood pressure:    Restin/74   Exercise:     Goals: consistent BP < 130/80, reduced dietary sodium <2300mg, moderate intensity exercise >150 mins/wk and medication compliance    Progression Toward Goals: Reviewed Pt goals and determined plan of care, Will continue to educate and progress as tolerated      Education:  low sodium diet and HTN  Plan: Class: Understanding Heart Disease, Class: Common Heart Medications, Avoid Processed foods, engage in regular exercise, eliminate salt shaker at the table, use salt substitutes and check labels for sodium content  Readiness to change: Preparation:  (Getting ready to change)

## 2022-08-19 ENCOUNTER — CLINICAL SUPPORT (OUTPATIENT)
Dept: CARDIAC REHAB | Age: 72
End: 2022-08-19
Payer: MEDICARE

## 2022-08-19 DIAGNOSIS — Z95.2 S/P TAVR (TRANSCATHETER AORTIC VALVE REPLACEMENT): ICD-10-CM

## 2022-08-19 PROCEDURE — 93798 PHYS/QHP OP CAR RHAB W/ECG: CPT

## 2022-08-22 ENCOUNTER — CLINICAL SUPPORT (OUTPATIENT)
Dept: CARDIAC REHAB | Age: 72
End: 2022-08-22
Payer: MEDICARE

## 2022-08-22 DIAGNOSIS — Z95.2 S/P TAVR (TRANSCATHETER AORTIC VALVE REPLACEMENT): ICD-10-CM

## 2022-08-22 PROCEDURE — 93798 PHYS/QHP OP CAR RHAB W/ECG: CPT

## 2022-08-24 ENCOUNTER — CLINICAL SUPPORT (OUTPATIENT)
Dept: CARDIAC REHAB | Age: 72
End: 2022-08-24
Payer: MEDICARE

## 2022-08-24 DIAGNOSIS — Z95.2 S/P TAVR (TRANSCATHETER AORTIC VALVE REPLACEMENT): ICD-10-CM

## 2022-08-24 PROCEDURE — 93798 PHYS/QHP OP CAR RHAB W/ECG: CPT

## 2022-08-26 ENCOUNTER — TELEPHONE (OUTPATIENT)
Dept: CARDIOLOGY CLINIC | Facility: CLINIC | Age: 72
End: 2022-08-26

## 2022-08-26 ENCOUNTER — CLINICAL SUPPORT (OUTPATIENT)
Dept: CARDIAC REHAB | Age: 72
End: 2022-08-26
Payer: MEDICARE

## 2022-08-26 DIAGNOSIS — I49.8 ARRHYTHMIA, ATRIAL: ICD-10-CM

## 2022-08-26 DIAGNOSIS — R00.2 PALPITATION: Primary | ICD-10-CM

## 2022-08-26 DIAGNOSIS — Z95.2 S/P TAVR (TRANSCATHETER AORTIC VALVE REPLACEMENT): ICD-10-CM

## 2022-08-26 PROCEDURE — 93798 PHYS/QHP OP CAR RHAB W/ECG: CPT

## 2022-08-26 NOTE — TELEPHONE ENCOUNTER
----- Message from Annalisa Collins MD sent at 8/26/2022 10:31 AM EDT -----  Bernadette ordered for Mr Romo Tariq if we could start the approval process   Thank you

## 2022-08-29 ENCOUNTER — CLINICAL SUPPORT (OUTPATIENT)
Dept: CARDIAC REHAB | Age: 72
End: 2022-08-29
Payer: MEDICARE

## 2022-08-29 DIAGNOSIS — Z95.2 S/P TAVR (TRANSCATHETER AORTIC VALVE REPLACEMENT): ICD-10-CM

## 2022-08-29 PROCEDURE — 93798 PHYS/QHP OP CAR RHAB W/ECG: CPT

## 2022-08-30 ENCOUNTER — TELEPHONE (OUTPATIENT)
Dept: OTHER | Facility: OTHER | Age: 72
End: 2022-08-30

## 2022-08-30 ENCOUNTER — APPOINTMENT (OUTPATIENT)
Dept: LAB | Age: 72
End: 2022-08-30
Payer: MEDICARE

## 2022-08-30 ENCOUNTER — TELEPHONE (OUTPATIENT)
Dept: HEMATOLOGY ONCOLOGY | Facility: CLINIC | Age: 72
End: 2022-08-30

## 2022-08-30 NOTE — TELEPHONE ENCOUNTER
Lab Result: [id number]   Date/Time Drawn: 8/30/22 10:42 AM   Ordering Provider: Kalin Muhammad MD   Lab Personnel's Name: Sukhdev Rivera       The following critical/stat result was read back to the lab as stated above and [employer] messaged to the on-call provider  The provider confirmed receipt of the message

## 2022-08-30 NOTE — TELEPHONE ENCOUNTER
Received message that WBC = [id number]    Reviewed labs and consistent with other counts    Katharine Pham MD, PhD

## 2022-08-31 ENCOUNTER — CLINICAL SUPPORT (OUTPATIENT)
Dept: CARDIAC REHAB | Age: 72
End: 2022-08-31
Payer: MEDICARE

## 2022-08-31 DIAGNOSIS — Z95.2 S/P TAVR (TRANSCATHETER AORTIC VALVE REPLACEMENT): ICD-10-CM

## 2022-08-31 PROCEDURE — 93798 PHYS/QHP OP CAR RHAB W/ECG: CPT

## 2022-08-31 NOTE — TELEPHONE ENCOUNTER
Lab Result: [id number]   Date/Time Drawn: 08/30/22 @ 1042    Ordering Provider: Dr Mary Villegas Name: Dinesh Wilhelm following critical/stat result was read back to the lab as stated above and [employer] to the on-call provider  The provider confirmed receipt of the message

## 2022-09-01 ENCOUNTER — CLINICAL SUPPORT (OUTPATIENT)
Dept: CARDIAC REHAB | Age: 72
End: 2022-09-01
Payer: MEDICARE

## 2022-09-01 DIAGNOSIS — Z95.2 S/P TAVR (TRANSCATHETER AORTIC VALVE REPLACEMENT): Primary | ICD-10-CM

## 2022-09-01 PROCEDURE — 93798 PHYS/QHP OP CAR RHAB W/ECG: CPT

## 2022-09-02 ENCOUNTER — APPOINTMENT (OUTPATIENT)
Dept: CARDIAC REHAB | Age: 72
End: 2022-09-02
Payer: MEDICARE

## 2022-09-06 ENCOUNTER — OFFICE VISIT (OUTPATIENT)
Dept: URGENT CARE | Age: 72
End: 2022-09-06
Payer: MEDICARE

## 2022-09-06 ENCOUNTER — TELEPHONE (OUTPATIENT)
Dept: HEMATOLOGY ONCOLOGY | Facility: CLINIC | Age: 72
End: 2022-09-06

## 2022-09-06 ENCOUNTER — CLINICAL SUPPORT (OUTPATIENT)
Dept: CARDIAC REHAB | Age: 72
End: 2022-09-06
Payer: MEDICARE

## 2022-09-06 DIAGNOSIS — Z95.2 S/P TAVR (TRANSCATHETER AORTIC VALVE REPLACEMENT): Primary | ICD-10-CM

## 2022-09-06 DIAGNOSIS — B02.9 HERPES ZOSTER WITHOUT COMPLICATION: Primary | ICD-10-CM

## 2022-09-06 PROCEDURE — 99213 OFFICE O/P EST LOW 20 MIN: CPT

## 2022-09-06 PROCEDURE — G0463 HOSPITAL OUTPT CLINIC VISIT: HCPCS

## 2022-09-06 PROCEDURE — 93798 PHYS/QHP OP CAR RHAB W/ECG: CPT

## 2022-09-06 RX ORDER — VALACYCLOVIR HYDROCHLORIDE 1 G/1
1000 TABLET, FILM COATED ORAL 3 TIMES DAILY
Qty: 21 TABLET | Refills: 0 | Status: SHIPPED | OUTPATIENT
Start: 2022-09-06 | End: 2022-10-07

## 2022-09-06 RX ORDER — CHLORHEXIDINE GLUCONATE 0.12 MG/ML
RINSE ORAL
COMMUNITY
Start: 2022-07-27

## 2022-09-07 ENCOUNTER — CLINICAL SUPPORT (OUTPATIENT)
Dept: CARDIAC REHAB | Age: 72
End: 2022-09-07
Payer: MEDICARE

## 2022-09-07 DIAGNOSIS — Z95.2 S/P TAVR (TRANSCATHETER AORTIC VALVE REPLACEMENT): ICD-10-CM

## 2022-09-07 PROCEDURE — 93798 PHYS/QHP OP CAR RHAB W/ECG: CPT

## 2022-09-08 ENCOUNTER — OFFICE VISIT (OUTPATIENT)
Dept: HEMATOLOGY ONCOLOGY | Facility: CLINIC | Age: 72
End: 2022-09-08
Payer: MEDICARE

## 2022-09-08 DIAGNOSIS — D64.9 ANEMIA, UNSPECIFIED TYPE: ICD-10-CM

## 2022-09-08 DIAGNOSIS — B02.9 HERPES ZOSTER WITHOUT COMPLICATION: ICD-10-CM

## 2022-09-08 DIAGNOSIS — C91.12 CLL (CHRONIC LYMPHOID LEUKEMIA) IN RELAPSE (HCC): Primary | ICD-10-CM

## 2022-09-08 DIAGNOSIS — D69.6 THROMBOCYTOPENIA (HCC): ICD-10-CM

## 2022-09-08 DIAGNOSIS — N18.30 STAGE 3 CHRONIC KIDNEY DISEASE, UNSPECIFIED WHETHER STAGE 3A OR 3B CKD (HCC): ICD-10-CM

## 2022-09-08 PROCEDURE — 99214 OFFICE O/P EST MOD 30 MIN: CPT | Performed by: INTERNAL MEDICINE

## 2022-09-08 NOTE — PROGRESS NOTES
HPI:  Follow-up visit for 17p deletion positive relapsed CLL and he has remained under surveillance and he prefers that way for now  Patient had TAVR for aortic stenosis on 07/12/2022 and is undergoing cardiac rehab  He has tiredness and exertional dyspnea  He states the symptoms are improving  Couple of days ago he developed shingles on his right lower leg below the knee and he is on Valtrex  Presently no pain or burning and lesions are drying   He has arthritic symptoms manageable with CBD  No fever, sweats, weight loss, frequent or severe infections, bleeding, itching or rash and he has not noticed enlarged glands and no fullness or discomfort in upper abdomen and no swelling of the legs  He has  recurrent scalp lesion and he follows with his dermatologist   Patient states that is not malignant    Current Outpatient Medications:     acetaminophen (TYLENOL) 325 mg tablet, Take 2 tablets (650 mg total) by mouth every 4 (four) hours as needed for fever or moderate pain (temperature greater than 101 F), Disp: 100 tablet, Rfl: 0    Ascorbic Acid (VITAMIN C PO), Take by mouth daily , Disp: , Rfl:     aspirin 81 mg chewable tablet, Chew 1 tablet (81 mg total) daily, Disp: 90 tablet, Rfl: 2    atorvastatin (LIPITOR) 20 mg tablet, Take 1 tablet (20 mg total) by mouth daily with dinner, Disp: 90 tablet, Rfl: 2    chlorhexidine (PERIDEX) 0 12 % solution, 1/2 OZ   TWICE DAILY, Disp: , Rfl:     clopidogrel (PLAVIX) 75 mg tablet, Take 1 tablet (75 mg total) by mouth daily, Disp: 90 tablet, Rfl: 0    fexofenadine-pseudoephedrine (ALLEGRA-D 24) 180-240 MG per 24 hr tablet, Take 1 tablet by mouth as needed , Disp: , Rfl:     metoprolol tartrate (LOPRESSOR) 25 mg tablet, Take 0 5 tablets (12 5 mg total) by mouth every 12 (twelve) hours, Disp: 90 tablet, Rfl: 2    valACYclovir (VALTREX) 1,000 mg tablet, Take 1 tablet (1,000 mg total) by mouth 3 (three) times a day for 7 days, Disp: 21 tablet, Rfl: 0   VITAMIN D PO, Take by mouth daily , Disp: , Rfl:     VITAMIN E PO, Take by mouth daily , Disp: , Rfl:     No Known Allergies    Oncology History Overview Note   1996:  CLL was diagnosed  Intermittently he received chlorambucil over the years  2014:  6 cycles of Rituxan and bendamustine  Presently under surveillance  CLL (chronic lymphoid leukemia) in relapse [facility])    Chemotherapy       1996:  CLL was diagnosed  Intermittent therapy with chlorambucil  2014:  6 cycles of Rituxan and bendamustine  Presently under surveillance  ROS:  09/08/22 Reviewed 12 systems:  See symptoms in HPI  Presently no other neurological, cardiac, pulmonary, GI and  symptoms  Other symptoms are in HPI  No symptoms like fever, chills, bleeding, bone pains, skin rash, weight loss,   weakness, numbness,  claudication and gait problem  No frequent infections other than recent shingles  Not unusually sensitive to heat or cold  No swelling of the ankles  No swollen glands  Patient is somewhat anxious  Physical Exam:      Temperature 97 9 degrees  Pulse 60  Respirations 17  Blood pressure 134/60  Oxygen saturation 98%     Alert and oriented and not in distress  Vitals as above  There is no icterus  , no oral thrush, no palpable neck mass,  lung fields clear to percussion and auscultation, regular heart rate, short systolic murmur, soft and non tender abdomen, no palpable abdominal mass, no ascites, no edema of ankles, no calf tenderness, no focal neurological deficit, no skin rash, no palpable lymphadenopathy,  no clubbing  Patient is anxious  Performance status 1      IMAGING:      LABS:    Results for orders placed or performed in visit on 72/98/87   Basic metabolic panel   Result Value Ref Range    Sodium 137 135 - 147 mmol/L    Potassium 5 1 3 5 - 5 3 mmol/L    Chloride 107 96 - 108 mmol/L    CO2 24 21 - 32 mmol/L    ANION GAP 6 4 - 13 mmol/L    BUN 25 5 - 25 mg/dL    Creatinine 1 62 (H) 0 60 - 1 30 mg/dL Glucose 97 65 - 140 mg/dL    Calcium 9 7 8 3 - 10 1 mg/dL    eGFR 42 ml/min/1 73sq m       s critical data CBC and differential  Order: [id number]   Status: Final result     Visible to patient: Yes (seen)     Next appt: 09/09/2022 at 09:00 AM in Cardiac Rehabilitation ([address])     Dx: CLL (chronic lymphoid leukemia) in re        0 Result Notes    Component Ref Range & Units 8/30/22 10:42 AM 8/11/22 10:22 AM 7/13/22  4:57 AM 7/12/22  8:54 AM 7/12/22  8:54 AM 7/5/22  3:01 PM 6/11/22  8:33 AM   WBC 4 31 - 10 16 Thousand/uL 159 61 High Panic   148 72 High Panic  CM  195 04 High Panic  CM    159 93 High Panic  CM  157 91 High Panic  CM    Comment: This result has been called to Alveta Grain by Leila Edwards on 08/30/2022 17:23:33, and has been read back  RBC 3 88 - 5 62 Million/uL 3 36 Low   3 32 Low   2 93 Low     3 43 Low   3 44 Low     Hemoglobin 12 0 - 17 0 g/dL 10 0 Low   10 0 Low   8 7 Low    8 3 Low   10 7 Low   10 3 Low     Hematocrit 36 5 - 49 3 % 34 6 Low   34 4 Low   30 1 Low    28 8 Low   35 5 Low   35 3 Low     MCV 82 - 98 fL 103 High   104 High   103 High     104 High   103 High     MCH 26 8 - 34 3 pg 29 8  30 1  29 7    31 2  29 9    MCHC 31 4 - 37 4 g/dL 28 9 Low   29 1 Low   28 9 Low     30 1 Low   29 2 Low     RDW 11 6 - 15 1 % 13 9  14 2  14 5    14 1  14 3    MPV 8 9 - 12 7 fL 8 9  9 0  8 7 Low   8 7 Low    8 8 Low   8 8 Low     Platelets 074 - 723 Thousands/uL 137 Low   133 Low   117 Low   107 Low    132 Low   143 Low               Narrative    This is an appended report   These results have been appended to a previously verified report  Specimen Collected: 08/30/22 10:42 AM Last Resulted: 08/30/22  9:56 PM         Lab Flowsheet      Order Details      View Encounter      Lab and Collection Details      Routing      Result History        CM=Additional comments          Result Care Coordination                    Normal uric acid    IgG 688    Labs, Imaging, & Other studies:   All pertinent labs and imaging studies were personally reviewed            Reviewed and discussed with patient  Assessment and plan: Follow-up visit for 17p deletion positive relapsed CLL and he has remained under surveillance and he prefers that way for now  Patient had TAVR for aortic stenosis on 07/12/2022 and is undergoing cardiac rehab  He has tiredness and exertional dyspnea  He states the symptoms are improving  Couple of days ago he developed shingles on his right lower leg below the knee and he is on Valtrex  Presently no pain or burning and lesions are drying   He has arthritic symptoms manageable with CBD  No fever, sweats, weight loss, frequent or severe infections, bleeding, itching or rash and he has not noticed enlarged glands and no fullness or discomfort in upper abdomen and no swelling of the legs  He has  recurrent scalp lesion and he follows with his dermatologist   Patient states that is not malignant          Physical examination and test results are as recorded and discussed  His CLL has an unfavorable feature of 17p deletion but his disease is not behaving like unfavorable  He has very high but stable WBC and lymphocyte counts  He has been heavily treated for CLL previously over the years   He has developed anemia since cardiac surgery  He prefers to have his condition monitored for now  Herpes zoster is being treated  Discussed Evusheld and vaccines    He also follows with [facility]  Dr Anila Estrella has a recent publication on CAR- T and CLL   Patient has been aware of that application  Anemia is any indication to treat in his case  He prefers surveillance for now  Discussed patient's condition with him in detail with explaned  Questions answered  Plan is surveillance  Also discussed the importance of eating healthy foods, staying active as tolerated and health screening test   He  plays golf  He is capable of self-care    Discussed precautions against coronavirus  He will continue to follow with primary physician and other consultants  See diagnoses, orders and instructions     1  CLL (chronic lymphoid leukemia) in relapse (HCC)    - CBC and differential; Standing  - Comprehensive metabolic panel; Standing  - Beta 2 microglobulin, serum; Standing  - IgG, IgA, IgM; Standing  - LD,Blood; Standing  - Uric acid; Standing  - Reticulocytes; Standing  - CBC and differential  - Comprehensive metabolic panel  - Beta 2 microglobulin, serum  - IgG, IgA, IgM  - LD,Blood  - Uric acid  - Reticulocytes    2  Herpes zoster without complication      3  Anemia, unspecified type    - Reticulocytes; Standing  - Reticulocytes    4  Stage 3 chronic kidney disease, unspecified whether stage 3a or 3b CKD ([facility] [address] )      5  Thrombocytopenia ([facility] [address] )    6  Status post TAVR      He has instructions for herpes zoster    Blood work every 4 weeks  Visit in 2 months                Patient voiced understanding and agrees      Counseling / Coordination of Care  Provided counseling and support

## 2022-09-09 ENCOUNTER — CLINICAL SUPPORT (OUTPATIENT)
Dept: CARDIAC REHAB | Age: 72
End: 2022-09-09
Payer: MEDICARE

## 2022-09-09 DIAGNOSIS — Z95.2 S/P TAVR (TRANSCATHETER AORTIC VALVE REPLACEMENT): ICD-10-CM

## 2022-09-09 PROCEDURE — 93798 PHYS/QHP OP CAR RHAB W/ECG: CPT

## 2022-09-12 ENCOUNTER — CLINICAL SUPPORT (OUTPATIENT)
Dept: CARDIAC REHAB | Age: 72
End: 2022-09-12
Payer: MEDICARE

## 2022-09-12 DIAGNOSIS — Z95.2 S/P TAVR (TRANSCATHETER AORTIC VALVE REPLACEMENT): ICD-10-CM

## 2022-09-12 PROCEDURE — 93798 PHYS/QHP OP CAR RHAB W/ECG: CPT

## 2022-09-12 NOTE — PROGRESS NOTES
Cardiac Rehabilitation Plan of Care   30 Day Reassessment          Today's date: 2022   # of Exercise Sessions Completed: 12  Patient name: Arnulfo Melvin      : 1950  Age: 71 y o  MRN: [id number]  Referring Physician: DAY MorfinC  Cardiologist: Glenna Storey MD  Provider: Waldo Mina  Clinician: Oleg Sanchez, MS, CEP, CCRP      Dx:   Encounter Diagnosis   Name Primary?  S/P TAVR (transcatheter aortic valve replacement)      Date of onset: 22      SUMMARY OF PROGRESS:  Lise Rothman is compliant attending cardiac rehab exercise sessions 3x/wk  He tolerates 40 mins at 3 4 - 4 0 METs plus wt training  He is tolerating progression of intensity levels to maintain RPE 4-6  Resting BP  122/62 - 132/80 with appropriate response to exercise reaching 132/64- 150/70  NSR on tele with  PAF, PACs with abberancy, and occ PVCs observed  He has been compliant with the ZIO patch x 2 weeks  which he plans to return tomorrow  RHR 66 - 83 ExHR 116 - 129  He has not added home exercise  He has increased home physical activity including playing ball with his grandkids, playing 18 holes of golf  He believes he is less winded with activity but is disappointed with the slow progression/improvement in his SOB with physical activity  He continues to have episodes of SOB at home but they are less frequent  He does not get SOB with exercise in CR  No cardiac complaints  He is progressing toward wt loss goals with a loss of 2 pounds  Patient has not made too  Many dietary changes  States he does not have CAD or HTN  He dines out daily and reports to be making better choices  Heart healthy eating was reinforced including rare red/processed meats, low fat dairy, reduced added sugars and refined flours  The patient is a non-smoker  Rich denies   depression/anxiety  Patient reports excellent social/emotional support  Patient attends group educational classes on cardiac risk factor modification      His exercise program will be progressed as tolerated to maintain RPE 4-6  The patient has the following personal goals he hopes to achieved by discharge: be able to run short distances, resume swinging the golf club and climb the steps without SOB, get back to "normal"  Pt will continue to be educated on lifestyle modifications and encouraged to supplement with a home exercise program to reach the following goals in the next 30 days: add home walking, reduce intake of high sodium foods when dining out  8/17/22: Today is Raghavendra's initial evaluation to begin Cardiac Rehab post TAVR on 7/12/22  The patient does not currently follow a formal exercise program at home  He has returned to [employer]  Reports he continues to have some dyspnea with activity as well as leg fatigue  He has resumed light to moderate ADLs reporting dyspnea with activity  He rests as needed  Depression screening using the PHQ-9 interprets the patient's score of  0  as  0 =No Depression  HALEY-7 screening tool for anxiety suggests 0  0-4  = Not anxious  When addressed, the patient denies having depression/anxiety  Patient reports excellent social/emotional support from his son and daughter  Information to begin using Otis Hartmann was provided as well as contact information for counseling through [facility]  PHQ-9 score will be reassessed in 30 days  The patient is a non-smoker  Patient admits to 100% medication compliance  The patient completed an initial submaximal TM ETT  The patient completed 1:30 minutes of stage 1 (2 2METs) with test termination of RHR +30 and ECG  Pt went into Afib with exercise and returned to NSR in recovery  Resting  /64  Patient denied symptoms during exercise     Patient was counseled on exercise guidelines to achieve a minimum of 150 mins/wk of moderate intensity (RPE 4-6) exercise and encouraged to add 1-2 days of exercise on opposite days of cardiac rehab as tolerated   We discussed current dietary habits and goals of heart healthy eating for lipid management  Patient's goals include: return to golf unrestricted, swing the golf club without SOB  The patient's CAD risk factors include:  inactivity  His education will focus on lifestyle modification/education specific to His needs  Patient will attend group education classes on heart healthy eating, reading food labels, stress management, risk factor reduction, understanding heart disease and common heart medications  Patient will attend 35 monitored exercise sessions, 3x/wk for 12-18 weeks beginning 2022         Medication compliance: Yes   Comments: Pt reports to be compliant with medications  Fall Risk: Low   Comments: Ambulates with a steady gait with no assist device and Denies a fall in the past 6 months    EKG Interpretation: NSR,  exercise induced afib, PACs with aberancy, occ PVCs      EXERCISE ASSESSMENT and PLAN    Exercise Prescription:      Frequency: 3 days/week Supplement with home exercise 2+ days/wk as tolerated       Minutes: 40        METS: 3 4-4 0           HR: 100-130   RPE: 4-5         Modalities: Treadmill, UBE, Lifecycle, Rower and Recumbent bike      30 Day Goals for Exercise Progression:    Frequency: 3 days/week of cardiac rehab     Supplement with home exercise 2+ days/wk as tolerated    Minutes: 40                            >150 mins/wk of moderate intensity exercise   METS: 3 5-4 8   HR: 100-130    RPE: 4-6   Modalities: Treadmill, [place]irdyne bike, UBE, Lifecycle and Rower    Strength trainin-3 days / week  12-15 repetitions  1-2 sets per modality    Modalities: Leg Press, Chest Press, Pull Downs and Lateral Raise    Home Exercise: none, has returned to golfing    Goals: 10% improvement in functional capacity - based on max METs achieved in fitness assessment, Reduced dyspnea with physical activity  0-1/10, improved DASI score by 10%, Increase in exercise capacity by 40% - based on peak METs tolerated in cardiac rehab exercise session, Exercise 5 days/wk, >150mins/wk of moderate intensity exercise, Resume ADLs with increased strength, Attend Rehab regularly, Start a walking program and swing the golf club hard without SOB    Progression Toward Goals:  Pt is progressing and showing improvement  toward the following goals:   10% improvement in functional capacity - based on max METs achieved in fitness assessment, Reduced dyspnea with physical activity  0-10, improved DASI score by 10%, Increase in exercise capacity by 40% - based on peak METs tolerated in cardiac rehab exercise session, Exercise 5 days/wk, >150mins/wk of moderate intensity exercise, Resume ADLs with increased strength, Attend Rehab regularly,   , Pt has not made progress toward the following goals: Start a walking program and swing the golf club hard without SOB, Exercise 5 days/wk, >150mins/wk of moderate intensity exercise  Patient will add home walking in the next 30 days, Will continue to educate and progress as tolerated      Education: benefit of exercise for CAD risk factors, AHA guidelines to achieve >150 mins/wk of moderate exercise and RPE scale   Plan:education on home exercise guidelines, home exercise 30+ mins 2 days opposite CR and Education class: Risk Factors for Heart Disease  Readiness to change: Preparation:  (Getting ready to change)       NUTRITION ASSESSMENT AND PLAN    Weight control:    Starting weight: 178   Current weight:   176  Waist circumference:    Startin   Current:      Diabetes: N/A    Lipid management: Discussed diet and lipid management and Last lipid profile 22  Chol 122  TRG 94  HDL 36  LDL 67    Goals:reduced BMI to < 25, HDL >40, Improved Rate Your Plate score  >85, choose lean meat (93-95%), eliminate processed meats, increase intake of fish, shellfish, reduce cheese intake or use reduced-fat, eat 3 or more servings of whole grains a day, Eat 4-5 cups of fruits and vegetables daily, use olive or canola oil in baking, choose low sodium processed foods, eliminate butter, Increase intake of nuts and seeds, seldom eat or choose low fat ice-cream, fruit juice bars or frozen yogurt , eliminate or choose low-fat sweets and daily saturated fat intake <7%/13g    Progression Toward Goals: Pt has not made progress toward the following goals: reduced BMI to < 25, HDL >40, Improved Rate Your Plate score  >34, choose lean meat (93-95%), eliminate processed meats, increase intake of fish, shellfish, reduce cheese intake or use reduced-fat, eat 3 or more servings of whole grains a day, Eat 4-5 cups of fruits and vegetables daily, use olive or canola oil in baking, choose low sodium processed foods, eliminate butter, Increase intake of nuts and seeds, seldom eat or choose low fat ice-cream, fruit juice bars or frozen yogurt , eliminate or choose low-fat sweets and daily saturated fat intake <7%/13g  , Patient will limit dietary sodium in take out, increase vegetable intake in the next 30 days, Will continue to educate and progress as tolerated      Education: heart healthy eating  low sodium diet  hydration  healthy choices while dining out  Plan: Education class: Reading Food Labels, Education Class: Heart Healthy Eating, replace butter with soft spreads made with olive oil, canola or yogurt, replace refined grain bread with whole grain bread, replace unhealthy snacks with fruits & vegs, reduce red meat 1x/wk, switch to skim or 1% milk, eat fewer desserts and sweets, avoid processed foods, learn how to read food labels, replace sugar with stevia or truvia and keep added daily sugar <25g/day  Readiness to change: Action:  (Changing behavior)      PSYCHOSOCIAL ASSESSMENT AND PLAN    Emotional:  Depression assessment:  PHQ-9 = 0  0 =No Depression            Anxiety measure:  HALEY-7 = 0  0-4  = Not anxious  Self-reported stress level:  1  Social support: Excellent and Patient reports excellent emotional/social support from family - son and daughter    Goals:  Physical Fitness in [facility] Score < 3 and Overall Health in [place] Score < 3    Progression Toward Goals: Reviewed Pt goals and determined plan of care, Will continue to educate and progress as tolerated  Education: benefits of a positive support system and stress management techniques  Plan: Class: Stress and Your Health, Class: Relaxation, Exercise, Enjoy a hobby, Enjoy family, Return to previous social activity and golf without SOB  Readiness to change: Preparation:  (Getting ready to change)       OTHER CORE COMPONENTS     Tobacco:   Social History     Tobacco Use   Smoking Status Never Smoker   Smokeless Tobacco Never Used       Tobacco Use Intervention:   N/A:  Patient is a non-smoker     Anginal Symptoms:  None   NTG use: No prescription    Blood pressure:    Restin/62 - 132/80   Exercise: 132/64- 150/70  Goals: consistent BP < 130/80, reduced dietary sodium <2300mg, moderate intensity exercise >150 mins/wk and medication compliance    Progression Toward Goals: Pt is progressing and showing improvement  toward the following goals:  consistent BP < 130/80, reduced dietary sodium <2300mg, moderate intensity exercise >150 mins/wk and medication compliance   , Patient will improve choices while dining out in the next 30 days, Will continue to educate and progress as tolerated      Education:  low sodium diet and HTN and Education class:  Common Heart Medications  Plan: Class: Understanding Heart Disease, Avoid Processed foods, engage in regular exercise, eliminate salt shaker at the table, use salt substitutes and check labels for sodium content  Readiness to change: Preparation:  (Getting ready to change)

## 2022-09-14 ENCOUNTER — CLINICAL SUPPORT (OUTPATIENT)
Dept: CARDIAC REHAB | Age: 72
End: 2022-09-14
Payer: MEDICARE

## 2022-09-14 DIAGNOSIS — Z95.2 S/P TAVR (TRANSCATHETER AORTIC VALVE REPLACEMENT): ICD-10-CM

## 2022-09-14 PROCEDURE — 93798 PHYS/QHP OP CAR RHAB W/ECG: CPT

## 2022-09-16 ENCOUNTER — CLINICAL SUPPORT (OUTPATIENT)
Dept: CARDIAC REHAB | Age: 72
End: 2022-09-16
Payer: MEDICARE

## 2022-09-16 DIAGNOSIS — Z95.2 S/P TAVR (TRANSCATHETER AORTIC VALVE REPLACEMENT): ICD-10-CM

## 2022-09-16 PROCEDURE — 93798 PHYS/QHP OP CAR RHAB W/ECG: CPT

## 2022-09-19 ENCOUNTER — APPOINTMENT (OUTPATIENT)
Dept: CARDIAC REHAB | Age: 72
End: 2022-09-19
Payer: MEDICARE

## 2022-09-19 DIAGNOSIS — R00.2 PALPITATION: ICD-10-CM

## 2022-09-19 DIAGNOSIS — I49.8 ARRHYTHMIA, ATRIAL: ICD-10-CM

## 2022-09-21 ENCOUNTER — CLINICAL SUPPORT (OUTPATIENT)
Dept: CARDIAC REHAB | Age: 72
End: 2022-09-21
Payer: MEDICARE

## 2022-09-21 DIAGNOSIS — Z95.2 S/P TAVR (TRANSCATHETER AORTIC VALVE REPLACEMENT): ICD-10-CM

## 2022-09-21 PROCEDURE — 93798 PHYS/QHP OP CAR RHAB W/ECG: CPT

## 2022-09-23 ENCOUNTER — CLINICAL SUPPORT (OUTPATIENT)
Dept: CARDIAC REHAB | Age: 72
End: 2022-09-23
Payer: MEDICARE

## 2022-09-23 DIAGNOSIS — Z95.2 S/P TAVR (TRANSCATHETER AORTIC VALVE REPLACEMENT): ICD-10-CM

## 2022-09-23 PROCEDURE — 93798 PHYS/QHP OP CAR RHAB W/ECG: CPT

## 2022-09-26 ENCOUNTER — CLINICAL SUPPORT (OUTPATIENT)
Dept: CARDIAC REHAB | Age: 72
End: 2022-09-26
Payer: MEDICARE

## 2022-09-26 DIAGNOSIS — Z95.2 S/P TAVR (TRANSCATHETER AORTIC VALVE REPLACEMENT): ICD-10-CM

## 2022-09-26 PROCEDURE — 93798 PHYS/QHP OP CAR RHAB W/ECG: CPT

## 2022-09-28 ENCOUNTER — CLINICAL SUPPORT (OUTPATIENT)
Dept: CARDIAC REHAB | Age: 72
End: 2022-09-28
Payer: MEDICARE

## 2022-09-28 DIAGNOSIS — Z95.2 S/P TAVR (TRANSCATHETER AORTIC VALVE REPLACEMENT): ICD-10-CM

## 2022-09-28 PROCEDURE — 93798 PHYS/QHP OP CAR RHAB W/ECG: CPT

## 2022-09-30 ENCOUNTER — CLINICAL SUPPORT (OUTPATIENT)
Dept: CARDIAC REHAB | Age: 72
End: 2022-09-30
Payer: MEDICARE

## 2022-09-30 DIAGNOSIS — Z95.2 S/P TAVR (TRANSCATHETER AORTIC VALVE REPLACEMENT): Primary | ICD-10-CM

## 2022-09-30 PROCEDURE — 93798 PHYS/QHP OP CAR RHAB W/ECG: CPT

## 2022-10-03 ENCOUNTER — CLINICAL SUPPORT (OUTPATIENT)
Dept: CARDIAC REHAB | Age: 72
End: 2022-10-03
Payer: MEDICARE

## 2022-10-03 DIAGNOSIS — Z95.2 S/P TAVR (TRANSCATHETER AORTIC VALVE REPLACEMENT): ICD-10-CM

## 2022-10-03 PROCEDURE — 93798 PHYS/QHP OP CAR RHAB W/ECG: CPT

## 2022-10-03 NOTE — PROGRESS NOTES
Cardiac Rehabilitation Plan of Care   60 Day Reassessment          Today's date: 10/3/2022   # of Exercise Sessions Completed: 20  Patient name: Esther Benson      : 1950  Age: 71 y o  MRN: [id number]  Referring Physician: Tamara Frankel, PA-C  Cardiologist: Garrett Callejas MD  Provider: Prashanth Masterson  Clinician: Meaghan Madden MS, CEP      Dx:   Encounter Diagnosis   Name Primary?  S/P TAVR (transcatheter aortic valve replacement)      Date of onset: 22      SUMMARY OF PROGRESS:   10/3/22: 60 DAY ITP  Pacheco is compliant attending cardiac rehab exercise sessions 3x/wk  He tolerates 40 mins at 4 0-4 7 METs plus wt training  He is tolerating progression of intensity levels to maintain RPE 4-6  Resting BP  114//70 with appropriate response to exercise reaching 140//72  NSR on tele with 1AVB, accelerated junctional rhythm, PACs with abberancy, and occ PVCs observed  RHR 63-77 ExHR 122-135  He has not added home exercise  He has increased home physical activity including playing ball with his grandkids and was playing 18 holes of golf however golf season is over  He believes he is less winded with activity but is disappointed with the slow progression/improvement in his SOB with physical activity  He continues to have episodes of SOB at home when walking up a grade or completing housework  He did get slightly SOB while we were talking on the treadmill today  reviewed and encouraged PLB technique with exertion  No cardiac complaints  He has discontinued his statin at the advisement of his cardiologist d/t leg discomfort  Since stopping, he has not experienced leg apin  Overall improvements in his energy and stamina  Weight remains steady at 176  Patient has not made too many dietary changes  He states he rarely uses table salt  States he does not have CAD or HTN  He dines out daily and reports to be making better choices    Heart healthy eating was reinforced including rare red/processed meats, low fat dairy, reduced added sugars and refined flours  The patient is a non-smoker  Rich denies depression/anxiety  Patient reports excellent social/emotional support  Patient attends group educational classes on cardiac risk factor modification  His exercise program will be progressed as tolerated to maintain RPE 4-6  The patient has the following personal goals he hopes to achieved by discharge: be able to run short distances, resume swinging the golf club and climb the steps without SOB, get back to "normal"  Pt will continue to be educated on lifestyle modifications and encouraged to supplement with a home exercise program to reach the following goals in the next 30 days: add home walking, reduce intake of high sodium foods when dining out  Pt has OV sheculed with Dr Nalani Gosselin on 10/7  Will continue to monitor  9/12/22: Pacheco is compliant attending cardiac rehab exercise sessions 3x/wk  He tolerates 40 mins at 3 4 - 4 0 METs plus wt training  He is tolerating progression of intensity levels to maintain RPE 4-6  Resting BP  122/62 - 132/80 with appropriate response to exercise reaching 132/64- 150/70  NSR on tele with  PAF, PACs with abberancy, and occ PVCs observed  He has been compliant with the ZIO patch x 2 weeks  which he plans to return tomorrow  RHR 66 - 83 ExHR 116 - 129  He has not added home exercise  He has increased home physical activity including playing ball with his grandkids, playing 18 holes of golf  He believes he is less winded with activity but is disappointed with the slow progression/improvement in his SOB with physical activity  He continues to have episodes of SOB at home but they are less frequent  He does not get SOB with exercise in CR  No cardiac complaints  He is progressing toward wt loss goals with a loss of 2 pounds  Patient has not made too  Many dietary changes  States he does not have CAD or HTN    He dines out daily and reports to be making better choices  Heart healthy eating was reinforced including rare red/processed meats, low fat dairy, reduced added sugars and refined flours  The patient is a non-smoker  Rich denies   depression/anxiety  Patient reports excellent social/emotional support  Patient attends group educational classes on cardiac risk factor modification  His exercise program will be progressed as tolerated to maintain RPE 4-6  The patient has the following personal goals he hopes to achieved by discharge: be able to run short distances, resume swinging the golf club and climb the steps without SOB, get back to "normal"  Pt will continue to be educated on lifestyle modifications and encouraged to supplement with a home exercise program to reach the following goals in the next 30 days: add home walking, reduce intake of high sodium foods when dining out  8/17/22: Today is Raghavendra's initial evaluation to begin Cardiac Rehab post TAVR on 7/12/22  The patient does not currently follow a formal exercise program at home  He has returned to [employer]  Reports he continues to have some dyspnea with activity as well as leg fatigue  He has resumed light to moderate ADLs reporting dyspnea with activity  He rests as needed  Depression screening using the PHQ-9 interprets the patient's score of  0  as  0 =No Depression  HALEY-7 screening tool for anxiety suggests 0  0-4  = Not anxious  When addressed, the patient denies having depression/anxiety  Patient reports excellent social/emotional support from his son and daughter  Information to begin using [employer] was provided as well as contact information for counseling through [facility]  PHQ-9 score will be reassessed in 30 days  The patient is a non-smoker  Patient admits to 100% medication compliance  The patient completed an initial submaximal TM ETT  The patient completed 1:30 minutes of stage 1 (2 2METs) with test termination of RHR +30 and ECG    Pt went into Afib with exercise and returned to NSR in recovery  Resting  /64  Patient denied symptoms during exercise     Patient was counseled on exercise guidelines to achieve a minimum of 150 mins/wk of moderate intensity (RPE 4-6) exercise and encouraged to add 1-2 days of exercise on opposite days of cardiac rehab as tolerated  We discussed current dietary habits and goals of heart healthy eating for lipid management  Patient's goals include: return to golf unrestricted, swing the golf club without SOB  The patient's CAD risk factors include:  inactivity  His education will focus on lifestyle modification/education specific to His needs  Patient will attend group education classes on heart healthy eating, reading food labels, stress management, risk factor reduction, understanding heart disease and common heart medications  Patient will attend 35 monitored exercise sessions, 3x/wk for 12-18 weeks beginning 2022         Medication compliance: Yes   Comments: Pt reports to be compliant with medications  Fall Risk: Low   Comments: Ambulates with a steady gait with no assist device and Denies a fall in the past 6 months    EKG Interpretation: NSR,  exercise induced afib, PACs with aberancy, occ PVCs      EXERCISE ASSESSMENT and PLAN    Exercise Prescription:      Frequency: 3 days/week Supplement with home exercise 2+ days/wk as tolerated       Minutes: 40        METS: 4 0-4 7          HR: 100-135   RPE: 4-5         Modalities: Treadmill, UBE, Lifecycle, Rower and Recumbent bike      30 Day Goals for Exercise Progression:    Frequency: 3 days/week of cardiac rehab     Supplement with home exercise 2+ days/wk as tolerated    Minutes: 40                            >150 mins/wk of moderate intensity exercise   METS: 4 1-5 5   HR: 100-130    RPE: 4-6   Modalities: Treadmill, [employer] bike, UBE, Lifecycle and Rower    Strength trainin-3 days / week  12-15 repetitions  1-2 sets per modality Modalities: Leg Press, Chest Press, Pull Downs and Lateral Raise    Home Exercise: none, has returned to [employer]    Goals: 10% improvement in functional capacity - based on max METs achieved in fitness assessment, Reduced dyspnea with physical activity  0-110, improved DASI score by 10%, Increase in exercise capacity by 40% - based on peak METs tolerated in cardiac rehab exercise session, Exercise 5 days/wk, >150mins/wk of moderate intensity exercise, Resume ADLs with increased strength, Attend Rehab regularly, Start a walking program and swing the [employer] club hard without SOB    Progression Toward Goals:  Pt is progressing and showing improvement  toward the following goals:   10% improvement in functional capacity - based on max METs achieved in fitness assessment, Reduced dyspnea with physical activity  0-110, improved DASI score by 10%, Increase in exercise capacity by 40% - based on peak METs tolerated in cardiac rehab exercise session, Exercise 5 days/wk, >150mins/wk of moderate intensity exercise, Resume ADLs with increased strength, Attend Rehab regularly,   , Pt has not made progress toward the following goals: Start a walking program and swing the golf club hard without SOB, Exercise 5 days/wk, >150mins/wk of moderate intensity exercise  Patient will add home walking in the next 30 days, Will continue to educate and progress as tolerated      Education: benefit of exercise for CAD risk factors, AHA guidelines to achieve >150 mins/wk of moderate exercise and RPE scale   Plan:education on home exercise guidelines, home exercise 30+ mins 2 days opposite CR and Education class: Risk Factors for Heart Disease  Readiness to change: Preparation:  (Getting ready to change)       NUTRITION ASSESSMENT AND PLAN    Weight control:    Starting weight: 178   Current weight:   176  Waist circumference:    Startin   Current:      Diabetes: N/A    Lipid management: Discussed diet and lipid management and Last lipid profile 8/11/22  Chol 122  TRG 94  HDL 36  LDL 67    Goals:reduced BMI to < 25, HDL >40, Improved Rate Your Plate score  >63, choose lean meat (93-95%), eliminate processed meats, increase intake of fish, shellfish, reduce cheese intake or use reduced-fat, eat 3 or more servings of whole grains a day, Eat 4-5 cups of fruits and vegetables daily, use olive or canola oil in baking, choose low sodium processed foods, eliminate butter, Increase intake of nuts and seeds, seldom eat or choose low fat ice-cream, fruit juice bars or frozen yogurt , eliminate or choose low-fat sweets and daily saturated fat intake <7%/13g    Progression Toward Goals: Pt has not made progress toward the following goals: reduced BMI to < 25, HDL >40, Improved Rate Your Plate score  >65, choose lean meat (93-95%), eliminate processed meats, increase intake of fish, shellfish, reduce cheese intake or use reduced-fat, eat 3 or more servings of whole grains a day, Eat 4-5 cups of fruits and vegetables daily, use olive or canola oil in baking, choose low sodium processed foods, eliminate butter, Increase intake of nuts and seeds, seldom eat or choose low fat ice-cream, fruit juice bars or frozen yogurt , eliminate or choose low-fat sweets and daily saturated fat intake <7%/13g  , Patient will limit dietary sodium in take out, increase vegetable intake in the next 30 days, Will continue to educate and progress as tolerated      Education: heart healthy eating  low sodium diet  hydration  healthy choices while dining out  Plan: Education class: Reading Food Labels, Education Class: Heart Healthy Eating, replace butter with soft spreads made with olive oil, canola or yogurt, replace refined grain bread with whole grain bread, replace unhealthy snacks with fruits & vegs, reduce red meat 1x/wk, switch to skim or 1% milk, eat fewer desserts and sweets, avoid processed foods, learn how to read food labels, replace sugar with stevia or truvia and keep added daily sugar <25g/day  Readiness to change: Action:  (Changing behavior)      PSYCHOSOCIAL ASSESSMENT AND PLAN    Emotional:  Depression assessment:  PHQ-9 = 0  0 =No Depression            Anxiety measure:  HALEY-7 = 0  0-4  = Not anxious  Self-reported stress level:  1  Social support: Excellent and Patient reports excellent emotional/social support from family - son and daughter    Goals:  Physical Fitness in [facility] Score < 3 and Overall Health in [place] Score < 3    Progression Toward Goals: Pt is progressing and showing improvement  toward the following goals:  Physical Fitness in [facility] Score < 3 and Overall Health in [place] Score < 3   , Will continue to educate and progress as tolerated  Education: benefits of a positive support system and stress management techniques  Plan: Class: Stress and Your Health, Class: Relaxation, Exercise, Enjoy a hobby, Enjoy family, Return to previous social activity and golf without SOB  Readiness to change: Preparation:  (Getting ready to change)       OTHER CORE COMPONENTS     Tobacco:   Social History     Tobacco Use   Smoking Status Never Smoker   Smokeless Tobacco Never Used       Tobacco Use Intervention:   N/A:  Patient is a non-smoker     Anginal Symptoms:  None   NTG use: No prescription    Blood pressure:    Restin//70   Exercise: 140//72    Goals: consistent BP < 130/80, reduced dietary sodium <2300mg, moderate intensity exercise >150 mins/wk and medication compliance    Progression Toward Goals: Pt is progressing and showing improvement  toward the following goals:  consistent BP < 130/80, reduced dietary sodium <2300mg, moderate intensity exercise >150 mins/wk and medication compliance   , Patient will improve choices while dining out in the next 30 days, Will continue to educate and progress as tolerated      Education:  low sodium diet and HTN and Education class:  Common Heart Medications  Plan: Class: Understanding Heart Disease, Avoid Processed foods, engage in regular exercise, eliminate salt shaker at the table, use salt substitutes and check labels for sodium content  Readiness to change: Preparation:  (Getting ready to change)

## 2022-10-05 ENCOUNTER — APPOINTMENT (OUTPATIENT)
Dept: LAB | Age: 72
End: 2022-10-05
Payer: MEDICARE

## 2022-10-05 ENCOUNTER — TELEPHONE (OUTPATIENT)
Dept: OTHER | Facility: HOSPITAL | Age: 72
End: 2022-10-05

## 2022-10-05 ENCOUNTER — CLINICAL SUPPORT (OUTPATIENT)
Dept: CARDIAC REHAB | Age: 72
End: 2022-10-05
Payer: MEDICARE

## 2022-10-05 DIAGNOSIS — Z95.2 S/P TAVR (TRANSCATHETER AORTIC VALVE REPLACEMENT): ICD-10-CM

## 2022-10-05 LAB
ALBUMIN SERPL BCP-MCNC: 4 G/DL (ref 3.5–5)
ALP SERPL-CCNC: 111 U/L (ref 46–116)
ALT SERPL W P-5'-P-CCNC: 21 U/L (ref 12–78)
ANION GAP SERPL CALCULATED.3IONS-SCNC: 5 MMOL/L (ref 4–13)
ANISOCYTOSIS BLD QL SMEAR: PRESENT
AST SERPL W P-5'-P-CCNC: 17 U/L (ref 5–45)
BASOPHILS # BLD MANUAL: 0 THOUSAND/UL (ref 0–0.1)
BASOPHILS NFR MAR MANUAL: 0 % (ref 0–1)
BILIRUB SERPL-MCNC: 0.47 MG/DL (ref 0.2–1)
BUN SERPL-MCNC: 28 MG/DL (ref 5–25)
CALCIUM SERPL-MCNC: 10.2 MG/DL (ref 8.3–10.1)
CHLORIDE SERPL-SCNC: 107 MMOL/L (ref 96–108)
CO2 SERPL-SCNC: 23 MMOL/L (ref 21–32)
CREAT SERPL-MCNC: 1.52 MG/DL (ref 0.6–1.3)
EOSINOPHIL # BLD MANUAL: 0 THOUSAND/UL (ref 0–0.4)
EOSINOPHIL NFR BLD MANUAL: 0 % (ref 0–6)
ERYTHROCYTE [DISTWIDTH] IN BLOOD BY AUTOMATED COUNT: 14.9 % (ref 11.6–15.1)
GFR SERPL CREATININE-BSD FRML MDRD: 45 ML/MIN/1.73SQ M
GLUCOSE SERPL-MCNC: 102 MG/DL (ref 65–140)
HCT VFR BLD AUTO: 33.1 % (ref 36.5–49.3)
HGB BLD-MCNC: 9.6 G/DL (ref 12–17)
HYPERCHROMIA BLD QL SMEAR: PRESENT
IGA SERPL-MCNC: 49 MG/DL (ref 70–400)
IGG SERPL-MCNC: 641 MG/DL (ref 700–1600)
IGM SERPL-MCNC: 22 MG/DL (ref 40–230)
LDH SERPL-CCNC: 337 U/L (ref 81–234)
LYMPHOCYTES # BLD AUTO: 134.64 THOUSAND/UL (ref 0.6–4.47)
LYMPHOCYTES # BLD AUTO: 90 % (ref 14–44)
MACROCYTES BLD QL AUTO: PRESENT
MCH RBC QN AUTO: 29.8 PG (ref 26.8–34.3)
MCHC RBC AUTO-ENTMCNC: 29 G/DL (ref 31.4–37.4)
MCV RBC AUTO: 103 FL (ref 82–98)
MONOCYTES # BLD AUTO: 8.98 THOUSAND/UL (ref 0–1.22)
MONOCYTES NFR BLD: 6 % (ref 4–12)
NEUTROPHILS # BLD MANUAL: 5.98 THOUSAND/UL (ref 1.85–7.62)
NEUTS SEG NFR BLD AUTO: 4 % (ref 43–75)
PLATELET # BLD AUTO: 134 THOUSANDS/UL (ref 149–390)
PLATELET BLD QL SMEAR: ADEQUATE
PMV BLD AUTO: 9 FL (ref 8.9–12.7)
POLYCHROMASIA BLD QL SMEAR: PRESENT
POTASSIUM SERPL-SCNC: 5.2 MMOL/L (ref 3.5–5.3)
PROT SERPL-MCNC: 7.3 G/DL (ref 6.4–8.4)
RBC # BLD AUTO: 3.22 MILLION/UL (ref 3.88–5.62)
RETICS # AUTO: NORMAL 10*3/UL (ref 14356–105094)
RETICS # CALC: 1.36 % (ref 0.37–1.87)
SMUDGE CELLS BLD QL SMEAR: PRESENT
SODIUM SERPL-SCNC: 135 MMOL/L (ref 135–147)
URATE SERPL-MCNC: 6.7 MG/DL (ref 3.5–8.5)
WBC # BLD AUTO: 149.6 THOUSAND/UL (ref 4.31–10.16)

## 2022-10-05 PROCEDURE — 93798 PHYS/QHP OP CAR RHAB W/ECG: CPT

## 2022-10-05 PROCEDURE — 85045 AUTOMATED RETICULOCYTE COUNT: CPT | Performed by: INTERNAL MEDICINE

## 2022-10-06 NOTE — TELEPHONE ENCOUNTER
Was notified of a critical WBC value of 149 60  Chart reviewed  Patient with known hx of CLL with 90% lymphocytes, does have anemia with Hgb 9 6 but this has been his baseline and platelet of 668 is around his baseline  Although patient does meet criteria to start treatment based on his anemia, it looks like he opted to continue surveillance based on most recent note from his primary oncologist, Dr Rehan Olivo  As WBC count is stable compared to prior CBCs, didn't feel an urgent need to call the patient  Will forward this message to Dr Rehan Olivo also

## 2022-10-07 ENCOUNTER — CLINICAL SUPPORT (OUTPATIENT)
Dept: CARDIAC REHAB | Age: 72
End: 2022-10-07
Payer: MEDICARE

## 2022-10-07 ENCOUNTER — OFFICE VISIT (OUTPATIENT)
Dept: CARDIOLOGY CLINIC | Facility: CLINIC | Age: 72
End: 2022-10-07
Payer: MEDICARE

## 2022-10-07 VITALS
OXYGEN SATURATION: 99 % | HEIGHT: 69 IN | WEIGHT: 180 LBS | SYSTOLIC BLOOD PRESSURE: 118 MMHG | HEART RATE: 98 BPM | BODY MASS INDEX: 26.66 KG/M2 | DIASTOLIC BLOOD PRESSURE: 60 MMHG

## 2022-10-07 DIAGNOSIS — E78.5 HYPERLIPIDEMIA, UNSPECIFIED HYPERLIPIDEMIA TYPE: Chronic | ICD-10-CM

## 2022-10-07 DIAGNOSIS — I25.10 CORONARY ARTERY DISEASE INVOLVING NATIVE CORONARY ARTERY OF NATIVE HEART WITHOUT ANGINA PECTORIS: Primary | ICD-10-CM

## 2022-10-07 DIAGNOSIS — Z95.2 S/P TAVR (TRANSCATHETER AORTIC VALVE REPLACEMENT): ICD-10-CM

## 2022-10-07 DIAGNOSIS — N18.30 STAGE 3 CHRONIC KIDNEY DISEASE, UNSPECIFIED WHETHER STAGE 3A OR 3B CKD (HCC): ICD-10-CM

## 2022-10-07 LAB — B2 MICROGLOB SERPL-MCNC: 3.4 MG/L (ref 0.6–2.4)

## 2022-10-07 PROCEDURE — 99214 OFFICE O/P EST MOD 30 MIN: CPT | Performed by: INTERNAL MEDICINE

## 2022-10-07 PROCEDURE — 93798 PHYS/QHP OP CAR RHAB W/ECG: CPT

## 2022-10-07 PROCEDURE — 93000 ELECTROCARDIOGRAM COMPLETE: CPT | Performed by: INTERNAL MEDICINE

## 2022-10-07 RX ORDER — AMOXICILLIN 500 MG/1
2000 TABLET, FILM COATED ORAL ONCE AS NEEDED
Qty: 4 TABLET | Refills: 1 | Status: SHIPPED | OUTPATIENT
Start: 2022-10-07 | End: 2040-10-07

## 2022-10-07 NOTE — PROGRESS NOTES
[facility]    CHIEF COMPLAINT:   Chief Complaint   Patient presents with    Follow-up     No cardiac concerns  HPI:  Esther Benson is a 71 y o  male with a past medical history of CLL, hyperlipidemia, CKD 3, nonobstructive CAD, severe AS s/p TAVR (7/12/22) who presents for follow up  Initial OV - 4/15/22: Briefly, he was diagnosed with CLL in 1996 treated with intermittent therapy with chlorambucil  In 2014 he had 6 cycles of Rituxan and bendamustine  He follows with [facility] regularly  He has been in his baseline state of health and generally stays active with outdoor lawn work including cutting the lawn with a push mower  Likes to play golf  Was not very active since last summer up until about 8 weeks ago  He went out one weekend to get ready for the golf season  He was doing some gardening/lawn work and felt very short of breath and fatigued  Also noticed this while going up and down steps  Reports a "little chest tightness" which happens "here and there " Complains of occasional lightheadedness  He denies any recent fever, chills, nausea, vomiting, abdominal pain, orthopnea, PND or LE swelling  TTE revealed LVEF 65%, mild-to-moderate concentric hypertrophy, normal diastolic function  Normal RV size and systolic function  Mildly dilated LA  There was moderate aortic stenosis with a mean gradient of 21 mmHg and calculated aortic valve area 1 32 cm2  No family history of premature coronary artery disease  Brother is over 300 lbs and had a heart attack & coronary stent - obese, drinks, smokes, late 46s  Interval history: Repeat echo performed and c/w severe AS  He underwent pre operative coronary angiogram which revealed a 50% mid LAD lesion  He underwent TAVR implantation on 7/12/22 with an Gu CLINT 3 Ultra 26 mm valve  He is still taking aspirin and plavix   He had felt his shortness of breath had improved but had still been struggling - interestingly his shortness of breath wasn't occurring with more strenuous activity or while at cardiac rehab  Symptoms were occurring with lighter activity like climbing stairs  This past Wednesday, he feels a significant change for the better  He continues to stay active and attend cardiac rehab  He also had complaints of leg pain and discontinued his statin therapy - this was also limiting him while at rehab  He has no lightheadedness, chest pain, palpitations, orthopnea, PND, LE swelling  +bruising    The following portions of the patient's history were reviewed and updated as appropriate: allergies, current medications, past family history, past medical history, past social history, past surgical history, and problem list     SINCE LAST OV I REVIEWED WITH THE PATIENT THE INTERIM LABS, TEST RESULTS, CONSULTANT(S) NOTES AND PERFORMED AN INTERIM REVIEW OF HISTORY    Past Medical History:   Diagnosis Date    Basal cell carcinoma (BCC) of skin of nose     History of chemotherapy     History of colonoscopy 2012    Lymphocytic leukemia ([facility], [place] [address] )        Past Surgical History:   Procedure Laterality Date    CARDIAC CATHETERIZATION N/A 6/15/2022    Procedure: Cardiac Coronary Angiogram;  Surgeon: Tiffanie Porter MD;  [facility];   Service: Cardiology    CARDIAC CATHETERIZATION N/A 7/12/2022    Procedure: CARDIAC TAVR;  Surgeon: Hazel Mccall MD;  [facility];  Service: Cardiology    CATARACT EXTRACTION Bilateral     FLAP LOCAL HEAD / NECK N/A 4/14/2020    Procedure: ADJACENT TISSUE TRANSFER  FOREHEAD FLAP,  CARTILAGE GRAFT NOSE;  Surgeon: Cady Cardenas MD;  [facility];  Service: Plastics    FLAP LOCAL HEAD / NECK N/A 6/17/2020    Procedure: DIVISION INSET FOREHEAD FLAP; FULL THICKNESS SKIN GRAFT TO FOREHEAD;  Surgeon: Cady Cardenas MD;  [facility];  Service: Plastics    FULL THICKNESS SKIN GRAFT N/A 4/14/2020    Procedure: SKIN GRAFT FULL THICKNESS  (FTSG) NOSE; Surgeon: Gabriella Chavez MD;  [facility];  Service: Plastics    MOHS RECONSTRUCTION N/A 4/14/2020    Procedure: MOHS RECONSTRUCTION NOSE DEFECT;  Surgeon: Gabriella Chavez MD;  [facility];  Service: Plastics    MOHS RECONSTRUCTION N/A 5/4/2020    Procedure: RECONSTRUCTION MOHS NASAL DEFECT WITH EAR CARTILAGE GRAFT;  Surgeon: Gabriella Chavez MD;  [facility];  Service: Plastics    REPLACEMENT AORTIC VALVE TRANSCATHETER (TAVR) N/A 7/12/2022    Procedure: REPLACEMENT AORTIC VALVE TRANSCATHETER (TAVR) TRANSFEMORAL W/ 26MM LEE CLINT S3 ULTRA VALVE(ACCESS ON LEFT) PARISA;  Surgeon: Mary Brown MD;  [facility];  Service: Cardiac Surgery    SHOULDER SURGERY      TIBIA FRACTURE SURGERY      TONSILLECTOMY         Social History     Socioeconomic History    Marital status:       Spouse name: Not on file    Number of children: Not on file    Years of education: Not on file    Highest education level: Not on file   Occupational History    Not on file   Tobacco Use    Smoking status: Never Smoker    Smokeless tobacco: Never Used   Vaping Use    Vaping Use: Never used   Substance and Sexual Activity    Alcohol use: Not Currently     Comment: rare    Drug use: Never    Sexual activity: Not on file   Other Topics Concern    Not on file   Social History Narrative    Not on file     Social Determinants of Health     Financial Resource Strain: Not on file   Food Insecurity: Not on file   Transportation Needs: Not on file   Physical Activity: Not on file   Stress: Not on file   Social Connections: Not on file   Intimate Partner Violence: Not on file   Housing Stability: Not on file       Family History   Problem Relation Age of Onset    Stroke Mother     No Known Problems Father        No Known Allergies    Current Outpatient Medications   Medication Sig Dispense Refill    acetaminophen (TYLENOL) 325 mg tablet Take 2 tablets (650 mg total) by mouth every 4 (four) hours as needed for fever or moderate pain (temperature greater than 101 F) 100 tablet 0    Ascorbic Acid (VITAMIN C PO) Take by mouth daily       aspirin 81 mg chewable tablet Chew 1 tablet (81 mg total) daily 90 tablet 2    chlorhexidine (PERIDEX) 0 12 % solution 1/2 OZ  TWICE DAILY      clopidogrel (PLAVIX) 75 mg tablet Take 1 tablet (75 mg total) by mouth daily 90 tablet 0    fexofenadine-pseudoephedrine (ALLEGRA-D 24) 180-240 MG per 24 hr tablet Take 1 tablet by mouth as needed       metoprolol tartrate (LOPRESSOR) 25 mg tablet Take 0 5 tablets (12 5 mg total) by mouth every 12 (twelve) hours 90 tablet 2    valACYclovir (VALTREX) 1,000 mg tablet Take 1 tablet (1,000 mg total) by mouth 3 (three) times a day for 7 days 21 tablet 0    VITAMIN D PO Take by mouth daily       VITAMIN E PO Take by mouth daily       atorvastatin (LIPITOR) 20 mg tablet Take 1 tablet (20 mg total) by mouth daily with dinner (Patient not taking: Reported on 10/7/2022) 90 tablet 2     No current facility-administered medications for this visit  /60 (BP Location: Right arm, Patient Position: Sitting, Cuff Size: Large)   Pulse 98   Ht 5' 9" (1 753 m)   Wt 81 6 kg (180 lb)   SpO2 99%   BMI 26 58 kg/m²     Review of Systems   All other systems reviewed and are negative  Physical Exam  Vitals reviewed  Constitutional:       General: He is not in acute distress  Appearance: Normal appearance  He is well-developed and normal weight  He is not ill-appearing  HENT:      Head: Normocephalic and atraumatic  Eyes:      General: No scleral icterus  Extraocular Movements: Extraocular movements intact  Conjunctiva/sclera: Conjunctivae normal    Cardiovascular:      Rate and Rhythm: Normal rate and regular rhythm  Heart sounds: No murmur heard  Pulmonary:      Effort: Pulmonary effort is normal  No respiratory distress  Breath sounds: Normal breath sounds   No wheezing or rales  Abdominal:      General: Abdomen is flat  Bowel sounds are normal  There is no distension  Palpations: Abdomen is soft  Tenderness: There is no abdominal tenderness  Musculoskeletal:      Right lower leg: No edema  Left lower leg: No edema  Skin:     General: Skin is warm and dry  Coloration: Skin is not jaundiced or pale  Neurological:      Mental Status: He is alert  Lab Results   Component Value Date     08/01/2015    K 5 2 10/05/2022     10/05/2022    CO2 23 10/05/2022    BUN 28 (H) 10/05/2022    CREATININE 1 52 (H) 10/05/2022    GLUCOSE 110 07/12/2022    CALCIUM 10 2 (H) 10/05/2022    ALT 21 10/05/2022    AST 17 10/05/2022    INR 1 09 07/05/2022       Lab Results   Component Value Date     60 (HH) 10/05/2022    HGB 9 6 (L) 10/05/2022    HCT 33 1 (L) 10/05/2022     (L) 10/05/2022       Cardiac studies:   Results for orders placed or performed in visit on 10/07/22   POCT ECG    Impression    Normal sinus rhythm     TTE - 8/11/22:  Left Ventricle Left ventricular cavity size is normal  Wall thickness is normal  There is no concentric hypertrophy  The left ventricular ejection fraction is 65%  Systolic function is normal   Wall motion is normal  Diastolic function is mildly abnormal, consistent with grade I (abnormal) relaxation  Right Ventricle Right ventricular cavity size is normal  Systolic function is normal  Wall thickness is normal    Left Atrium The atrium is mildly dilated  Right Atrium The atrium is normal in size  Aortic Valve There is an Gu CLINT 3 Ultra 26 mm TAVR bioprosthetic valve  There is no evidence of regurgitation  The aortic valve has no significant stenosis  Mitral Valve There is mild annular calcification  There is mild to moderate regurgitation  There is no evidence of stenosis  The mitral valve has normal structure and normal function     Tricuspid Valve Tricuspid valve structure is normal  There is trace regurgitation  There is no evidence of stenosis  The right ventricular systolic pressure is normal    Pulmonic Valve Pulmonic valve structure is normal  There is no evidence of regurgitation  There is no evidence of stenosis  Ascending Aorta The aortic root is normal in size  The ascending aorta is mildly dilated  The ascending aorta is 3 9 cm  IVC/SVC The inferior vena cava is normal in size  Pericardium There is no pericardial effusion  The pericardium is normal in appearance  TTE - 5/18/22: LVEF 60%, mild LA enlargement, severe AS with peak velocity 3 5 m/s, MG 28 mmHg, DI 0 24  TTE - 4/1/22:    Left Ventricle: Left ventricular cavity size is normal  Wall thickness is mildly increased  The left ventricular ejection fraction is 65%  Systolic function is normal  Wall motion is normal  Diastolic function is normal  There is mild to moderate concentric hypertrophy    Left Atrium: The atrium is mildly dilated    Aortic Valve: The aortic valve is trileaflet  The leaflets are moderately thickened  The leaflets are moderately calcified  There is severely reduced mobility  There is mild regurgitation  There is moderate stenosis  The aortic valve mean gradient is 21 0 mmHg  The aortic valve area is 1 32 cm2  The aortic valve velocity is increased due to stenosis    Mitral Valve: There is mild annular calcification    Tricuspid Valve: There is mild regurgitation      ASSESSMENT AND PLAN:  Vivian Carmichael was seen today for follow-up  Diagnoses and all orders for this visit:    #  Coronary artery disease involving native coronary artery of native heart without angina pectoris: Nonobstructive CAD  Cath on 6/15/22 with 50% focal mid LAD lesion, Ramus normal, LCx normal, RCA normal  Continue aspirin 81 mg daily  He is also on clopidogrel due to recent TAVR  He discontinued atorvastatin due to myalgias  We discussed another trial but take every other day   For now, he will hold off on restarting and we will repeat a lipid panel in 3 months prior to follow up to assess if we can try and even lower dose  -     Lipid Panel with Direct LDL reflex; Future  -     ECG 12 lead; Future    #  S/P TAVR (transcatheter aortic valve replacement): ECG today NSR  TTE from 8/11/22 with normally function bioprosthesis and no paravalvular leak  Continue dual antiplatelet therapy for now  Clopidogrel can be discontinued after 90 days post procedure  We discussed Abx prophylaxis and a prescription was sent to his pharmacy  Overall, doing quite well and progressing  He will continue cardiac rehabilitation and once completed he will plan to continue an exercise regimen  -     amoxicillin (AMOXIL) 500 MG tablet; Take 4 tablets (2,000 mg total) by mouth once as needed (Take 60 minutes prior to ANY dental work) for up to 1 dose    #  Hyperlipidemia, unspecified hyperlipidemia type: As above - repeat lipid panel in 3 months  Didn't tolerate atorvastatin  #  Stage 3 chronic kidney disease, unspecified whether stage 3a or 3b CKD ([facility] [address] ): Stable    #   Laz Fan MD

## 2022-10-07 NOTE — PATIENT INSTRUCTIONS
You were seen today in the Cardiology office for follow up  We discontinued your metoprolol today  Please continue aspirin and plavix  Plavix can be discontinued 90 days after your TAVR procedure  Aspirin will need to be continued lifelong  Please have another lipid panel in about 3 months  I have sent a prescription for an antibiotic - you will need to take antibiotics prior to any dental work or procedure given your prosthetic heart valve  Thank you for choosing [address]  Please call our office or use [employer] with any questions

## 2022-10-10 ENCOUNTER — CLINICAL SUPPORT (OUTPATIENT)
Dept: CARDIAC REHAB | Age: 72
End: 2022-10-10
Payer: MEDICARE

## 2022-10-10 DIAGNOSIS — I35.0 AORTIC STENOSIS, SEVERE: ICD-10-CM

## 2022-10-10 DIAGNOSIS — Z95.2 S/P TAVR (TRANSCATHETER AORTIC VALVE REPLACEMENT): ICD-10-CM

## 2022-10-10 PROCEDURE — 93798 PHYS/QHP OP CAR RHAB W/ECG: CPT

## 2022-10-12 ENCOUNTER — CLINICAL SUPPORT (OUTPATIENT)
Dept: CARDIAC REHAB | Age: 72
End: 2022-10-12
Payer: MEDICARE

## 2022-10-12 DIAGNOSIS — Z95.2 S/P TAVR (TRANSCATHETER AORTIC VALVE REPLACEMENT): ICD-10-CM

## 2022-10-12 PROCEDURE — 93798 PHYS/QHP OP CAR RHAB W/ECG: CPT

## 2022-10-14 ENCOUNTER — CLINICAL SUPPORT (OUTPATIENT)
Dept: CARDIAC REHAB | Age: 72
End: 2022-10-14
Payer: MEDICARE

## 2022-10-14 DIAGNOSIS — Z95.2 S/P TAVR (TRANSCATHETER AORTIC VALVE REPLACEMENT): ICD-10-CM

## 2022-10-14 PROCEDURE — 93798 PHYS/QHP OP CAR RHAB W/ECG: CPT

## 2022-10-17 ENCOUNTER — CLINICAL SUPPORT (OUTPATIENT)
Dept: CARDIAC REHAB | Age: 72
End: 2022-10-17
Payer: MEDICARE

## 2022-10-17 DIAGNOSIS — Z95.2 S/P TAVR (TRANSCATHETER AORTIC VALVE REPLACEMENT): ICD-10-CM

## 2022-10-17 PROCEDURE — 93798 PHYS/QHP OP CAR RHAB W/ECG: CPT

## 2022-10-17 RX ORDER — CLOPIDOGREL BISULFATE 75 MG/1
75 TABLET ORAL DAILY
Qty: 90 TABLET | Refills: 3 | Status: CANCELLED | OUTPATIENT
Start: 2022-10-17 | End: 2023-01-15

## 2022-10-19 ENCOUNTER — CLINICAL SUPPORT (OUTPATIENT)
Dept: CARDIAC REHAB | Age: 72
End: 2022-10-19
Payer: MEDICARE

## 2022-10-19 DIAGNOSIS — Z95.2 S/P TAVR (TRANSCATHETER AORTIC VALVE REPLACEMENT): ICD-10-CM

## 2022-10-19 PROCEDURE — 93798 PHYS/QHP OP CAR RHAB W/ECG: CPT

## 2022-10-21 ENCOUNTER — CLINICAL SUPPORT (OUTPATIENT)
Dept: CARDIAC REHAB | Age: 72
End: 2022-10-21
Payer: MEDICARE

## 2022-10-21 DIAGNOSIS — Z95.2 S/P TAVR (TRANSCATHETER AORTIC VALVE REPLACEMENT): ICD-10-CM

## 2022-10-21 PROCEDURE — 93798 PHYS/QHP OP CAR RHAB W/ECG: CPT

## 2022-10-24 ENCOUNTER — CLINICAL SUPPORT (OUTPATIENT)
Dept: CARDIAC REHAB | Age: 72
End: 2022-10-24
Payer: MEDICARE

## 2022-10-24 DIAGNOSIS — Z95.2 S/P TAVR (TRANSCATHETER AORTIC VALVE REPLACEMENT): ICD-10-CM

## 2022-10-24 PROCEDURE — 93798 PHYS/QHP OP CAR RHAB W/ECG: CPT

## 2022-10-24 NOTE — PROGRESS NOTES
Cardiac Rehabilitation Plan of Care   90 Day Reassessment          Today's date: 10/24/2022   # of Exercise Sessions Completed: 29  Patient name: Hailey Rodrigez      : 1950  Age: 71 y o  MRN: [id number]  Referring Physician: DAY MonterrosoC  Cardiologist: Baljit Falcon MD  Provider: Teagan Goodman  Clinician: Luzmaria Coulter, MS, CEP, CCRP        Dx:   Encounter Diagnosis   Name Primary? • S/P TAVR (transcatheter aortic valve replacement)      Date of onset: 22      SUMMARY OF PROGRESS: Bashir Forte continues to be compliant attending cardiac rehab exercise sessions 3x/wk  He tolerates 40 mins at 4 0-4 7 METs plus wt training  He finally is tolerating progression of intensity levels to maintain RPE 4-6 reporting increased leg strength  He is no longer limited by leg fatigue with exercise and his SOB has improved  Workloads have been progressing now with increased exercsie tolerance  Resting /70 - 138/86 with appropriate response to exercise reaching 156/72 - 182/68  NSR on tele with 1AVB, accelerated junctional rhythm, PACs , and occ PVCs observed  Episodes of junctional rhythm are shorter  RHR 63-77 ExHR 122-135  He has been taking 30 min walks at home up to 2x/wk  He is  physically activity at home without limitations including playing ball with his grandkids and returned to playing 18 holes of golf  No cardiac complaints  He has discontinued his statin at the advisement of his cardiologist d/t leg discomfort  Since stopping, he has not experienced leg apin  Weight remains steady at 176  Patient has been trying to reduce red/processed meats  He states he rarely uses table salt  States he does not have CAD or HTN  He dines out daily and reports to be making better choices  Heart healthy eating was reinforced including rare red/processed meats, low fat dairy, reduced added sugars and refined flours  The patient is a non-smoker  Rich denies depression/anxiety   Patient reports excellent social/emotional support  Patient attends group educational classes on cardiac risk factor modification  His exercise program will be progressed as tolerated to maintain RPE 4-6  The patient has the following personal goals he hopes to achieved by discharge: be able to run short distances, resume swinging the golf club and climb the steps without SOB, get back to "normal"  Pt will continue to be educated on lifestyle modifications and encouraged to supplement with a home exercise program to reach the following goals in the next 30 days: consistent home walking, reduce intake of high sodium foods when dining out  Pt has reached out to Dr Cedric Howard via [address] regarding a prescription refill for his Plavix  10/3/22: 60 DAY ITP  Pacheco is compliant attending cardiac rehab exercise sessions 3x/wk  He tolerates 40 mins at 4 0-4 7 METs plus wt training  He is tolerating progression of intensity levels to maintain RPE 4-6  Resting BP  114//70 with appropriate response to exercise reaching 140//72  NSR on tele with 1AVB, accelerated junctional rhythm, PACs with abberancy, and occ PVCs observed  RHR 63-77 ExHR 122-135  He has not added home exercise  He has increased home physical activity including playing ball with his grandkids and was playing 18 holes of golf however golf season is over  He believes he is less winded with activity but is disappointed with the slow progression/improvement in his SOB with physical activity  He continues to have episodes of SOB at home when walking up a grade or completing housework  He did get slightly SOB while we were talking on the treadmill today  reviewed and encouraged PLB technique with exertion  No cardiac complaints  He has discontinued his statin at the advisement of his cardiologist d/t leg discomfort  Since stopping, he has not experienced leg apin  Overall improvements in his energy and stamina  Weight remains steady at 176    Patient has not made too many dietary changes  He states he rarely uses table salt  States he does not have CAD or HTN  He dines out daily and reports to be making better choices  Heart healthy eating was reinforced including rare red/processed meats, low fat dairy, reduced added sugars and refined flours  The patient is a non-smoker  Rich denies depression/anxiety  Patient reports excellent social/emotional support  Patient attends group educational classes on cardiac risk factor modification  His exercise program will be progressed as tolerated to maintain RPE 4-6  The patient has the following personal goals he hopes to achieved by discharge: be able to run short distances, resume swinging the golf club and climb the steps without SOB, get back to "normal"  Pt will continue to be educated on lifestyle modifications and encouraged to supplement with a home exercise program to reach the following goals in the next 30 days: add home walking, reduce intake of high sodium foods when dining out  Pt has OV sheculed with Dr John Knowles on 10/7  Will continue to monitor  9/12/22: Pacheco is compliant attending cardiac rehab exercise sessions 3x/wk  He tolerates 40 mins at 3 4 - 4 0 METs plus wt training  He is tolerating progression of intensity levels to maintain RPE 4-6  Resting BP  122/62 - 132/80 with appropriate response to exercise reaching 132/64- 150/70  NSR on tele with  PAF, PACs with abberancy, and occ PVCs observed  He has been compliant with the ZIO patch x 2 weeks  which he plans to return tomorrow  RHR 66 - 83 ExHR 116 - 129  He has not added home exercise  He has increased home physical activity including playing ball with his grandkids, playing 18 holes of golf  He believes he is less winded with activity but is disappointed with the slow progression/improvement in his SOB with physical activity  He continues to have episodes of SOB at home but they are less frequent  He does not get SOB with exercise in CR  No cardiac complaints  He is progressing toward wt loss goals with a loss of 2 pounds  Patient has not made too  Many dietary changes  States he does not have CAD or HTN  He dines out daily and reports to be making better choices  Heart healthy eating was reinforced including rare red/processed meats, low fat dairy, reduced added sugars and refined flours  The patient is a non-smoker  Rich denies   depression/anxiety  Patient reports excellent social/emotional support  Patient attends group educational classes on cardiac risk factor modification  His exercise program will be progressed as tolerated to maintain RPE 4-6  The patient has the following personal goals he hopes to achieved by discharge: be able to run short distances, resume swinging the golf club and climb the steps without SOB, get back to "normal"  Pt will continue to be educated on lifestyle modifications and encouraged to supplement with a home exercise program to reach the following goals in the next 30 days: add home walking, reduce intake of high sodium foods when dining out  8/17/22: Today is Raghavendra's initial evaluation to begin Cardiac Rehab post TAVR on 7/12/22  The patient does not currently follow a formal exercise program at home  He has returned to [email or web address]  Reports he continues to have some dyspnea with activity as well as leg fatigue  He has resumed light to moderate ADLs reporting dyspnea with activity  He rests as needed  Depression screening using the PHQ-9 interprets the patient's score of  0  as  0 =No Depression  HALEY-7 screening tool for anxiety suggests 0  0-4  = Not anxious  When addressed, the patient denies having depression/anxiety  Patient reports excellent social/emotional support from his son and daughter  Information to begin using [facility] was provided as well as contact information for counseling through [facility]  PHQ-9 score will be reassessed in 30 days   The patient is a non-smoker  Patient admits to 100% medication compliance  The patient completed an initial submaximal TM ETT  The patient completed 1:30 minutes of stage 1 (2 2METs) with test termination of RHR +30 and ECG  Pt went into Afib with exercise and returned to NSR in recovery  Resting  /64  Patient denied symptoms during exercise     Patient was counseled on exercise guidelines to achieve a minimum of 150 mins/wk of moderate intensity (RPE 4-6) exercise and encouraged to add 1-2 days of exercise on opposite days of cardiac rehab as tolerated  We discussed current dietary habits and goals of heart healthy eating for lipid management  Patient's goals include: return to golf unrestricted, swing the golf club without SOB  The patient's CAD risk factors include:  inactivity  His education will focus on lifestyle modification/education specific to His needs  Patient will attend group education classes on heart healthy eating, reading food labels, stress management, risk factor reduction, understanding heart disease and common heart medications  Patient will attend 35 monitored exercise sessions, 3x/wk for 12-18 weeks beginning August 19, 2022         Medication compliance: Yes   Comments: Pt reports to be compliant with medications  Fall Risk: Low   Comments: Ambulates with a steady gait with no assist device and Denies a fall in the past 6 months    EKG Interpretation: NSR,  exercise induced accelerated junctional rhythm, occ PVCs      EXERCISE ASSESSMENT and PLAN    Exercise Prescription:      Frequency: 3 days/week Supplement with home exercise 2+ days/wk as tolerated       Minutes: 40        METS: 4 0-4 7          HR: 100-135   RPE: 4-5         Modalities: Treadmill, UBE, Lifecycle, Rower and Recumbent bike      30 Day Goals for Exercise Progression:    Frequency: 3 days/week of cardiac rehab     Supplement with home exercise 2+ days/wk as tolerated    Minutes: 40                            >150 mins/wk of moderate intensity exercise   METS: 4 1-5 5   HR: 100-130    RPE: 4-6   Modalities: Treadmill, Airdyne bike, UBE, Lifecycle and Rower    Strength trainin-3 days / week  12-15 repetitions  1-2 sets per modality    Modalities: Leg Press, Chest Press, Pull Downs and Lateral Raise    Home Exercise: Type: walking, Frequency: 2 days/week, Duration: 30 mins    Goals: 10% improvement in functional capacity - based on max METs achieved in fitness assessment, Reduced dyspnea with physical activity  0-10, improved DASI score by 10%, Increase in exercise capacity by 40% - based on peak METs tolerated in cardiac rehab exercise session, Exercise 5 days/wk, >150mins/wk of moderate intensity exercise, Resume ADLs with increased strength, Attend Rehab regularly,  home walking program and swing the golf club hard without SOB    Progression Toward Goals:  Pt is progressing and showing improvement  toward the following goals:   10% improvement in functional capacity - based on max METs achieved in fitness assessment, Reduced dyspnea with physical activity  0-10, improved DASI score by 10%, Increase in exercise capacity by 40% - based on peak METs tolerated in cardiac rehab exercise session, Exercise 5 days/wk, >150mins/wk of moderate intensity exercise, Resume ADLs with increased strength, Attend Rehab regularly,   , Pt has not made progress toward the following goals: Start a walking program and swing the golf club hard without SOB, Exercise 5 days/wk, >150mins/wk of moderate intensity exercise  Patient continue home walking in the next 30 days, Will continue to educate and progress as tolerated      Education: benefit of exercise for CAD risk factors, AHA guidelines to achieve >150 mins/wk of moderate exercise and RPE scale   Plan:education on home exercise guidelines, home exercise 30+ mins 2 days opposite CR and Education class: Risk Factors for Heart Disease  Readiness to change: Action:  (Changing behavior)      NUTRITION ASSESSMENT AND PLAN    Weight control:    Starting weight: 178   Current weight:   176  Waist circumference:    Startin   Current:      Diabetes: N/A    Lipid management: Discussed diet and lipid management and Last lipid profile 22  Chol 122  TRG 94  HDL 36  LDL 67    Goals:reduced BMI to < 25, HDL >40, Improved Rate Your Plate score  >01, choose lean meat (93-95%), eliminate processed meats, increase intake of fish, shellfish, reduce cheese intake or use reduced-fat, eat 3 or more servings of whole grains a day, Eat 4-5 cups of fruits and vegetables daily, use olive or canola oil in baking, choose low sodium processed foods, eliminate butter, Increase intake of nuts and seeds, seldom eat or choose low fat ice-cream, fruit juice bars or frozen yogurt , eliminate or choose low-fat sweets and daily saturated fat intake <7%/13g    Progression Toward Goals: Pt has not made progress toward the following goals: reduced BMI to < 25, HDL >40, Improved Rate Your Plate score  >73, choose lean meat (93-95%), eliminate processed meats, increase intake of fish, shellfish, reduce cheese intake or use reduced-fat, eat 3 or more servings of whole grains a day, Eat 4-5 cups of fruits and vegetables daily, use olive or canola oil in baking, choose low sodium processed foods, eliminate butter, Increase intake of nuts and seeds, seldom eat or choose low fat ice-cream, fruit juice bars or frozen yogurt , eliminate or choose low-fat sweets and daily saturated fat intake <7%/13g  , Patient will limit dietary sodium in take out, increase vegetable intake in the next 30 days, Will continue to educate and progress as tolerated      Education: heart healthy eating  low sodium diet  hydration  healthy choices while dining out  Plan: Education class: Reading Food Labels, Education Class: Heart Healthy Eating, replace butter with soft spreads made with olive oil, canola or yogurt, replace refined grain bread with whole grain bread, replace unhealthy snacks with fruits & vegs, reduce red meat 1x/wk, switch to skim or 1% milk, eat fewer desserts and sweets, avoid processed foods, learn how to read food labels, replace sugar with stevia or truvia and keep added daily sugar <25g/day  Readiness to change: Action:  (Changing behavior)      PSYCHOSOCIAL ASSESSMENT AND PLAN    Emotional:  Depression assessment:  PHQ-9 = 0  0 =No Depression            Anxiety measure:  HALEY-7 = 0  0-4  = Not anxious  Self-reported stress level:  1  Social support: Excellent and Patient reports excellent emotional/social support from family - son and daughter    Goals:  Physical Fitness in [facility] Score < 3 and Overall Health in [place] Score < 3    Progression Toward Goals: Pt is progressing and showing improvement  toward the following goals:  Physical Fitness in [facility] Score < 3 and Overall Health in [place] Score < 3   , Will continue to educate and progress as tolerated      Education: benefits of a positive support system and stress management techniques  Plan: Class: Stress and Your Health, Class: Relaxation, Exercise, Enjoy a hobby, Enjoy family, Return to previous social activity and golf without SOB  Readiness to change: Preparation:  (Getting ready to change)       OTHER CORE COMPONENTS     Tobacco:   Social History     Tobacco Use   Smoking Status Never Smoker   Smokeless Tobacco Never Used       Tobacco Use Intervention:   N/A:  Patient is a non-smoker     Anginal Symptoms:  None   NTG use: No prescription    Blood pressure:    Restin/70 - 138/86   Exercise: 156/72 - 182/68    Goals: consistent BP < 130/80, reduced dietary sodium <2300mg, moderate intensity exercise >150 mins/wk and medication compliance    Progression Toward Goals: Pt is progressing and showing improvement  toward the following goals:  consistent BP < 130/80, reduced dietary sodium <2300mg, moderate intensity exercise >150 mins/wk and medication compliance   , Patient will improve choices while dining out in the next 30 days, Will continue to educate and progress as tolerated      Education:  low sodium diet and HTN and Education class:  Common Heart Medications  Plan: Class: Understanding Heart Disease, Avoid Processed foods, engage in regular exercise, eliminate salt shaker at the table, use salt substitutes and check labels for sodium content  Readiness to change: Preparation:  (Getting ready to change)

## 2022-10-24 NOTE — TELEPHONE ENCOUNTER
Requested medication(s) are due for refill today: Yes  Patient has already received a courtesy refill: No  Other reason request has been forwarded to provider: This has been pending since 10/10/22  Please approve

## 2022-10-25 ENCOUNTER — TELEPHONE (OUTPATIENT)
Dept: CARDIOLOGY CLINIC | Facility: CLINIC | Age: 72
End: 2022-10-25

## 2022-10-25 NOTE — TELEPHONE ENCOUNTER
Dr Alex Tellez requested a call to patient who originally wanted to stop Plavix, but then called for a refill  Clarified Plavix no longer required, or Metoprolol  Should be on aspirin 81 and Atorvastatin until he has lipid panel done

## 2022-10-26 ENCOUNTER — CLINICAL SUPPORT (OUTPATIENT)
Dept: CARDIAC REHAB | Age: 72
End: 2022-10-26
Payer: MEDICARE

## 2022-10-26 DIAGNOSIS — Z95.2 S/P TAVR (TRANSCATHETER AORTIC VALVE REPLACEMENT): ICD-10-CM

## 2022-10-26 PROCEDURE — 93798 PHYS/QHP OP CAR RHAB W/ECG: CPT

## 2022-10-28 ENCOUNTER — CLINICAL SUPPORT (OUTPATIENT)
Dept: CARDIAC REHAB | Age: 72
End: 2022-10-28
Payer: MEDICARE

## 2022-10-28 DIAGNOSIS — Z95.2 S/P TAVR (TRANSCATHETER AORTIC VALVE REPLACEMENT): ICD-10-CM

## 2022-10-28 PROCEDURE — 93798 PHYS/QHP OP CAR RHAB W/ECG: CPT

## 2022-10-31 ENCOUNTER — CLINICAL SUPPORT (OUTPATIENT)
Dept: CARDIAC REHAB | Age: 72
End: 2022-10-31

## 2022-10-31 DIAGNOSIS — Z95.2 S/P TAVR (TRANSCATHETER AORTIC VALVE REPLACEMENT): ICD-10-CM

## 2022-11-02 ENCOUNTER — CLINICAL SUPPORT (OUTPATIENT)
Dept: CARDIAC REHAB | Age: 72
End: 2022-11-02

## 2022-11-02 DIAGNOSIS — Z95.2 S/P TAVR (TRANSCATHETER AORTIC VALVE REPLACEMENT): ICD-10-CM

## 2022-11-04 ENCOUNTER — CLINICAL SUPPORT (OUTPATIENT)
Dept: CARDIAC REHAB | Age: 72
End: 2022-11-04

## 2022-11-04 ENCOUNTER — TELEPHONE (OUTPATIENT)
Dept: HEMATOLOGY ONCOLOGY | Facility: CLINIC | Age: 72
End: 2022-11-04

## 2022-11-04 DIAGNOSIS — Z95.2 S/P TAVR (TRANSCATHETER AORTIC VALVE REPLACEMENT): ICD-10-CM

## 2022-11-04 NOTE — TELEPHONE ENCOUNTER
I left a message for Rich explaining that Dr Darylene Crate will be out of the office on 11/21/22 and we would like to move his appointment with Dr Darylene Crate to the following week  I will send a My Chart message

## 2022-11-07 ENCOUNTER — CLINICAL SUPPORT (OUTPATIENT)
Dept: CARDIAC REHAB | Age: 72
End: 2022-11-07

## 2022-11-07 DIAGNOSIS — Z95.2 S/P TAVR (TRANSCATHETER AORTIC VALVE REPLACEMENT): Primary | ICD-10-CM

## 2022-11-08 ENCOUNTER — APPOINTMENT (EMERGENCY)
Dept: RADIOLOGY | Facility: HOSPITAL | Age: 72
End: 2022-11-08

## 2022-11-08 ENCOUNTER — HOSPITAL ENCOUNTER (INPATIENT)
Facility: HOSPITAL | Age: 72
LOS: 2 days | Discharge: HOME | End: 2022-11-10
Attending: EMERGENCY MEDICINE | Admitting: SURGERY

## 2022-11-08 DIAGNOSIS — R10.9 ABDOMINAL PAIN: ICD-10-CM

## 2022-11-08 DIAGNOSIS — K80.50 CHOLEDOCHOLITHIASIS: ICD-10-CM

## 2022-11-08 DIAGNOSIS — R74.01 TRANSAMINITIS: ICD-10-CM

## 2022-11-08 DIAGNOSIS — K81.0 ACUTE CHOLECYSTITIS: Primary | ICD-10-CM

## 2022-11-08 LAB
2HR DELTA HS TROPONIN: 0 NG/L
ALBUMIN SERPL BCP-MCNC: 4.1 G/DL (ref 3.5–5)
ALP SERPL-CCNC: 176 U/L (ref 46–116)
ALT SERPL W P-5'-P-CCNC: 339 U/L (ref 12–78)
ANION GAP SERPL CALCULATED.3IONS-SCNC: 3 MMOL/L (ref 4–13)
ANION GAP SERPL CALCULATED.3IONS-SCNC: 5 MMOL/L (ref 4–13)
AST SERPL W P-5'-P-CCNC: 340 U/L (ref 5–45)
ATRIAL RATE: 82 BPM
ATRIAL RATE: 83 BPM
BACTERIA UR QL AUTO: ABNORMAL /HPF
BASOPHILS # BLD MANUAL: 0 THOUSAND/UL (ref 0–0.1)
BASOPHILS NFR MAR MANUAL: 0 % (ref 0–1)
BILIRUB SERPL-MCNC: 2.52 MG/DL (ref 0.2–1)
BILIRUB UR QL STRIP: NEGATIVE
BUN SERPL-MCNC: 27 MG/DL (ref 5–25)
BUN SERPL-MCNC: 27 MG/DL (ref 5–25)
CALCIUM SERPL-MCNC: 10 MG/DL (ref 8.3–10.1)
CALCIUM SERPL-MCNC: 10.2 MG/DL (ref 8.3–10.1)
CARDIAC TROPONIN I PNL SERPL HS: 5 NG/L
CARDIAC TROPONIN I PNL SERPL HS: 5 NG/L
CHLORIDE SERPL-SCNC: 104 MMOL/L (ref 96–108)
CHLORIDE SERPL-SCNC: 104 MMOL/L (ref 96–108)
CLARITY UR: CLEAR
CO2 SERPL-SCNC: 23 MMOL/L (ref 21–32)
CO2 SERPL-SCNC: 26 MMOL/L (ref 21–32)
COLOR UR: YELLOW
CREAT SERPL-MCNC: 1.53 MG/DL (ref 0.6–1.3)
CREAT SERPL-MCNC: 1.56 MG/DL (ref 0.6–1.3)
EOSINOPHIL # BLD MANUAL: 0 THOUSAND/UL (ref 0–0.4)
EOSINOPHIL NFR BLD MANUAL: 0 % (ref 0–6)
ERYTHROCYTE [DISTWIDTH] IN BLOOD BY AUTOMATED COUNT: 14.8 % (ref 11.6–15.1)
GFR SERPL CREATININE-BSD FRML MDRD: 44 ML/MIN/1.73SQ M
GFR SERPL CREATININE-BSD FRML MDRD: 45 ML/MIN/1.73SQ M
GLUCOSE SERPL-MCNC: 118 MG/DL (ref 65–140)
GLUCOSE SERPL-MCNC: 126 MG/DL (ref 65–140)
GLUCOSE UR STRIP-MCNC: NEGATIVE MG/DL
HCT VFR BLD AUTO: 33.7 % (ref 36.5–49.3)
HGB BLD-MCNC: 9.9 G/DL (ref 12–17)
HGB UR QL STRIP.AUTO: ABNORMAL
HYALINE CASTS #/AREA URNS LPF: ABNORMAL /LPF
KETONES UR STRIP-MCNC: NEGATIVE MG/DL
LEUKOCYTE ESTERASE UR QL STRIP: NEGATIVE
LYMPHOCYTES # BLD AUTO: 111.13 THOUSAND/UL (ref 0.6–4.47)
LYMPHOCYTES # BLD AUTO: 82 % (ref 14–44)
MCH RBC QN AUTO: 29.9 PG (ref 26.8–34.3)
MCHC RBC AUTO-ENTMCNC: 29.4 G/DL (ref 31.4–37.4)
MCV RBC AUTO: 102 FL (ref 82–98)
MONOCYTES # BLD AUTO: 5.42 THOUSAND/UL (ref 0–1.22)
MONOCYTES NFR BLD: 4 % (ref 4–12)
MUCOUS THREADS UR QL AUTO: ABNORMAL
NEUTROPHILS # BLD MANUAL: 16.26 THOUSAND/UL (ref 1.85–7.62)
NEUTS SEG NFR BLD AUTO: 12 % (ref 43–75)
NITRITE UR QL STRIP: NEGATIVE
NON-SQ EPI CELLS URNS QL MICRO: ABNORMAL /HPF
NT-PROBNP SERPL-MCNC: 268 PG/ML
P AXIS: 61 DEGREES
P AXIS: 67 DEGREES
PH UR STRIP.AUTO: 6.5 [PH]
PLATELET # BLD AUTO: 121 THOUSANDS/UL (ref 149–390)
PLATELET BLD QL SMEAR: ABNORMAL
PMV BLD AUTO: 9.4 FL (ref 8.9–12.7)
POTASSIUM SERPL-SCNC: 5.7 MMOL/L (ref 3.5–5.3)
POTASSIUM SERPL-SCNC: 6 MMOL/L (ref 3.5–5.3)
PR INTERVAL: 192 MS
PR INTERVAL: 198 MS
PROT SERPL-MCNC: 7.2 G/DL (ref 6.4–8.4)
PROT UR STRIP-MCNC: NEGATIVE MG/DL
QRS AXIS: 67 DEGREES
QRS AXIS: 70 DEGREES
QRSD INTERVAL: 74 MS
QRSD INTERVAL: 74 MS
QT INTERVAL: 344 MS
QT INTERVAL: 348 MS
QTC INTERVAL: 404 MS
QTC INTERVAL: 406 MS
RBC # BLD AUTO: 3.31 MILLION/UL (ref 3.88–5.62)
RBC #/AREA URNS AUTO: ABNORMAL /HPF
SODIUM SERPL-SCNC: 132 MMOL/L (ref 135–147)
SODIUM SERPL-SCNC: 133 MMOL/L (ref 135–147)
SP GR UR STRIP.AUTO: 1.01 (ref 1–1.03)
T WAVE AXIS: 54 DEGREES
T WAVE AXIS: 56 DEGREES
UROBILINOGEN UR STRIP-ACNC: <2 MG/DL
VARIANT LYMPHS # BLD AUTO: 2 %
VENTRICULAR RATE: 82 BPM
VENTRICULAR RATE: 83 BPM
WBC # BLD AUTO: 135.52 THOUSAND/UL (ref 4.31–10.16)
WBC #/AREA URNS AUTO: ABNORMAL /HPF

## 2022-11-08 RX ORDER — HEPARIN SODIUM 5000 [USP'U]/ML
5000 INJECTION, SOLUTION INTRAVENOUS; SUBCUTANEOUS EVERY 8 HOURS SCHEDULED
Status: DISCONTINUED | OUTPATIENT
Start: 2022-11-08 | End: 2022-11-10 | Stop reason: HOSPADM

## 2022-11-08 RX ORDER — ONDANSETRON 2 MG/ML
4 INJECTION INTRAMUSCULAR; INTRAVENOUS EVERY 6 HOURS PRN
Status: DISCONTINUED | OUTPATIENT
Start: 2022-11-08 | End: 2022-11-10 | Stop reason: HOSPADM

## 2022-11-08 RX ORDER — ASPIRIN 81 MG/1
81 TABLET, CHEWABLE ORAL DAILY
Status: DISCONTINUED | OUTPATIENT
Start: 2022-11-09 | End: 2022-11-10 | Stop reason: HOSPADM

## 2022-11-08 RX ORDER — OXYCODONE HYDROCHLORIDE 10 MG/1
10 TABLET ORAL EVERY 4 HOURS PRN
Status: DISCONTINUED | OUTPATIENT
Start: 2022-11-08 | End: 2022-11-10 | Stop reason: HOSPADM

## 2022-11-08 RX ORDER — HYDROMORPHONE HCL/PF 1 MG/ML
0.5 SYRINGE (ML) INJECTION
Status: DISCONTINUED | OUTPATIENT
Start: 2022-11-08 | End: 2022-11-10 | Stop reason: HOSPADM

## 2022-11-08 RX ORDER — OXYCODONE HYDROCHLORIDE 5 MG/1
5 TABLET ORAL EVERY 4 HOURS PRN
Status: DISCONTINUED | OUTPATIENT
Start: 2022-11-08 | End: 2022-11-10 | Stop reason: HOSPADM

## 2022-11-08 RX ORDER — FAMOTIDINE 20 MG/1
20 TABLET, FILM COATED ORAL ONCE
Status: COMPLETED | OUTPATIENT
Start: 2022-11-08 | End: 2022-11-08

## 2022-11-08 RX ORDER — SODIUM CHLORIDE 9 MG/ML
3 INJECTION INTRAVENOUS
Status: DISCONTINUED | OUTPATIENT
Start: 2022-11-08 | End: 2022-11-10 | Stop reason: HOSPADM

## 2022-11-08 RX ORDER — SODIUM CHLORIDE 9 MG/ML
125 INJECTION, SOLUTION INTRAVENOUS CONTINUOUS
Status: DISCONTINUED | OUTPATIENT
Start: 2022-11-08 | End: 2022-11-10

## 2022-11-08 RX ORDER — ACETAMINOPHEN 160 MG/5ML
650 SUSPENSION, ORAL (FINAL DOSE FORM) ORAL EVERY 4 HOURS PRN
Status: DISCONTINUED | OUTPATIENT
Start: 2022-11-08 | End: 2022-11-10 | Stop reason: HOSPADM

## 2022-11-08 RX ADMIN — FAMOTIDINE 20 MG: 20 TABLET, FILM COATED ORAL at 13:22

## 2022-11-08 RX ADMIN — SODIUM CHLORIDE 1000 ML: 0.9 INJECTION, SOLUTION INTRAVENOUS at 15:41

## 2022-11-08 RX ADMIN — SODIUM CHLORIDE 125 ML/HR: 0.9 INJECTION, SOLUTION INTRAVENOUS at 20:34

## 2022-11-08 NOTE — ED PROVIDER NOTES
History  Chief Complaint   Patient presents with   • Abdominal Pain     Pt c/o epigastric abdominal pain, shortness of breath  EKG completed at this time  Pt went to primary physician with abdominal pain and stated his HR was up and his PCP stated he should come to ER asap  Patient is a 43-year-old male with a history of TAVR and HLD who presented with epigastric pain  He states he started feeling right sided chest pain on Sunday that awoke him from sleep  He describes the pain as dull and nonradiating  The chest pain resolved but then he began feeling epigastric pain the following morning  The epigastric pain does not radiate  He has tried Pepto-Bismol and MiraLax without relief  He has also been feeling short of breath with movement  It does not worsen with eating  He denies GERD symptoms  His last BM was today  He has no history of abdominal surgery  He had a cardiac catheterization in June prior to his TAVR procedure which did show 50% occlusion in his LAD  He denies nausea, vomiting, or diarrhea  Prior to Admission Medications   Prescriptions Last Dose Informant Patient Reported? Taking?    Ascorbic Acid (VITAMIN C PO)  Self Yes No   Sig: Take by mouth daily    VITAMIN D PO  Self Yes No   Sig: Take by mouth daily    VITAMIN E PO  Self Yes No   Sig: Take by mouth daily    acetaminophen (TYLENOL) 325 mg tablet  Self No No   Sig: Take 2 tablets (650 mg total) by mouth every 4 (four) hours as needed for fever or moderate pain (temperature greater than 101 F)   amoxicillin (AMOXIL) 500 MG tablet   No No   Sig: Take 4 tablets (2,000 mg total) by mouth once as needed (Take 60 minutes prior to ANY dental work) for up to 1 dose   aspirin 81 mg chewable tablet  Self No No   Sig: Chew 1 tablet (81 mg total) daily   atorvastatin (LIPITOR) 20 mg tablet  Self No No   Sig: Take 1 tablet (20 mg total) by mouth daily with dinner   Patient not taking: Reported on 10/7/2022   chlorhexidine (PERIDEX) 0 12 % solution  Self Yes No   Si/2 OZ  TWICE DAILY   clopidogrel (PLAVIX) 75 mg tablet  Self No No   Sig: Take 1 tablet (75 mg total) by mouth daily   fexofenadine-pseudoephedrine (ALLEGRA-D 24) 180-240 MG per 24 hr tablet  Self Yes No   Sig: Take 1 tablet by mouth as needed    valACYclovir (VALTREX) 1,000 mg tablet  Self No No   Sig: Take 1 tablet (1,000 mg total) by mouth 3 (three) times a day for 7 days      Facility-Administered Medications: None       Past Medical History:   Diagnosis Date   • Basal cell carcinoma (BCC) of skin of nose    • History of chemotherapy    • History of colonoscopy    • Lymphocytic leukemia ([facility] [address] )        Past Surgical History:   Procedure Laterality Date   • CARDIAC CATHETERIZATION N/A 6/15/2022    Procedure: Cardiac Coronary Angiogram;  Surgeon: Saba Agarwal MD;  Location: [facility];   Service: Cardiology   • CARDIAC CATHETERIZATION N/A 2022    Procedure: CARDIAC TAVR;  Surgeon: Yamilet Woodruff MD;  Location: [facility];  Service: Cardiology   • CATARACT EXTRACTION Bilateral    • FLAP LOCAL HEAD / NECK N/A 2020    Procedure: ADJACENT TISSUE TRANSFER  FOREHEAD FLAP,  CARTILAGE GRAFT NOSE;  Surgeon: Ale Michele MD;  Location: [facility];  Service: Plastics   • FLAP LOCAL HEAD / NECK N/A 2020    Procedure: DIVISION INSET FOREHEAD FLAP; FULL THICKNESS SKIN GRAFT TO FOREHEAD;  Surgeon: Ale Michele MD;  [facility];  Service: Plastics   • FULL THICKNESS SKIN GRAFT N/A 2020    Procedure: SKIN GRAFT FULL THICKNESS  (FTSG) NOSE;  Surgeon: Ale Michele MD;  [facility];  Service: Plastics   • MOHS RECONSTRUCTION N/A 2020    Procedure: MOHS RECONSTRUCTION NOSE DEFECT;  Surgeon: Ale Michele MD;  [facility];  Service: Plastics   • MOHS RECONSTRUCTION N/A 2020    Procedure: RECONSTRUCTION MOHS NASAL DEFECT WITH EAR CARTILAGE GRAFT;  Surgeon: Ale Michele MD; Location: [facility];  Service: Plastics   • REPLACEMENT AORTIC VALVE TRANSCATHETER (TAVR) N/A 7/12/2022    Procedure: REPLACEMENT AORTIC VALVE TRANSCATHETER (TAVR) TRANSFEMORAL W/ 26MM LEE CLINT S3 ULTRA VALVE(ACCESS ON LEFT) PARISA;  Surgeon: Babatunde Peng MD;  Location: [facility];  Service: Cardiac Surgery   • SHOULDER SURGERY     • TIBIA FRACTURE SURGERY     • TONSILLECTOMY         Family History   Problem Relation Age of Onset   • Stroke Mother    • No Known Problems Father      I have reviewed and agree with the history as documented  E-Cigarette/Vaping   • E-Cigarette Use Never User      E-Cigarette/Vaping Substances   • Nicotine No    • THC No    • CBD No    • Flavoring No    • Other No    • Unknown No      Social History     Tobacco Use   • Smoking status: Never Smoker   • Smokeless tobacco: Never Used   Vaping Use   • Vaping Use: Never used   Substance Use Topics   • Alcohol use: Not Currently     Comment: rare   • Drug use: Never        Review of Systems   Constitutional: Negative for chills and fever  HENT: Negative for ear pain and sore throat  Eyes: Negative for pain and visual disturbance  Respiratory: Positive for shortness of breath  Negative for cough  Cardiovascular: Positive for chest pain  Negative for palpitations  Gastrointestinal: Negative for abdominal pain and vomiting  Genitourinary: Negative for dysuria and hematuria  Musculoskeletal: Negative for arthralgias and back pain  Skin: Negative for color change and rash  Neurological: Negative for seizures and syncope  All other systems reviewed and are negative        Physical Exam  ED Triage Vitals   Temperature Pulse Respirations Blood Pressure SpO2   11/08/22 1238 11/08/22 1238 11/08/22 1238 11/08/22 1238 11/08/22 1238   97 9 °F (36 6 °C) 85 20 127/60 97 %      Temp Source Heart Rate Source Patient Position - Orthostatic VS BP Location FiO2 (%)   11/08/22 1238 11/08/22 1238 11/08/22 2014 11/08/22 1238 --   Oral Monitor Lying Right arm       Pain Score       11/08/22 1238       9             Orthostatic Vital Signs  Vitals:    11/08/22 2215 11/09/22 0015 11/09/22 0030 11/09/22 0045   BP: 139/63   114/54   Pulse: 74 98 100 78   Patient Position - Orthostatic VS:           Physical Exam  Vitals and nursing note reviewed  Constitutional:       General: He is not in acute distress  HENT:      Head: Normocephalic and atraumatic  Eyes:      Conjunctiva/sclera: Conjunctivae normal    Cardiovascular:      Rate and Rhythm: Normal rate and regular rhythm  Heart sounds: No murmur heard  Pulmonary:      Effort: Pulmonary effort is normal  No respiratory distress  Breath sounds: Normal breath sounds  Abdominal:      General: Bowel sounds are normal       Palpations: Abdomen is soft  Tenderness: There is no abdominal tenderness  Musculoskeletal:      Cervical back: Neck supple  Right lower leg: No edema  Left lower leg: No edema  Skin:     General: Skin is warm and dry  Capillary Refill: Capillary refill takes less than 2 seconds  Neurological:      Mental Status: He is alert  Sensory: No sensory deficit  Motor: No weakness           ED Medications  Medications   sodium chloride (PF) 0 9 % injection 3 mL (has no administration in time range)   ondansetron (ZOFRAN) injection 4 mg (has no administration in time range)   heparin (porcine) subcutaneous injection 5,000 Units (5,000 Units Subcutaneous Not Given 11/9/22 0541)   sodium chloride 0 9 % infusion (125 mL/hr Intravenous New Bag 11/8/22 2034)   acetaminophen (TYLENOL) oral suspension 650 mg (650 mg Oral Given 11/9/22 0545)   HYDROmorphone (DILAUDID) injection 0 5 mg (has no administration in time range)   oxyCODONE (ROXICODONE) IR tablet 5 mg (has no administration in time range)   oxyCODONE (ROXICODONE) immediate release tablet 10 mg (has no administration in time range)   aspirin chewable tablet 81 mg (has no administration in time range)   ceFAZolin (ANCEF) IVPB (premix in dextrose) 2,000 mg 50 mL (has no administration in time range)   metroNIDAZOLE (FLAGYL) IVPB (premix) 500 mg 100 mL (has no administration in time range)   famotidine (PEPCID) tablet 20 mg (20 mg Oral Given 11/8/22 1322)   sodium chloride 0 9 % bolus 1,000 mL (0 mL Intravenous Stopped 11/8/22 2029)       Diagnostic Studies  Results Reviewed     Procedure Component Value Units Date/Time    Bilirubin, direct [[id number]]  (Abnormal) Collected: 11/09/22 0541    Lab Status: Final result Specimen: Blood from Arm, Right Updated: 11/09/22 0641     Bilirubin, Direct 0 35 mg/dL     Comprehensive metabolic panel [[id number]]  (Abnormal) Collected: 11/09/22 0541    Lab Status: Final result Specimen: Blood from Arm, Right Updated: 11/09/22 0616     Sodium 139 mmol/L      Potassium 4 7 mmol/L      Chloride 112 mmol/L      CO2 22 mmol/L      ANION GAP 5 mmol/L      BUN 20 mg/dL      Creatinine 1 21 mg/dL      Glucose 108 mg/dL      Calcium 9 1 mg/dL      Corrected Calcium 9 7 mg/dL       U/L       U/L      Alkaline Phosphatase 167 U/L      Total Protein 6 5 g/dL      Albumin 3 3 g/dL      Total Bilirubin 0 73 mg/dL      eGFR 59 ml/min/1 73sq m     Narrative:      [place] guidelines for Chronic Kidney Disease (CKD):   •  Stage 1 with normal or high GFR (GFR > 90 mL/min/1 73 square meters)  •  Stage 2 Mild CKD (GFR = 60-89 mL/min/1 73 square meters)  •  Stage 3A Moderate CKD (GFR = 45-59 mL/min/1 73 square meters)  •  Stage 3B Moderate CKD (GFR = 30-44 mL/min/1 73 square meters)  •  Stage 4 Severe CKD (GFR = 15-29 mL/min/1 73 square meters)  •  Stage 5 End Stage CKD (GFR <15 mL/min/1 73 square meters)  Note: GFR calculation is accurate only with a steady state creatinine    CBC [[id number]]  (Abnormal) Collected: 11/09/22 0541    Lab Status: Final result Specimen: Blood from Arm, Right Updated: 11/09/22 0558      53 Thousand/uL      RBC 2 91 Million/uL      Hemoglobin 8 7 g/dL      Hematocrit 29 2 %       fL      MCH 29 9 pg      MCHC 29 8 g/dL      RDW 14 8 %      Platelets 990 Thousands/uL      MPV 9 2 fL     Platelet count [[id number]]     Lab Status: No result Specimen: Blood     Urinalysis with microscopic [[id number]]  (Abnormal) Collected: 11/08/22 1757    Lab Status: Final result Specimen: Urine, Clean Catch Updated: 11/08/22 1842     Color, UA Yellow     Clarity, UA Clear     Specific Gravity, UA 1 012     pH, UA 6 5     Leukocytes, UA Negative     Nitrite, UA Negative     Protein, UA Negative mg/dl      Glucose, UA Negative mg/dl      Ketones, UA Negative mg/dl      Urobilinogen, UA <2 0 mg/dl      Bilirubin, UA Negative     Occult Blood, UA Trace     RBC, UA None Seen /hpf      WBC, UA None Seen /hpf      Epithelial Cells None Seen /hpf      Bacteria, UA None Seen /hpf      MUCUS THREADS Occasional     Hyaline Casts, UA 0-3 /lpf     CBC and differential [[id number]]  (Abnormal) Collected: 11/08/22 1308    Lab Status: Final result Specimen: Blood from Arm, Right Updated: 11/08/22 1606      52 Thousand/uL      RBC 3 31 Million/uL      Hemoglobin 9 9 g/dL      Hematocrit 33 7 %       fL      MCH 29 9 pg      MCHC 29 4 g/dL      RDW 14 8 %      MPV 9 4 fL      Platelets 523 Thousands/uL     Narrative: This is an appended report  These results have been appended to a previously verified report      Manual Differential(PHLEBS Do Not Order) [[id number]]  (Abnormal) Collected: 11/08/22 1308    Lab Status: Final result Specimen: Blood from Arm, Right Updated: 11/08/22 1606     Segmented % 12 %      Lymphocytes % 82 %      Monocytes % 4 %      Eosinophils, % 0 %      Basophils % 0 %      Atypical Lymphocytes % 2 %      Absolute Neutrophils 16 26 Thousand/uL      Lymphocytes Absolute 111 13 Thousand/uL      Monocytes Absolute 5 42 Thousand/uL      Eosinophils Absolute 0 00 Thousand/uL      Basophils Absolute 0 00 Thousand/uL Total Counted --     Platelet Estimate Decreased    HS Troponin I 2hr [[id number]]  (Normal) Collected: 11/08/22 1447    Lab Status: Final result Specimen: Blood from Arm, Right Updated: 11/08/22 1529     hs TnI 2hr 5 ng/L      Delta 2hr hsTnI 0 ng/L     Basic metabolic panel [[id number]]  (Abnormal) Collected: 11/08/22 1447    Lab Status: Final result Specimen: Blood from Arm, Right Updated: 11/08/22 1511     Sodium 132 mmol/L      Potassium 5 7 mmol/L      Chloride 104 mmol/L      CO2 23 mmol/L      ANION GAP 5 mmol/L      BUN 27 mg/dL      Creatinine 1 53 mg/dL      Glucose 118 mg/dL      Calcium 10 0 mg/dL      eGFR 45 ml/min/1 73sq m     Narrative:      [place] guidelines for Chronic Kidney Disease (CKD):   •  Stage 1 with normal or high GFR (GFR > 90 mL/min/1 73 square meters)  •  Stage 2 Mild CKD (GFR = 60-89 mL/min/1 73 square meters)  •  Stage 3A Moderate CKD (GFR = 45-59 mL/min/1 73 square meters)  •  Stage 3B Moderate CKD (GFR = 30-44 mL/min/1 73 square meters)  •  Stage 4 Severe CKD (GFR = 15-29 mL/min/1 73 square meters)  •  Stage 5 End Stage CKD (GFR <15 mL/min/1 73 square meters)  Note: GFR calculation is accurate only with a steady state creatinine    HS Troponin 0hr (reflex protocol) [[id number]]  (Normal) Collected: 11/08/22 1308    Lab Status: Final result Specimen: Blood from Arm, Right Updated: 11/08/22 1405     hs TnI 0hr 5 ng/L     NT-BNP PRO [[id number]]  (Abnormal) Collected: 11/08/22 1308    Lab Status: Final result Specimen: Blood from Arm, Right Updated: 11/08/22 1359     NT-proBNP 268 pg/mL     Comprehensive metabolic panel [[id number]]  (Abnormal) Collected: 11/08/22 1308    Lab Status: Final result Specimen: Blood from Arm, Right Updated: 11/08/22 1359     Sodium 133 mmol/L      Potassium 6 0 mmol/L      Chloride 104 mmol/L      CO2 26 mmol/L      ANION GAP 3 mmol/L      BUN 27 mg/dL      Creatinine 1 56 mg/dL      Glucose 126 mg/dL      Calcium 10 2 mg/dL  U/L       U/L      Alkaline Phosphatase 176 U/L      Total Protein 7 2 g/dL      Albumin 4 1 g/dL      Total Bilirubin 2 52 mg/dL      eGFR 44 ml/min/1 73sq m     Narrative:      [place] guidelines for Chronic Kidney Disease (CKD):   •  Stage 1 with normal or high GFR (GFR > 90 mL/min/1 73 square meters)  •  Stage 2 Mild CKD (GFR = 60-89 mL/min/1 73 square meters)  •  Stage 3A Moderate CKD (GFR = 45-59 mL/min/1 73 square meters)  •  Stage 3B Moderate CKD (GFR = 30-44 mL/min/1 73 square meters)  •  Stage 4 Severe CKD (GFR = 15-29 mL/min/1 73 square meters)  •  Stage 5 End Stage CKD (GFR <15 mL/min/1 73 square meters)  Note: GFR calculation is accurate only with a steady state creatinine                 US right upper quadrant   Final Result by Zaid Scott MD (11/08 1848)      Somewhat distended gallbladder containing several gallstones with gallbladder wall at upper limits of normal in thickness, however without pericholecystic fluid and with negative sonographic Cardona sign  These findings are equivocal for acute    cholecystitis  Consider further evaluation with HIDA scan  No biliary ductal dilatation  Mild hepatomegaly  Shadowing 5 mm calculus lower pole right kidney  Otherwise unremarkable sonographic appearance of the solid right upper quadrant organs  Workstation performed: [id number]         CT abdomen pelvis wo contrast   Final Result by David Alfonso MD (11/08 1525)      1  Fluid-filled, mildly dilated small bowel in the left upper quadrant with new inflammatory stranding in the root of the mesentery, findings likely reflecting a nonspecific enteritis  2   Cholelithiasis and hydropic gallbladder without signs of acute cholecystitis  If biliary symptoms are present, right upper quadrant ultrasound could be performed for further evaluation  The study was marked in EPIC for significant notification         Workstation performed: [id number]         X-ray chest 1 view portable   Final Result by Janey Suarez MD (11/09 0557)      Minimal linear subsegmental atelectasis or scar at the left lung base  Otherwise, no radiographic evidence of acute intrathoracic process  Workstation performed: [id number]         MRI abdomen wo contrast and mrcp    (Results Pending)         Procedures  ECG 12 Lead Documentation Only    Date/Time: 11/8/2022 1:20 PM  Performed by: Deuce Roldan MD  Authorized by: Deuce Roldan MD     ECG reviewed by me, the ED Provider: yes    Patient location:  ED  Previous ECG:     Previous ECG:  Compared to current    Similarity:  No change  Interpretation:     Interpretation: normal    Rate:     ECG rate:  82    ECG rate assessment: normal    Rhythm:     Rhythm: sinus rhythm    Ectopy:     Ectopy: none    QRS:     QRS axis:  Normal  Conduction:     Conduction: normal    ST segments:     ST segments:  Normal  T waves:     T waves: normal            ED Course  ED Course as of 11/09/22 0701   Tue Nov 08, 2022   1336 WBC(!!): 135 52  Pt  Has hx of CLL   1336 Hemoglobin(!): 9 9  Stable from past blood work    1345 Pt states that abdominal pain has slightly decreased following Pepcid   1410 NT-proBNP(!): 268   1410 Potassium(!): 6 0   1410 Creatinine(!): 1 56   1410 AST(!): 340   1410 ALT(!): 339   1410 TOTAL BILIRUBIN(!): 2 52   1413 Alkaline Phosphatase(!): 176   1413 hs TnI 0hr: 5   1440 CT abdomen/ pelvis w/o contrast ordered  Pts GFR is 44   1513 Potassium(!): 5 7  Repeat BMP  Non-hemolyzed   1535 CT abd/pelvis shows cholelithiasis without acute cholecystitis  Will order RUQ ultrasound   1539 Delta 2hr hsTnI: 0                             SBIRT 22yo+    Flowsheet Row Most Recent Value   SBIRT (25 yo +)    In order to provide better care to our patients, we are screening all of our patients for alcohol and drug use  Would it be okay to ask you these screening questions?  Unable to answer at this time Filed at: 11/08/2022 6201                [facility]  Number of Diagnoses or Management Options  Abdominal pain  Transaminitis  Diagnosis management comments: Patient is a 73-year-old male with a history of TAVR and HLD who presented with epigastric pain  Differential dx: ACS, gastritis, cholecystitis, appendicitis     Given patient's cardiac history there is concern for ACS  EKG, CBC, CMP, troponin, and CXR will be ordered  Pepcid will be ordered for epigastric pain  Patient's liver enzymes were elevated so a CT of his abdomen and pelvis will be ordered  Patient is unable to have contrast due to GFR of 44  BMP shows hyperkalemia however specimen was hemolyzed so one will be repeated  Potassium is still high on repeat BMP so 1L of normal saline was given  CT showed cholelithiasis without signs of acute cholecystitis  A RUQ ultrasound will be ordered  The patient's care was transferred to another resident with the plan being to admit the patient          Amount and/or Complexity of Data Reviewed  Decide to obtain previous medical records or to obtain history from someone other than the patient: yes        Disposition  Final diagnoses:   Abdominal pain   Transaminitis     Time reflects when diagnosis was documented in both MDM as applicable and the Disposition within this note     Time User Action Codes Description Comment    11/8/2022  7:36 PM Andrew Sheppard Add [R10 9] Abdominal pain     11/8/2022  7:52 PM Andrew Avendaño [R74 01] Transaminitis     11/8/2022  8:12 PM Charmayne Parlor Add [K80 50] Choledocholithiasis       ED Disposition     ED Disposition   Admit    Condition   Stable    Date/Time   Tue Nov 8, 2022  7:52 PM    Comment   Case was discussed with Dr Renetta Fuentes and the patient's admission status was agreed to be {inpatient to the service of  TO             Follow-up Information    None         Patient's Medications   Discharge Prescriptions    No medications on file     No discharge procedures on file  PDMP Review       Value Time User    PDMP Reviewed  Yes 7/13/2022  6:58 AM Hannah Pena PA-C           ED Provider  Attending physically available and evaluated Arnulfo Melvin I managed the patient along with the ED Attending      Electronically Signed by         Paige Faith MD  11/09/22 0264       Paige Faith MD  11/09/22 105

## 2022-11-08 NOTE — ED ATTENDING ATTESTATION
11/8/2022  Chilango MARCOS, DO, saw and evaluated the patient  I have discussed the patient with the resident/non-physician practitioner and agree with the resident's/non-physician practitioner's findings, Plan of Care, and MDM as documented in the resident's/non-physician practitioner's note, except where noted  All available labs and Radiology studies were reviewed  I was present for key portions of any procedure(s) performed by the resident/non-physician practitioner and I was immediately available to provide assistance  At this point I agree with the current assessment done in the Emergency Department  I have conducted an independent evaluation of this patient a history and physical is as follows:    History provided by:  Patient  Abdominal Pain  Pain location:  Epigastric  Pain quality: aching and cramping    Pain radiates to:  Does not radiate  Pain severity:  Moderate  Onset quality:  Gradual  Timing:  Constant  Progression:  Worsening  Chronicity:  New  Relieved by:  Nothing  Worsened by:  Nothing  Ineffective treatments:  None tried  Associated symptoms: shortness of breath    Associated symptoms: no chest pain, no chills, no cough, no diarrhea, no dysuria, no fever, no hematochezia, no nausea and no sore throat     are as follows /60 (BP Location: Left arm)   Pulse 80   Temp 98 5 °F (36 9 °C) (Oral)   Resp 17   Ht 5' 9" (1 753 m)   Wt 80 7 kg (178 lb)   SpO2 98%   BMI 26 29 kg/m²   Review of Systems Review of Systems   Constitutional: Negative for chills and fever  HENT: Negative for rhinorrhea, sore throat and trouble swallowing  Eyes: Negative for pain  Respiratory: Positive for shortness of breath  Negative for cough, wheezing and stridor  Cardiovascular: Negative for chest pain and leg swelling  Gastrointestinal: Positive for abdominal pain  Negative for diarrhea, hematochezia and nausea  Endocrine: Negative for polyuria     Genitourinary: Negative for dysuria, flank pain and urgency  Musculoskeletal: Negative for joint swelling, myalgias and neck stiffness  Skin: Negative for rash  Allergic/Immunologic: Negative for immunocompromised state  Neurological: Negative for dizziness, syncope, weakness, numbness and headaches  Psychiatric/Behavioral: Negative for confusion and suicidal ideas  All other systems reviewed and are negative  Physical Exam remarkable for Physical Exam  Vitals and nursing note reviewed  Constitutional:       Appearance: He is well-developed  HENT:      Head: Normocephalic and atraumatic  Eyes:      Pupils: Pupils are equal, round, and reactive to light  Cardiovascular:      Rate and Rhythm: Normal rate and regular rhythm  Heart sounds: Normal heart sounds  No murmur heard  No friction rub  Pulmonary:      Effort: Pulmonary effort is normal  No respiratory distress  Breath sounds: No wheezing or rales  Abdominal:      General: Bowel sounds are normal       Palpations: Abdomen is soft  There is no mass  Tenderness: There is abdominal tenderness in the epigastric area  There is no guarding  Musculoskeletal:      Cervical back: Normal range of motion and neck supple  Skin:     General: Skin is warm  Findings: No rash  Neurological:      Mental Status: He is alert and oriented to person, place, and time  Work up and treatment plan discussed with Treatment Team: Attending Provider: Marcie Hyman DO; Surgeon: Sesar Ellis MD; Registered Nurse: Linus Sandifer, RN; Patient Care Assistant: Elza Marie and agreed upon plan               MDM  Number of Diagnoses or Management Options  Diagnosis management comments: Background: 71 y o  male with chest pain    Differential DX includes but is not limited to: acs/mi, pe, pleurisy, dissection, pneumonia, musculoskeletal chest pain    Plan: cardiac workup           Amount and/or Complexity of Data Reviewed  Clinical lab tests: reviewed and ordered  Tests in the radiology section of CPT®: ordered and reviewed  Review and summarize past medical records: yes  Independent visualization of images, tracings, or specimens: yes         Lab Results: Lab Results: I have personally reviewed pertinent lab results  Imaging: I have personally reviewed pertinent reports  EKG, Pathology, and Other Studies: I have personally reviewed pertinent films in PACS    Clinical Impression:    Final diagnoses:   Abdominal pain   Transaminitis         Disposition    admitted to the hospital           New Prescriptions:    Current Discharge Medication List               Follow-up Instructions:    No follow-up provider specified        ED Course         Critical Care Time  Procedures

## 2022-11-09 ENCOUNTER — APPOINTMENT (INPATIENT)
Dept: RADIOLOGY | Facility: HOSPITAL | Age: 72
End: 2022-11-09

## 2022-11-09 LAB
ABO GROUP BLD: NORMAL
ALBUMIN SERPL BCP-MCNC: 3.3 G/DL (ref 3.5–5)
ALP SERPL-CCNC: 167 U/L (ref 46–116)
ALT SERPL W P-5'-P-CCNC: 243 U/L (ref 12–78)
ANION GAP SERPL CALCULATED.3IONS-SCNC: 5 MMOL/L (ref 4–13)
AST SERPL W P-5'-P-CCNC: 132 U/L (ref 5–45)
BILIRUB DIRECT SERPL-MCNC: 0.35 MG/DL (ref 0–0.2)
BILIRUB SERPL-MCNC: 0.73 MG/DL (ref 0.2–1)
BLD GP AB SCN SERPL QL: NEGATIVE
BUN SERPL-MCNC: 20 MG/DL (ref 5–25)
CALCIUM ALBUM COR SERPL-MCNC: 9.7 MG/DL (ref 8.3–10.1)
CALCIUM SERPL-MCNC: 9.1 MG/DL (ref 8.3–10.1)
CHLORIDE SERPL-SCNC: 112 MMOL/L (ref 96–108)
CO2 SERPL-SCNC: 22 MMOL/L (ref 21–32)
CREAT SERPL-MCNC: 1.21 MG/DL (ref 0.6–1.3)
ERYTHROCYTE [DISTWIDTH] IN BLOOD BY AUTOMATED COUNT: 14.8 % (ref 11.6–15.1)
GFR SERPL CREATININE-BSD FRML MDRD: 59 ML/MIN/1.73SQ M
GLUCOSE SERPL-MCNC: 108 MG/DL (ref 65–140)
HCT VFR BLD AUTO: 29.2 % (ref 36.5–49.3)
HGB BLD-MCNC: 8.7 G/DL (ref 12–17)
MCH RBC QN AUTO: 29.9 PG (ref 26.8–34.3)
MCHC RBC AUTO-ENTMCNC: 29.8 G/DL (ref 31.4–37.4)
MCV RBC AUTO: 100 FL (ref 82–98)
PLATELET # BLD AUTO: 102 THOUSANDS/UL (ref 149–390)
PLATELET # BLD AUTO: 104 THOUSANDS/UL (ref 149–390)
PMV BLD AUTO: 9.2 FL (ref 8.9–12.7)
PMV BLD AUTO: 9.4 FL (ref 8.9–12.7)
POTASSIUM SERPL-SCNC: 4.7 MMOL/L (ref 3.5–5.3)
PROT SERPL-MCNC: 6.5 G/DL (ref 6.4–8.4)
RBC # BLD AUTO: 2.91 MILLION/UL (ref 3.88–5.62)
RH BLD: POSITIVE
SODIUM SERPL-SCNC: 139 MMOL/L (ref 135–147)
SPECIMEN EXPIRATION DATE: NORMAL
WBC # BLD AUTO: 119.53 THOUSAND/UL (ref 4.31–10.16)

## 2022-11-09 RX ORDER — METRONIDAZOLE 500 MG/100ML
500 INJECTION, SOLUTION INTRAVENOUS EVERY 8 HOURS
Status: DISCONTINUED | OUTPATIENT
Start: 2022-11-09 | End: 2022-11-10

## 2022-11-09 RX ORDER — CEFAZOLIN SODIUM 2 G/50ML
2000 SOLUTION INTRAVENOUS EVERY 8 HOURS
Status: DISCONTINUED | OUTPATIENT
Start: 2022-11-09 | End: 2022-11-10

## 2022-11-09 RX ADMIN — METRONIDAZOLE 500 MG: 500 INJECTION, SOLUTION INTRAVENOUS at 23:34

## 2022-11-09 RX ADMIN — ASPIRIN 81 MG CHEWABLE TABLET 81 MG: 81 TABLET CHEWABLE at 08:43

## 2022-11-09 RX ADMIN — CEFAZOLIN SODIUM 2000 MG: 2 SOLUTION INTRAVENOUS at 07:40

## 2022-11-09 RX ADMIN — METRONIDAZOLE 500 MG: 500 INJECTION, SOLUTION INTRAVENOUS at 08:19

## 2022-11-09 RX ADMIN — CEFAZOLIN SODIUM 2000 MG: 2 SOLUTION INTRAVENOUS at 16:42

## 2022-11-09 RX ADMIN — SODIUM CHLORIDE 125 ML/HR: 0.9 INJECTION, SOLUTION INTRAVENOUS at 15:02

## 2022-11-09 RX ADMIN — METRONIDAZOLE 500 MG: 500 INJECTION, SOLUTION INTRAVENOUS at 15:03

## 2022-11-09 RX ADMIN — ACETAMINOPHEN 650 MG: 650 SUSPENSION ORAL at 05:45

## 2022-11-09 NOTE — CONSULTS
Consult Service: Gastroenterology      PATIENT INFORMATION      Radha Bennett 71 y o  male MRN: [id number]  Unit/Bed#: ED 23 Encounter: [id number]  PCP: Kameron Connolly MD  Date of Admission:  11/8/2022  Date of Consultation: 11/09/22  Requesting Physician: Julianna Reed To, DO       ASSESSMENTS & PLAN   Radha Bennett is a 71 y o  old male with PMH of CLL, AS s/p TAVR who presents with RUQ pain x 2 days     Gastroenterology has been consulted for assistance with management of elevated liver enzymes    Elevated liver enzymes  · Secondary to acute cholecystitis  · Normal bilirubin  · Imaging reviewed and no concern for choledocholithiasis  · Plan for laparoscopic cholecystectomy today  · No interventions necessary from a GI standpoint    Aortic stenosis s/p TAVR  · On low dose aspirin    CLL  · Being observed, not on any treatment  · Outpatient oncology follow-up    We will sign off, please call with questions or concerns     HISTORY OF PRESENT ILLNESS      Radha Bennett is a 71 y o  male who is originally admitted for right upper quadrant pain and is being consulted for elevated liver enzymes  Patient reports that his pain started on Sunday and initially felt like heartburn but eventually had right upper quadrant tenderness  Initially he was worried that it might be related to his heart given he recently had surgery for aortic stenosis but when the pain migrated to the abdomen he called his cardiologist who informed him to come to the emergency room for evaluation  Patient denies any nausea, vomiting  Patient denies any dysphagia currently  Patient denies any change in bowel habits or bloody stools  REVIEW OF SYSTEMS     A thorough 12-point review of systems has been conducted  Pertinent positives and negatives are mentioned in the history of present illness         PAST MEDICAL & SURGICAL HISTORY      Past Medical History:   Diagnosis Date   • Basal cell carcinoma (BCC) of skin of nose    • History of chemotherapy    • History of colonoscopy 2012   • Lymphocytic leukemia ([facility] [address] )        Past Surgical History:   Procedure Laterality Date   • CARDIAC CATHETERIZATION N/A 6/15/2022    Procedure: Cardiac Coronary Angiogram;  Surgeon: Tereso Newman MD;  Location: [facility]; Service: Cardiology   • CARDIAC CATHETERIZATION N/A 7/12/2022    Procedure: CARDIAC TAVR;  Surgeon: Shayna Falcon MD;  Location: [facility];  Service: Cardiology   • CATARACT EXTRACTION Bilateral    • FLAP LOCAL HEAD / NECK N/A 4/14/2020    Procedure: ADJACENT TISSUE TRANSFER  FOREHEAD FLAP,  CARTILAGE GRAFT NOSE;  Surgeon: Vielka Monge MD;  Location: [facility];  Service: Plastics   • FLAP LOCAL HEAD / NECK N/A 6/17/2020    Procedure: DIVISION INSET FOREHEAD FLAP; FULL THICKNESS SKIN GRAFT TO FOREHEAD;  Surgeon: Vielka Monge MD;  Location: [facility];  Service: Plastics   • FULL THICKNESS SKIN GRAFT N/A 4/14/2020    Procedure: SKIN GRAFT FULL THICKNESS  (FTSG) NOSE;  Surgeon: Vielka Monge MD;  [facility];  Service: Plastics   • MOHS RECONSTRUCTION N/A 4/14/2020    Procedure: MOHS RECONSTRUCTION NOSE DEFECT;  Surgeon: Vielka Monge MD;  [facility]: [facility];  Service: Plastics   • MOHS RECONSTRUCTION N/A 5/4/2020    Procedure: RECONSTRUCTION MOHS NASAL DEFECT WITH EAR CARTILAGE GRAFT;  Surgeon: Vielka Monge MD;  [facility];  Service: Plastics   • REPLACEMENT AORTIC VALVE TRANSCATHETER (TAVR) N/A 7/12/2022    Procedure: REPLACEMENT AORTIC VALVE TRANSCATHETER (TAVR) TRANSFEMORAL W/ 26MM LEE CLINT S3 ULTRA VALVE(ACCESS ON LEFT) PARISA;  Surgeon: Efrain Rae MD;  Location: [facility];  Service: Cardiac Surgery   • SHOULDER SURGERY     • TIBIA FRACTURE SURGERY     • TONSILLECTOMY           MEDICATIONS & ALLERGIES       Medications:   Prior to Admission medications    Medication Sig Start Date End Date Taking?  Authorizing Provider   acetaminophen (TYLENOL) 325 mg tablet Take 2 tablets (650 mg total) by mouth every 4 (four) hours as needed for fever or moderate pain (temperature greater than 101 F) 7/13/22   Melvi Fuller PA-C   amoxicillin (AMOXIL) 500 MG tablet Take 4 tablets (2,000 mg total) by mouth once as needed (Take 60 minutes prior to ANY dental work) for up to 1 dose 10/7/22 10/7/40  Raudel Perez MD   Ascorbic Acid (VITAMIN C PO) Take by mouth daily     Historical Provider, MD   aspirin 81 mg chewable tablet Chew 1 tablet (81 mg total) daily 7/13/22 10/11/22  Melvi Fuller PA-C   atorvastatin (LIPITOR) 20 mg tablet Take 1 tablet (20 mg total) by mouth daily with dinner  Patient not taking: Reported on 10/7/2022 7/13/22 10/11/22  Melvi Fuller PA-C   chlorhexidine (PERIDEX) 0 12 % solution 1/2 OZ  TWICE DAILY 7/27/22   Historical Provider, MD   clopidogrel (PLAVIX) 75 mg tablet Take 1 tablet (75 mg total) by mouth daily 7/13/22 10/11/22  Melvi Fuller PA-C   fexofenadine-pseudoephedrine (ALLEGRA-D 24) 180-240 MG per 24 hr tablet Take 1 tablet by mouth as needed     Historical Provider, MD   valACYclovir (VALTREX) 1,000 mg tablet Take 1 tablet (1,000 mg total) by mouth 3 (three) times a day for 7 days 9/6/22 10/7/22  CANDE Humphrey   VITAMIN D PO Take by mouth daily     Historical Provider, MD   VITAMIN E PO Take by mouth daily     Historical Provider, MD       Allergies: No Known Allergies      SOCIAL HISTORY      Marital Status:      Substance Use History:   Social History     Substance and Sexual Activity   Alcohol Use Not Currently    Comment: rare     Social History     Tobacco Use   Smoking Status Never Smoker   Smokeless Tobacco Never Used     Social History     Substance and Sexual Activity   Drug Use Never         FAMILY HISTORY      Non-Contributory      PHYSICAL EXAM     Vitals:   Blood Pressure: 120/61 (11/09/22 1007)  Pulse: 75 (11/09/22 1007)  Temperature: 97 8 °F (36 6 °C) (11/08/22 2014)  Temp Source: Oral (11/08/22 1238)  Respirations: 18 (11/09/22 1007)  SpO2: 97 % (11/09/22 1007)    Physical Exam:   GENERAL: NAD  HEENT:  NC/AT, MMM  CARDIAC:  RRR, +S1/S2, no S3/S4 heard  PULMONARY:  CTA B/L, no wheezing/rales/rhonci, non-labored breathing  ABDOMEN:  Soft, NT/ND, +BS, no rebound/guarding/rigidity  DIGITAL RECTAL EXAM: not indicated   NEUROLOGIC:  Alert/oriented x3  SKIN:  No rashes or erythema       ADDITIONAL DATA     Lab Results:     Results from last 7 days   Lab Units 11/09/22  0752 11/09/22  0541 11/08/22  1308   WBC Thousand/uL  --  119 53* 135 52*   HEMOGLOBIN g/dL  --  8 7* 9 9*   HEMATOCRIT %  --  29 2* 33 7*   PLATELETS Thousands/uL 102* 104* 121*   LYMPHO PCT %  --   --  82*   MONO PCT %  --   --  4   EOS PCT %  --   --  0     Results from last 7 days   Lab Units 11/09/22  0541   POTASSIUM mmol/L 4 7   CHLORIDE mmol/L 112*   CO2 mmol/L 22   BUN mg/dL 20   CREATININE mg/dL 1 21   CALCIUM mg/dL 9 1   ALK PHOS U/L 167*   ALT U/L 243*   AST U/L 132*           Imaging:    CT abdomen pelvis wo contrast    Result Date: 11/8/2022  Narrative: CT ABDOMEN AND PELVIS WITHOUT IV CONTRAST INDICATION:   Epigastric pain for 2 days  Periumbilical pain  Leukocytosis  Elevated LFTs  COMPARISON:  CTA chest/abdomen/pelvis 6/9/2022  TECHNIQUE:  CT examination of the abdomen and pelvis was performed without administration of intravenous contrast, limiting evaluation for certain processes  Axial, sagittal, and coronal 2D reformatted images were created from the source data and submitted for interpretation  Radiation dose length product (DLP) for this visit:  572 64 mGy-cm   This examination, like all CT scans performed in the [facility], was performed utilizing techniques to minimize radiation dose exposure, including the use of iterative  reconstruction and automated exposure control  IV Contrast:  None  Enteric Contrast:  None  FINDINGS: ABDOMEN LOWER CHEST: No consolidation or effusion  Aortic valve stenosis  Coronary artery calcification  LIVER: Normal size and morphology  No gross abnormality on noncontrast study  BILIARY: No intrahepatic biliary ductal dilatation  Normal caliber common bile duct  GALLBLADDER: Gallbladder is distended measuring 5 6 cm transverse  Multiple calcified gallstones  Normal wall thickness  No pericholecystic fluid  SPLEEN: Splenomegaly measuring 17 5 cm AP prior  PANCREAS: Mild diffuse fatty atrophy  No peripancreatic inflammation  ADRENAL GLANDS: Within normal limits  KIDNEYS/URETERS: Normal kidney size and position  No gross solid lesion within the limitations of a noncontrast exam  Bilateral nonobstructing nephrolithiasis, the largest a 5 mm calculus in the lower pole of the right kidney, unchanged  No hydronephrosis  Normal ureters  STOMACH AND BOWEL: Stomach is grossly within normal limits  There are several top-normal caliber and mildly dilated (measuring up to 3 3 cm) fluid-filled loops of jejunum in the left upper quadrant, with mild inflammatory stranding in the root of the jejunal mesentery (e g  series 601/74, 2/33) which is new from prior study, findings likely reflecting a nonspecific enteritis  Normal caliber large bowel  Sigmoid diverticulosis with muscular hypertrophy, no evidence of acute diverticulitis  Large colonic stool burden  APPENDIX: Normal air-filled appendix  ABDOMINOPELVIC CAVITY: Mild fat stranding in the root of the jejunal mesentery, new from prior study  No tim ascites  No collection or abscess  No intraperitoneal free air  No bulky lymphadenopathy  No retroperitoneal hematoma  VESSELS: Normal caliber abdominal aorta with mild atherosclerotic plaque  PELVIS REPRODUCTIVE ORGANS:  Normal prostate  Symmetric seminal vesicles  URINARY BLADDER:  Diffusely thickened wall which could reflect sequela of chronic cystitis and/or outlet obstruction  No calculi  No perivesical inflammatory stranding  ABDOMINAL WALL/INGUINAL REGIONS:  Within normal limits   BONES:  Mild multilevel degenerative changes in the spine  Vertebral body height is maintained  No acute fracture or destructive osseous lesion  Impression: 1  Fluid-filled, mildly dilated small bowel in the left upper quadrant with new inflammatory stranding in the root of the mesentery, findings likely reflecting a nonspecific enteritis  2   Cholelithiasis and hydropic gallbladder without signs of acute cholecystitis  If biliary symptoms are present, right upper quadrant ultrasound could be performed for further evaluation  The study was marked in EPIC for significant notification  Workstation performed: [id number]     X-ray chest 1 view portable    Result Date: 11/9/2022  Narrative: CHEST INDICATION:   chest pain  COMPARISON:  One view chest 7/13/2022 EXAM PERFORMED/VIEWS:  XR CHEST PORTABLE FINDINGS: Cardiomediastinal silhouette appears unremarkable  Prosthetic aortic valve  Minimal linear subsegmental atelectasis or scar at the left lung base  No pneumothorax, pulmonary edema, or large pleural effusion  Osseous structures appear within normal limits for patient age  Impression: Minimal linear subsegmental atelectasis or scar at the left lung base  Otherwise, no radiographic evidence of acute intrathoracic process  Workstation performed: [id number]     MRI abdomen wo contrast and mrcp    Result Date: 11/9/2022  Narrative: MRI OF THE ABDOMEN WITHOUT CONTRAST WITH MRCP INDICATION: 71 years / Male  Choledocholithiasis  COMPARISON: November 8, 2022 TECHNIQUE:  MRI of the abdomen was performed without the administration of contrast using the following  using sequences: Axial T1 weighted in/out of phase images, multiplanar T2 weighted images, diffusion weighted and fat suppressed T1 weighted images  3D MRCP images were obtained with radial thick slabs and projections  3D rendering was performed from the acquisition scanner  FINDINGS: LOWER CHEST:   Unremarkable  LIVER: Hepatic steatosis seen No suspicious mass  Limited evaluation of hepatic veins and portal veins on this non-contrast MRI is unremarkable  BILE DUCTS:  The common bile duct measures about 5 mm There is no biliary dilation There is no choledocholithiasis GALLBLADDER:  Distended gallbladder seen and there is pericholecystic fluid Cholelithiasis seen PANCREAS:  Unremarkable  ADRENAL GLANDS:  Normal  SPLEEN:  Splenomegaly seen  Spleen measures 16 cm KIDNEYS/PROXIMAL URETERS:  No hydroureteronephrosis  Right renal cyst seen BOWEL:  No dilated loops of bowel  PERITONEAL CAVITY/RETROPERITONEUM:  No ascites  No mass  Again noted is mild mesenteric edema, nonspecific LYMPH NODES:  No abdominal lymphadenopathy  VASCULAR STRUCTURES:  Unremarkable  No aneurysm  ABDOMINAL WALL:  Unremarkable  OSSEOUS STRUCTURES:  No suspicious osseous lesion  Impression: No choledocholithiasis is seen No biliary dilation Distended gallbladder with pericholecystic fluid and cholelithiasis correlated with the acute cholecystitis Splenomegaly with spleen measuring about 16 cm can be correlated with the patient's history of CLL Mild edema seen in the mesentery as seen on the CT Workstation performed: [id number]     US right upper quadrant    Result Date: 11/8/2022  Narrative: RIGHT UPPER QUADRANT ULTRASOUND INDICATION:     RUQ pain  As per review of electronic medical record,  patient with history of CLL presenting with epigastric abdominal pain  COMPARISON:  Abdominal ultrasound from 2/27/2021 and CT of the abdomen and pelvis from earlier the same day  TECHNIQUE:   Real-time ultrasound of the right upper quadrant was performed with a curvilinear transducer with both volumetric sweeps and still imaging techniques  FINDINGS: PANCREAS:  Visualized portions of the pancreas are within normal limits  AORTA AND IVC:  Visualized portions are normal for patient age  LIVER: Size:  Mildly enlarged  The liver measures 18 0 cm in the midclavicular line  Contour:  Surface contour is smooth  Parenchyma:  Echogenicity and echotexture are within normal limits  No liver mass identified  Limited imaging of the main portal vein shows it to be patent and hepatopetal   BILIARY: The gallbladder is distended and contains several gallstones  The gallbladder wall is at the upper limits of normal measuring 3 mm  The sonographic Rianna Points sign is negative  No intrahepatic biliary dilatation  CBD measures 5 0 mm  No choledocholithiasis  KIDNEY: Right kidney measures 10 2 x 5 5 x 4 5 cm  Volume 131 0 mL A shadowing 5 mm calculus is seen in the lower pole of the right kidney  ASCITES:   None  Impression: Somewhat distended gallbladder containing several gallstones with gallbladder wall at upper limits of normal in thickness, however without pericholecystic fluid and with negative sonographic Cardona sign  These findings are equivocal for acute cholecystitis  Consider further evaluation with HIDA scan  No biliary ductal dilatation  Mild hepatomegaly  Shadowing 5 mm calculus lower pole right kidney  Otherwise unremarkable sonographic appearance of the solid right upper quadrant organs  Workstation performed: [id number]       EKG, Pathology, and Other Studies Reviewed on Admission:   · EKG: Reviewed      Counseling / Coordination of Care Time: 30 total mins spent n consult  Greater than 50% of total time spent on patient counseling and coordination of care  Cullen Stahl MD   PGY-5,   Gastroenterology         ** Please Note: This note is constructed using a voice recognition dictation system   **

## 2022-11-09 NOTE — PROGRESS NOTES
General Surgery  Progress Note   Cleveland Vazquez 71 y o  male MRN: [id number]  Unit/Bed#: ED 23 Encounter: [id number]    Assessment:  Patient is a 71 y o  male with a past medical history of CLL, aortic stenosis status post TEVAR, HLD, CKD who presents with likely choledocholithiasis  Afebrile, VSS  Morning labs pending    UOP:  Not recorded    Plan:  -NPO  -IVF  -trend LFTs and T bili  -follow-up MRCP read  -follow-up GI consult  -pain and nausea control as needed  -DVT prophylaxis  Subjective/Objective     Subjective: Patient seen and examined at bedside, in no acute distress  No acute events overnight  Patient reports that pain has resolved completely  Pt denies nausea or vomiting  Passing flatus  Objective:     Vitals:Blood pressure 114/54, pulse 78, temperature 97 8 °F (36 6 °C), resp  rate 17, SpO2 97 %  ,There is no height or weight on file to calculate BMI  Temp (24hrs), Av 9 °F (36 6 °C), Min:97 8 °F (36 6 °C), Max:97 9 °F (36 6 °C)  Current: Temperature: 97 8 °F (36 6 °C)      Intake/Output Summary (Last 24 hours) at 2022 0539  Last data filed at 2022  Gross per 24 hour   Intake 1000 ml   Output --   Net 1000 ml       Invasive Devices  Report    Peripheral Intravenous Line  Duration           Peripheral IV 22 Right Forearm <1 day                Physical Exam:  General: No acute distress, alert and oriented  CV: Well perfused, regular rate and rhythm  Lungs: Normal work of breathing, no increased respiratory effort  Abdomen: Soft, non-tender, mildly-distended     Extremities: No edema, clubbing or cyanosis  Skin: Warm, dry    Lab Results: Results: I have personally reviewed all pertinent laboratory/tests results  VTE Prophylaxis: Sequential compression device (Venodyne)  and Heparin    Rosa Maria Pond  2022

## 2022-11-09 NOTE — H&P
H&P:  General surgery  Faye Mendoza 71 y o  male MRN: [id number]  Unit/Bed#: QCF Encounter: [id number]        Assessment/Plan     Assessment:  Patient is a 71 y o  male with a past medical history of CLL, aortic stenosis status post TEVAR, HLD, CKD who presents with likely choledocholithiasis  Afebrile, VSS  WBC:  135 (baseline 140); Hb 9  CTAP showed Cholelithiasis and hydropic gallbladder without signs of acute cholecystitis  Fluid-filled, mildly dilated small bowel in the left upper quadrant with new inflammatory stranding in the root of the mesentery  RUQ U/S showed distended GB containing several gallstones w/ GB wall at upper limits of normal in thickness, no PCF and w/ negative sonographic Cardona sign  Plan:  -admit to General surgery   -NPO   -IVF  -MRCP  -GI consult  -pain and nausea control as needed  -DVT prophylaxis      History of Present Illness     HPI:  Faye Mendoza is a 71 y o  male with a past medical history of CLL, aortic stenosis status post TEVAR in 2022, HLD, CKD who presents with 24 hours of acute onset chest pain which migrated to the epigastric region as well as poor appetite  He reports that the chest pain is completely resolved and is now limited to abdomen  He reports any relieving or exacerbating factors, although he has not eaten since the pain is started  He continues to have normal bowel movements and has not had any nausea or vomiting  He denies further chest pain, shortness breath, fever, chills dysuria, hematemesis, melanic stools  He has a known history of cholelithiasis but denies ever having symptoms similar to this previously or any symptoms consistent with biliary colic  Denies any prior abdominal surgeries      Review of Systems   As above, otherwise negative  Consult to surgery general  Consult performed by: Indigo Sanders MD  Consult ordered by: Marty Webb DO          Historical Information   Past Medical History:   Diagnosis Date • Basal cell carcinoma (BCC) of skin of nose    • History of chemotherapy    • History of colonoscopy 2012   • Lymphocytic leukemia ([facility] [address] )      Past Surgical History:   Procedure Laterality Date   • CARDIAC CATHETERIZATION N/A 6/15/2022    Procedure: Cardiac Coronary Angiogram;  Surgeon: Dinah Monsivais MD;  [facility];   Service: Cardiology   • CARDIAC CATHETERIZATION N/A 7/12/2022    Procedure: CARDIAC TAVR;  Surgeon: Brodie Baer MD;  Location: [facility] MAIN OR;  Service: Cardiology   • CATARACT EXTRACTION Bilateral    • FLAP LOCAL HEAD / NECK N/A 4/14/2020    Procedure: ADJACENT TISSUE TRANSFER  FOREHEAD FLAP,  CARTILAGE GRAFT NOSE;  Surgeon: Lesli Watts MD;  Location: [facility] MAIN [facility];  Service: Plastics   • FLAP LOCAL HEAD / NECK N/A 6/17/2020    Procedure: DIVISION INSET FOREHEAD FLAP; FULL THICKNESS SKIN GRAFT TO FOREHEAD;  Surgeon: Lesli Watts MD;  Location: [facility];  Service: Plastics   • FULL THICKNESS SKIN GRAFT N/A 4/14/2020    Procedure: SKIN GRAFT FULL THICKNESS  (FTSG) NOSE;  Surgeon: Lesli Watts MD;  [facility];  Service: Plastics   • MOHS RECONSTRUCTION N/A 4/14/2020    Procedure: MOHS RECONSTRUCTION NOSE DEFECT;  Surgeon: Lesli Watts MD;  [facility];  Service: Plastics   • MOHS RECONSTRUCTION N/A 5/4/2020    Procedure: RECONSTRUCTION MOHS NASAL DEFECT WITH EAR CARTILAGE GRAFT;  Surgeon: Lesli Watts MD;  [facility];  Service: Plastics   • REPLACEMENT AORTIC VALVE TRANSCATHETER (TAVR) N/A 7/12/2022    Procedure: REPLACEMENT AORTIC VALVE TRANSCATHETER (TAVR) TRANSFEMORAL W/ 26MM LEE CLINT S3 ULTRA VALVE(ACCESS ON LEFT) PARISA;  Surgeon: Dwight Ramírez MD;  Location[facility];  Service: Cardiac Surgery   • SHOULDER SURGERY     • TIBIA FRACTURE SURGERY     • TONSILLECTOMY       Social History   Social History     Substance and Sexual Activity   Alcohol Use Not Currently    Comment: rare Social History     Substance and Sexual Activity   Drug Use Never     Social History     Tobacco Use   Smoking Status Never Smoker   Smokeless Tobacco Never Used     Family History: non-contributory    Meds/Allergies   all medications and allergies reviewed  No Known Allergies    Objective   First Vitals:   Blood Pressure: 127/60 (11/08/22 1238)  Pulse: 85 (11/08/22 1238)  Temperature: 97 9 °F (36 6 °C) (11/08/22 1238)  Temp Source: Oral (11/08/22 1238)  Respirations: 20 (11/08/22 1238)  SpO2: 97 % (11/08/22 1238)    Current Vitals:   Blood Pressure: 101/61 (11/08/22 2015)  Pulse: 92 (11/08/22 2014)  Temperature: 97 8 °F (36 6 °C) (11/08/22 2014)  Temp Source: Oral (11/08/22 1238)  Respirations: 20 (11/08/22 1900)  SpO2: 97 % (11/08/22 2014)      Intake/Output Summary (Last 24 hours) at 11/8/2022 2140  Last data filed at 11/8/2022 2029  Gross per 24 hour   Intake 1000 ml   Output --   Net 1000 ml       Invasive Devices  Report    Peripheral Intravenous Line  Duration           Peripheral IV 11/08/22 Right Forearm <1 day              Physical Exam:  General: No acute distress, alert and oriented  CV: Well perfused, regular rate and rhythm  Lungs: Normal work of breathing, no increased respiratory effort  Abdomen:  Obese, Soft, non-tender, non-distended  Extremities: No edema, clubbing or cyanosis  Skin: Warm, dry    Lab Results: I have personally reviewed pertinent lab results  Imaging: I have personally reviewed pertinent reports  EKG, Pathology, and Other Studies: I have personally reviewed pertinent reports        Code Status: Level 1 - Full Code   Advance Directive and Living Will:      Power of :    POLST:

## 2022-11-09 NOTE — PLAN OF CARE
Problem: Potential for Falls  Goal: Patient will remain free of falls  Description: INTERVENTIONS:  - Educate patient/family on patient safety including physical limitations  - Instruct patient to call for assistance with activity   - Consult OT/PT to assist with strengthening/mobility   - Keep Call bell within reach  - Keep bed low and locked with side rails adjusted as appropriate  - Keep care items and personal belongings within reach  - Initiate and maintain comfort rounds  - Make Fall Risk Sign visible to staff  - Offer Toileting every  Hours, in advance of need  - Initiate/Maintain alarm  - Obtain necessary fall risk management equipment:   - Apply yellow socks and bracelet for high fall risk patients  - Consider moving patient to room near nurses station  Outcome: Not Progressing     Problem: PAIN - ADULT  Goal: Verbalizes/displays adequate comfort level or baseline comfort level  Description: Interventions:  - Encourage patient to monitor pain and request assistance  - Assess pain using appropriate pain scale  - Administer analgesics based on type and severity of pain and evaluate response  - Implement non-pharmacological measures as appropriate and evaluate response  - Consider cultural and social influences on pain and pain management  - Notify physician/advanced practitioner if interventions unsuccessful or patient reports new pain  Outcome: Not Progressing     Problem: INFECTION - ADULT  Goal: Absence or prevention of progression during hospitalization  Description: INTERVENTIONS:  - Assess and monitor for signs and symptoms of infection  - Monitor lab/diagnostic results  - Monitor all insertion sites, i e  indwelling lines, tubes, and drains  - Monitor endotracheal if appropriate and nasal secretions for changes in amount and color  - [place] appropriate cooling/warming therapies per order  - Administer medications as ordered  - Instruct and encourage patient and family to use good hand hygiene technique  - Identify and instruct in appropriate isolation precautions for identified infection/condition  Outcome: Not Progressing  Goal: Absence of fever/infection during neutropenic period  Description: INTERVENTIONS:  - Monitor WBC    Outcome: Not Progressing     Problem: SAFETY ADULT  Goal: Patient will remain free of falls  Description: INTERVENTIONS:  - Educate patient/family on patient safety including physical limitations  - Instruct patient to call for assistance with activity   - Consult OT/PT to assist with strengthening/mobility   - Keep Call bell within reach  - Keep bed low and locked with side rails adjusted as appropriate  - Keep care items and personal belongings within reach  - Initiate and maintain comfort rounds  - Make Fall Risk Sign visible to staff  - Offer Toileting every  Hours, in advance of need  - Initiate/Maintain alarm  - Obtain necessary fall risk management equipment:   - Apply yellow socks and bracelet for high fall risk patients  - Consider moving patient to room near nurses station  Outcome: Not Progressing  Goal: Maintain or return to baseline ADL function  Description: INTERVENTIONS:  -  Assess patient's ability to carry out ADLs; assess patient's baseline for ADL function and identify physical deficits which impact ability to perform ADLs (bathing, care of mouth/teeth, toileting, grooming, dressing, etc )  - Assess/evaluate cause of self-care deficits   - Assess range of motion  - Assess patient's mobility; develop plan if impaired  - Assess patient's need for assistive devices and provide as appropriate  - Encourage maximum independence but intervene and supervise when necessary  - Involve family in performance of ADLs  - Assess for home care needs following discharge   - Consider OT consult to assist with ADL evaluation and planning for discharge  - Provide patient education as appropriate  Outcome: Not Progressing  Goal: Maintains/Returns to pre admission functional level  Description: INTERVENTIONS:  - Perform BMAT or MOVE assessment daily    - Set and communicate daily mobility goal to care team and patient/family/caregiver  - Collaborate with rehabilitation services on mobility goals if consulted  - Perform Range of Motion  times a day  - Reposition patient every  hours  - Dangle patient  times a day  - Stand patient  times a day  - Ambulate patient  times a day  - Out of bed to chair  times a day   - Out of bed for meals times a day  - Out of bed for toileting  - Record patient progress and toleration of activity level   Outcome: Not Progressing     Problem: DISCHARGE PLANNING  Goal: Discharge to home or other facility with appropriate resources  Description: INTERVENTIONS:  - Identify barriers to discharge w/patient and caregiver  - Arrange for needed discharge resources and transportation as appropriate  - Identify discharge learning needs (meds, wound care, etc )  - Arrange for interpretive services to assist at discharge as needed  - Refer to Case Management Department for coordinating discharge planning if the patient needs post-hospital services based on physician/advanced practitioner order or complex needs related to functional status, cognitive ability, or social support system  Outcome: Not Progressing     Problem: Knowledge Deficit  Goal: Patient/family/caregiver demonstrates understanding of disease process, treatment plan, medications, and discharge instructions  Description: Complete learning assessment and assess knowledge base    Interventions:  - Provide teaching at level of understanding  - Provide teaching via preferred learning methods  Outcome: Not Progressing     Problem: GASTROINTESTINAL - ADULT  Goal: Maintains adequate nutritional intake  Description: INTERVENTIONS:  - Monitor percentage of each meal consumed  - Identify factors contributing to decreased intake, treat as appropriate  - Assist with meals as needed  - Monitor I&O, weight, and lab values if indicated  - Obtain nutrition services referral as needed  Outcome: Not Progressing  Goal: Oral mucous membranes remain intact  Description: INTERVENTIONS  - Assess oral mucosa and hygiene practices  - Implement preventative oral hygiene regimen  - Implement oral medicated treatments as ordered  - Initiate Nutrition services referral as needed  Outcome: Not Progressing     Problem: METABOLIC, FLUID AND ELECTROLYTES - ADULT  Goal: Electrolytes maintained within normal limits  Description: INTERVENTIONS:  - Monitor labs and assess patient for signs and symptoms of electrolyte imbalances  - Administer electrolyte replacement as ordered  - Monitor response to electrolyte replacements, including repeat lab results as appropriate  - Instruct patient on fluid and nutrition as appropriate  Outcome: Not Progressing

## 2022-11-10 ENCOUNTER — ANESTHESIA (INPATIENT)
Dept: PERIOP | Facility: HOSPITAL | Age: 72
End: 2022-11-10

## 2022-11-10 ENCOUNTER — ANESTHESIA EVENT (INPATIENT)
Dept: PERIOP | Facility: HOSPITAL | Age: 72
End: 2022-11-10

## 2022-11-10 VITALS
SYSTOLIC BLOOD PRESSURE: 115 MMHG | HEIGHT: 69 IN | WEIGHT: 178 LBS | HEART RATE: 85 BPM | DIASTOLIC BLOOD PRESSURE: 57 MMHG | RESPIRATION RATE: 17 BRPM | TEMPERATURE: 97.6 F | BODY MASS INDEX: 26.36 KG/M2 | OXYGEN SATURATION: 100 %

## 2022-11-10 PROBLEM — K81.0 ACUTE CHOLECYSTITIS: Status: ACTIVE | Noted: 2022-11-10

## 2022-11-10 LAB
ALBUMIN SERPL BCP-MCNC: 3.4 G/DL (ref 3.5–5)
ALP SERPL-CCNC: 176 U/L (ref 46–116)
ALT SERPL W P-5'-P-CCNC: 140 U/L (ref 12–78)
ANION GAP SERPL CALCULATED.3IONS-SCNC: 3 MMOL/L (ref 4–13)
AST SERPL W P-5'-P-CCNC: 63 U/L (ref 5–45)
BILIRUB SERPL-MCNC: 0.39 MG/DL (ref 0.2–1)
BUN SERPL-MCNC: 25 MG/DL (ref 5–25)
CALCIUM ALBUM COR SERPL-MCNC: 9.8 MG/DL (ref 8.3–10.1)
CALCIUM SERPL-MCNC: 9.3 MG/DL (ref 8.3–10.1)
CHLORIDE SERPL-SCNC: 113 MMOL/L (ref 96–108)
CO2 SERPL-SCNC: 23 MMOL/L (ref 21–32)
CREAT SERPL-MCNC: 1.17 MG/DL (ref 0.6–1.3)
GFR SERPL CREATININE-BSD FRML MDRD: 62 ML/MIN/1.73SQ M
GLUCOSE SERPL-MCNC: 112 MG/DL (ref 65–140)
POTASSIUM SERPL-SCNC: 5.2 MMOL/L (ref 3.5–5.3)
PROT SERPL-MCNC: 6.3 G/DL (ref 6.4–8.4)
SODIUM SERPL-SCNC: 139 MMOL/L (ref 135–147)

## 2022-11-10 PROCEDURE — 0FT44ZZ RESECTION OF GALLBLADDER, PERCUTANEOUS ENDOSCOPIC APPROACH: ICD-10-PCS | Performed by: SURGERY

## 2022-11-10 RX ORDER — EPHEDRINE SULFATE 50 MG/ML
INJECTION INTRAVENOUS AS NEEDED
Status: DISCONTINUED | OUTPATIENT
Start: 2022-11-10 | End: 2022-11-10

## 2022-11-10 RX ORDER — FENTANYL CITRATE/PF 50 MCG/ML
25 SYRINGE (ML) INJECTION
Status: DISCONTINUED | OUTPATIENT
Start: 2022-11-10 | End: 2022-11-10 | Stop reason: HOSPADM

## 2022-11-10 RX ORDER — DEXAMETHASONE SODIUM PHOSPHATE 10 MG/ML
INJECTION, SOLUTION INTRAMUSCULAR; INTRAVENOUS AS NEEDED
Status: DISCONTINUED | OUTPATIENT
Start: 2022-11-10 | End: 2022-11-10

## 2022-11-10 RX ORDER — ONDANSETRON 2 MG/ML
INJECTION INTRAMUSCULAR; INTRAVENOUS AS NEEDED
Status: DISCONTINUED | OUTPATIENT
Start: 2022-11-10 | End: 2022-11-10

## 2022-11-10 RX ORDER — PROPOFOL 10 MG/ML
INJECTION, EMULSION INTRAVENOUS AS NEEDED
Status: DISCONTINUED | OUTPATIENT
Start: 2022-11-10 | End: 2022-11-10

## 2022-11-10 RX ORDER — HYDROMORPHONE HCL/PF 1 MG/ML
SYRINGE (ML) INJECTION AS NEEDED
Status: DISCONTINUED | OUTPATIENT
Start: 2022-11-10 | End: 2022-11-10

## 2022-11-10 RX ORDER — SODIUM CHLORIDE 9 MG/ML
125 INJECTION, SOLUTION INTRAVENOUS CONTINUOUS
Status: DISPENSED | OUTPATIENT
Start: 2022-11-10 | End: 2022-11-10

## 2022-11-10 RX ORDER — SODIUM CHLORIDE, SODIUM LACTATE, POTASSIUM CHLORIDE, CALCIUM CHLORIDE 600; 310; 30; 20 MG/100ML; MG/100ML; MG/100ML; MG/100ML
INJECTION, SOLUTION INTRAVENOUS CONTINUOUS PRN
Status: DISCONTINUED | OUTPATIENT
Start: 2022-11-10 | End: 2022-11-10

## 2022-11-10 RX ORDER — HYDROMORPHONE HCL IN WATER/PF 6 MG/30 ML
0.2 PATIENT CONTROLLED ANALGESIA SYRINGE INTRAVENOUS
Status: DISCONTINUED | OUTPATIENT
Start: 2022-11-10 | End: 2022-11-10 | Stop reason: HOSPADM

## 2022-11-10 RX ORDER — FENTANYL CITRATE 50 UG/ML
INJECTION, SOLUTION INTRAMUSCULAR; INTRAVENOUS AS NEEDED
Status: DISCONTINUED | OUTPATIENT
Start: 2022-11-10 | End: 2022-11-10

## 2022-11-10 RX ORDER — MIDAZOLAM HYDROCHLORIDE 2 MG/2ML
INJECTION, SOLUTION INTRAMUSCULAR; INTRAVENOUS AS NEEDED
Status: DISCONTINUED | OUTPATIENT
Start: 2022-11-10 | End: 2022-11-10

## 2022-11-10 RX ORDER — ROCURONIUM BROMIDE 10 MG/ML
INJECTION, SOLUTION INTRAVENOUS AS NEEDED
Status: DISCONTINUED | OUTPATIENT
Start: 2022-11-10 | End: 2022-11-10

## 2022-11-10 RX ORDER — OXYCODONE HYDROCHLORIDE 5 MG/1
5 TABLET ORAL EVERY 6 HOURS PRN
Qty: 20 TABLET | Refills: 0 | Status: SHIPPED | OUTPATIENT
Start: 2022-11-10 | End: 2022-11-20

## 2022-11-10 RX ORDER — BUPIVACAINE HYDROCHLORIDE 5 MG/ML
INJECTION, SOLUTION PERINEURAL AS NEEDED
Status: DISCONTINUED | OUTPATIENT
Start: 2022-11-10 | End: 2022-11-10 | Stop reason: HOSPADM

## 2022-11-10 RX ORDER — LIDOCAINE HYDROCHLORIDE 10 MG/ML
INJECTION, SOLUTION EPIDURAL; INFILTRATION; INTRACAUDAL; PERINEURAL AS NEEDED
Status: DISCONTINUED | OUTPATIENT
Start: 2022-11-10 | End: 2022-11-10

## 2022-11-10 RX ORDER — CEFAZOLIN SODIUM 1 G/3ML
INJECTION, POWDER, FOR SOLUTION INTRAMUSCULAR; INTRAVENOUS AS NEEDED
Status: DISCONTINUED | OUTPATIENT
Start: 2022-11-10 | End: 2022-11-10

## 2022-11-10 RX ORDER — ONDANSETRON 2 MG/ML
4 INJECTION INTRAMUSCULAR; INTRAVENOUS ONCE AS NEEDED
Status: DISCONTINUED | OUTPATIENT
Start: 2022-11-10 | End: 2022-11-10 | Stop reason: HOSPADM

## 2022-11-10 RX ADMIN — FENTANYL CITRATE 50 MCG: 50 INJECTION INTRAMUSCULAR; INTRAVENOUS at 09:10

## 2022-11-10 RX ADMIN — CEFAZOLIN 2000 MG: 1 INJECTION, POWDER, FOR SOLUTION INTRAMUSCULAR; INTRAVENOUS at 08:35

## 2022-11-10 RX ADMIN — EPHEDRINE SULFATE 10 MG: 50 INJECTION INTRAVENOUS at 09:24

## 2022-11-10 RX ADMIN — ROCURONIUM BROMIDE 10 MG: 50 INJECTION INTRAVENOUS at 09:13

## 2022-11-10 RX ADMIN — ROCURONIUM BROMIDE 50 MG: 50 INJECTION INTRAVENOUS at 08:29

## 2022-11-10 RX ADMIN — CEFAZOLIN SODIUM 2000 MG: 2 SOLUTION INTRAVENOUS at 00:24

## 2022-11-10 RX ADMIN — PROPOFOL 170 MG: 10 INJECTION, EMULSION INTRAVENOUS at 08:29

## 2022-11-10 RX ADMIN — EPHEDRINE SULFATE 10 MG: 50 INJECTION INTRAVENOUS at 08:58

## 2022-11-10 RX ADMIN — MIDAZOLAM 2 MG: 1 INJECTION INTRAMUSCULAR; INTRAVENOUS at 08:21

## 2022-11-10 RX ADMIN — METRONIDAZOLE: 500 INJECTION, SOLUTION INTRAVENOUS at 08:37

## 2022-11-10 RX ADMIN — DEXAMETHASONE SODIUM PHOSPHATE 10 MG: 10 INJECTION, SOLUTION INTRAMUSCULAR; INTRAVENOUS at 09:07

## 2022-11-10 RX ADMIN — ONDANSETRON 4 MG: 2 INJECTION INTRAMUSCULAR; INTRAVENOUS at 09:07

## 2022-11-10 RX ADMIN — HYDROMORPHONE HYDROCHLORIDE 0.5 MG: 1 INJECTION, SOLUTION INTRAMUSCULAR; INTRAVENOUS; SUBCUTANEOUS at 09:52

## 2022-11-10 RX ADMIN — SODIUM CHLORIDE, SODIUM LACTATE, POTASSIUM CHLORIDE, AND CALCIUM CHLORIDE: .6; .31; .03; .02 INJECTION, SOLUTION INTRAVENOUS at 08:10

## 2022-11-10 RX ADMIN — LIDOCAINE HYDROCHLORIDE 50 MG: 10 INJECTION, SOLUTION EPIDURAL; INFILTRATION; INTRACAUDAL; PERINEURAL at 08:29

## 2022-11-10 RX ADMIN — SUGAMMADEX 200 MG: 100 INJECTION, SOLUTION INTRAVENOUS at 09:49

## 2022-11-10 RX ADMIN — EPHEDRINE SULFATE 5 MG: 50 INJECTION INTRAVENOUS at 09:01

## 2022-11-10 RX ADMIN — ASPIRIN 81 MG CHEWABLE TABLET 81 MG: 81 TABLET CHEWABLE at 13:53

## 2022-11-10 RX ADMIN — SODIUM CHLORIDE, SODIUM LACTATE, POTASSIUM CHLORIDE, AND CALCIUM CHLORIDE: .6; .31; .03; .02 INJECTION, SOLUTION INTRAVENOUS at 09:25

## 2022-11-10 RX ADMIN — ACETAMINOPHEN 650 MG: 650 SUSPENSION ORAL at 17:12

## 2022-11-10 RX ADMIN — FENTANYL CITRATE 50 MCG: 50 INJECTION INTRAMUSCULAR; INTRAVENOUS at 08:29

## 2022-11-10 NOTE — ANESTHESIA PREPROCEDURE EVALUATION
Procedure:  CHOLECYSTECTOMY LAPAROSCOPIC (N/A Abdomen)    Relevant Problems   CARDIO   (+) Coronary artery disease involving native coronary artery of native heart without angina pectoris   (+) Hyperlipidemia   (+) Pure hypercholesterolemia   (+) S/P TAVR (transcatheter aortic valve replacement)      /RENAL   (+) Stage 3 chronic kidney disease, unspecified whether stage 3a or 3b CKD (HCC)      HEMATOLOGY   (+) Anemia (Hb 8 7)   (+) Thrombocytopenia (HCC)      Other   (+) CLL (chronic lymphoid leukemia) in relapse (HCC) ()        TTE 8/11/2022  LVEF 65%  Grade 1 DD  Normal RV size and function  bioAVR- no AI, no AS  Mild to mod MR  Mildy dilated asc aorta 3 9cm             Anesthesia Plan  ASA Score- 3     Anesthesia Type- general with ASA Monitors  Additional Monitors:   Airway Plan: ETT  Plan Factors-    Induction-     Postoperative Plan- Plan for postoperative opioid use  Planned trial extubation    Informed Consent-   I personally reviewed this patient with the CRNA  Discussed and agreed on the Anesthesia Plan with the CRNA  Jarvis Hussein

## 2022-11-10 NOTE — PROGRESS NOTES
Progress Note - General Surgery   [facility] Resident on [facility]   Pushpa Goldman 71 y o  male MRN: [id number]  Unit/Bed#: OR [place] Encounter: [id number]    Assessment:  Patient is [phone] y  o  male with a past medical history of CLL, aortic stenosis status post TEVAR, HLD, CKD who presents with acute cholecystitis  Afebrile  VSS  No ins and outs recorded  Am labs pending    Plan:  -NPO  -IVF  -trend LFTs and T bili  -PRN pain control  -PRN anti-nausea control  -DVT prophylaxis  -plan for lap choley today    Subjective/Objective     Subjective: NAEO  Patient states he has no pain  Denies nausea  denies vomiting  Having bowel movements  Passing flatus  Making adequate amount of urine with multiple UOPs  Currently NPO for procedure  Objective:     Blood pressure 120/65, pulse 71, temperature 98 1 °F (36 7 °C), temperature source Oral, resp  rate 20, height 5' 9" (1 753 m), weight 80 7 kg (178 lb), SpO2 99 %  ,Body mass index is 26 29 kg/m²  Intake/Output Summary (Last 24 hours) at 11/10/2022 0832  Last data filed at 11/9/2022 2469  Gross per 24 hour   Intake 100 ml   Output --   Net 100 ml       Invasive Devices  Report    Peripheral Intravenous Line  Duration           Peripheral IV 11/09/22 Right Antecubital <1 day                General: NAD  HENT: NCAT MMM  Neck: supple, no JVD  CV: nl rate  Lungs: nl wob   No resp distress  ABD: Soft, nontender, nondistended  Extrem: No CCE  Neuro: AAOx3       Scheduled Meds:  Current Facility-Administered Medications   Medication Dose Route Frequency Provider Last Rate   • [MAR Hold] acetaminophen  650 mg Oral Q4H PRN Vivienne Zamarripa MD     • [facility] Hold] aspirin  81 mg Oral Daily Vivienne Zamarripa MD     • [facility] Hold] cefazolin  2,000 mg Intravenous Q8H Vivienne Zamarripa MD 2,000 mg (11/10/22 0024)   • [MAR Hold] heparin (porcine)  5,000 Units Subcutaneous [facility] Vivienne Zamarripa MD     • [facility] Hold] HYDROmorphone  0 5 mg Intravenous Q3H PRN lCarisse Alvarado Mary Ann Leger MD     • [facility] Hold] metroNIDAZOLE  500 mg Intravenous Q8H Priya Song  mg (11/09/22 6064)   • [MAR Hold] ondansetron  4 mg Intravenous Q6H PRN Priya Song MD     • [facility] Hold] oxyCODONE  10 mg Oral Q4H PRN Priya Song MD     • [facility] Hold] oxyCODONE  5 mg Oral Q4H PRN Priya Song MD     • [facility] Hold] sodium chloride (PF)  3 mL Intravenous Q1H PRN Radha Porter MD     • sodium chloride  125 mL/hr Intravenous Continuous Priya Song  mL/hr (11/09/22 1502)     Continuous Infusions:sodium chloride, 125 mL/hr, Last Rate: 125 mL/hr (11/09/22 1502)      PRN Meds: •  [MAR Hold] acetaminophen  •  [MAR Hold] HYDROmorphone  •  [MAR Hold] ondansetron  •  [MAR Hold] oxyCODONE  •  [MAR Hold] oxyCODONE  •  Insert peripheral IV **AND** [MAR Hold] sodium chloride (PF)      Lab, Imaging and other studies:  I have personally reviewed pertinent lab results    , CBC: No results found for: WBC, HGB, HCT, MCV, PLT, ADJUSTEDWBC, MCH, MCHC, RDW, MPV, NRBC, CMP:   Lab Results   Component Value Date    SODIUM 139 11/10/2022    K 5 2 11/10/2022     (H) 11/10/2022    CO2 23 11/10/2022    BUN 25 11/10/2022    CREATININE 1 17 11/10/2022    CALCIUM 9 3 11/10/2022    AST 63 (H) 11/10/2022     (H) 11/10/2022    ALKPHOS 176 (H) 11/10/2022    EGFR 62 11/10/2022   , Coagulation: No results found for: PT, INR, APTT, Urinalysis: No results found for: COLORU, CLARITYU, SPECGRAV, PHUR, LEUKOCYTESUR, NITRITE, PROTEINUA, GLUCOSEU, KETONESU, BILIRUBINUR, BLOODU, Amylase: No results found for: AMYLASE, Lipase: No results found for: LIPASE  VTE Pharmacologic Prophylaxis: Heparin  VTE Mechanical Prophylaxis: sequential compression device      [facility], MD  11/10/2022 8:32 AM

## 2022-11-10 NOTE — PROGRESS NOTES
11/19/2018       RE: Belkis Fang  [address]  [place] [address]     Dear Colleague,    Thank you for referring your patient, Belkis Fang, to the [facility]. Please see a copy of my visit note below.    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 This was ER, CT and HER2 positive.  - Ms. Fang had a bilateral screening mammogram on 03/20/2015. This showed calcification and a focal asymmetry in the left breast. She had a left diagnostic mammogram and a left breast ultrasound on 03/30/2015. Mammogram confirmed the calcification. Ultrasound showed a hypoechoic mass of 9 x 7 x7 mm on the left side. Contrast-enhancing mammogram on 04/02/2015 showed a left breast mass of 1 x 1.5 cm which was enhancing. Biopsy on the same day showed a mucinous colloid intraductal carcinoma. ER, CT and HER2 were positive.   - She had a left lumpectomy and sentinel lymph node biopsy on 04/22/2015. This showed a 1.0-cm tumor, invasive mucinous carcinoma, [place] grade 1. 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 Margins were clear for invasive cancer, but positive for DCIS. Zero of 1 lymph nodes were positive. She was diagnosed with a stage I (T1 N0 M0) left breast cancer. She received TCH (Taxotere, carboplatin, Herceptin) x6 cycles from 05/13/2015 until 08/25/2015. She remains on the Herceptin, restarted on 09/16/2015, and completed one year in April 2016.   - She had a left mastectomy on 10/05/2015 which was positive for DCIS, but no invasive cancer. She began Arimidex in 11/2015 and discontinued this in 01/2017 due to arthralgias.  Aromasin was started in 01/2017    INTERVAL HISTORY:Therese comes back today for followup.  She remains on Aromasin.   She retired recently and has been traveling with her .  She is very pleased with how she is doing, as well as with group home.      She traveled to [place] and locally.  She has been walking a lot.     She has no  S: Mary Caba is a 71 y o  male who returns to the floor s/p laparoscopic cholecystectomy  EBL minimal   Patient tolerated procedure well, without complications, was extubated in the OR, returned to PACU in stable condition  Patient doing well postoperatively, reports minimal abdominal pain mostly around the incisions, denies any nausea or emesis, voiding without issues, ambulating without issues, not yet passing flatus or having bowel movements, denies any lightheadedness or dizziness, denies any chest pain or shortness of breath      O:    Vitals:    11/10/22 1447   BP: 115/57   Pulse: 85   Resp: 17   Temp: 97 6 °F (36 4 °C)   SpO2: 100%     General: NAD  Skin: Warm, dry, anicteric  HEENT: Normocephalic, atraumatic  CV: RRR, no m/r/g  Pulm: CTA b/l, no inc WOB  Abd: Soft, ND, minimally tender around incisions, incisions clean dry and intact dressings present  MSK: Symmetric, no edema, no tenderness, no deformity  Neuro: AOx3, GCS 15    A: 72yoM w choledocholithiasis, passed stone, MRCP negative, now postop day 0 status post laparoscopic cholecystectomy on 11/10    P:  - regular diet, low-fat  - pain control  - SQH  - encourage ambulation  - anticipate discharge today fevers or chills, no chest pain or shortness of breath.      Occasional arthralgias.      ROS: complete 10-pt ROS negative unless noted above     MEDICATIONS:   Current Outpatient Prescriptions   Medication Sig Dispense Refill     citalopram (CELEXA) 10 MG tablet TAKE 1 TABLET (10 MG) BY MOUTH DAILY  3     diclofenac (VOLTAREN) 75 MG EC tablet Take 1 tablet (75 mg) by mouth 2 times daily as needed for moderate pain 30 tablet 1     exemestane (AROMASIN) 25 MG tablet TAKE ONE TABLET BY MOUTH EVERY DAY 90 tablet 3     glucosamine-chondroitin 500-400 MG CAPS Take 3 capsules by mouth daily       Multiple Vitamin (MULTIVITAMINS PO) Take 1 capsule by mouth daily.          ORDER FOR DME RSPR APAP 5-15 Resmed mask AirFit P10       Pyridoxine HCl (VITAMIN B6 PO) Take 100 mg by mouth       tiZANidine (ZANAFLEX) 4 MG tablet Take 1-2 tablets (4-8 mg) by mouth nightly as needed 30 tablet 1     Vaginal Lubricant (REPLENS) GEL Apply small amount topically twice daily 35 g 1     VITAMIN D, CHOLECALCIFEROL, PO Take 5,000 Units by mouth daily       ciprofloxacin (CIPRO) 500 MG tablet Take 1 tablet (500 mg) by mouth 2 times daily (Patient not taking: Reported on 11/19/2018) 6 tablet 0     citalopram (CELEXA) 10 MG tablet TAKE 1 TABLET (10 MG) BY MOUTH DAILY (Patient not taking: Reported on 11/19/2018) 90 tablet 3         ALLERGIES:  No Known Allergies    PHYSICAL EXAMINATION:  Vitals: /74 (BP Location: Right arm, Patient Position: Sitting, Cuff Size: Adult Large)  Pulse 61  Temp 97.9  F (36.6  C) (Oral)  Resp 16  Wt 119.5 kg (263 lb 8 oz)  LMP 09/22/2014 (Approximate)  SpO2 98%  BMI 43.85 kg/m2  GENERAL:  A pleasant person in no acute distress.   HEENT:  Sclerae are nonicteric.   NECK:  Supple.   LYMPH NODES:  No peripheral lymphadenopathy noted in the axillary, supraclavicular, or cervical regions.   LUNGS:  Clear to auscultation bilaterally.   HEART:  Regular rate and rhythm, with no murmur appreciated.   ABDOMEN:  Bowel  sounds are active.  Soft and nontender.  No hepatosplenomegaly or other masses appreciated.  LOWER EXTREMITIES:  Without pitting edema to the knees bilaterally.   NEUROLOGICAL:  Alert/orientated/able to answer all questions.  CN grossly intact.  BREAST:  Right breast, well healed surgical incision, no masses.  Left breast notable for the breast reconstruction, well healed surgical incisions, implant intact.  No masses or nodules.        Last dexa scan 2016 - normal bone density    Mammogram March 2018 - benign    IMPRESSION/PLAN:   1.  Stage I (T1 N0 M0) invasive mucinous carcinoma, left breast, ER/RI positive, HER-2 positive.  Ms. Fang continues to do well at this time.  She is not having any signs or symptoms that would suggest recurrence of her breast carcinoma.      She will plan to continue the Aromasin 25 mg daily.      She is due for mammogram in March.     I encouraged an exercise program of 150 minutes of cardiovascular exercise per week.      2.  Bone health.  DEXA scan in 09/2015 was consistent with normal bone density.  I recommended calcium 5966-9045 mg daily, along with vitamin D.  She should do weightbearing exercises 2-3 days a week.   We will repeat this with her next visit.        If there are no interval concerns, the patient will follow up in 6 months.     Deandra Calvert MD

## 2022-11-10 NOTE — PLAN OF CARE
Problem: METABOLIC, FLUID AND ELECTROLYTES - ADULT  Goal: Electrolytes maintained within normal limits  Description: INTERVENTIONS:  - Monitor labs and assess patient for signs and symptoms of electrolyte imbalances  - Administer electrolyte replacement as ordered  - Monitor response to electrolyte replacements, including repeat lab results as appropriate  - Instruct patient on fluid and nutrition as appropriate  Outcome: Progressing     Problem: SAFETY ADULT  Goal: Maintain or return to baseline ADL function  Description: INTERVENTIONS:  -  Assess patient's ability to carry out ADLs; assess patient's baseline for ADL function and identify physical deficits which impact ability to perform ADLs (bathing, care of mouth/teeth, toileting, grooming, dressing, etc )  - Assess/evaluate cause of self-care deficits   - Assess range of motion  - Assess patient's mobility; develop plan if impaired  - Assess patient's need for assistive devices and provide as appropriate  - Encourage maximum independence but intervene and supervise when necessary  - Involve family in performance of ADLs  - Assess for home care needs following discharge   - Consider OT consult to assist with ADL evaluation and planning for discharge  - Provide patient education as appropriate  Outcome: Progressing

## 2022-11-10 NOTE — DISCHARGE SUMMARY
Discharge Summary - Zhou Gaming 71 y o  male MRN: [id number]    Unit/Bed#: [facility] 324-01 Encounter: [id number]    Admission Date:   Admission Orders (From admission, onward)     Ordered        11/08/22 1423 [facility]  Once                        Admitting Diagnosis: Choledocholithiasis [K80 50]  Abdominal pain [R10 9]  Transaminitis [R74 01]    HPI:  80-year-old male with PMH of HLD, CKD 3, CLL, aortic stenosis status post TAVR originally presenting to [facility] ED on 11/08 with chest pain radiating to the epigastric region, decreased p o  Intake, denied any nausea or emesis at the time  Imaging including CT and RUQ U/S was significant for cholelithiasis without evidence of acute cholecystitis, originally with total bilirubin elevated to 2 52 and elevated liver enzymes [id number], concern for choledocholithiasis  Patient was admitted to the General surgery Service, initiated on IV antibiotics, made NPO with IVF resuscitation, started on a standard analgesic regimen, DVT prophylaxis  GI colleagues were consulted, MRCP was ordered on 11/09 showing no evidence of choledocholithiasis, no biliary dilation, likely passed stone, evidence of acute cholecystitis, was determined no GI intervention was needed  Patient's total bilirubin returned to normal on 11/10, LFTs were down trending, patient was taken to the OR for laparoscopic cholecystectomy, he tolerated this well without complications  Postoperatively patient had pain controlled, was ambulating without issues, was voiding without issues, tolerating diet without nausea or emesis, was medically stable for discharge on 11/10  He will follow-up with General surgery in the office in approximately 2 weeks      Procedures Performed:   Orders Placed This Encounter   Procedures   • ED ECG Documentation Only       Summary of Hospital Course:  See above HPI    Significant Findings, Care, Treatment and Services Provided:  See above HPI    Complications: None    Discharge Diagnosis:  Choledocholithiasis status post laparoscopic cholecystectomy    Medical Problems             Resolved Problems  Date Reviewed: 10/7/2022   None                 Condition at Discharge: good         Discharge instructions/Information to patient and family:   See after visit summary for information provided to patient and family  Provisions for Follow-Up Care:  See after visit summary for information related to follow-up care and any pertinent home health orders  PCP: Nikky Brewer MD    Disposition: Home    Planned Readmission: No      Discharge Statement   I spent 30 minutes discharging the patient  This time was spent on the day of discharge  I had direct contact with the patient on the day of discharge  Additional documentation is required if more than 30 minutes were spent on discharge  Discharge Medications:  See after visit summary for reconciled discharge medications provided to patient and family

## 2022-11-10 NOTE — DISCHARGE INSTRUCTIONS
Acute Care Surgery Discharge Instructions    Please follow-up as instructed  If you do not already have a follow-up appointment, please call the office when you leave to schedule an appointment to be seen in 2-3 weeks for post-operative re-evaluation  Activity:  - No lifting greater than 20 pounds or strenuous physical activity or exercise for 2 weeks  - Walking and normal light activities are encouraged  - Normal daily activities including climbing steps are okay  - No driving until no longer using pain medications  Return to work:    - You may return to work in 2 weeks or sooner if you are feeling well enough  Diet:    - You may resume your normal diet  Wound Care:  - May shower daily  No tub baths or swimming until cleared by your surgeon   - Wash incision gently with soap and water and pat dry  - Do not apply any creams or ointments unless instructed to do so by your surgeon   - Antelmo Mayorga may apply ice as needed (no longer than 20 minutes at a time) for the first 48 hours  - Bruising is not unusual and will go away with a little time  You may apply a warm, moist compress that may help the bruising resolve quicker  - You may remove the dressings the day after surgery (unless otherwise instructed)  Leave any skin tapes (steri-strips) on the incision(s) in place until they fall off on their own  Any new dressings are optional     Medications:    - You may resume all of your regular medications after discharge unless otherwise instructed  Please refer to your discharge medication list for further details  - Please take the pain medications as directed  - You are encouraged to use non-narcotic pain medications first and whenever possible  Reserve the use of narcotic pain medication for moderate to severe pain not controlled by non-narcotic medications   - No driving while taking narcotic pain medications  - You may become constipated, especially if taking pain medications   You may take any over the counter stool softeners or laxatives as needed  Examples: Milk of Magnesia, Colace, Senna  Additional Instructions:  - If you have any questions or concerns after discharge please call the office   - Call office or return to ER if fever greater than 101, chills, persistent nausea/vomiting, worsening/uncontrollable pain, and/or increasing redness or purulent/foul smelling drainage from incision(s)

## 2022-11-10 NOTE — ANESTHESIA POSTPROCEDURE EVALUATION
Post-Op Assessment Note    CV Status:  Stable  Pain Score: 0    Pain management: adequate     Mental Status:  Alert and awake   Hydration Status:  Euvolemic   PONV Controlled:  Controlled   Airway Patency:  Patent      Post Op Vitals Reviewed: Yes      Staff: Anesthesiologist, CRNA         No complications documented      BP   131/65   Temp   97 3   Pulse  74   Resp   16   SpO2   100

## 2022-11-10 NOTE — OP NOTE
OPERATIVE REPORT  PATIENT NAME: Florian Drain    :  1950  MRN: [id number]  Pt Location: [facility] OR ROOM 18    SURGERY DATE: 11/10/2022    Surgeon(s) and Role:     * Barber Edwards MD - Primary     * Surgical Fellow - Dileep Spears DO    Preop Diagnosis:  Abdominal pain [R10 9]    Post-Op Diagnosis Codes:     * Abdominal pain [R10 9]    Procedure(s) (LRB):  CHOLECYSTECTOMY LAPAROSCOPIC (N/A)    Specimen(s):  ID Type Source Tests Collected by Time Destination   1 : GALLBLADDER Tissue Gallbladder TISSUE EXAM Barber Edwards MD 11/10/2022 9221        Estimated Blood Loss:   Minimal    Drains:  * No LDAs found *    Anesthesia Type:   General    Operative Indications:  Abdominal pain [R10 9]  Acute cholecystitis    Operative Findings:  Acute cholecystitis    Complications:   None    Procedure and Technique:  Pt placed supine on table and prepped and draped in usual sterile manner  Timeout performed and all elements of timeout reviewed and confirmed  Laparoscopic equipment was checked and deemed adequate  An infraumbilical incision was made and dissection was taken down through anterior and posterior abdominal wall layers until an 11 mm trocar could be introduced  Abdomen was then insufflated to 15 mm HG pressure and direct inspection only showed a frankly inflamed Gallbladder  The subxiphoid and right sided ports were then placed under direct visualization and the gallbladder was retracted cephalad and laterally  The cystic duct and cystic artery were carefully skeletonized until a critical view could be accomplished  Once this was done these structures were sequentially clipped and divided  The gallbladder was removed from the gallbladder fossa with the laparoscopic hook and bovie cautery  It was placed in an Endocatch bag and removed through the infraumbilical port site, after taht site was made slightly larger, without difficulty  Irrigation was not necessary   The ports were then removed under direct visualization without difficulty  The subumbilical port site was closed with 2 figure of eight 0-vicryl sutures and the skin sites were closed with running 4-0 monocryl subcuticular suture and steri strips  Pt tolerated the procedure well, was transported to PACU in stable condition and sponge and instrument counts were correct        I was present for the entire procedure    Patient Disposition:  PACU         SIGNATURE: Latosha Bates MD  DATE: November 10, 2022  TIME: 10:01 AM

## 2022-11-14 NOTE — PROGRESS NOTES
Cardiac Rehabilitation Plan of Care   Discharge          Today's date: 2022   # of Exercise Sessions Completed: 35  Patient name: Arnulfo Melvin      : 1950  Age: 71 y o  MRN: [id number]  Referring Physician: Marina Jacobo PA-C  Cardiologist: Glenna Storey MD  Provider: Waldo Mina  Clinician: Oleg Sanchez, MS, CEP, CCRP      Dx:   Encounter Diagnosis   Name Primary? • S/P TAVR (transcatheter aortic valve replacement) Yes     Date of onset: 22      SUMMARY OF PROGRESS: Discharge note for Pacheco  He had 241% improvement in functional capacity increasing His max METs in the submaximal TM ETT  from 2 2 to 7 5 with test termination of RPE 4  His exercise tolerance (max METs in tolerated in cardiac rehab) increased by 113%  His had a 24% improvement in the [facility] activity estimated MET level with ADLs and physical activity  His [facility] QOL improved by 19%  PHQ-9 score increased from 0 to 1  HALEY-7 did not change remaining at 0  His weight decreased by 5 pounds  Rate Your Plate score improved from 44 to 50  Pacheco has been supplementing CR sessions with home exercise which includes 30 min walks at home up to 2x/wk  He reports increased stamina, strength and reduced SOB with activity  Pachceo tolerates 40 mins at 4 4 - 6 0 METs plus wt training  NSR with occ accelerated junctional rhythm up to rates of 150 on telemetry  RHR 72 - 80, ExHR 137 - 150  Resting /74 - 144/70 with appropriate response to exercise reaching 168/70 - 170/80  All group education classes on cardiac risk factor modification were attended by the patient  Discharge plans include joining [employer]   Encouraged Pt to continue exercise  Frequency: 4-6 days/wk, Intensity: RPE 4-5, Time: 40-50 mins daily, 150-200 mins/wk  Pt was encouraged to continue eating heart healthy    Pt was encouraged to remain compliant with medications and f/u with cardiologist with any cardiac symptoms, medication management and updated lipid profile  Susana Salazar continues to be compliant attending cardiac rehab exercise sessions 3x/wk  He tolerates 40 mins at 4 0-4 7 METs plus wt training  He finally is tolerating progression of intensity levels to maintain RPE 4-6 reporting increased leg strength  He is no longer limited by leg fatigue with exercise and his SOB has improved  Workloads have been progressing now with increased exercsie tolerance  Resting /70 - 138/86 with appropriate response to exercise reaching 156/72 - 182/68  NSR on tele with 1AVB, accelerated junctional rhythm, PACs , and occ PVCs observed  Episodes of junctional rhythm are shorter  RHR 63-77 ExHR 122-135  He has been taking 30 min walks at home up to 2x/wk  He is  physically activity at home without limitations including playing ball with his grandkids and returned to playing 18 holes of golf  No cardiac complaints  He has discontinued his statin at the advisement of his cardiologist d/t leg discomfort  Since stopping, he has not experienced leg apin  Weight remains steady at 176  Patient has been trying to reduce red/processed meats  He states he rarely uses table salt  States he does not have CAD or HTN  He dines out daily and reports to be making better choices  Heart healthy eating was reinforced including rare red/processed meats, low fat dairy, reduced added sugars and refined flours  The patient is a non-smoker  Rich denies depression/anxiety  Patient reports excellent social/emotional support  Patient attends group educational classes on cardiac risk factor modification  His exercise program will be progressed as tolerated to maintain RPE 4-6  The patient has the following personal goals he hopes to achieved by discharge: be able to run short distances, resume swinging the golf club and climb the steps without SOB, get back to "normal"     Pt will continue to be educated on lifestyle modifications and encouraged to supplement with a home exercise program to reach the following goals in the next 30 days: consistent home walking, reduce intake of high sodium foods when dining out  Pt has reached out to Dr Abel Anguiano via [address] regarding a prescription refill for his Plavix  10/3/22: 60 DAY ITP  Pacheco is compliant attending cardiac rehab exercise sessions 3x/wk  He tolerates 40 mins at 4 0-4 7 METs plus wt training  He is tolerating progression of intensity levels to maintain RPE 4-6  Resting BP  114//70 with appropriate response to exercise reaching 140//72  NSR on tele with 1AVB, accelerated junctional rhythm, PACs with abberancy, and occ PVCs observed  RHR 63-77 ExHR 122-135  He has not added home exercise  He has increased home physical activity including playing ball with his grandkids and was playing 18 holes of golf however golf season is over  He believes he is less winded with activity but is disappointed with the slow progression/improvement in his SOB with physical activity  He continues to have episodes of SOB at home when walking up a grade or completing housework  He did get slightly SOB while we were talking on the treadmill today  reviewed and encouraged PLB technique with exertion  No cardiac complaints  He has discontinued his statin at the advisement of his cardiologist d/t leg discomfort  Since stopping, he has not experienced leg apin  Overall improvements in his energy and stamina  Weight remains steady at 176  Patient has not made too many dietary changes  He states he rarely uses table salt  States he does not have CAD or HTN  He dines out daily and reports to be making better choices  Heart healthy eating was reinforced including rare red/processed meats, low fat dairy, reduced added sugars and refined flours  The patient is a non-smoker  Rich denies depression/anxiety  Patient reports excellent social/emotional support  Patient attends group educational classes on cardiac risk factor modification    His exercise program will be progressed as tolerated to maintain RPE 4-6  The patient has the following personal goals he hopes to achieved by discharge: be able to run short distances, resume swinging the golf club and climb the steps without SOB, get back to "normal"  Pt will continue to be educated on lifestyle modifications and encouraged to supplement with a home exercise program to reach the following goals in the next 30 days: add home walking, reduce intake of high sodium foods when dining out  Pt has OV sheculed with Dr Gabino Koenig on 10/7  Will continue to monitor  9/12/22: Pacheco is compliant attending cardiac rehab exercise sessions 3x/wk  He tolerates 40 mins at 3 4 - 4 0 METs plus wt training  He is tolerating progression of intensity levels to maintain RPE 4-6  Resting BP  122/62 - 132/80 with appropriate response to exercise reaching 132/64- 150/70  NSR on tele with  PAF, PACs with abberancy, and occ PVCs observed  He has been compliant with the ZIO patch x 2 weeks  which he plans to return tomorrow  RHR 66 - 83 ExHR 116 - 129  He has not added home exercise  He has increased home physical activity including playing ball with his grandkids, playing 18 holes of golf  He believes he is less winded with activity but is disappointed with the slow progression/improvement in his SOB with physical activity  He continues to have episodes of SOB at home but they are less frequent  He does not get SOB with exercise in CR  No cardiac complaints  He is progressing toward wt loss goals with a loss of 2 pounds  Patient has not made too  Many dietary changes  States he does not have CAD or HTN  He dines out daily and reports to be making better choices  Heart healthy eating was reinforced including rare red/processed meats, low fat dairy, reduced added sugars and refined flours  The patient is a non-smoker  Rich denies   depression/anxiety  Patient reports excellent social/emotional support     Patient attends group educational classes on cardiac risk factor modification  His exercise program will be progressed as tolerated to maintain RPE 4-6  The patient has the following personal goals he hopes to achieved by discharge: be able to run short distances, resume swinging the golf club and climb the steps without SOB, get back to "normal"  Pt will continue to be educated on lifestyle modifications and encouraged to supplement with a home exercise program to reach the following goals in the next 30 days: add home walking, reduce intake of high sodium foods when dining out      Medication compliance: Yes   Comments: Pt reports to be compliant with medications  Fall Risk: Low   Comments: Ambulates with a steady gait with no assist device and Denies a fall in the past 6 months    EKG Interpretation: NSR,  exercise induced accelerated junctional rhythm, occ PVCs      EXERCISE ASSESSMENT and PLAN    Exercise Prescription:      Frequency: 3 days/week Supplement with home exercise 2+ days/wk as tolerated       Minutes: 40        METS: 4 4-6 0         HR: 137-150   RPE: 4-5         Modalities: Treadmill, UBE, Lifecycle, Rower and Recumbent bike      Exercise Progression after Discharge:    Frequency: 3-5 days of gym or home exercise   Minutes: 30-50  >150 mins/wk of moderate intensity exercise   METS: 4 4 - 6 2   HR: 137-150    RPE: 4-6   Modalities: Treadmill, Lifecycle, Elliptical and Rower   Strength trainin-3 days / week  12-15 repetitions  1-2 sets per modality    Modalities: Leg Press, Chest Press, Pull Downs and Lateral Raise    Home Exercise: Type: walking, Frequency: 2 days/week, Duration: 30 mins    Goals: 10% improvement in functional capacity - based on max METs achieved in fitness assessment, Reduced dyspnea with physical activity  0-1/10, improved DASI score by 10%, Increase in exercise capacity by 40% - based on peak METs tolerated in cardiac rehab exercise session, Exercise 5 days/wk, >150mins/wk of moderate intensity exercise, Resume ADLs with increased strength, Attend Rehab regularly,  home walking program and swing the golA-TEX club hard without SOB    Progression Toward Goals: Goals met: 10% improvement in functional capacity - based on max METs achieved in fitness assessment, Reduced dyspnea with physical activity  0-1/10, improved DASI score by 10%, Increase in exercise capacity by 40% - based on peak METs tolerated in cardiac rehab exercise session, Exercise 5 days/wk, >150mins/wk of moderate intensity exercise, Resume ADLs with increased strength, Attend Rehab regularly,  home walking program and swing the golA-TEX club hard without SOB , Patient will be encouraged to focus on lifestyle modifications following discharge    Education: benefit of exercise for CAD risk factors, home exercise guidelines, AHA guidelines to achieve >150 mins/wk of moderate exercise, RPE scale, class: Risk Factors for Heart Disease, exercise instructions/guidelines for discharge  and physical activity/exercise in extreme weather conditions   Plan:[employer]  Readiness to change: Maintenance: (Maintaining the behavior change)      NUTRITION ASSESSMENT AND PLAN    Weight control:    Starting weight: 178   Current weight:   173  Waist circumference:    Startin   Current:   unable to measure - Pt missed last session     Diabetes: N/A    Lipid management: Discussed diet and lipid management and Last lipid profile 22  Chol 122  TRG 94  HDL 36  LDL 67    Goals:reduced BMI to < 25, HDL >40, Improved Rate Your Plate score  >20, choose lean meat (93-95%), eliminate processed meats, increase intake of fish, shellfish, reduce cheese intake or use reduced-fat, eat 3 or more servings of whole grains a day, Eat 4-5 cups of fruits and vegetables daily, use olive or canola oil in baking, choose low sodium processed foods, eliminate butter, Increase intake of nuts and seeds, seldom eat or choose low fat ice-cream, fruit juice bars or frozen yogurt , eliminate or choose low-fat sweets and daily saturated fat intake <7%/13g    Progression Toward Goals: Goals not met: reduced BMI to < 25, HDL >40, Improved Rate Your Plate score  >14, choose lean meat (93-95%), eliminate processed meats, increase intake of fish, shellfish, reduce cheese intake or use reduced-fat, eat 3 or more servings of whole grains a day, Eat 4-5 cups of fruits and vegetables daily, use olive or canola oil in baking, choose low sodium processed foods, eliminate butter, Increase intake of nuts and seeds, seldom eat or choose low fat ice-cream, fruit juice bars or frozen yogurt , eliminate or choose low-fat sweets and daily saturated fat intake <7%/13g   , Patient will be encouraged to focus on lifestyle modifications following discharge , Pacheco attended our heart heatlhy eating class and was provided one on one education with goals for dietary modifications        Education: heart healthy eating  low sodium diet  hydration  healthy choices while dining out  education class: Heart Healthy Eating  education class:  Label Reading  Plan: replace butter with soft spreads made with olive oil, canola or yogurt, replace refined grain bread with whole grain bread, replace unhealthy snacks with fruits & vegs, reduce red meat 1x/wk, switch to skim or 1% milk, eat fewer desserts and sweets, avoid processed foods, learn how to read food labels, replace sugar with stevia or truvia and keep added daily sugar <25g/day  Readiness to change: Action:  (Changing behavior)      PSYCHOSOCIAL ASSESSMENT AND PLAN    Emotional:  Depression assessment:  PHQ-9 = 0  0 =No Depression            Anxiety measure:  HALEY-7 = 0  0-4  = Not anxious  Self-reported stress level:  1  Social support: Excellent and Patient reports excellent emotional/social support from family - son and daughter    Goals:  Continue to work on improved Physical Fitness  and Overall Health     Progression Toward Goals: Goals met: Physical Fitness in [facility] Score < 3 and Overall Health in [facility] Score < 3 , Patient will be encouraged to focus on lifestyle modifications following discharge  Education: benefits of a positive support system, stress management techniques, class:  Stress and Your Health  and class:  Relaxation  Plan: Exercise, Enjoy a hobby, Enjoy family, Return to previous social activity and golf without SOB  Readiness to change: Action:  (Changing behavior)      OTHER CORE COMPONENTS     Tobacco:   Social History     Tobacco Use   Smoking Status Never Smoker   Smokeless Tobacco Never Used       Tobacco Use Intervention:   N/A:  Patient is a non-smoker     Anginal Symptoms:  None   NTG use: No prescription    Blood pressure:    Restin/74 - 144/70   Exercise: 168/70 - 170/80    Goals: consistent BP < 130/80, reduced dietary sodium <2300mg, moderate intensity exercise >150 mins/wk and medication compliance    Progression Toward Goals: Goals met: consistent BP < 130/80, reduced dietary sodium <2300mg, moderate intensity exercise >150 mins/wk and medication compliance , Patient will be encouraged to focus on lifestyle modifications following discharge      Education:  low sodium diet and HTN and Education class:  Common Heart Medications  Plan: Class: Understanding Heart Disease, Avoid Processed foods, engage in regular exercise, eliminate salt shaker at the table, use salt substitutes and check labels for sodium content  Readiness to change: Action:  (Changing behavior) and Maintenance: (Maintaining the behavior change)

## 2022-11-23 ENCOUNTER — TELEPHONE (OUTPATIENT)
Dept: OTHER | Facility: HOSPITAL | Age: 72
End: 2022-11-23

## 2022-11-23 NOTE — QUICK NOTE
Called patient with pathology results  Pt already with know diagnosis of CLL > 20 years and had close follow up with Oncology at [facility]  He states he feels fine and has follow up appointments with surgery and oncology scheduled

## 2022-11-25 ENCOUNTER — TELEPHONE (OUTPATIENT)
Dept: HEMATOLOGY ONCOLOGY | Facility: CLINIC | Age: 72
End: 2022-11-25

## 2022-11-25 NOTE — TELEPHONE ENCOUNTER
Called patient and informed him to get his labs drawn, pt stated he had labs prior to surgery, informed pt that some of the tests Dr Nhung Erickson ordered were not drawn, pt stated he had most of the test done, informed that I will speak with Dr Nhung Erickson in regards to the additional tests, that he may be okay with the tests that were drawn prior to his surgery

## 2022-11-26 ENCOUNTER — TELEPHONE (OUTPATIENT)
Dept: OTHER | Facility: OTHER | Age: 72
End: 2022-11-26

## 2022-11-26 ENCOUNTER — APPOINTMENT (OUTPATIENT)
Dept: LAB | Age: 72
End: 2022-11-26

## 2022-11-26 DIAGNOSIS — C91.12 CLL (CHRONIC LYMPHOID LEUKEMIA) IN RELAPSE (HCC): ICD-10-CM

## 2022-11-26 LAB
ALBUMIN SERPL BCP-MCNC: 4.2 G/DL (ref 3.5–5)
ALP SERPL-CCNC: 137 U/L (ref 46–116)
ALT SERPL W P-5'-P-CCNC: 23 U/L (ref 12–78)
ANION GAP SERPL CALCULATED.3IONS-SCNC: 10 MMOL/L (ref 4–13)
ANISOCYTOSIS BLD QL SMEAR: PRESENT
AST SERPL W P-5'-P-CCNC: 21 U/L (ref 5–45)
BASOPHILS # BLD MANUAL: 0 THOUSAND/UL (ref 0–0.1)
BASOPHILS NFR MAR MANUAL: 0 % (ref 0–1)
BILIRUB SERPL-MCNC: 0.34 MG/DL (ref 0.2–1)
BUN SERPL-MCNC: 30 MG/DL (ref 5–25)
CALCIUM SERPL-MCNC: 10.5 MG/DL (ref 8.3–10.1)
CHLORIDE SERPL-SCNC: 109 MMOL/L (ref 96–108)
CO2 SERPL-SCNC: 21 MMOL/L (ref 21–32)
CREAT SERPL-MCNC: 1.46 MG/DL (ref 0.6–1.3)
DIFFERENTIAL COMMENT: ABNORMAL
EOSINOPHIL # BLD MANUAL: 0 THOUSAND/UL (ref 0–0.4)
EOSINOPHIL NFR BLD MANUAL: 0 % (ref 0–6)
ERYTHROCYTE [DISTWIDTH] IN BLOOD BY AUTOMATED COUNT: 14.4 % (ref 11.6–15.1)
GFR SERPL CREATININE-BSD FRML MDRD: 47 ML/MIN/1.73SQ M
GLUCOSE SERPL-MCNC: 109 MG/DL (ref 65–140)
HCT VFR BLD AUTO: 37.4 % (ref 36.5–49.3)
HGB BLD-MCNC: 10.6 G/DL (ref 12–17)
LDH SERPL-CCNC: 315 U/L (ref 81–234)
LYMPHOCYTES # BLD AUTO: 185.46 THOUSAND/UL (ref 0.6–4.47)
LYMPHOCYTES # BLD AUTO: 98 % (ref 14–44)
MACROCYTES BLD QL AUTO: PRESENT
MCH RBC QN AUTO: 29.8 PG (ref 26.8–34.3)
MCHC RBC AUTO-ENTMCNC: 28.3 G/DL (ref 31.4–37.4)
MCV RBC AUTO: 105 FL (ref 82–98)
MONOCYTES # BLD AUTO: 1.89 THOUSAND/UL (ref 0–1.22)
MONOCYTES NFR BLD: 1 % (ref 4–12)
NEUTROPHILS # BLD MANUAL: 1.89 THOUSAND/UL (ref 1.85–7.62)
NEUTS SEG NFR BLD AUTO: 1 % (ref 43–75)
PLATELET # BLD AUTO: 200 THOUSANDS/UL (ref 149–390)
PLATELET BLD QL SMEAR: ADEQUATE
PMV BLD AUTO: 9.1 FL (ref 8.9–12.7)
POTASSIUM SERPL-SCNC: 4.8 MMOL/L (ref 3.5–5.3)
PROT SERPL-MCNC: 8 G/DL (ref 6.4–8.4)
RBC # BLD AUTO: 3.56 MILLION/UL (ref 3.88–5.62)
RBC MORPH BLD: PRESENT
SODIUM SERPL-SCNC: 140 MMOL/L (ref 135–147)
URATE SERPL-MCNC: 6.4 MG/DL (ref 3.5–8.5)
WBC # BLD AUTO: 189.24 THOUSAND/UL (ref 4.31–10.16)

## 2022-11-26 NOTE — TELEPHONE ENCOUNTER
Pt: Erika Corporal : [id number]  Lab Result:  24   Date/Time Drawn: 2022 @ 08:31 am   Ordering Provider: Dr Piedad Elena Name: [facility] [phone]       The following critical/stat result was read back to the lab as stated above and [employer] to the on-call provider  The provider confirmed receipt of the message

## 2022-11-29 ENCOUNTER — OFFICE VISIT (OUTPATIENT)
Dept: SURGERY | Facility: CLINIC | Age: 72
End: 2022-11-29

## 2022-11-29 VITALS
HEART RATE: 75 BPM | TEMPERATURE: 97.3 F | DIASTOLIC BLOOD PRESSURE: 67 MMHG | SYSTOLIC BLOOD PRESSURE: 130 MMHG | HEIGHT: 69 IN | WEIGHT: 171.4 LBS | BODY MASS INDEX: 25.39 KG/M2

## 2022-11-29 DIAGNOSIS — Z90.49 STATUS POST LAPAROSCOPIC CHOLECYSTECTOMY: Primary | ICD-10-CM

## 2022-11-29 PROBLEM — K81.0 ACUTE CHOLECYSTITIS: Status: RESOLVED | Noted: 2022-11-10 | Resolved: 2022-11-29

## 2022-11-29 NOTE — PROGRESS NOTES
Office Visit - General Surgery  Tacho Bundy MRN: [id number]  Encounter: [id number]    Assessment and Plan  Problem List Items Addressed This Visit        Other    Status post laparoscopic cholecystectomy - Primary     Doing well status post laparoscopic cholecystectomy  The allow him activity as tolerated  See us back here if needed  Chief Complaint:  Tacho Bundy is a 71 y o  male who presents for Post-op (P/o lap west )    Subjective  71year old male status post laparoscopic cholecystectomy  Feeling well  Noticed a little firmness of the umbilicus  Eating and moving bowels normally  Past Medical History:   Diagnosis Date   • Basal cell carcinoma (BCC) of skin of nose    • History of chemotherapy    • History of colonoscopy 2012   • Lymphocytic leukemia ([facility] [address] )        Past Surgical History:   Procedure Laterality Date   • CARDIAC CATHETERIZATION N/A 6/15/2022    Procedure: Cardiac Coronary Angiogram;  Surgeon: Roxy Hunter MD;  [facility]: [facility];   Service: Cardiology   • CARDIAC CATHETERIZATION N/A 7/12/2022    Procedure: CARDIAC TAVR;  Surgeon: Karina Cummins MD;  Location: [facility];  Service: Cardiology   • CATARACT EXTRACTION Bilateral    • CHOLECYSTECTOMY LAPAROSCOPIC N/A 11/10/2022    Procedure: CHOLECYSTECTOMY LAPAROSCOPIC;  Surgeon: Manuel Amaral MD;  Location: [facility];  Service: General   • FLAP LOCAL HEAD / NECK N/A 4/14/2020    Procedure: ADJACENT TISSUE TRANSFER  FOREHEAD FLAP,  CARTILAGE GRAFT NOSE;  Surgeon: Indigo Ardon MD;  Location: [facility];  Service: Plastics   • FLAP LOCAL HEAD / NECK N/A 6/17/2020    Procedure: DIVISION INSET FOREHEAD FLAP; FULL THICKNESS SKIN GRAFT TO FOREHEAD;  Surgeon: Indgio Ardon MD;  Location: [facility];  Service: Plastics   • FULL THICKNESS SKIN GRAFT N/A 4/14/2020    Procedure: SKIN GRAFT FULL THICKNESS  (FTSG) NOSE;  Surgeon: Indigo Ardon MD;  [facility]: [facility];  Service: Plastics   • MOHS RECONSTRUCTION N/A 4/14/2020    Procedure: MOHS RECONSTRUCTION NOSE DEFECT;  Surgeon: Doreen Cowan MD;  [facility]: [facility];  Service: Plastics   • MOHS RECONSTRUCTION N/A 5/4/2020    Procedure: RECONSTRUCTION MOHS NASAL DEFECT WITH EAR CARTILAGE GRAFT;  Surgeon: Doreen Cowan MD;  [facility];  Service: Plastics   • REPLACEMENT AORTIC VALVE TRANSCATHETER (TAVR) N/A 7/12/2022    Procedure: REPLACEMENT AORTIC VALVE TRANSCATHETER (TAVR) TRANSFEMORAL W/ 26MM LEE CLINT S3 ULTRA VALVE(ACCESS ON LEFT) PARISA;  Surgeon: Vipul Bowman MD;  Location: [facility];  Service: Cardiac Surgery   • SHOULDER SURGERY     • TIBIA FRACTURE SURGERY     • TONSILLECTOMY         Family History   Problem Relation Age of Onset   • Stroke Mother    • No Known Problems Father        Social History     Tobacco Use   • Smoking status: Never   • Smokeless tobacco: Never   Vaping Use   • Vaping Use: Never used   Substance Use Topics   • Alcohol use: Not Currently     Comment: rare   • Drug use: Never        Medications  Current Outpatient Medications on File Prior to Visit   Medication Sig Dispense Refill   • acetaminophen (TYLENOL) 325 mg tablet Take 2 tablets (650 mg total) by mouth every 4 (four) hours as needed for fever or moderate pain (temperature greater than 101 F) 100 tablet 0   • amoxicillin (AMOXIL) 500 MG tablet Take 4 tablets (2,000 mg total) by mouth once as needed (Take 60 minutes prior to ANY dental work) for up to 1 dose 4 tablet 1   • Ascorbic Acid (VITAMIN C PO) Take by mouth daily      • fexofenadine-pseudoephedrine (ALLEGRA-D 24) 180-240 MG per 24 hr tablet Take 1 tablet by mouth as needed      • VITAMIN D PO Take by mouth daily      • VITAMIN E PO Take by mouth daily      • aspirin 81 mg chewable tablet Chew 1 tablet (81 mg total) daily 90 tablet 2   • atorvastatin (LIPITOR) 20 mg tablet Take 1 tablet (20 mg total) by mouth daily with dinner (Patient not taking: Reported on 10/7/2022) 90 tablet 2   • chlorhexidine (PERIDEX) 0 12 % solution 1/2 OZ  TWICE DAILY (Patient not taking: Reported on 11/29/2022)     • clopidogrel (PLAVIX) 75 mg tablet Take 1 tablet (75 mg total) by mouth daily 90 tablet 0   • valACYclovir (VALTREX) 1,000 mg tablet Take 1 tablet (1,000 mg total) by mouth 3 (three) times a day for 7 days 21 tablet 0     No current facility-administered medications on file prior to visit  Allergies  No Known Allergies    Review of Systems    Objective  Vitals:    11/29/22 0800   BP: 130/67   Pulse: 75   Temp: (!) 97 3 °F (36 3 °C)       Physical Exam   Abdomen:  Laparoscopic incisions healing well    Little firmness at the umbilical incision, soft, nontender

## 2022-11-29 NOTE — ASSESSMENT & PLAN NOTE
Doing well status post laparoscopic cholecystectomy  The allow him activity as tolerated  See us back here if needed

## 2022-12-01 ENCOUNTER — OFFICE VISIT (OUTPATIENT)
Dept: HEMATOLOGY ONCOLOGY | Facility: CLINIC | Age: 72
End: 2022-12-01

## 2022-12-01 VITALS
SYSTOLIC BLOOD PRESSURE: 124 MMHG | RESPIRATION RATE: 17 BRPM | HEIGHT: 69 IN | WEIGHT: 173 LBS | BODY MASS INDEX: 25.62 KG/M2 | OXYGEN SATURATION: 99 % | HEART RATE: 64 BPM | DIASTOLIC BLOOD PRESSURE: 80 MMHG

## 2022-12-01 DIAGNOSIS — N18.30 STAGE 3 CHRONIC KIDNEY DISEASE, UNSPECIFIED WHETHER STAGE 3A OR 3B CKD (HCC): ICD-10-CM

## 2022-12-01 DIAGNOSIS — D64.9 ANEMIA, UNSPECIFIED TYPE: ICD-10-CM

## 2022-12-01 DIAGNOSIS — C91.12 CLL (CHRONIC LYMPHOID LEUKEMIA) IN RELAPSE (HCC): Primary | ICD-10-CM

## 2022-12-01 DIAGNOSIS — B02.9 HERPES ZOSTER WITHOUT COMPLICATION: ICD-10-CM

## 2022-12-01 NOTE — PROGRESS NOTES
HPI:   Patient has history of relapsed refractory CLL with 17p deletion and he has been under surveillance  There has been increase in WBC and lymphocyte count and patient states some of the increase could be secondary to renal cyst gallbladder surgery on 11/10/2022 that showed chronic cholecystitis with cholelithiasis and 1 lymph node sample in the gallbladder specimen showed CLL  No other lymphadenopathy in the abdomen on MRI scan and spleen size 16 cm on MRI scan  Borderline anemia with hemoglobin 10 6  No thrombocytopenia  Patient had TAVR for aortic stenosis on 07/12/2022 and received  cardiac rehab  He has tiredness and exertional dyspnea  Recent history of herpes zoster on his right lower leg treated with Valtrex  He has arthritic symptoms manageable with CBD     No CLL related symptoms   He has  recurrent scalp lesion and he follows with his dermatologist   Patient states that was not malignant    Current Outpatient Medications:   •  acetaminophen (TYLENOL) 325 mg tablet, Take 2 tablets (650 mg total) by mouth every 4 (four) hours as needed for fever or moderate pain (temperature greater than 101 F), Disp: 100 tablet, Rfl: 0  •  amoxicillin (AMOXIL) 500 MG tablet, Take 4 tablets (2,000 mg total) by mouth once as needed (Take 60 minutes prior to ANY dental work) for up to 1 dose, Disp: 4 tablet, Rfl: 1  •  Ascorbic Acid (VITAMIN C PO), Take by mouth daily , Disp: , Rfl:   •  fexofenadine-pseudoephedrine (ALLEGRA-D 24) 180-240 MG per 24 hr tablet, Take 1 tablet by mouth as needed , Disp: , Rfl:   •  VITAMIN D PO, Take by mouth daily , Disp: , Rfl:   •  VITAMIN E PO, Take by mouth daily , Disp: , Rfl:   •  aspirin 81 mg chewable tablet, Chew 1 tablet (81 mg total) daily, Disp: 90 tablet, Rfl: 2  •  atorvastatin (LIPITOR) 20 mg tablet, Take 1 tablet (20 mg total) by mouth daily with dinner (Patient not taking: Reported on 10/7/2022), Disp: 90 tablet, Rfl: 2  •  chlorhexidine (PERIDEX) 0 12 % solution, 1/2 OZ  TWICE DAILY (Patient not taking: Reported on 11/29/2022), Disp: , Rfl:   •  clopidogrel (PLAVIX) 75 mg tablet, Take 1 tablet (75 mg total) by mouth daily, Disp: 90 tablet, Rfl: 0  •  valACYclovir (VALTREX) 1,000 mg tablet, Take 1 tablet (1,000 mg total) by mouth 3 (three) times a day for 7 days, Disp: 21 tablet, Rfl: 0    No Known Allergies    Oncology History Overview Note   1996:  CLL was diagnosed  Intermittently he received chlorambucil over the years  2014:  6 cycles of Rituxan and bendamustine  Presently under surveillance  CLL (chronic lymphoid leukemia) in relapse [facility])    Chemotherapy       1996:  CLL was diagnosed  Intermittent therapy with chlorambucil  2014:  6 cycles of Rituxan and bendamustine  Presently under surveillance  ROS:  12/01/22 Reviewed 12 systems:  See symptoms in HPI  Presently no other neurological, cardiac, pulmonary, GI and  symptoms  Other symptoms are in HPI  No symptoms like fever, chills, bleeding, bone pains, skin rash, weight loss,   weakness, numbness,  claudication and gait problem  No frequent infections other than recent shingles  Not unusually sensitive to heat or cold  No swelling of the ankles  No swollen glands  Patient is somewhat anxious  Physical Exam:      Temperature 97 9 degrees  Pulse 60  Respirations 17  Blood pressure 134/60  Oxygen saturation 98%     Alert and oriented and not in distress  Vitals as above  There is no icterus  , no oral thrush, no palpable neck mass,  lung fields clear to percussion and auscultation, regular heart rate, short systolic murmur, soft and non tender abdomen, no palpable abdominal mass, no ascites, no edema of ankles, no calf tenderness, no focal neurological deficit, no skin rash, no palpable lymphadenopathy,  no clubbing  Patient is anxious  Performance status 1      IMAGING:  IMPRESSION:     No choledocholithiasis is seen  No biliary dilation  Distended gallbladder with pericholecystic fluid and cholelithiasis correlated with the acute cholecystitis  Splenomegaly with spleen measuring about 16 cm can be correlated with the patient's history of CLL  Mild edema seen in the mesentery as seen on the CT              Workstation performed: [id number]        Imaging    MRI abdomen wo contrast and mrcp (Order: [id number]) - 11/8/2022      LABS:    Results for orders placed or performed in visit on 11/26/22   CBC and differential   Result Value Ref Range     24 (HH) 4 31 - 10 16 Thousand/uL    RBC 3 56 (L) 3 88 - 5 62 Million/uL    Hemoglobin 10 6 (L) 12 0 - 17 0 g/dL    Hematocrit 37 4 36 5 - 49 3 %     (H) 82 - 98 fL    MCH 29 8 26 8 - 34 3 pg    MCHC 28 3 (L) 31 4 - 37 4 g/dL    RDW 14 4 11 6 - 15 1 %    MPV 9 1 8 9 - 12 7 fL    Platelets 649 552 - 639 Thousands/uL   Comprehensive metabolic panel   Result Value Ref Range    Sodium 140 135 - 147 mmol/L    Potassium 4 8 3 5 - 5 3 mmol/L    Chloride 109 (H) 96 - 108 mmol/L    CO2 21 21 - 32 mmol/L    ANION GAP 10 4 - 13 mmol/L    BUN 30 (H) 5 - 25 mg/dL    Creatinine 1 46 (H) 0 60 - 1 30 mg/dL    Glucose 109 65 - 140 mg/dL    Calcium 10 5 (H) 8 3 - 10 1 mg/dL    AST 21 5 - 45 U/L    ALT 23 12 - 78 U/L    Alkaline Phosphatase 137 (H) 46 - 116 U/L    Total Protein 8 0 6 4 - 8 4 g/dL    Albumin 4 2 3 5 - 5 0 g/dL    Total Bilirubin 0 34 0 20 - 1 00 mg/dL    eGFR 47 ml/min/1 73sq m   LD,Blood   Result Value Ref Range     (H) 81 - 234 U/L   Uric acid   Result Value Ref Range    Uric Acid 6 4 3 5 - 8 5 mg/dL   Manual Differential(PHLEBS Do Not Order)   Result Value Ref Range    Segmented % 1 (L) 43 - 75 %    Lymphocytes % 98 (H) 14 - 44 %    Monocytes % 1 (L) 4 - 12 %    Eosinophils, % 0 0 - 6 %    Basophils % 0 0 - 1 %    Absolute Neutrophils 1 89 1 85 - 7 62 Thousand/uL    Lymphocytes Absolute 185 46 (H) 0 60 - 4 47 Thousand/uL    Monocytes Absolute 1 89 (H) 0 00 - 1 22 Thousand/uL    Eosinophils Absolute 0 00 0 00 - 0 40 Thousand/uL    Basophils Absolute 0 00 0 00 - 0 10 Thousand/uL    Total Counted      RBC Morphology Present     Anisocytosis Present     Macrocytes Present     Platelet Estimate Adequate Adequate    Differential Comment see note      [address]  Absolute lymphocyte 185,460                  Normal uric acid  IgG 688    Tissue Exam: [id number]  Order: [id number]   Collected 11/10/2022  9:07 AM      Status: Final result      Visible to patient: Yes (seen)      Dx: Abdominal pain      0 Result Notes  Component    Case Report   Surgical Pathology Report                         Case: [id number]                                    Authorizing Provider: Manuel Amaral MD          Collected:           11/10/2022 0907               Ordering Location:     [facility]      Received:            11/10/2022 1059                                      Hospital Operating Room                                                       Pathologist:           Dank Lewis MD                                                          Specimen:    Gallbladder, GALLBLADDER                                                                   Final Diagnosis   A  Gallbladder and lymph node (1), cholecystectomy:      - Chronic lymphocytic leukemia/small lymphocytic lymphoma (CLL/SLL) involving one lymph node and gallbladder, see note       - Chronic cholecystitis with cholelithiasis   Electronically signed by Dank Lewis MD on 11/23/2022 at 11:31 AM   Note           Labs, Imaging, & Other studies:   All pertinent labs and imaging studies were personally reviewed            Reviewed and discussed with patient  Assessment and plan:  Patient has history of relapsed refractory CLL with 17p deletion and he has been under surveillance    There has been increase in WBC and lymphocyte count and patient states some of the increase could be secondary to renal cyst gallbladder surgery on 11/10/2022 that showed chronic cholecystitis with cholelithiasis and 1 lymph node sample in the gallbladder specimen showed CLL  No other lymphadenopathy in the abdomen on MRI scan and spleen size 16 cm on MRI scan  Borderline anemia with hemoglobin 10 6  No thrombocytopenia  Patient had TAVR for aortic stenosis on 07/12/2022 and received  cardiac rehab  He has tiredness and exertional dyspnea  Recent history of herpes zoster on his right lower leg treated with Valtrex  He has arthritic symptoms manageable with CBD  No CLL related symptoms   He has  recurrent scalp lesion and he follows with his dermatologist   Patient states that was not malignant     Physical examination and test results are as recorded and discussed  His CLL has an unfavorable feature of 17p deletion but his disease is not behaving like unfavorable  He has very high  WBC and lymphocyte counts  He has been heavily treated for CLL previously over the years   He is close to meeting criteria for CLL treatment and in the meantime he remains under surveillance  He will take information home on acalabrutinib  Aj Roberts He follows with Dr Nicole Lopez at [facility]  He prefers surveillance for now  Discussed patient's condition with him in detail with explaned  Questions answered  Plan is surveillance as above  Also discussed the importance of eating healthy foods, staying active as tolerated and health screening test     He is capable of self-care  Discussed precautions against coronavirus  He will continue to follow with primary physician and other consultants  See diagnoses, orders and instructions     1  CLL (chronic lymphoid leukemia) in relapse (HCC)    - CBC and differential; Future  - Comprehensive metabolic panel; Future  - Uric acid; Future  - Reticulocytes; Future    2  Anemia, unspecified type    - Reticulocytes; Future    3  Stage 3 chronic kidney disease, unspecified whether stage 3a or 3b CKD ([facility] [address] )      4  Herpes zoster without complication            Blood work in 1 month  Visit in 5 weeks        Patient voiced understanding and agrees      Counseling / Coordination of Care  Provided counseling and support

## 2022-12-21 ENCOUNTER — APPOINTMENT (OUTPATIENT)
Dept: LAB | Age: 72
End: 2022-12-21

## 2022-12-21 ENCOUNTER — TELEPHONE (OUTPATIENT)
Dept: OTHER | Facility: OTHER | Age: 72
End: 2022-12-21

## 2022-12-21 DIAGNOSIS — D64.9 ANEMIA, UNSPECIFIED TYPE: ICD-10-CM

## 2022-12-21 DIAGNOSIS — C91.12 CLL (CHRONIC LYMPHOID LEUKEMIA) IN RELAPSE (HCC): ICD-10-CM

## 2022-12-21 LAB
ALBUMIN SERPL BCP-MCNC: 4.4 G/DL (ref 3.5–5)
ALP SERPL-CCNC: 113 U/L (ref 46–116)
ALT SERPL W P-5'-P-CCNC: 22 U/L (ref 12–78)
ANION GAP SERPL CALCULATED.3IONS-SCNC: 6 MMOL/L (ref 4–13)
ANISOCYTOSIS BLD QL SMEAR: PRESENT
AST SERPL W P-5'-P-CCNC: 20 U/L (ref 5–45)
BASOPHILS # BLD MANUAL: 0 THOUSAND/UL (ref 0–0.1)
BASOPHILS NFR MAR MANUAL: 0 % (ref 0–1)
BILIRUB SERPL-MCNC: 0.31 MG/DL (ref 0.2–1)
BUN SERPL-MCNC: 20 MG/DL (ref 5–25)
CALCIUM SERPL-MCNC: 9.8 MG/DL (ref 8.3–10.1)
CHLORIDE SERPL-SCNC: 112 MMOL/L (ref 96–108)
CO2 SERPL-SCNC: 24 MMOL/L (ref 21–32)
CREAT SERPL-MCNC: 1.28 MG/DL (ref 0.6–1.3)
DIFFERENTIAL COMMENT: ABNORMAL
EOSINOPHIL # BLD MANUAL: 0 THOUSAND/UL (ref 0–0.4)
EOSINOPHIL NFR BLD MANUAL: 0 % (ref 0–6)
ERYTHROCYTE [DISTWIDTH] IN BLOOD BY AUTOMATED COUNT: 14.6 % (ref 11.6–15.1)
GFR SERPL CREATININE-BSD FRML MDRD: 55 ML/MIN/1.73SQ M
GLUCOSE SERPL-MCNC: 99 MG/DL (ref 65–140)
HCT VFR BLD AUTO: 33 % (ref 36.5–49.3)
HGB BLD-MCNC: 9.7 G/DL (ref 12–17)
LYMPHOCYTES # BLD AUTO: 121.6 THOUSAND/UL (ref 0.6–4.47)
LYMPHOCYTES # BLD AUTO: 79 % (ref 14–44)
MACROCYTES BLD QL AUTO: PRESENT
MCH RBC QN AUTO: 30.9 PG (ref 26.8–34.3)
MCHC RBC AUTO-ENTMCNC: 29.4 G/DL (ref 31.4–37.4)
MCV RBC AUTO: 105 FL (ref 82–98)
MONOCYTES # BLD AUTO: 0 THOUSAND/UL (ref 0–1.22)
MONOCYTES NFR BLD: 0 % (ref 4–12)
NEUTROPHILS # BLD MANUAL: 3.08 THOUSAND/UL (ref 1.85–7.62)
NEUTS BAND NFR BLD MANUAL: 1 % (ref 0–8)
NEUTS SEG NFR BLD AUTO: 1 % (ref 43–75)
PLATELET # BLD AUTO: 133 THOUSANDS/UL (ref 149–390)
PLATELET BLD QL SMEAR: ABNORMAL
PMV BLD AUTO: 8.9 FL (ref 8.9–12.7)
POLYCHROMASIA BLD QL SMEAR: PRESENT
POTASSIUM SERPL-SCNC: 5.3 MMOL/L (ref 3.5–5.3)
PROT SERPL-MCNC: 7.1 G/DL (ref 6.4–8.4)
RBC # BLD AUTO: 3.14 MILLION/UL (ref 3.88–5.62)
RBC MORPH BLD: PRESENT
RETICS # AUTO: NORMAL 10*3/UL (ref 14356–105094)
RETICS # CALC: 1.56 % (ref 0.37–1.87)
SMUDGE CELLS BLD QL SMEAR: PRESENT
SODIUM SERPL-SCNC: 142 MMOL/L (ref 135–147)
URATE SERPL-MCNC: 5.7 MG/DL (ref 3.5–8.5)
VARIANT LYMPHS # BLD AUTO: 19 %
WBC # BLD AUTO: 153.92 THOUSAND/UL (ref 4.31–10.16)

## 2022-12-21 NOTE — TELEPHONE ENCOUNTER
Lab Result:  92   Date/Time Drawn: 12/21/2022 1152   Ordering Provider: Dr Moreland Needle Name: Ludivina Adam       The following critical/stat result was read back to the lab as stated above and [employer] to the on-call provider  The provider confirmed receipt of the message

## 2023-01-06 ENCOUNTER — OFFICE VISIT (OUTPATIENT)
Dept: HEMATOLOGY ONCOLOGY | Facility: CLINIC | Age: 73
End: 2023-01-06

## 2023-01-06 VITALS
HEART RATE: 83 BPM | SYSTOLIC BLOOD PRESSURE: 132 MMHG | HEIGHT: 69 IN | TEMPERATURE: 97.3 F | DIASTOLIC BLOOD PRESSURE: 82 MMHG | BODY MASS INDEX: 25.92 KG/M2 | WEIGHT: 175 LBS | OXYGEN SATURATION: 98 %

## 2023-01-06 DIAGNOSIS — C91.12 CLL (CHRONIC LYMPHOID LEUKEMIA) IN RELAPSE (HCC): Primary | ICD-10-CM

## 2023-01-06 NOTE — PROGRESS NOTES
Hematology/Oncology Outpatient Follow-up  Radha Czaares 71 y o  male 1950 [id number]    Date:  1/6/2023      Assessment and Plan:  1  CLL (chronic lymphoid leukemia) in relapse [facility])  67-year-old male presents for follow-up regarding history of CLL  Patient's absolute lymphocytes had increased from 111,000 to 185,000 from early November to late November 2022  Due to this pretty significant increase patient was closely monitored  In December lymphocytes decreased to 121,000  His hemoglobin is stable, 9 7, platelets are 263,316 which is also stable  Again reviewed with patient that he does meet eligibility for treatment due to his anemia and thrombocytopenia  At his last visit acalabrutinib was discussed  He was given written information regarding this  We again reviewed this today  He would prefer to wait for treatment as he is asymptomatic  He has no palpable adenopathy  We reviewed signs and symptoms of progressive disease  If he were to experience these he should contact us for sooner assessment  Follow-up 3 months with labs  HPI:  71year-old male with history of CLL  Laura Osullivan was diagnosed with CLL in 1996  He had received chlorambucil intermittently, 6 cycles of Rituxan and bendamustine in 2014 in no treatment since  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      Patient also follows at the [facility] with Dr Shar Thompson was last seen at [facility] in Aug 2019   He does not plan to follow up with Dr Shanti Pemberton at this time      Patient was dx with stage III skin cancer, 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, T3N0, left side nose, 1 5 cm, deep invasion into muscle  He required Moh's surgery with positive margins  He underwent re excision and then with subsequent skin graft  He was recommended to receive radiation and received at [facility] with Dr Jose Roberto Alonso       Interval history: feeling at baseline, no new complaints     ROS: Review of Systems   Constitutional: Positive for fatigue (mild)  Negative for activity change, appetite change, chills and fever  Denies night sweats     HENT: Negative for trouble swallowing  Respiratory: Negative for cough and shortness of breath  Cardiovascular: Negative for chest pain, palpitations and leg swelling  Gastrointestinal: Negative for abdominal pain, constipation, diarrhea, nausea and vomiting  Genitourinary: Negative for difficulty urinating, dysuria and hematuria  Musculoskeletal: Negative  Skin: Negative  Neurological: Negative for dizziness, weakness, light-headedness and headaches  Hematological: Negative  Psychiatric/Behavioral: Negative  Past Medical History:   Diagnosis Date   • Basal cell carcinoma (BCC) of skin of nose    • History of chemotherapy    • History of colonoscopy 2012   • Lymphocytic leukemia ([facility] [address] )        Past Surgical History:   Procedure Laterality Date   • CARDIAC CATHETERIZATION N/A 6/15/2022    Procedure: Cardiac Coronary Angiogram;  Surgeon: Gita Lopez MD;  [facility]: [facility];   Service: Cardiology   • CARDIAC CATHETERIZATION N/A 7/12/2022    Procedure: CARDIAC TAVR;  Surgeon: Ronnie Butler MD;  Location: [facility] MAIN OR;  Service: Cardiology   • CATARACT EXTRACTION Bilateral    • CHOLECYSTECTOMY LAPAROSCOPIC N/A 11/10/2022    Procedure: CHOLECYSTECTOMY LAPAROSCOPIC;  Surgeon: Winifred Wilhelm MD;  Location: [facility] MAIN [facility];  Service: General   • FLAP LOCAL HEAD / NECK N/A 4/14/2020    Procedure: ADJACENT TISSUE TRANSFER  FOREHEAD FLAP,  CARTILAGE GRAFT NOSE;  Surgeon: Neeru Gordon MD;  Location: [facility];  Service: Plastics   • FLAP LOCAL HEAD / NECK N/A 6/17/2020    Procedure: DIVISION INSET FOREHEAD FLAP; FULL THICKNESS SKIN GRAFT TO FOREHEAD;  Surgeon: Neeru Gordon MD;  Location: [facility];  Service: Plastics   • FULL THICKNESS SKIN GRAFT N/A 4/14/2020    Procedure: SKIN GRAFT FULL THICKNESS  (FTSG) NOSE;  Surgeon: Neeru Gordon MD;  [facility]: [facility];  Service: Plastics   • MOHS RECONSTRUCTION N/A 4/14/2020    Procedure: MOHS RECONSTRUCTION NOSE DEFECT;  Surgeon: Colin Holloway MD;  [facility];  Service: Plastics   • MOHS RECONSTRUCTION N/A 5/4/2020    Procedure: RECONSTRUCTION MOHS NASAL DEFECT WITH EAR CARTILAGE GRAFT;  Surgeon: Colin Holloway MD;  [facility];  Service: Plastics   • REPLACEMENT AORTIC VALVE TRANSCATHETER (TAVR) N/A 7/12/2022    Procedure: REPLACEMENT AORTIC VALVE TRANSCATHETER (TAVR) TRANSFEMORAL W/ 26MM LEE CLINT S3 ULTRA VALVE(ACCESS ON LEFT) PARISA;  Surgeon: Zafar Gomez MD;  Location: [facility];  Service: Cardiac Surgery   • SHOULDER SURGERY     • TIBIA FRACTURE SURGERY     • TONSILLECTOMY         Social History     Socioeconomic History   • Marital status:       Spouse name: Not on file   • Number of children: Not on file   • Years of education: Not on file   • Highest education level: Not on file   Occupational History   • Not on file   Tobacco Use   • Smoking status: Never   • Smokeless tobacco: Never   Vaping Use   • Vaping Use: Never used   Substance and Sexual Activity   • Alcohol use: Not Currently     Comment: rare   • Drug use: Never   • Sexual activity: Not on file   Other Topics Concern   • Not on file   Social History Narrative   • Not on file     Social Determinants of Health     Financial Resource Strain: Not on file   Food Insecurity: Not on file   Transportation Needs: Not on file   Physical Activity: Not on file   Stress: Not on file   Social Connections: Not on file   Intimate Partner Violence: Not on file   Housing Stability: Not on file       Family History   Problem Relation Age of Onset   • Stroke Mother    • No Known Problems Father        No Known Allergies      Current Outpatient Medications:   •  acetaminophen (TYLENOL) 325 mg tablet, Take 2 tablets (650 mg total) by mouth every 4 (four) hours as needed for fever or moderate pain (temperature greater than 101 F), Disp: 100 tablet, Rfl: 0  •  amoxicillin (AMOXIL) 500 MG tablet, Take 4 tablets (2,000 mg total) by mouth once as needed (Take 60 minutes prior to ANY dental work) for up to 1 dose, Disp: 4 tablet, Rfl: 1  •  Ascorbic Acid (VITAMIN C PO), Take by mouth daily , Disp: , Rfl:   •  aspirin 81 mg chewable tablet, Chew 1 tablet (81 mg total) daily, Disp: 90 tablet, Rfl: 2  •  chlorhexidine (PERIDEX) 0 12 % solution, , Disp: , Rfl:   •  fexofenadine-pseudoephedrine (ALLEGRA-D 24) 180-240 MG per 24 hr tablet, Take 1 tablet by mouth as needed , Disp: , Rfl:   •  VITAMIN D PO, Take by mouth daily , Disp: , Rfl:   •  VITAMIN E PO, Take by mouth daily , Disp: , Rfl:   •  atorvastatin (LIPITOR) 20 mg tablet, Take 1 tablet (20 mg total) by mouth daily with dinner (Patient not taking: Reported on 10/7/2022), Disp: 90 tablet, Rfl: 2  •  clopidogrel (PLAVIX) 75 mg tablet, Take 1 tablet (75 mg total) by mouth daily, Disp: 90 tablet, Rfl: 0  •  valACYclovir (VALTREX) 1,000 mg tablet, Take 1 tablet (1,000 mg total) by mouth 3 (three) times a day for 7 days (Patient not taking: Reported on 1/6/2023), Disp: 21 tablet, Rfl: 0      Physical Exam:  /82 (BP Location: Left arm, Patient Position: Sitting, Cuff Size: Large)   Pulse 83   Temp (!) 97 3 °F (36 3 °C) (Temporal)   Ht 5' 9" (1 753 m)   Wt 79 4 kg (175 lb)   SpO2 98%   BMI 25 84 kg/m²     Physical Exam  Vitals reviewed  Constitutional:       General: He is not in acute distress  Appearance: He is well-developed  He is not ill-appearing  HENT:      Head: Normocephalic and atraumatic  Eyes:      General: No scleral icterus  Conjunctiva/sclera: Conjunctivae normal    Cardiovascular:      Rate and Rhythm: Normal rate and regular rhythm  Heart sounds: Normal heart sounds  No murmur heard  Pulmonary:      Effort: Pulmonary effort is normal  No respiratory distress  Breath sounds: Normal breath sounds     Abdominal:      Palpations: Abdomen is soft       Tenderness: There is no abdominal tenderness  Musculoskeletal:         General: No tenderness  Normal range of motion  Cervical back: Normal range of motion and neck supple  Right lower leg: No edema  Left lower leg: No edema  Lymphadenopathy:      Cervical: No cervical adenopathy  Upper Body:      Right upper body: No supraclavicular or axillary adenopathy  Left upper body: No supraclavicular or axillary adenopathy  Skin:     General: Skin is warm and dry  Neurological:      Mental Status: He is alert and oriented to person, place, and time  Cranial Nerves: No cranial nerve deficit  Psychiatric:         Mood and Affect: Mood normal          Behavior: Behavior normal            Labs:  Lab Results   Component Value Date     92 (HH) 12/21/2022    HGB 9 7 (L) 12/21/2022    HCT 33 0 (L) 12/21/2022     (H) 12/21/2022     (L) 12/21/2022     Lab Results   Component Value Date     08/01/2015    K 5 3 12/21/2022     (H) 12/21/2022    CO2 24 12/21/2022    ANIONGAP 8 08/01/2015    BUN 20 12/21/2022    CREATININE 1 28 12/21/2022    GLUCOSE 110 07/12/2022    GLUF 102 (H) 06/15/2022    CALCIUM 9 8 12/21/2022    CORRECTEDCA 9 8 11/10/2022    AST 20 12/21/2022    ALT 22 12/21/2022    ALKPHOS 113 12/21/2022    PROT 7 6 08/01/2015    BILITOT 0 33 08/01/2015    EGFR 55 12/21/2022       Patient voiced understanding and agreement in the above discussion  Aware to contact our office with questions/symptoms in the interim  This note has been generated by voice recognition software system  Therefore, there may be spelling, grammar, and or syntax errors  Please contact if questions arise

## 2023-03-28 ENCOUNTER — ESTABLISHED COMPREHENSIVE EXAM (OUTPATIENT)
Dept: URBAN - METROPOLITAN AREA CLINIC 6 | Facility: CLINIC | Age: 73
End: 2023-03-28

## 2023-03-28 DIAGNOSIS — Z96.1: ICD-10-CM

## 2023-03-28 PROCEDURE — 92015 DETERMINE REFRACTIVE STATE: CPT

## 2023-03-28 PROCEDURE — 92014 COMPRE OPH EXAM EST PT 1/>: CPT

## 2023-03-28 ASSESSMENT — VISUAL ACUITY
OD_SC: 20/200+1
OD_PH: 20/50
OU_CC: J2

## 2023-03-28 ASSESSMENT — TONOMETRY
OS_IOP_MMHG: 10
OD_IOP_MMHG: 13

## 2023-03-30 ENCOUNTER — TELEPHONE (OUTPATIENT)
Dept: OTHER | Facility: OTHER | Age: 73
End: 2023-03-30

## 2023-03-30 ENCOUNTER — APPOINTMENT (OUTPATIENT)
Dept: LAB | Age: 73
End: 2023-03-30

## 2023-03-31 NOTE — TELEPHONE ENCOUNTER
Lab Result: Wbc 191 67   Date/Time Drawn: 3/30 @ 10:50am   Ordering Provider: Damon Fang Name: Bonnie Jimenez       The following critical/stat result was read back to the lab as stated above and [employer] to the on-call provider  The provider confirmed receipt of the message

## 2023-04-07 ENCOUNTER — DOCUMENTATION (OUTPATIENT)
Dept: HEMATOLOGY ONCOLOGY | Facility: CLINIC | Age: 73
End: 2023-04-07

## 2023-04-07 ENCOUNTER — OFFICE VISIT (OUTPATIENT)
Dept: HEMATOLOGY ONCOLOGY | Facility: CLINIC | Age: 73
End: 2023-04-07

## 2023-04-07 VITALS
BODY MASS INDEX: 26.36 KG/M2 | HEIGHT: 69 IN | DIASTOLIC BLOOD PRESSURE: 74 MMHG | SYSTOLIC BLOOD PRESSURE: 150 MMHG | WEIGHT: 178 LBS | HEART RATE: 105 BPM | OXYGEN SATURATION: 100 % | RESPIRATION RATE: 17 BRPM

## 2023-04-07 DIAGNOSIS — N18.30 STAGE 3 CHRONIC KIDNEY DISEASE, UNSPECIFIED WHETHER STAGE 3A OR 3B CKD (HCC): ICD-10-CM

## 2023-04-07 DIAGNOSIS — D64.9 ANEMIA, UNSPECIFIED TYPE: ICD-10-CM

## 2023-04-07 DIAGNOSIS — D69.6 THROMBOCYTOPENIA (HCC): ICD-10-CM

## 2023-04-07 DIAGNOSIS — C91.12 CLL (CHRONIC LYMPHOID LEUKEMIA) IN RELAPSE (HCC): Primary | ICD-10-CM

## 2023-04-07 DIAGNOSIS — C91.12 CLL (CHRONIC LYMPHOID LEUKEMIA) IN RELAPSE (HCC): ICD-10-CM

## 2023-04-07 RX ORDER — ALLOPURINOL 100 MG/1
TABLET ORAL
Qty: 180 TABLET | Refills: 1 | Status: SHIPPED | OUTPATIENT
Start: 2023-04-07

## 2023-04-07 RX ORDER — ALLOPURINOL 100 MG/1
100 TABLET ORAL 2 TIMES DAILY
Qty: 60 TABLET | Refills: 1 | Status: SHIPPED | OUTPATIENT
Start: 2023-04-07 | End: 2023-04-07

## 2023-04-07 RX ORDER — ACYCLOVIR 400 MG/1
400 TABLET ORAL 2 TIMES DAILY
Qty: 60 TABLET | Refills: 11 | Status: SHIPPED | OUTPATIENT
Start: 2023-04-07 | End: 2024-04-06

## 2023-04-07 NOTE — PATIENT INSTRUCTIONS
Informed consent for zanubrutinib for CLL and start  Please let us know when you get the supply of zanubrutinib  Please start taking allopurinol this weekend and daily and call if you get rash and stop allopurinol  Follow-up in 4 weeks  Weekly blood work    Acyclovir 400 mg twice a day can be started this weekend

## 2023-04-07 NOTE — PROGRESS NOTES
Reviewed Zanubrutinib handout with Pt and gave him a copy  Instructed possible side effects and importance of reporting any symptoms  Reviewed the Allopurinol and Acyclovir to start taking this weekend and to call our office  or send a message via Isai when he gets the Zanubrutinib  Pt understands to get labs weekly  Explained the consent is both for the Zanubrutinib and a blood consent   email sent to [facility]

## 2023-04-07 NOTE — H&P (VIEW-ONLY)
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 and he has started to become symptomatic now and he is willing to get started on therapy  WBC and lymphocyte counts are rising  He has anemia  He is becoming more tired  He has exertional dyspnea  Has night sweats  CLL was diagnosed several years ago and he had chlorambucil  His most recent treatment was in 2014 when he had Rituxan and Bendamustine  He had gallbladder surgery on 11/10/2022 that showed chronic cholecystitis with cholelithiasis and 1 lymph node sample in the gallbladder specimen showed CLL  No other lymphadenopathy in the abdomen on MRI scan and spleen size was 16 cm on MRI scan  Patient had TAVR for aortic stenosis on 07/12/2022 and received  cardiac rehab  History of herpes zoster on his right lower leg treated with Valtrex  He has arthritic symptoms manageable with CBD     He has  recurrent scalp lesion and he follows with his dermatologist   Patient states that was not malignant    Current Outpatient Medications:     acetaminophen (TYLENOL) 325 mg tablet, Take 2 tablets (650 mg total) by mouth every 4 (four) hours as needed for fever or moderate pain (temperature greater than 101 F), Disp: 100 tablet, Rfl: 0    amoxicillin (AMOXIL) 500 MG tablet, Take 4 tablets (2,000 mg total) by mouth once as needed (Take 60 minutes prior to ANY dental work) for up to 1 dose, Disp: 4 tablet, Rfl: 1    Ascorbic Acid (VITAMIN C PO), Take by mouth daily , Disp: , Rfl:     aspirin 81 mg chewable tablet, Chew 1 tablet (81 mg total) daily, Disp: 90 tablet, Rfl: 2    chlorhexidine (PERIDEX) 0 12 % solution, , Disp: , Rfl:     fexofenadine-pseudoephedrine (ALLEGRA-D 24) 180-240 MG per 24 hr tablet, Take 1 tablet by mouth as needed , Disp: , Rfl:     VITAMIN D PO, Take by mouth daily , Disp: , Rfl:     VITAMIN E PO, Take by mouth daily , Disp: , Rfl:     atorvastatin (LIPITOR) 20 mg tablet, Take 1 tablet (20 mg total) by mouth daily "with dinner (Patient not taking: Reported on 10/7/2022), Disp: 90 tablet, Rfl: 2    clopidogrel (PLAVIX) 75 mg tablet, Take 1 tablet (75 mg total) by mouth daily, Disp: 90 tablet, Rfl: 0    valACYclovir (VALTREX) 1,000 mg tablet, Take 1 tablet (1,000 mg total) by mouth 3 (three) times a day for 7 days (Patient not taking: Reported on 1/6/2023), Disp: 21 tablet, Rfl: 0    No Known Allergies    Oncology History Overview Note   1996:  CLL was diagnosed  Intermittently he received chlorambucil over the years  2014:  6 cycles of Rituxan and bendamustine  Presently under surveillance  CLL (chronic lymphoid leukemia) in relapse [facility])    Chemotherapy       1996:  CLL was diagnosed  Intermittent therapy with chlorambucil  2014:  6 cycles of Rituxan and bendamustine  Presently under surveillance  ROS:  04/07/23 Reviewed 12 systems:  See symptoms in HPI  Presently no other neurological, cardiac, pulmonary, GI and  symptoms  Other symptoms are in HPI  No  fever, chills, bleeding, bone pains, skin rash, weight loss,   weakness, numbness,  claudication and gait problem  No frequent infections   Not unusually sensitive to heat or cold  No swelling of the ankles  No swollen glands  Patient is  anxious  Physical Exam:       4/7/23 1/6/23 12/1/22   Blood Pressure 150/74 132/82 124/80   Pulse 105 83 64   Respirations 17 -- 17   Temperature -- 97 3 °F (36 3 °C) Abnormal  --   Temp Source -- Temporal --   SpO2 100 % 98 % 99 %   Weight - Scale 80 7 kg (178 lb) 79 4 kg (175 lb) 78 5 kg (173 lb)   Height 5' 9\" (1 753 m) 5' 9\" (1 753 m) 5' 9\" (1 753 m)   Pain Score Zero Zero Zero     Specialty Comments        Patient is alert and oriented  Patient is not in distress  Vital signs as above  No icterus  No oral thrush  Couple of small palpable lymph nodes in the neck  Clear lung fields to percussion and auscultation    Heart rate is regular, systolic murmur, abdomen is soft and non tender , no palpable " abdominal mass, no ascites, no edema of ankles, no calf tenderness, no focal neurological deficit, no skin rash, there is no palpable lymphadenopathy,  no clubbing  Patient is anxious  Performance status 2      IMAGING:  IMPRESSION:     No choledocholithiasis is seen  No biliary dilation  Distended gallbladder with pericholecystic fluid and cholelithiasis correlated with the acute cholecystitis  Splenomegaly with spleen measuring about 16 cm can be correlated with the patient's history of CLL  Mild edema seen in the mesentery as seen on the CT              Workstation performed: [id number]        Imaging    MRI abdomen wo contrast and mrcp (Order: [id number]) - 11/8/2022    Blood tests done on 3/30/2023  LABS:    Results for orders placed or performed in visit on 12/23/22   CBC and differential   Result Value Ref Range     67 (HH) 4 31 - 10 16 Thousand/uL    RBC 3 18 (L) 3 88 - 5 62 Million/uL    Hemoglobin 9 9 (L) 12 0 - 17 0 g/dL    Hematocrit 33 7 (L) 36 5 - 49 3 %     (H) 82 - 98 fL    MCH 31 1 26 8 - 34 3 pg    MCHC 29 4 (L) 31 4 - 37 4 g/dL    RDW 14 6 11 6 - 15 1 %    MPV 9 1 8 9 - 12 7 fL    Platelets 142 (L) 279 - 390 Thousands/uL   Comprehensive metabolic panel   Result Value Ref Range    Sodium 134 (L) 135 - 147 mmol/L    Potassium 5 1 3 5 - 5 3 mmol/L    Chloride 106 96 - 108 mmol/L    CO2 26 21 - 32 mmol/L    ANION GAP 2 (L) 4 - 13 mmol/L    BUN 23 5 - 25 mg/dL    Creatinine 1 41 (H) 0 60 - 1 30 mg/dL    Glucose, Fasting 101 (H) 65 - 99 mg/dL    Calcium 11 2 (H) 8 3 - 10 1 mg/dL    AST 25 5 - 45 U/L    ALT 25 12 - 78 U/L    Alkaline Phosphatase 125 (H) 46 - 116 U/L    Total Protein 7 2 6 4 - 8 4 g/dL    Albumin 4 5 3 5 - 5 0 g/dL    Total Bilirubin 0 29 0 20 - 1 00 mg/dL    eGFR 49 ml/min/1 73sq m   Beta 2 microglobulin, serum   Result Value Ref Range    Beta-2 Microglobulin 3 3 (H) 0 6 - 2 4 mg/L   IgG, IgA, IgM   Result Value Ref Range    IGA 49 0 (L) 70 0 - 400 0 mg/dL     0 (L) 700 0 - 1,600 0 mg/dL    IGM 16 0 (L) 40 0 - 230 0 mg/dL   LD,Blood   Result Value Ref Range     (H) 81 - 234 U/L   Uric acid   Result Value Ref Range    Uric Acid 5 8 3 5 - 8 5 mg/dL   Reticulocytes   Result Value Ref Range    Retic Ct Abs 54,100 14,356 - 105,094    Retic Ct Pct 1 70 0 37 - 1 87 %   Manual Differential(PHLEBS Do Not Order)   Result Value Ref Range    Segmented % 4 (L) 43 - 75 %    Lymphocytes % 95 (H) 14 - 44 %    Monocytes % 0 (L) 4 - 12 %    Eosinophils, % 1 0 - 6 %    Basophils % 0 0 - 1 %    Absolute Neutrophils 7 67 (H) 1 85 - 7 62 Thousand/uL    Lymphocytes Absolute 182 09 (H) 0 60 - 4 47 Thousand/uL    Monocytes Absolute 0 00 0 00 - 1 22 Thousand/uL    Eosinophils Absolute 1 92 (H) 0 00 - 0 40 Thousand/uL    Basophils Absolute 0 00 0 00 - 0 10 Thousand/uL    Total Counted      Poikilocytes Present     Platelet Estimate Adequate Adequate   Lab results from 3/30/2022  Hemoglobin 9 9  ,670  Platelets 465,154  Lymphocyte 182,090  [address]  No immature cell  Reticulocyte 1 7%  Uric acid 5 8    IgA 49  IgG 685 and IgM 16  Beta-2 microglobulin 3 3    Tissue Exam: [id number]  Order: [id number]   Collected 11/10/2022  9:07 AM      Status: Final result      Visible to patient: Yes (seen)      Dx:  Abdominal pain      0 Result Notes  Component    Case Report   Surgical Pathology Report                         Case: [id number]                                    Authorizing Provider: Noah Bautitsa MD          Collected:           11/10/2022 0907               Ordering Location:     [address]      Received:            11/10/2022 1059                                      [facility] Operating Room                                                       Pathologist:           [address] [place] [address] Avenue, [place]                                                          Specimen:    Gallbladder, GALLBLADDER                                                                   Final Diagnosis   A  Gallbladder and lymph node (1), cholecystectomy:      - Chronic lymphocytic leukemia/small lymphocytic lymphoma (CLL/SLL) involving one lymph node and gallbladder, see note       - Chronic cholecystitis with cholelithiasis   Electronically signed by Jennifer Reed MD on 11/23/2022 at 11:31 AM   Note           Labs, Imaging, & Other studies:   All pertinent labs and imaging studies were personally reviewed            Reviewed and discussed with patient  Assessment and plan: Follow-up visit for 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 and he has started to become symptomatic now and he is willing to get started on therapy  WBC and lymphocyte counts are rising  He has anemia  He is becoming more tired  He has exertional dyspnea  Has night sweats  CLL was diagnosed several years ago and he had chlorambucil  His most recent treatment was in 2014 when he had Rituxan and Bendamustine  He had gallbladder surgery on 11/10/2022 that showed chronic cholecystitis with cholelithiasis and 1 lymph node sample in the gallbladder specimen showed CLL  No other lymphadenopathy in the abdomen on MRI scan and spleen size was 16 cm on MRI scan  Patient had TAVR for aortic stenosis on 07/12/2022 and received  cardiac rehab  History of herpes zoster on his right lower leg treated with Valtrex  He has arthritic symptoms manageable with CBD  He has  recurrent scalp lesion and he follows with his dermatologist   Patient states that was not malignant    Physical examination and test results are as recorded and discussed  Even though there is not much difference in the blood test results between now and the last time patient is definitely more symptomatic now from CLL and he has agreed to and wants to have treatment  He is not on Plavix anymore  He is not taking any blood thinner other than aspirin  He could be considered for BTK inhibitor and he could be tried on zanubrutinib    We discussed all the 3 approved BTK inhibitors and decided to go with zanubrutinib  His CLL has an unfavorable feature of 17p deletion  Blood will also be checked about IGHV mutation  He follows with Dr Eugene Ibarra at [facility]  Discussed patient's condition with him in detail with explaned  Questions answered  Patient has signed informed consent for zanubrutinib  He will start taking allopurinol  He will have Acyclovir because of history of herpes zoster last year  Also discussed the importance of eating healthy foods, staying active as tolerated and health screening tests    He is capable of self-care  Discussed precautions against coronavirus and other infections  He will continue to follow with primary physician and other consultants  See diagnoses, orders and instructions     1  CLL (chronic lymphoid leukemia) in relapse (HCC)    - CBC and differential; Standing  - Comprehensive metabolic panel; Standing  - Uric acid; Standing  - CBC and differential  - Comprehensive metabolic panel  - Uric acid  - allopurinol (ZYLOPRIM) 100 mg tablet; Take 1 tablet (100 mg total) by mouth 2 (two) times a day  Dispense: 60 tablet; Refill: 1  - MISCELLANEOUS LAB TEST; Future  - acyclovir (ZOVIRAX) 400 MG tablet; Take 1 tablet (400 mg total) by mouth 2 (two) times a day  Dispense: 60 tablet; Refill: 11    2  Anemia, unspecified type      3  Stage 3 chronic kidney disease, unspecified whether stage 3a or 3b CKD ([facility] [address] )      4  Thrombocytopenia ([facility] [address] )    Informed consent for zanubrutinib for CLL and start  Please let us know when you get the supply of zanubrutinib  Please start taking allopurinol this weekend and daily and call if you get rash and stop allopurinol  Follow-up in 4 weeks  Weekly blood work  Acyclovir 400 mg twice a day can be started this weekend    I used a dictation device to dictate this note and there could be mistakes in my note and patient may contact my office for that      Patient voiced understanding and agrees      Counseling / Coordination of Care  Provided counseling and support

## 2023-04-07 NOTE — PROGRESS NOTES
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 and he has started to become symptomatic now and he is willing to get started on therapy  WBC and lymphocyte counts are rising  He has anemia  He is becoming more tired  He has exertional dyspnea  Has night sweats  CLL was diagnosed several years ago and he had chlorambucil  His most recent treatment was in 2014 when he had Rituxan and Bendamustine  He had gallbladder surgery on 11/10/2022 that showed chronic cholecystitis with cholelithiasis and 1 lymph node sample in the gallbladder specimen showed CLL  No other lymphadenopathy in the abdomen on MRI scan and spleen size was 16 cm on MRI scan  Patient had TAVR for aortic stenosis on 07/12/2022 and received  cardiac rehab  History of herpes zoster on his right lower leg treated with Valtrex  He has arthritic symptoms manageable with CBD     He has  recurrent scalp lesion and he follows with his dermatologist   Patient states that was not malignant    Current Outpatient Medications:   •  acetaminophen (TYLENOL) 325 mg tablet, Take 2 tablets (650 mg total) by mouth every 4 (four) hours as needed for fever or moderate pain (temperature greater than 101 F), Disp: 100 tablet, Rfl: 0  •  amoxicillin (AMOXIL) 500 MG tablet, Take 4 tablets (2,000 mg total) by mouth once as needed (Take 60 minutes prior to ANY dental work) for up to 1 dose, Disp: 4 tablet, Rfl: 1  •  Ascorbic Acid (VITAMIN C PO), Take by mouth daily , Disp: , Rfl:   •  aspirin 81 mg chewable tablet, Chew 1 tablet (81 mg total) daily, Disp: 90 tablet, Rfl: 2  •  chlorhexidine (PERIDEX) 0 12 % solution, , Disp: , Rfl:   •  fexofenadine-pseudoephedrine (ALLEGRA-D 24) 180-240 MG per 24 hr tablet, Take 1 tablet by mouth as needed , Disp: , Rfl:   •  VITAMIN D PO, Take by mouth daily , Disp: , Rfl:   •  VITAMIN E PO, Take by mouth daily , Disp: , Rfl:   •  atorvastatin (LIPITOR) 20 mg tablet, Take 1 tablet (20 mg total) by mouth daily "with dinner (Patient not taking: Reported on 10/7/2022), Disp: 90 tablet, Rfl: 2  •  clopidogrel (PLAVIX) 75 mg tablet, Take 1 tablet (75 mg total) by mouth daily, Disp: 90 tablet, Rfl: 0  •  valACYclovir (VALTREX) 1,000 mg tablet, Take 1 tablet (1,000 mg total) by mouth 3 (three) times a day for 7 days (Patient not taking: Reported on 1/6/2023), Disp: 21 tablet, Rfl: 0    No Known Allergies    Oncology History Overview Note   1996:  CLL was diagnosed  Intermittently he received chlorambucil over the years  2014:  6 cycles of Rituxan and bendamustine  Presently under surveillance  CLL (chronic lymphoid leukemia) in relapse [facility])    Chemotherapy       1996:  CLL was diagnosed  Intermittent therapy with chlorambucil  2014:  6 cycles of Rituxan and bendamustine  Presently under surveillance  ROS:  04/07/23 Reviewed 12 systems:  See symptoms in HPI  Presently no other neurological, cardiac, pulmonary, GI and  symptoms  Other symptoms are in HPI  No  fever, chills, bleeding, bone pains, skin rash, weight loss,   weakness, numbness,  claudication and gait problem  No frequent infections   Not unusually sensitive to heat or cold  No swelling of the ankles  No swollen glands  Patient is  anxious  Physical Exam:       4/7/23 1/6/23 12/1/22   Blood Pressure 150/74 132/82 124/80   Pulse 105 83 64   Respirations 17 -- 17   Temperature -- 97 3 °F (36 3 °C) Abnormal  --   Temp Source -- Temporal --   SpO2 100 % 98 % 99 %   Weight - Scale 80 7 kg (178 lb) 79 4 kg (175 lb) 78 5 kg (173 lb)   Height 5' 9\" (1 753 m) 5' 9\" (1 753 m) 5' 9\" (1 753 m)   Pain Score Zero Zero Zero     Specialty Comments        Patient is alert and oriented  Patient is not in distress  Vital signs as above  No icterus  No oral thrush  Couple of small palpable lymph nodes in the neck  Clear lung fields to percussion and auscultation    Heart rate is regular, systolic murmur, abdomen is soft and non tender , no palpable " abdominal mass, no ascites, no edema of ankles, no calf tenderness, no focal neurological deficit, no skin rash, there is no palpable lymphadenopathy,  no clubbing  Patient is anxious  Performance status 2      IMAGING:  IMPRESSION:     No choledocholithiasis is seen  No biliary dilation  Distended gallbladder with pericholecystic fluid and cholelithiasis correlated with the acute cholecystitis  Splenomegaly with spleen measuring about 16 cm can be correlated with the patient's history of CLL  Mild edema seen in the mesentery as seen on the CT              Workstation performed: [id number]        Imaging    MRI abdomen wo contrast and mrcp (Order: [id number]) - 11/8/2022    Blood tests done on 3/30/2023  LABS:    Results for orders placed or performed in visit on 12/23/22   CBC and differential   Result Value Ref Range     67 (HH) 4 31 - 10 16 Thousand/uL    RBC 3 18 (L) 3 88 - 5 62 Million/uL    Hemoglobin 9 9 (L) 12 0 - 17 0 g/dL    Hematocrit 33 7 (L) 36 5 - 49 3 %     (H) 82 - 98 fL    MCH 31 1 26 8 - 34 3 pg    MCHC 29 4 (L) 31 4 - 37 4 g/dL    RDW 14 6 11 6 - 15 1 %    MPV 9 1 8 9 - 12 7 fL    Platelets 176 (L) 689 - 390 Thousands/uL   Comprehensive metabolic panel   Result Value Ref Range    Sodium 134 (L) 135 - 147 mmol/L    Potassium 5 1 3 5 - 5 3 mmol/L    Chloride 106 96 - 108 mmol/L    CO2 26 21 - 32 mmol/L    ANION GAP 2 (L) 4 - 13 mmol/L    BUN 23 5 - 25 mg/dL    Creatinine 1 41 (H) 0 60 - 1 30 mg/dL    Glucose, Fasting 101 (H) 65 - 99 mg/dL    Calcium 11 2 (H) 8 3 - 10 1 mg/dL    AST 25 5 - 45 U/L    ALT 25 12 - 78 U/L    Alkaline Phosphatase 125 (H) 46 - 116 U/L    Total Protein 7 2 6 4 - 8 4 g/dL    Albumin 4 5 3 5 - 5 0 g/dL    Total Bilirubin 0 29 0 20 - 1 00 mg/dL    eGFR 49 ml/min/1 73sq m   Beta 2 microglobulin, serum   Result Value Ref Range    Beta-2 Microglobulin 3 3 (H) 0 6 - 2 4 mg/L   IgG, IgA, IgM   Result Value Ref Range    IGA 49 0 (L) 70 0 - 400 0 mg/dL     0 (L) 700 0 - 1,600 0 mg/dL    IGM 16 0 (L) 40 0 - 230 0 mg/dL   LD,Blood   Result Value Ref Range     (H) 81 - 234 U/L   Uric acid   Result Value Ref Range    Uric Acid 5 8 3 5 - 8 5 mg/dL   Reticulocytes   Result Value Ref Range    Retic Ct Abs 54,100 14,356 - 105,094    Retic Ct Pct 1 70 0 37 - 1 87 %   Manual Differential(PHLEBS Do Not Order)   Result Value Ref Range    Segmented % 4 (L) 43 - 75 %    Lymphocytes % 95 (H) 14 - 44 %    Monocytes % 0 (L) 4 - 12 %    Eosinophils, % 1 0 - 6 %    Basophils % 0 0 - 1 %    Absolute Neutrophils 7 67 (H) 1 85 - 7 62 Thousand/uL    Lymphocytes Absolute 182 09 (H) 0 60 - 4 47 Thousand/uL    Monocytes Absolute 0 00 0 00 - 1 22 Thousand/uL    Eosinophils Absolute 1 92 (H) 0 00 - 0 40 Thousand/uL    Basophils Absolute 0 00 0 00 - 0 10 Thousand/uL    Total Counted      Poikilocytes Present     Platelet Estimate Adequate Adequate   Lab results from 3/30/2022  Hemoglobin 9 9  ,670  Platelets 930,902  Lymphocyte 182,090  [address] [facility] [address] [facility]  No immature cell  Reticulocyte 1 7%  Uric acid 5 8    IgA 49  IgG 685 and IgM 16  Beta-2 microglobulin 3 3    Tissue Exam: [id number]  Order: [id number]   Collected 11/10/2022  9:07 AM      Status: Final result      Visible to patient: Yes (seen)      Dx:  Abdominal pain      0 Result Notes  Component    Case Report   Surgical Pathology Report                         Case: [id number]                                    Authorizing Provider: Dylon Donovan MD          Collected:           11/10/2022 0907               Ordering Location:     [address]      Received:            11/10/2022 1059                                      [facility] Operating Room                                                       Pathologist:           [address] [place] [address] Avenue, MD                                                          Specimen:    Gallbladder, GALLBLADDER                                                                   Final Diagnosis   A  Gallbladder and lymph node (1), cholecystectomy:      - Chronic lymphocytic leukemia/small lymphocytic lymphoma (CLL/SLL) involving one lymph node and gallbladder, see note       - Chronic cholecystitis with cholelithiasis   Electronically signed by Armaan Kauffman MD on 11/23/2022 at 11:31 AM   Note           Labs, Imaging, & Other studies:   All pertinent labs and imaging studies were personally reviewed            Reviewed and discussed with patient  Assessment and plan: Follow-up visit for 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 and he has started to become symptomatic now and he is willing to get started on therapy  WBC and lymphocyte counts are rising  He has anemia  He is becoming more tired  He has exertional dyspnea  Has night sweats  CLL was diagnosed several years ago and he had chlorambucil  His most recent treatment was in 2014 when he had Rituxan and Bendamustine  He had gallbladder surgery on 11/10/2022 that showed chronic cholecystitis with cholelithiasis and 1 lymph node sample in the gallbladder specimen showed CLL  No other lymphadenopathy in the abdomen on MRI scan and spleen size was 16 cm on MRI scan  Patient had TAVR for aortic stenosis on 07/12/2022 and received  cardiac rehab  History of herpes zoster on his right lower leg treated with Valtrex  He has arthritic symptoms manageable with CBD  He has  recurrent scalp lesion and he follows with his dermatologist   Patient states that was not malignant    Physical examination and test results are as recorded and discussed  Even though there is not much difference in the blood test results between now and the last time patient is definitely more symptomatic now from CLL and he has agreed to and wants to have treatment  He is not on Plavix anymore  He is not taking any blood thinner other than aspirin  He could be considered for BTK inhibitor and he could be tried on zanubrutinib    We discussed all the 3 approved BTK inhibitors and decided to go with zanubrutinib  His CLL has an unfavorable feature of 17p deletion  Blood will also be checked about IGHV mutation  He follows with Dr Ponce Mora at [facility]  Discussed patient's condition with him in detail with explaned  Questions answered  Patient has signed informed consent for zanubrutinib  He will start taking allopurinol  He will have Acyclovir because of history of herpes zoster last year  Also discussed the importance of eating healthy foods, staying active as tolerated and health screening tests    He is capable of self-care  Discussed precautions against coronavirus and other infections  He will continue to follow with primary physician and other consultants  See diagnoses, orders and instructions     1  CLL (chronic lymphoid leukemia) in relapse (HCC)    - CBC and differential; Standing  - Comprehensive metabolic panel; Standing  - Uric acid; Standing  - CBC and differential  - Comprehensive metabolic panel  - Uric acid  - allopurinol (ZYLOPRIM) 100 mg tablet; Take 1 tablet (100 mg total) by mouth 2 (two) times a day  Dispense: 60 tablet; Refill: 1  - MISCELLANEOUS LAB TEST; Future  - acyclovir (ZOVIRAX) 400 MG tablet; Take 1 tablet (400 mg total) by mouth 2 (two) times a day  Dispense: 60 tablet; Refill: 11    2  Anemia, unspecified type      3  Stage 3 chronic kidney disease, unspecified whether stage 3a or 3b CKD ([facility] [address] )      4  Thrombocytopenia ([facility] [address] )    Informed consent for zanubrutinib for CLL and start  Please let us know when you get the supply of zanubrutinib  Please start taking allopurinol this weekend and daily and call if you get rash and stop allopurinol  Follow-up in 4 weeks  Weekly blood work  Acyclovir 400 mg twice a day can be started this weekend    I used a dictation device to dictate this note and there could be mistakes in my note and patient may contact my office for that      Patient voiced understanding and agrees      Counseling / Coordination of Care  Provided counseling and support

## 2023-04-21 ENCOUNTER — APPOINTMENT (OUTPATIENT)
Dept: LAB | Age: 73
End: 2023-04-21

## 2023-04-21 DIAGNOSIS — C91.12 CLL (CHRONIC LYMPHOID LEUKEMIA) IN RELAPSE (HCC): ICD-10-CM

## 2023-04-26 NOTE — PRE-PROCEDURE INSTRUCTIONS
Pre-procedure Instructions for Interventional Radiology  [address] [place] [address] [address] [phone]    You are scheduled for a/an bone marrow biopsy     On Monday 5/1/23    Your tentative arrival time is 8am  Short stay will notify you the day before your procedure with the exact arrival time and the location to arrive  To prepare for your procedure:  1  Please arrange for someone to drive you home after the procedure and stay with you until the next morning if you are instructed to do so  This is typically for patients receiving some type of sedative or anesthetic for the procedure  2  DO NOT EAT OR DRINK ANYTHING after midnight on the evening before your procedure including candy & gum   3  ONLY SIPS OF WATER with your medications are allowed on the morning of your procedure  4  TAKE ALL OF YOUR REGULAR MEDICATIONS THE MORNING OF YOUR PROCEDURE with sips of water! We may call you to stop some of your blood sugar, blood pressure and blood thinning medications depending on the procedure  Please take all of these medications unless we instruct you to stop them  5  If you have an allergy to x-ray dye, please contact Interventional Radiology for an x-ray dye preparation which usually consists of an oral steroid and Benadryl  The day of your procedure:  1  Bring a list of the medications you take at home  2  Bring medications you take for breathing problems (such as inhalers), medications for chest pain, or both  3  Bring a case for your glasses or contacts  4  Bring your insurance card and a form of photo ID   5  Please leave all valuables such as credit cards and jewelry at home  6  Report to the registration desk in the main lobby at the [facility]  Ask to be directed to [facility]  7  While your procedure is being performed, your family may wait in the Radiology Waiting Room on the 1st floor in Radiology    if they need to leave, they may provide a number to be called following the procedure  8  Be prepared to stay overnight just in case  Sometimes procedures will indicate the need for further observation or treatment  9  If you are scheduled for a follow-up visit with the Interventional Radiologist after your procedure, you will be called with a date and time      Special Instructions (Medications to stop taking before your procedure etc ): am medications with sip of water

## 2023-04-28 ENCOUNTER — TELEPHONE (OUTPATIENT)
Dept: HEMATOLOGY ONCOLOGY | Facility: CLINIC | Age: 73
End: 2023-04-28

## 2023-04-28 ENCOUNTER — APPOINTMENT (OUTPATIENT)
Dept: LAB | Age: 73
End: 2023-04-28

## 2023-04-28 NOTE — TELEPHONE ENCOUNTER
Call Transfer   Who are you speaking with? Marian Luna   If it is not the patient, are they listed on an active communication consent form? N/A   Who is the patients HemOnc/SurgOnc provider? Dr Chris Benz   What is the reason for this call? Lefty Orozco is calling in from the lab with critical results    Person/Department that the call was transferred to? Time that call was transferred? Maria Isabel Harper / 12:28pm   Your call will be transferred now  If you receive a voicemail, please leave a detailed message and a member of the team will return your call as soon as possible  Did you relay this information to the caller?   Yes

## 2023-05-01 ENCOUNTER — HOSPITAL ENCOUNTER (OUTPATIENT)
Dept: RADIOLOGY | Facility: HOSPITAL | Age: 73
Discharge: HOME | End: 2023-05-01
Attending: INTERNAL MEDICINE

## 2023-05-01 VITALS
HEIGHT: 69 IN | SYSTOLIC BLOOD PRESSURE: 135 MMHG | RESPIRATION RATE: 18 BRPM | DIASTOLIC BLOOD PRESSURE: 69 MMHG | HEART RATE: 74 BPM | OXYGEN SATURATION: 100 % | TEMPERATURE: 97.2 F | WEIGHT: 172 LBS | BODY MASS INDEX: 25.48 KG/M2

## 2023-05-01 DIAGNOSIS — C91.12 CLL (CHRONIC LYMPHOID LEUKEMIA) IN RELAPSE (HCC): ICD-10-CM

## 2023-05-01 DIAGNOSIS — Q99.9 CHROMOSOMAL ABNORMALITY: ICD-10-CM

## 2023-05-01 LAB
ANISOCYTOSIS BLD QL SMEAR: PRESENT
BASOPHILS # BLD MANUAL: 0 THOUSAND/UL (ref 0–0.1)
BASOPHILS NFR MAR MANUAL: 0 % (ref 0–1)
DIFFERENTIAL COMMENT: ABNORMAL
EOSINOPHIL # BLD MANUAL: 0 THOUSAND/UL (ref 0–0.4)
EOSINOPHIL NFR BLD MANUAL: 0 % (ref 0–6)
ERYTHROCYTE [DISTWIDTH] IN BLOOD BY AUTOMATED COUNT: 18.4 % (ref 11.6–15.1)
HCT VFR BLD AUTO: 35.7 % (ref 36.5–49.3)
HGB BLD-MCNC: 9.6 G/DL (ref 12–17)
LYMPHOCYTES # BLD AUTO: 256.06 THOUSAND/UL (ref 0.6–4.47)
LYMPHOCYTES # BLD AUTO: 96 % (ref 14–44)
MACROCYTES BLD QL AUTO: PRESENT
MCH RBC QN AUTO: 28.9 PG (ref 26.8–34.3)
MCHC RBC AUTO-ENTMCNC: 26.9 G/DL (ref 31.4–37.4)
MCV RBC AUTO: 108 FL (ref 82–98)
MONOCYTES # BLD AUTO: 0 THOUSAND/UL (ref 0–1.22)
MONOCYTES NFR BLD: 0 % (ref 4–12)
NEUTROPHILS # BLD MANUAL: 10.67 THOUSAND/UL (ref 1.85–7.62)
NEUTS SEG NFR BLD AUTO: 4 % (ref 43–75)
PLATELET # BLD AUTO: 119 THOUSANDS/UL (ref 149–390)
PLATELET BLD QL SMEAR: ABNORMAL
PMV BLD AUTO: 9.8 FL (ref 8.9–12.7)
POIKILOCYTOSIS BLD QL SMEAR: PRESENT
POLYCHROMASIA BLD QL SMEAR: PRESENT
RBC # BLD AUTO: 3.32 MILLION/UL (ref 3.88–5.62)
RBC MORPH BLD: PRESENT
WBC # BLD AUTO: 266.73 THOUSAND/UL (ref 4.31–10.16)

## 2023-05-01 RX ORDER — FENTANYL CITRATE 50 UG/ML
INJECTION, SOLUTION INTRAMUSCULAR; INTRAVENOUS AS NEEDED
Status: COMPLETED | OUTPATIENT
Start: 2023-05-01 | End: 2023-05-01

## 2023-05-01 RX ORDER — LIDOCAINE HYDROCHLORIDE 10 MG/ML
INJECTION, SOLUTION EPIDURAL; INFILTRATION; INTRACAUDAL; PERINEURAL AS NEEDED
Status: COMPLETED | OUTPATIENT
Start: 2023-05-01 | End: 2023-05-01

## 2023-05-01 RX ORDER — SODIUM CHLORIDE 9 MG/ML
75 INJECTION, SOLUTION INTRAVENOUS CONTINUOUS
Status: DISCONTINUED | OUTPATIENT
Start: 2023-05-01 | End: 2023-05-02 | Stop reason: HOSPADM

## 2023-05-01 RX ORDER — MIDAZOLAM HYDROCHLORIDE 2 MG/2ML
INJECTION, SOLUTION INTRAMUSCULAR; INTRAVENOUS AS NEEDED
Status: COMPLETED | OUTPATIENT
Start: 2023-05-01 | End: 2023-05-01

## 2023-05-01 RX ADMIN — FENTANYL CITRATE 50 MCG: 50 INJECTION, SOLUTION INTRAMUSCULAR; INTRAVENOUS at 09:24

## 2023-05-01 RX ADMIN — LIDOCAINE HYDROCHLORIDE 10 ML: 10 INJECTION, SOLUTION EPIDURAL; INFILTRATION; INTRACAUDAL; PERINEURAL at 09:26

## 2023-05-01 RX ADMIN — MIDAZOLAM 1 MG: 1 INJECTION INTRAMUSCULAR; INTRAVENOUS at 09:24

## 2023-05-01 RX ADMIN — MIDAZOLAM 1 MG: 1 INJECTION INTRAMUSCULAR; INTRAVENOUS at 09:16

## 2023-05-01 RX ADMIN — FENTANYL CITRATE 50 MCG: 50 INJECTION, SOLUTION INTRAMUSCULAR; INTRAVENOUS at 09:16

## 2023-05-01 RX ADMIN — SODIUM CHLORIDE 75 ML/HR: 0.9 INJECTION, SOLUTION INTRAVENOUS at 08:18

## 2023-05-01 NOTE — INTERVAL H&P NOTE
H&P reviewed  There have been no interval changes since the time the H&P was written  Vitals:    05/01/23 0805   BP: 160/76   Pulse: 71   Resp: 17   Temp: 97 8 °F (36 6 °C)   SpO2: 100%         Patient with history of CLL  Presenting for bone marrow biopsy due to chromosomal abnormality  Patient has been NPO  He had a bone marrow biopsy approximately 20 years ago  Informed written consent was obtained and questions were answered  No known allergies        Silvio Yao PA-C

## 2023-05-01 NOTE — BRIEF OP NOTE (RAD/CATH)
IR BIOPSY BONE MARROW Procedure Note    PATIENT NAME: Olya Miller  : 1950  MRN: [id number]    Pre-op Diagnosis:   1  CLL (chronic lymphoid leukemia) in relapse (HCC)    2  Chromosomal abnormality      Post-op Diagnosis:   1   CLL (chronic lymphoid leukemia) in relapse ([facility] [address] )    2  Chromosomal abnormality        Provider:  Yasmine Stacy PA-C    No qualified resident was available, Resident is only observing    Estimated Blood Loss: minimal    Findings: right posterior iliac crest bone marrow aspiration and core biopsy    Specimens: sent    Complications:  None immediate    Anesthesia: conscious sedation and local    Yasmine Stacy PA-C     Date: 2023  Time: 9:45 AM

## 2023-05-01 NOTE — SEDATION DOCUMENTATION
IR bone marrow biopsy by RENARD Webb  Patient tolerated procedure well  Bandaid placed on biopsy site   Report called to [facility] - DIPAK POE RN and bed rest start time 947

## 2023-05-01 NOTE — DISCHARGE INSTRUCTIONS
Bone Marrow Biopsy     WHAT YOU NEED TO KNOW:   A bone marrow biopsy is a procedure to remove a small amount of bone marrow from your bone  Bone marrow is the soft tissue inside your bone that helps to make blood cells  The sample is tested for disease or infection  DISCHARGE INSTRUCTIONS:     1  Limit your activities day of biopsy as directed by your doctor  2  Use medication as ordered  3  Return to your normal diet  Small sips of flat soda will help with nausea  4  Remove band-aid or dressing 24 hours after procedure  Contact Interventional Radiology at [phone] Jodee PATIENTS: Contact Interventional Radiology at [phone]) Saúldarien Nunez PATIENTS: Contact Interventional Radiology at [phone]) if:    1  Difficulty breathing, nausea or vomiting  2  Chills or fever above 101 F     3  Pain at biopsy site not relieved by medication  4  Develop any redness, swelling, heat, unusual drainage, heavy bruising or bleeding from biopsy site  Moderate Sedation   WHAT YOU NEED TO KNOW:   Moderate sedation, or conscious sedation, is medicine used during procedures to help you feel relaxed and calm  You will be awake and able to follow directions without anxiety or pain  You will remember little to none of the procedure  You may feel tired, weak, or unsteady on your feet after you get sedation  You may also have trouble concentrating or short-term memory loss  These symptoms should go away in 24 hours or less  DISCHARGE INSTRUCTIONS:   Call 911 or have someone else call for any of the following: You have sudden trouble breathing  You cannot be woken  Seek care immediately if:   You have a severe headache or dizziness  Your heart is beating faster than usual   Contact your healthcare provider if:   You have a fever  You have nausea or are vomiting for more than 8 hours after the procedure  Your skin is itchy, swollen, or you have a rash       You have questions or concerns about your condition or care  Self-care:   Have someone stay with you for 24 hours  This person can drive you to errands and help you do things around the house  This person can also watch for problems  Rest and do quiet activities for 24 hours  Do not exercise, ride a bike, or play sports  Stand up slowly to prevent dizziness and falls  Take short walks around the house with another person  Slowly return to your usual activities the next day  Do not drive or use dangerous machines or tools for 24 hours  You may injure yourself or others  Examples include a lawnmower, saw, or drill  Do not return to work for 24 hours if you use dangerous machines or tools for work  Do not make important decisions for 24 hours  For example, do not sign important papers or invest money  Drink liquids as directed  Liquids help flush the sedation medicine out of your body  Ask how much liquid to drink each day and which liquids are best for you  Eat small, frequent meals to prevent nausea and vomiting  Start with clear liquids such as juice or broth  If you do not vomit after clear liquids, you can eat your usual foods  Do not drink alcohol or take medicines that make you drowsy  This includes medicines that help you sleep and anxiety medicines  Ask your healthcare provider if it is safe for you to take pain medicine  Follow up with your healthcare provider as directed: Write down your questions so you remember to ask them during your visits  © 2017 [employer] [employer] Information is for End User's use only and may not be sold, redistributed or otherwise used for commercial purposes  All illustrations and images included in CareNotes® are the copyrighted property of [employer]  or [employer]  The above information is an  only  It is not intended as medical advice for individual conditions or treatments   Talk to your doctor, nurse or pharmacist before following any medical regimen to see if it is safe and effective for you

## 2023-05-02 LAB — MISCELLANEOUS LAB TEST RESULT: NORMAL

## 2023-05-03 LAB — MISCELLANEOUS LAB TEST RESULT: NORMAL

## 2023-05-05 ENCOUNTER — TELEPHONE (OUTPATIENT)
Dept: HEMATOLOGY ONCOLOGY | Facility: CLINIC | Age: 73
End: 2023-05-05

## 2023-05-05 ENCOUNTER — APPOINTMENT (OUTPATIENT)
Dept: LAB | Age: 73
End: 2023-05-05

## 2023-05-05 DIAGNOSIS — Z95.2 S/P TAVR (TRANSCATHETER AORTIC VALVE REPLACEMENT): ICD-10-CM

## 2023-05-05 LAB — NT-PROBNP SERPL-MCNC: 126 PG/ML

## 2023-05-09 LAB
ANISOCYTOSIS BLD QL SMEAR: PRESENT
BASOPHILS # BLD MANUAL: 0 THOUSAND/UL (ref 0–0.1)
BASOPHILS NFR MAR MANUAL: 0 % (ref 0–1)
DIFFERENTIAL COMMENT: ABNORMAL
EOSINOPHIL # BLD MANUAL: 0 THOUSAND/UL (ref 0–0.4)
EOSINOPHIL NFR BLD MANUAL: 0 % (ref 0–6)
LYMPHOCYTES # BLD AUTO: 256.06 THOUSAND/UL (ref 0.6–4.47)
MACROCYTES BLD QL AUTO: PRESENT
MISCELLANEOUS LAB TEST RESULT: NORMAL
MONOCYTES # BLD AUTO: 0 THOUSAND/UL (ref 0–1.22)
MONOCYTES NFR BLD: 0 % (ref 4–12)
NEUTROPHILS # BLD MANUAL: 10.67 THOUSAND/UL (ref 1.85–7.62)
NEUTS SEG NFR BLD AUTO: 4 % (ref 43–75)
PLATELET BLD QL SMEAR: ABNORMAL
POIKILOCYTOSIS BLD QL SMEAR: PRESENT
POLYCHROMASIA BLD QL SMEAR: PRESENT
RBC MORPH BLD: PRESENT
VARIANT LYMPHS # BLD AUTO: 96 %

## 2023-05-10 LAB
MISCELLANEOUS LAB TEST RESULT: NORMAL
MISCELLANEOUS LAB TEST RESULT: NORMAL

## 2023-05-11 ENCOUNTER — APPOINTMENT (OUTPATIENT)
Dept: LAB | Age: 73
End: 2023-05-11

## 2023-05-11 DIAGNOSIS — C91.12 CLL (CHRONIC LYMPHOID LEUKEMIA) IN RELAPSE (HCC): ICD-10-CM

## 2023-05-11 LAB
ALBUMIN SERPL BCP-MCNC: 4.4 G/DL (ref 3.5–5)
ALP SERPL-CCNC: 101 U/L (ref 46–116)
ALT SERPL W P-5'-P-CCNC: 20 U/L (ref 12–78)
ANION GAP SERPL CALCULATED.3IONS-SCNC: 4 MMOL/L (ref 4–13)
ANISOCYTOSIS BLD QL SMEAR: PRESENT
AST SERPL W P-5'-P-CCNC: 17 U/L (ref 5–45)
BASOPHILS # BLD MANUAL: 0 THOUSAND/UL (ref 0–0.1)
BASOPHILS NFR MAR MANUAL: 0 % (ref 0–1)
BILIRUB SERPL-MCNC: 0.32 MG/DL (ref 0.2–1)
BUN SERPL-MCNC: 23 MG/DL (ref 5–25)
CALCIUM SERPL-MCNC: 10.5 MG/DL (ref 8.3–10.1)
CHLORIDE SERPL-SCNC: 111 MMOL/L (ref 96–108)
CO2 SERPL-SCNC: 23 MMOL/L (ref 21–32)
CREAT SERPL-MCNC: 1.71 MG/DL (ref 0.6–1.3)
EOSINOPHIL # BLD MANUAL: 0 THOUSAND/UL (ref 0–0.4)
EOSINOPHIL NFR BLD MANUAL: 0 % (ref 0–6)
ERYTHROCYTE [DISTWIDTH] IN BLOOD BY AUTOMATED COUNT: 18 % (ref 11.6–15.1)
GFR SERPL CREATININE-BSD FRML MDRD: 39 ML/MIN/1.73SQ M
GLUCOSE SERPL-MCNC: 108 MG/DL (ref 65–140)
HCT VFR BLD AUTO: 35.3 % (ref 36.5–49.3)
HGB BLD-MCNC: 10.1 G/DL (ref 12–17)
LYMPHOCYTES # BLD AUTO: 246.57 THOUSAND/UL (ref 0.6–4.47)
LYMPHOCYTES # BLD AUTO: 99 % (ref 14–44)
MACROCYTES BLD QL AUTO: PRESENT
MCH RBC QN AUTO: 31.4 PG (ref 26.8–34.3)
MCHC RBC AUTO-ENTMCNC: 28.6 G/DL (ref 31.4–37.4)
MCV RBC AUTO: 110 FL (ref 82–98)
MONOCYTES # BLD AUTO: 0 THOUSAND/UL (ref 0–1.22)
MONOCYTES NFR BLD: 0 % (ref 4–12)
NEUTROPHILS # BLD MANUAL: 2.49 THOUSAND/UL (ref 1.85–7.62)
NEUTS SEG NFR BLD AUTO: 1 % (ref 43–75)
PLATELET # BLD AUTO: 120 THOUSANDS/UL (ref 149–390)
PLATELET BLD QL SMEAR: ABNORMAL
PMV BLD AUTO: 10.3 FL (ref 8.9–12.7)
POTASSIUM SERPL-SCNC: 4.9 MMOL/L (ref 3.5–5.3)
PROT SERPL-MCNC: 7.4 G/DL (ref 6.4–8.4)
RBC # BLD AUTO: 3.22 MILLION/UL (ref 3.88–5.62)
RBC MORPH BLD: PRESENT
SODIUM SERPL-SCNC: 138 MMOL/L (ref 135–147)
URATE SERPL-MCNC: 4.1 MG/DL (ref 3.5–8.5)
WBC # BLD AUTO: 249.06 THOUSAND/UL (ref 4.31–10.16)

## 2023-05-12 ENCOUNTER — OFFICE VISIT (OUTPATIENT)
Dept: HEMATOLOGY ONCOLOGY | Facility: CLINIC | Age: 73
End: 2023-05-12

## 2023-05-12 VITALS
DIASTOLIC BLOOD PRESSURE: 68 MMHG | BODY MASS INDEX: 25.92 KG/M2 | SYSTOLIC BLOOD PRESSURE: 122 MMHG | OXYGEN SATURATION: 98 % | HEIGHT: 69 IN | WEIGHT: 175 LBS | RESPIRATION RATE: 17 BRPM | HEART RATE: 117 BPM

## 2023-05-12 DIAGNOSIS — D69.6 THROMBOCYTOPENIA (HCC): ICD-10-CM

## 2023-05-12 DIAGNOSIS — Z95.2 S/P TAVR (TRANSCATHETER AORTIC VALVE REPLACEMENT): ICD-10-CM

## 2023-05-12 DIAGNOSIS — I25.10 CORONARY ARTERY DISEASE INVOLVING NATIVE CORONARY ARTERY OF NATIVE HEART WITHOUT ANGINA PECTORIS: ICD-10-CM

## 2023-05-12 DIAGNOSIS — C91.12 CLL (CHRONIC LYMPHOID LEUKEMIA) IN RELAPSE (HCC): Primary | ICD-10-CM

## 2023-05-12 DIAGNOSIS — N18.30 STAGE 3 CHRONIC KIDNEY DISEASE, UNSPECIFIED WHETHER STAGE 3A OR 3B CKD (HCC): ICD-10-CM

## 2023-05-12 DIAGNOSIS — D64.9 ANEMIA, UNSPECIFIED TYPE: ICD-10-CM

## 2023-05-12 NOTE — PROGRESS NOTES
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 and since April 2023 he has been on zanubrutinib and he has been tolerating that without much problem  No bleeding  No atrial fibrillation and no other symptoms secondary to zanubrutinib  He has tiredness and exertional dyspnea and the symptoms could be secondary to CLL and cardiac in origin because he has underlying cardiac disorder and had TAVR for aortic stenosis in July 2022 followed by cardiac rehabilitation  He did not go back to playing golf  Has night sweats  Has arthritic symptoms  Recurrent scalp lesion and he follows with his dermatologist and he states scalp lesions are not malignant  CLL was diagnosed several years ago and he had chlorambucil  His most recent treatment was in 2014 when he had Rituxan and Bendamustine  He had gallbladder surgery on 11/10/2022 that showed chronic cholecystitis with cholelithiasis and 1 lymph node sample in the gallbladder specimen showed CLL  No other lymphadenopathy in the abdomen on MRI scan and spleen size was 16 cm on MRI scan  History of herpes zoster on his right lower leg treated with Valtrex            Current Outpatient Medications:   •  acetaminophen (TYLENOL) 325 mg tablet, Take 2 tablets (650 mg total) by mouth every 4 (four) hours as needed for fever or moderate pain (temperature greater than 101 F), Disp: 100 tablet, Rfl: 0  •  acyclovir (ZOVIRAX) 400 MG tablet, Take 1 tablet (400 mg total) by mouth 2 (two) times a day, Disp: 60 tablet, Rfl: 11  •  allopurinol (ZYLOPRIM) 100 mg tablet, TAKE 1 TABLET(100 MG) BY MOUTH TWICE DAILY, Disp: 180 tablet, Rfl: 1  •  amoxicillin (AMOXIL) 500 MG tablet, Take 4 tablets (2,000 mg total) by mouth once as needed (Take 60 minutes prior to ANY dental work) for up to 1 dose, Disp: 4 tablet, Rfl: 1  •  Ascorbic Acid (VITAMIN C PO), Take by mouth daily , Disp: , Rfl:   •  chlorhexidine (PERIDEX) 0 12 % solution, , "Disp: , Rfl:   •  fexofenadine-pseudoephedrine (ALLEGRA-D 24) 180-240 MG per 24 hr tablet, Take 1 tablet by mouth as needed , Disp: , Rfl:   •  VITAMIN D PO, Take by mouth daily , Disp: , Rfl:   •  VITAMIN E PO, Take by mouth daily , Disp: , Rfl:   •  Zanubrutinib 80 MG CAPS, Take 160 mg by mouth 2 (two) times a day, Disp: 120 capsule, Rfl: 11    No Known Allergies    Oncology History Overview Note   1996:  CLL was diagnosed  Intermittently he received chlorambucil over the years  2014:  6 cycles of Rituxan and bendamustine  Presently under surveillance  CLL (chronic lymphoid leukemia) in relapse [facility])    Chemotherapy       1996:  CLL was diagnosed  Intermittent therapy with chlorambucil  2014:  6 cycles of Rituxan and bendamustine  Presently under surveillance  ROS:  05/12/23 Reviewed 12 systems:  See symptoms in HPI  Presently no other neurological, cardiac, pulmonary, GI and  symptoms  Other symptoms are in HPI  No symptoms like fever, chills, bleeding, bone pains, skin rash, weight loss,   weakness, numbness,  claudication and gait problem  No frequent infections   Not unusually sensitive to heat or cold  No swelling of the ankles  No swollen glands  Patient is  anxious  Physical Exam:      Vitals from encounters over the past 365 days      5/12/23 5/1/23 4/7/23   Blood Pressure 122/68 135/69 150/74   Pulse 117 Abnormal  74 105   Respirations 17 18 17   Temperature -- 97 2 °F (36 2 °C) Abnormal  --   Temp Source -- Temporal --   SpO2 98 % 100 % 100 %   Weight - Scale 79 4 kg (175 lb) 78 kg (172 lb) 80 7 kg (178 lb)   Height 5' 9\" (1 753 m) 5' 9\" (1 753 m) 5' 9\" (1 753 m)   Pain Score Zero -- Zero     Specialty Comments        Heart rate 102 when I took it    Alert and oriented and not in distress  Vitals are above  There is no icterus  There is no oral thrush  There are couple small palpable lymph nodes in the neck  Lung fields are clear to percussion and auscultation    " Regular heart rate  Systolic murmur  Liver and spleen are not palpably enlarged  No palpable abdominal mass  No ascites  No edema of ankles  No calf tenderness  No focal neurological deficit, no skin rash,  no palpable lymphadenopathy other than a couple of small palpable lymph nodes in the neck,  no clubbing  Patient is anxious  Performance status 2      IMAGING:  IMPRESSION:     No choledocholithiasis is seen  No biliary dilation  Distended gallbladder with pericholecystic fluid and cholelithiasis correlated with the acute cholecystitis  Splenomegaly with spleen measuring about 16 cm can be correlated with the patient's history of CLL  Mild edema seen in the mesentery as seen on the CT              Workstation performed: [id number]        Imaging    MRI abdomen wo contrast and mrcp (Order: [id number]) - 11/8/2022    Blood tests done on 3/30/2023  LABS:    Results for orders placed or performed in visit on 05/11/23   CBC and differential   Result Value Ref Range     06 (HH) 4 31 - 10 16 Thousand/uL    RBC 3 22 (L) 3 88 - 5 62 Million/uL    Hemoglobin 10 1 (L) 12 0 - 17 0 g/dL    Hematocrit 35 3 (L) 36 5 - 49 3 %     (H) 82 - 98 fL    MCH 31 4 26 8 - 34 3 pg    MCHC 28 6 (L) 31 4 - 37 4 g/dL    RDW 18 0 (H) 11 6 - 15 1 %    MPV 10 3 8 9 - 12 7 fL    Platelets 501 (L) 503 - 390 Thousands/uL   Comprehensive metabolic panel   Result Value Ref Range    Sodium 138 135 - 147 mmol/L    Potassium 4 9 3 5 - 5 3 mmol/L    Chloride 111 (H) 96 - 108 mmol/L    CO2 23 21 - 32 mmol/L    ANION GAP 4 4 - 13 mmol/L    BUN 23 5 - 25 mg/dL    Creatinine 1 71 (H) 0 60 - 1 30 mg/dL    Glucose 108 65 - 140 mg/dL    Calcium 10 5 (H) 8 3 - 10 1 mg/dL    AST 17 5 - 45 U/L    ALT 20 12 - 78 U/L    Alkaline Phosphatase 101 46 - 116 U/L    Total Protein 7 4 6 4 - 8 4 g/dL    Albumin 4 4 3 5 - 5 0 g/dL    Total Bilirubin 0 32 0 20 - 1 00 mg/dL    eGFR 39 ml/min/1 73sq m   Uric acid   Result Value Ref Range    Uric Acid 4 1 3 5 - 8 5 mg/dL   Manual Differential(PHLEBS Do Not Order)   Result Value Ref Range    Segmented % 1 (L) 43 - 75 %    Lymphocytes % 99 (H) 14 - 44 %    Monocytes % 0 (L) 4 - 12 %    Eosinophils, % 0 0 - 6 %    Basophils % 0 0 - 1 %    Absolute Neutrophils 2 49 1 85 - 7 62 Thousand/uL    Lymphocytes Absolute 246 57 (H) 0 60 - 4 47 Thousand/uL    Monocytes Absolute 0 00 0 00 - 1 22 Thousand/uL    Eosinophils Absolute 0 00 0 00 - 0 40 Thousand/uL    Basophils Absolute 0 00 0 00 - 0 10 Thousand/uL    Total Counted      RBC Morphology Present     Anisocytosis Present     Macrocytes Present     Platelet Estimate Decreased (A) Adequate       Tissue Exam: [id number]  Order: [id number]   Collected 11/10/2022  9:07 AM      Status: Final result      Visible to patient: Yes (seen)      Dx:  Abdominal pain      0 Result Notes  Component    Case Report   Surgical Pathology Report                         Case: [id number]                                    Authorizing Provider: Selam Flores MD          Collected:           11/10/2022 0907               Ordering Location:     [facility]      Received:            11/10/2022 1059                                      [facility] Operating Room                                                       Pathologist:           Ana Dalton MD                                                          Specimen:    Gallbladder, GALLBLADDER                                                                   Final Diagnosis   A  Gallbladder and lymph node (1), cholecystectomy:      - Chronic lymphocytic leukemia/small lymphocytic lymphoma (CLL/SLL) involving one lymph node and gallbladder, see note       - Chronic cholecystitis with cholelithiasis   Electronically signed by Ana Dalton MD on 11/23/2022 at 11:31 AM   Note           Labs, Imaging, & Other studies:   All pertinent labs and imaging studies were personally reviewed            Reviewed and discussed with patient  Assessment and plan:    Patient Follow-up visit for 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 and since April 2023 he has been on zanubrutinib and he has been tolerating that without much problem  No bleeding  No atrial fibrillation and no other symptoms secondary to zanubrutinib  He has tiredness and exertional dyspnea and the symptoms could be secondary to CLL and cardiac in origin because he has underlying cardiac disorder and had TAVR for aortic stenosis in July 2022 followed by cardiac rehabilitation  He did not go back to playing golf  Has night sweats  Has arthritic symptoms  Recurrent scalp lesion and he follows with his dermatologist and he states scalp lesions are not malignant  CLL was diagnosed several years ago and he had chlorambucil  His most recent treatment was in 2014 when he had Rituxan and Bendamustine  He had gallbladder surgery on 11/10/2022 that showed chronic cholecystitis with cholelithiasis and 1 lymph node sample in the gallbladder specimen showed CLL  No other lymphadenopathy in the abdomen on MRI scan and spleen size was 16 cm on MRI scan  History of herpes zoster on his right lower leg treated with Valtrex  Physical examination and test results are as recorded and discussed  Patient is being continued on zanubrutinib  Patient's condition, counts, chemistry and other CLL parameters are being monitored  He follows with Dr Margaret Johnson at [facility]  for CLL for his expert advice  Discussed patient's condition with him in detail with explaned  Questions answered  He is taking allopurinol and acyclovir  Discussed the importance of eating healthy foods, staying active as tolerated and health screening tests   Patient is capable of self-care  Discussed precautions against coronavirus and other infections  He will continue to follow with primary physician and other consultants  See diagnoses, orders and instructions     1   CLL (chronic lymphoid leukemia) in relapse ([facility] )      2  Anemia, unspecified type      3  Stage 3 chronic kidney disease, unspecified whether stage 3a or 3b CKD ([place] [address] )      4  Thrombocytopenia ([place] [address] )      5  Coronary artery disease involving native coronary artery of native heart without angina pectoris      6  S/P TAVR (transcatheter aortic valve replacement)    No change in zanubrutinib  Blood work every week as before  Follow-up in 6 weeks  I used a dictation device to dictate this note and there could be mistakes in my note and patient may contact my office for that      Patient voiced understanding and agrees      Counseling / Coordination of Care  Provided counseling and support

## 2023-05-16 LAB — MISCELLANEOUS LAB TEST RESULT: NORMAL

## 2023-05-19 ENCOUNTER — APPOINTMENT (OUTPATIENT)
Dept: LAB | Age: 73
End: 2023-05-19

## 2023-05-19 ENCOUNTER — TELEPHONE (OUTPATIENT)
Dept: OTHER | Facility: HOSPITAL | Age: 73
End: 2023-05-19

## 2023-05-19 ENCOUNTER — TELEPHONE (OUTPATIENT)
Dept: OTHER | Facility: OTHER | Age: 73
End: 2023-05-19

## 2023-05-19 LAB — MISCELLANEOUS LAB TEST RESULT: NORMAL

## 2023-05-20 NOTE — TELEPHONE ENCOUNTER
Was informed while on call regarding critical lab value, WBC 199k  Chart reviewed  Mr  Luba Bartlett is a 71 Y M with known CLL with 17p deletion and IgVH mutation  He is currently on treatment with zanabrutinib, since April 2023  Reported WBC count of 199k is improved from his WBC count of 249k from 1 week prior and 273k from 4/21/23, indicative of patient responding to current treatment with zanabrutinib  CBC or differential values not yet available on Epic for me to review, but given the overall decreasing trend in WBC counts, didn't feel an urgent need to call the patient  Forwarding this to primary team so that they are also aware of the situation above

## 2023-05-20 NOTE — TELEPHONE ENCOUNTER
Lab Result: White count 199 21   Date/Time Drawn: 05/19/2023 @ 10:59   Ordering Provider: Dr Nhan Saul Name: Jose Lombardo        The following critical/stat result was read back to the lab as stated above and [employer] to the on-call provider  The provider confirmed receipt of the message

## 2023-05-24 LAB — MISCELLANEOUS LAB TEST RESULT: NORMAL

## 2023-05-26 ENCOUNTER — APPOINTMENT (OUTPATIENT)
Dept: LAB | Age: 73
End: 2023-05-26

## 2023-05-31 DIAGNOSIS — C91.12 CLL (CHRONIC LYMPHOID LEUKEMIA) IN RELAPSE (HCC): ICD-10-CM

## 2023-05-31 RX ORDER — ALLOPURINOL 100 MG/1
TABLET ORAL
Qty: 60 TABLET | Refills: 1 | Status: SHIPPED | OUTPATIENT
Start: 2023-05-31 | End: 2023-06-02

## 2023-06-02 ENCOUNTER — TELEPHONE (OUTPATIENT)
Dept: OTHER | Facility: OTHER | Age: 73
End: 2023-06-02

## 2023-06-02 ENCOUNTER — APPOINTMENT (OUTPATIENT)
Dept: LAB | Age: 73
End: 2023-06-02
Payer: MEDICARE

## 2023-06-02 DIAGNOSIS — C91.12 CLL (CHRONIC LYMPHOID LEUKEMIA) IN RELAPSE (HCC): ICD-10-CM

## 2023-06-02 DIAGNOSIS — D64.9 ANEMIA, UNSPECIFIED TYPE: ICD-10-CM

## 2023-06-02 DIAGNOSIS — C91.12 CLL (CHRONIC LYMPHOID LEUKEMIA) IN RELAPSE (HCC): Primary | ICD-10-CM

## 2023-06-02 LAB
ALBUMIN SERPL BCP-MCNC: 4.1 G/DL (ref 3.5–5)
ALP SERPL-CCNC: 81 U/L (ref 46–116)
ALT SERPL W P-5'-P-CCNC: 24 U/L (ref 12–78)
ANION GAP SERPL CALCULATED.3IONS-SCNC: 3 MMOL/L (ref 4–13)
ANISOCYTOSIS BLD QL SMEAR: PRESENT
AST SERPL W P-5'-P-CCNC: 27 U/L (ref 5–45)
BASOPHILS # BLD MANUAL: 0 THOUSAND/UL (ref 0–0.1)
BASOPHILS NFR MAR MANUAL: 0 % (ref 0–1)
BILIRUB SERPL-MCNC: 0.41 MG/DL (ref 0.2–1)
BUN SERPL-MCNC: 23 MG/DL (ref 5–25)
CALCIUM SERPL-MCNC: 9.9 MG/DL (ref 8.3–10.1)
CHLORIDE SERPL-SCNC: 109 MMOL/L (ref 96–108)
CO2 SERPL-SCNC: 23 MMOL/L (ref 21–32)
CREAT SERPL-MCNC: 1.51 MG/DL (ref 0.6–1.3)
EOSINOPHIL # BLD MANUAL: 1.88 THOUSAND/UL (ref 0–0.4)
EOSINOPHIL NFR BLD MANUAL: 1 % (ref 0–6)
ERYTHROCYTE [DISTWIDTH] IN BLOOD BY AUTOMATED COUNT: 16.4 % (ref 11.6–15.1)
GFR SERPL CREATININE-BSD FRML MDRD: 45 ML/MIN/1.73SQ M
GLUCOSE SERPL-MCNC: 102 MG/DL (ref 65–140)
HCT VFR BLD AUTO: 34.9 % (ref 36.5–49.3)
HGB BLD-MCNC: 10.3 G/DL (ref 12–17)
LYMPHOCYTES # BLD AUTO: 156.28 THOUSAND/UL (ref 0.6–4.47)
LYMPHOCYTES # BLD AUTO: 83 % (ref 14–44)
MACROCYTES BLD QL AUTO: PRESENT
MCH RBC QN AUTO: 31.8 PG (ref 26.8–34.3)
MCHC RBC AUTO-ENTMCNC: 29.5 G/DL (ref 31.4–37.4)
MCV RBC AUTO: 108 FL (ref 82–98)
MONOCYTES # BLD AUTO: 0 THOUSAND/UL (ref 0–1.22)
MONOCYTES NFR BLD: 0 % (ref 4–12)
NEUTROPHILS # BLD MANUAL: 5.65 THOUSAND/UL (ref 1.85–7.62)
NEUTS SEG NFR BLD AUTO: 3 % (ref 43–75)
PLATELET # BLD AUTO: 124 THOUSANDS/UL (ref 149–390)
PLATELET BLD QL SMEAR: ABNORMAL
PMV BLD AUTO: 10.8 FL (ref 8.9–12.7)
POTASSIUM SERPL-SCNC: 5.2 MMOL/L (ref 3.5–5.3)
PROT SERPL-MCNC: 6.8 G/DL (ref 6.4–8.4)
RBC # BLD AUTO: 3.24 MILLION/UL (ref 3.88–5.62)
SODIUM SERPL-SCNC: 135 MMOL/L (ref 135–147)
VARIANT LYMPHS # BLD AUTO: 13 %
WBC # BLD AUTO: 188.29 THOUSAND/UL (ref 4.31–10.16)

## 2023-06-02 RX ORDER — ALLOPURINOL 100 MG/1
TABLET ORAL
Qty: 180 TABLET | Refills: 1 | Status: SHIPPED | OUTPATIENT
Start: 2023-06-02

## 2023-06-02 RX ORDER — ALLOPURINOL 100 MG/1
TABLET ORAL
Qty: 60 TABLET | Refills: 1 | Status: SHIPPED | OUTPATIENT
Start: 2023-06-02

## 2023-06-02 NOTE — TELEPHONE ENCOUNTER
Lab Result: Critical Lab White Blood Cell Count 188 29   Date/Time Drawn: 6/2/23  @  11:24 AM    Ordering Provider: Dr Kaelyn Thurston Name: Beulah/[facility] Sophy Elder following critical/stat result was read back to the lab as stated above and [employer] to the on-call provider  The provider confirmed receipt of the message

## 2023-06-09 ENCOUNTER — APPOINTMENT (OUTPATIENT)
Dept: LAB | Age: 73
End: 2023-06-09
Payer: MEDICARE

## 2023-06-12 LAB — MISCELLANEOUS LAB TEST RESULT: NORMAL

## 2023-06-16 ENCOUNTER — APPOINTMENT (OUTPATIENT)
Dept: LAB | Age: 73
End: 2023-06-16
Payer: MEDICARE

## 2023-06-21 ENCOUNTER — HOSPITAL ENCOUNTER (OUTPATIENT)
Dept: NON INVASIVE DIAGNOSTICS | Facility: CLINIC | Age: 73
Discharge: HOME | End: 2023-06-21
Payer: MEDICARE

## 2023-06-21 VITALS
HEART RATE: 65 BPM | BODY MASS INDEX: 25.92 KG/M2 | SYSTOLIC BLOOD PRESSURE: 122 MMHG | DIASTOLIC BLOOD PRESSURE: 68 MMHG | HEIGHT: 69 IN | WEIGHT: 175 LBS

## 2023-06-21 DIAGNOSIS — Z95.2 S/P TAVR (TRANSCATHETER AORTIC VALVE REPLACEMENT): ICD-10-CM

## 2023-06-21 LAB
AORTIC ROOT: 3.3 CM
AORTIC VALVE MEAN VELOCITY: 13.1 M/S
APICAL FOUR CHAMBER EJECTION FRACTION: 66 %
ASCENDING AORTA: 3.3 CM
AV AREA BY CONTINUOUS VTI: 1.8 CM2
AV AREA PEAK VELOCITY: 1.8 CM2
AV LVOT MEAN GRADIENT: 3 MMHG
AV LVOT PEAK GRADIENT: 6 MMHG
AV MEAN GRADIENT: 8 MMHG
AV PEAK GRADIENT: 15 MMHG
AV VALVE AREA: 1.78 CM2
AV VELOCITY RATIO: 0.64
DOP CALC AO PEAK VEL: 1.93 M/S
DOP CALC AO VTI: 46.19 CM
DOP CALC LVOT AREA: 2.83 CM2
DOP CALC LVOT DIAMETER: 1.9 CM
DOP CALC LVOT PEAK VEL VTI: 29.08 CM
DOP CALC LVOT PEAK VEL: 1.24 M/S
DOP CALC LVOT STROKE INDEX: 43.1 ML/M2
DOP CALC LVOT STROKE VOLUME: 82.41 CM3
E WAVE DECELERATION TIME: 290 MS
FRACTIONAL SHORTENING: 35 % (ref 28–44)
INTERVENTRICULAR SEPTUM IN DIASTOLE (PARASTERNAL SHORT AXIS VIEW): 0.9 CM
INTERVENTRICULAR SEPTUM: 0.9 CM (ref 0.6–1.1)
LAAS-AP2: 16.7 CM2
LAAS-AP4: 22 CM2
LEFT ATRIUM AREA SYSTOLE SINGLE PLANE A4C: 19.6 CM2
LEFT ATRIUM SIZE: 3.8 CM
LEFT INTERNAL DIMENSION IN SYSTOLE: 3.1 CM (ref 2.1–4)
LEFT VENTRICLE DIASTOLIC VOLUME (MOD BIPLANE): 135 ML
LEFT VENTRICLE SYSTOLIC VOLUME (MOD BIPLANE): 45 ML
LEFT VENTRICULAR INTERNAL DIMENSION IN DIASTOLE: 4.8 CM (ref 3.5–6)
LEFT VENTRICULAR POSTERIOR WALL IN END DIASTOLE: 1.1 CM
LEFT VENTRICULAR STROKE VOLUME: 69 ML
LV EF: 67 %
LVSV (TEICH): 69 ML
MV E'TISSUE VEL-SEP: 9 CM/S
MV PEAK A VEL: 1.16 M/S
MV PEAK E VEL: 95 CM/S
MV STENOSIS PRESSURE HALF TIME: 84 MS
MV VALVE AREA P 1/2 METHOD: 2.62 CM2
RA PRESSURE ESTIMATED: 3 MMHG
RIGHT ATRIUM AREA SYSTOLE A4C: 12 CM2
RIGHT VENTRICLE ID DIMENSION: 3.6 CM
RV PSP: 21 MMHG
SL CV LEFT ATRIUM LENGTH A2C: 5.6 CM
SL CV LV EF: 65
SL CV PED ECHO LEFT VENTRICLE DIASTOLIC VOLUME (MOD BIPLANE) 2D: 107 ML
SL CV PED ECHO LEFT VENTRICLE SYSTOLIC VOLUME (MOD BIPLANE) 2D: 38 ML
TR MAX PG: 18 MMHG
TR PEAK VELOCITY: 2.1 M/S
TRICUSPID ANNULAR PLANE SYSTOLIC EXCURSION: 2.7 CM
TRICUSPID VALVE PEAK REGURGITATION VELOCITY: 2.12 M/S

## 2023-06-21 PROCEDURE — 93005 ELECTROCARDIOGRAM TRACING: CPT

## 2023-06-21 PROCEDURE — 93306 TTE W/DOPPLER COMPLETE: CPT | Performed by: INTERNAL MEDICINE

## 2023-06-21 PROCEDURE — 93306 TTE W/DOPPLER COMPLETE: CPT

## 2023-06-22 LAB
ATRIAL RATE: 62 BPM
P AXIS: 37 DEGREES
PR INTERVAL: 214 MS
QRS AXIS: 64 DEGREES
QRSD INTERVAL: 80 MS
QT INTERVAL: 388 MS
QTC INTERVAL: 393 MS
T WAVE AXIS: 44 DEGREES
VENTRICULAR RATE: 62 BPM

## 2023-06-22 PROCEDURE — 93010 ELECTROCARDIOGRAM REPORT: CPT | Performed by: INTERNAL MEDICINE

## 2023-06-23 ENCOUNTER — OFFICE VISIT (OUTPATIENT)
Dept: HEMATOLOGY ONCOLOGY | Facility: CLINIC | Age: 73
End: 2023-06-23
Payer: MEDICARE

## 2023-06-23 VITALS
HEART RATE: 67 BPM | TEMPERATURE: 97.6 F | BODY MASS INDEX: 25.62 KG/M2 | SYSTOLIC BLOOD PRESSURE: 150 MMHG | OXYGEN SATURATION: 99 % | WEIGHT: 173 LBS | HEIGHT: 69 IN | DIASTOLIC BLOOD PRESSURE: 88 MMHG

## 2023-06-23 DIAGNOSIS — C91.12 CLL (CHRONIC LYMPHOID LEUKEMIA) IN RELAPSE (HCC): Primary | ICD-10-CM

## 2023-06-23 PROCEDURE — 99214 OFFICE O/P EST MOD 30 MIN: CPT | Performed by: PHYSICIAN ASSISTANT

## 2023-06-26 LAB — MISCELLANEOUS LAB TEST RESULT: NORMAL

## 2023-06-26 NOTE — PROGRESS NOTES
Hematology/Oncology Outpatient Follow-up  Iam Peng 71 y o  male 1950 [id number]    Date:  6/26/2023      Assessment and Plan:    1  CLL (chronic lymphoid leukemia) in relapse [facility])  69-year-old male presents for follow-up visit regarding history of CLL  He is on treatment with zanubrutinib  He is tolerating well  He has no reported side effects  White blood cell count is slowly coming down  Hemoglobin has improved  Both show that he is responding to therapy  He has been checking labs weekly  He can change this to monthly  Call with any concerns  Follow-up 3 months  HPI:  69-year-old male presents for follow-up as regarding history of CLL  He was diagnosed with CLL in 1996  He had received chlorambucil intermittently, 6 cycles of Rituxan and bendamustine in 2014 in no treatment since  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  Since April 2023 he has been on zanubrutinib  Patient had bone marrow biopsy on 5/1/2023  This showed diffuse involvement 90 to 95% of patients CLL  Cytogenetics showed deletion of chromosome 13 q , deletion of chromosome 17 P, IgH gene rearrangement  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 with Dr Anuradha James  Oncology History Overview Note   1996:  CLL was diagnosed  Intermittently he received chlorambucil over the years  2014:  6 cycles of Rituxan and bendamustine  Presently under surveillance  CLL (chronic lymphoid leukemia) in relapse [facility])    Chemotherapy       1996:  CLL was diagnosed  Intermittent therapy with chlorambucil  2014:  6 cycles of Rituxan and bendamustine  Presently under surveillance  6/26/2023 -  Cancer Staged    Staging form: Chronic Lymphocytic Leukemia, AJCC 8th Edition  - Clinical: Modified Tolliver Stage IV (Modified Tolliver risk: High, Lymphocytosis: Present, Adenopathy: Unknown, Organomegaly: Present, Anemia: Present, Thrombocytopenia: Present) - Signed by Zandra Baldwin PA-C on 6/26/2023           Interval history:    ROS: Review of Systems   Constitutional: Positive for fatigue  Negative for appetite change, chills, fever and unexpected weight change  Respiratory: Negative for cough and shortness of breath  Cardiovascular: Negative for chest pain, palpitations and leg swelling  Gastrointestinal: Negative for abdominal pain, constipation, diarrhea, nausea and vomiting  Genitourinary: Negative for difficulty urinating, dysuria and hematuria  Musculoskeletal: Negative for arthralgias  Skin: Negative  Neurological: Negative for dizziness, weakness, light-headedness, numbness and headaches  Hematological: Negative  Psychiatric/Behavioral: Negative  Past Medical History:   Diagnosis Date   • Basal cell carcinoma (BCC) of skin of nose    • History of chemotherapy    • History of colonoscopy 2012   • Lymphocytic leukemia ([facility] [address] )        Past Surgical History:   Procedure Laterality Date   • CARDIAC CATHETERIZATION N/A 6/15/2022    Procedure: Cardiac Coronary Angiogram;  Surgeon: Natalia Cantu MD;  Location: [facility] CARD[facility];   Service: Cardiology   • CARDIAC CATHETERIZATION N/A 7/12/2022    Procedure: CARDIAC TAVR;  Surgeon: Gabbie Lion MD;  Location: [facility] MAIN OR;  Service: Cardiology   • CATARACT EXTRACTION Bilateral    • CHOLECYSTECTOMY LAPAROSCOPIC N/A 11/10/2022    Procedure: CHOLECYSTECTOMY LAPAROSCOPIC;  Surgeon: Alejandro Parisi MD;  Location: [facility] MAIN [facility];  Service: General   • FLAP LOCAL HEAD / NECK N/A 4/14/2020    Procedure: ADJACENT TISSUE TRANSFER  FOREHEAD FLAP,  CARTILAGE GRAFT NOSE;  Surgeon: Jaleel Dumas MD;  Location: [facility]IN [facility]; Service: Plastics   • FLAP LOCAL HEAD / NECK N/A 6/17/2020    Procedure: DIVISION INSET FOREHEAD FLAP; FULL THICKNESS SKIN GRAFT TO FOREHEAD;  Surgeon: Kayode Hirsch MD;  Location: [facility];  Service: Plastics   • FULL THICKNESS SKIN GRAFT N/A 4/14/2020    Procedure: SKIN GRAFT FULL THICKNESS  (FTSG) NOSE;  Surgeon: Kayode Hirsch MD;  Location: [facility];  Service: Plastics   • IR BIOPSY BONE MARROW  5/1/2023   • MOHS RECONSTRUCTION N/A 4/14/2020    Procedure: MOHS RECONSTRUCTION NOSE DEFECT;  Surgeon: Kayode Hirsch MD;  Location: [facility];  Service: Plastics   • MOHS RECONSTRUCTION N/A 5/4/2020    Procedure: RECONSTRUCTION MOHS NASAL DEFECT WITH EAR CARTILAGE GRAFT;  Surgeon: Kayode Hirsch MD;  [facility];  Service: Plastics   • REPLACEMENT AORTIC VALVE TRANSCATHETER (TAVR) N/A 7/12/2022    Procedure: REPLACEMENT AORTIC VALVE TRANSCATHETER (TAVR) TRANSFEMORAL W/ 26MM LEE CLINT S3 ULTRA VALVE(ACCESS ON LEFT) PARISA;  Surgeon: Nila Downs MD;  Location: [facility];  Service: Cardiac Surgery   • SHOULDER SURGERY     • TIBIA FRACTURE SURGERY     • TONSILLECTOMY         Social History     Socioeconomic History   • Marital status:       Spouse name: Not on file   • Number of children: Not on file   • Years of education: Not on file   • Highest education level: Not on file   Occupational History   • Not on file   Tobacco Use   • Smoking status: Never   • Smokeless tobacco: Never   Vaping Use   • Vaping Use: Never used   Substance and Sexual Activity   • Alcohol use: Not Currently     Comment: rare   • Drug use: Never   • Sexual activity: Not on file   Other Topics Concern   • Not on file   Social History Narrative   • Not on file     Social Determinants of Health     Financial Resource Strain: Not on file   Food Insecurity: Not on file   Transportation Needs: Not on file   Physical Activity: Not on file   Stress: Not on file   Social Connections: Not "on file   Intimate Partner Violence: Not on file   Housing Stability: Not on file       Family History   Problem Relation Age of Onset   • Stroke Mother    • No Known Problems Father        No Known Allergies      Current Outpatient Medications:   •  acetaminophen (TYLENOL) 325 mg tablet, Take 2 tablets (650 mg total) by mouth every 4 (four) hours as needed for fever or moderate pain (temperature greater than 101 F), Disp: 100 tablet, Rfl: 0  •  acyclovir (ZOVIRAX) 400 MG tablet, Take 1 tablet (400 mg total) by mouth 2 (two) times a day, Disp: 60 tablet, Rfl: 11  •  allopurinol (ZYLOPRIM) 100 mg tablet, TAKE 1 TABLET(100 MG) BY MOUTH TWICE DAILY, Disp: 180 tablet, Rfl: 1  •  allopurinol (ZYLOPRIM) 100 mg tablet, TAKE 1 TABLET(100 MG) BY MOUTH TWICE DAILY, Disp: 60 tablet, Rfl: 1  •  Ascorbic Acid (VITAMIN C PO), Take by mouth daily , Disp: , Rfl:   •  chlorhexidine (PERIDEX) 0 12 % solution, , Disp: , Rfl:   •  fexofenadine-pseudoephedrine (ALLEGRA-D 24) 180-240 MG per 24 hr tablet, Take 1 tablet by mouth as needed , Disp: , Rfl:   •  VITAMIN D PO, Take by mouth daily , Disp: , Rfl:   •  VITAMIN E PO, Take by mouth daily , Disp: , Rfl:   •  Zanubrutinib 80 MG CAPS, Take 160 mg by mouth 2 (two) times a day, Disp: 120 capsule, Rfl: 11  •  amoxicillin (AMOXIL) 500 MG tablet, Take 4 tablets (2,000 mg total) by mouth once as needed (Take 60 minutes prior to ANY dental work) for up to 1 dose (Patient not taking: Reported on 6/23/2023), Disp: 4 tablet, Rfl: 1      Physical Exam:  /88 (BP Location: Left arm, Patient Position: Sitting, Cuff Size: Standard)   Pulse 67   Temp 97 6 °F (36 4 °C) (Temporal)   Ht 5' 9\" (1 753 m)   Wt 78 5 kg (173 lb)   SpO2 99%   BMI 25 55 kg/m²     Physical Exam  Vitals reviewed  Constitutional:       General: He is not in acute distress  Appearance: He is well-developed  He is not ill-appearing  HENT:      Head: Normocephalic and atraumatic     Eyes:      General: No scleral " icterus  Conjunctiva/sclera: Conjunctivae normal    Cardiovascular:      Rate and Rhythm: Normal rate and regular rhythm  Heart sounds: Normal heart sounds  No murmur heard  Pulmonary:      Effort: Pulmonary effort is normal  No respiratory distress  Breath sounds: Normal breath sounds  Abdominal:      Palpations: Abdomen is soft  Tenderness: There is no abdominal tenderness  Musculoskeletal:         General: No tenderness  Normal range of motion  Cervical back: Normal range of motion and neck supple  Right lower leg: No edema  Left lower leg: No edema  Lymphadenopathy:      Cervical: No cervical adenopathy  Skin:     General: Skin is warm and dry  Neurological:      Mental Status: He is alert and oriented to person, place, and time  Cranial Nerves: No cranial nerve deficit  Psychiatric:         Mood and Affect: Mood normal          Behavior: Behavior normal        Labs:  Lab Results   Component Value Date     64 (HH) 06/16/2023    HGB 10 8 (L) 06/16/2023    HCT 37 1 06/16/2023     (H) 06/16/2023     (L) 06/16/2023     I have spent 20 minutes with Patient  today in which greater than 50% of this time was spent in counseling/coordination of care regarding Diagnostic results, Instructions for management, Patient and family education, Importance of tx compliance, Impressions, Counseling / Coordination of care, Documenting in the medical record, Reviewing / ordering tests, medicine, procedures   and Obtaining or reviewing history    Patient voiced understanding and agreement in the above discussion  Aware to contact our office with questions/symptoms in the interim  This note has been generated by voice recognition software system  Therefore, there may be spelling, grammar, and or syntax errors  Please contact if questions arise

## 2023-07-05 ENCOUNTER — TELEPHONE (OUTPATIENT)
Dept: HEMATOLOGY ONCOLOGY | Facility: CLINIC | Age: 73
End: 2023-07-05

## 2023-07-05 NOTE — TELEPHONE ENCOUNTER
q  Appointment Change  Cancel, Reschedule, Change to Virtual      Who are you speaking with? self   If it is not the patient, are they listed on an active communication consent form? self   Which provider is the appointment scheduled with? [facility]   When is the appointment scheduled? Please list date and time 9/22   At which location is the appointment scheduled to take place? [facility]   Was the appointment rescheduled or changed from an in person visit to a virtual visit? If so, please list the details of the change. Yes, 9/1 9am   What is the reason for the appointment change? sooner   Was [facility] transport scheduled for this visit? no   Does [facility] transport need to be scheduled for the new visit (if applicable) no   Does the patient need an infusion appointment rescheduled? no   Does the patient have an infusion appointment scheduled? If so, when? no   Is the patient undergoing chemotherapy? yes   Was the no-show policy reviewed for appointments being changed with less then 24 hours of notice?  yes

## 2023-07-08 ENCOUNTER — APPOINTMENT (OUTPATIENT)
Dept: LAB | Age: 73
End: 2023-07-08
Payer: MEDICARE

## 2023-08-03 ENCOUNTER — TELEPHONE (OUTPATIENT)
Dept: HEMATOLOGY ONCOLOGY | Facility: CLINIC | Age: 73
End: 2023-08-03

## 2023-08-03 ENCOUNTER — APPOINTMENT (OUTPATIENT)
Dept: LAB | Age: 73
End: 2023-08-03
Payer: MEDICARE

## 2023-08-26 ENCOUNTER — APPOINTMENT (OUTPATIENT)
Dept: LAB | Age: 73
End: 2023-08-26
Payer: MEDICARE

## 2023-09-01 ENCOUNTER — OFFICE VISIT (OUTPATIENT)
Dept: HEMATOLOGY ONCOLOGY | Facility: CLINIC | Age: 73
End: 2023-09-01
Payer: MEDICARE

## 2023-09-01 ENCOUNTER — APPOINTMENT (OUTPATIENT)
Dept: LAB | Facility: CLINIC | Age: 73
End: 2023-09-01
Payer: MEDICARE

## 2023-09-01 VITALS
HEART RATE: 67 BPM | WEIGHT: 182 LBS | SYSTOLIC BLOOD PRESSURE: 128 MMHG | RESPIRATION RATE: 17 BRPM | DIASTOLIC BLOOD PRESSURE: 74 MMHG | HEIGHT: 69 IN | OXYGEN SATURATION: 98 % | BODY MASS INDEX: 26.96 KG/M2

## 2023-09-01 DIAGNOSIS — E87.5 HYPERKALEMIA: ICD-10-CM

## 2023-09-01 DIAGNOSIS — D69.6 THROMBOCYTOPENIA (HCC): ICD-10-CM

## 2023-09-01 DIAGNOSIS — Z95.2 S/P TAVR (TRANSCATHETER AORTIC VALVE REPLACEMENT): ICD-10-CM

## 2023-09-01 DIAGNOSIS — C91.12 CLL (CHRONIC LYMPHOID LEUKEMIA) IN RELAPSE (HCC): ICD-10-CM

## 2023-09-01 DIAGNOSIS — C91.12 CLL (CHRONIC LYMPHOID LEUKEMIA) IN RELAPSE (HCC): Primary | ICD-10-CM

## 2023-09-01 DIAGNOSIS — I25.10 CORONARY ARTERY DISEASE INVOLVING NATIVE CORONARY ARTERY OF NATIVE HEART WITHOUT ANGINA PECTORIS: ICD-10-CM

## 2023-09-01 DIAGNOSIS — D64.9 ANEMIA, UNSPECIFIED TYPE: ICD-10-CM

## 2023-09-01 DIAGNOSIS — N18.30 STAGE 3 CHRONIC KIDNEY DISEASE, UNSPECIFIED WHETHER STAGE 3A OR 3B CKD (HCC): ICD-10-CM

## 2023-09-01 LAB
ANION GAP SERPL CALCULATED.3IONS-SCNC: 6 MMOL/L
BUN SERPL-MCNC: 26 MG/DL (ref 5–25)
CALCIUM SERPL-MCNC: 10.4 MG/DL (ref 8.4–10.2)
CHLORIDE SERPL-SCNC: 102 MMOL/L (ref 96–108)
CO2 SERPL-SCNC: 29 MMOL/L (ref 21–32)
CREAT SERPL-MCNC: 1.47 MG/DL (ref 0.6–1.3)
GFR SERPL CREATININE-BSD FRML MDRD: 47 ML/MIN/1.73SQ M
GLUCOSE SERPL-MCNC: 97 MG/DL (ref 65–140)
POTASSIUM SERPL-SCNC: 5.7 MMOL/L (ref 3.5–5.3)
SODIUM SERPL-SCNC: 137 MMOL/L (ref 135–147)
URATE SERPL-MCNC: 3.9 MG/DL (ref 3.5–8.5)

## 2023-09-01 PROCEDURE — 80048 BASIC METABOLIC PNL TOTAL CA: CPT

## 2023-09-01 PROCEDURE — 36415 COLL VENOUS BLD VENIPUNCTURE: CPT

## 2023-09-01 PROCEDURE — 84550 ASSAY OF BLOOD/URIC ACID: CPT

## 2023-09-01 PROCEDURE — 99214 OFFICE O/P EST MOD 30 MIN: CPT | Performed by: INTERNAL MEDICINE

## 2023-09-01 NOTE — PROGRESS NOTES
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 Initial treatment was with chlorambucil. In 2014 patient had Rituxan plus Bendamustine. Since April 2023 patient has been on zanubrutinib and he has been tolerating that without much problem. No bleeding. No atrial fibrillation and no other symptoms secondary to zanubrutinib. He has tiredness and exertional dyspnea and the symptoms could be secondary to CLL and cardiac in origin because he has underlying cardiac disorder and had TAVR for aortic stenosis in July 2022 followed by cardiac rehabilitation. He did start  playing golf again. Has less night sweats. Has arthritic symptoms. Recurrent scalp lesion and he follows with his dermatologist and he states scalp lesions are not malignant. History of herpes zoster right lower leg. No postherpetic neuralgia.           Current Outpatient Medications:   •  acetaminophen (TYLENOL) 325 mg tablet, Take 2 tablets (650 mg total) by mouth every 4 (four) hours as needed for fever or moderate pain (temperature greater than 101 F), Disp: 100 tablet, Rfl: 0  •  acyclovir (ZOVIRAX) 400 MG tablet, Take 1 tablet (400 mg total) by mouth 2 (two) times a day, Disp: 60 tablet, Rfl: 11  •  allopurinol (ZYLOPRIM) 100 mg tablet, TAKE 1 TABLET(100 MG) BY MOUTH TWICE DAILY, Disp: 180 tablet, Rfl: 1  •  allopurinol (ZYLOPRIM) 100 mg tablet, TAKE 1 TABLET(100 MG) BY MOUTH TWICE DAILY, Disp: 60 tablet, Rfl: 1  •  Ascorbic Acid (VITAMIN C PO), Take by mouth daily , Disp: , Rfl:   •  chlorhexidine (PERIDEX) 0.12 % solution, , Disp: , Rfl:   •  fexofenadine-pseudoephedrine (ALLEGRA-D 24) 180-240 MG per 24 hr tablet, Take 1 tablet by mouth as needed , Disp: , Rfl:   •  VITAMIN D PO, Take by mouth daily , Disp: , Rfl:   •  VITAMIN E PO, Take by mouth daily , Disp: , Rfl:   •  Zanubrutinib 80 MG CAPS, Take 160 mg by mouth 2 (two) times a day, Disp: 120 capsule, Rfl: 11  •  amoxicillin (AMOXIL) 500 MG tablet, Take 4 tablets (2,000 mg total) by mouth once as needed (Take 60 minutes prior to ANY dental work) for up to 1 dose (Patient not taking: Reported on 6/23/2023), Disp: 4 tablet, Rfl: 1    No Known Allergies    Oncology History Overview Note   1996:  CLL was diagnosed. Intermittently he received chlorambucil over the years. 2014:  6 cycles of Rituxan and bendamustine. Presently under surveillance. CLL (chronic lymphoid leukemia) in relapse [facility])    Chemotherapy       1996:  CLL was diagnosed. Intermittent therapy with chlorambucil. 2014:  6 cycles of Rituxan and bendamustine. Presently under surveillance. 6/26/2023 -  Cancer Staged    Staging form: Chronic Lymphocytic Leukemia, AJCC 8th Edition  - Clinical: Modified Tolliver Stage IV (Modified Tolliver risk: High, Lymphocytosis: Present, Adenopathy: Unknown, Organomegaly: Present, Anemia: Present, Thrombocytopenia: Present) - Signed by Mike Yu PA-C on 6/26/2023           ROS:  09/01/23 Reviewed 12 systems:  See symptoms in HPI. Presently no other neurological, cardiac, pulmonary, GI and  symptoms. Other symptoms are in HPI. No symptoms like fever, chills, bleeding, bone pains, skin rash, weight loss,   weakness, numbness,  claudication and gait problem. No frequent infections . Not unusually sensitive to heat or cold. No swelling of the ankles. No swollen glands. Patient is  anxious. Physical Exam:   9/1/23 6/23/23 6/21/23   Blood Pressure 128/74 150/88 122/68   Pulse 67 67 65   Respirations 17 -- --   Temperature -- 97.6 °F (36.4 °C) --   Temp Source -- Temporal --   SpO2 98 % 99 % --   Weight - Scale 82.6 kg (182 lb) 78.5 kg (173 lb) 79.4 kg (175 lb)   Height 5' 9" (1.753 m) 5' 9" (1.753 m) 5' 9" (1.753 m)   Pain Score Zero -- --       Patient is alert and oriented. Patient not in distress. Vital signs as above. No icterus. No oral thrush. No palpable neck mass.   Lungs are clear to percussion and auscultation. Heart rate is regular. Systolic murmur. No palpable abdominal mass. Abdomen is soft and nontender. No ascites. No edema of ankles. There is no calf tenderness. There is no focal neurological deficit. There is no skin rash. There is no palpable lymphadenopathy in the neck and axillary areas. There is no focal neurological deficit. There is no skin rash. Department status 2 on ECOG scale.       IMAGING:  IMPRESSION:     No choledocholithiasis is seen  No biliary dilation  Distended gallbladder with pericholecystic fluid and cholelithiasis correlated with the acute cholecystitis  Splenomegaly with spleen measuring about 16 cm can be correlated with the patient's history of CLL  Mild edema seen in the mesentery as seen on the CT              Workstation performed: [id number]        Imaging    MRI abdomen wo contrast and mrcp (Order: [id number]) - 11/8/2022    Blood tests done on 3/30/2023  LABS:    Results for orders placed or performed in visit on 08/04/23   CBC and differential   Result Value Ref Range    WBC 69.82 (HH) 4.31 - 10.16 Thousand/uL    RBC 3.73 (L) 3.88 - 5.62 Million/uL    Hemoglobin 11.8 (L) 12.0 - 17.0 g/dL    Hematocrit 39.2 36.5 - 49.3 %     (H) 82 - 98 fL    MCH 31.6 26.8 - 34.3 pg    MCHC 30.1 (L) 31.4 - 37.4 g/dL    RDW 14.3 11.6 - 15.1 %    MPV 11.0 8.9 - 12.7 fL    Platelets 257 (L) 605 - 390 Thousands/uL   Comprehensive metabolic panel   Result Value Ref Range    Sodium 138 135 - 147 mmol/L    Potassium 5.7 (H) 3.5 - 5.3 mmol/L    Chloride 105 96 - 108 mmol/L    CO2 28 21 - 32 mmol/L    ANION GAP 5 mmol/L    BUN 24 5 - 25 mg/dL    Creatinine 1.52 (H) 0.60 - 1.30 mg/dL    Glucose, Fasting 107 (H) 65 - 99 mg/dL    Calcium 10.7 (H) 8.4 - 10.2 mg/dL    AST 23 13 - 39 U/L    ALT 14 7 - 52 U/L    Alkaline Phosphatase 74 34 - 104 U/L    Total Protein 7.2 6.4 - 8.4 g/dL    Albumin 4.5 3.5 - 5.0 g/dL    Total Bilirubin 0.40 0.20 - 1.00 mg/dL eGFR 45 ml/min/1.73sq m   Manual Differential(PHLEBS Do Not Order)   Result Value Ref Range    Segmented % 9 (L) 43 - 75 %    Bands % 1 0 - 8 %    Lymphocytes % 79 (H) 14 - 44 %    Monocytes % 1 (L) 4 - 12 %    Eosinophils, % 0 0 - 6 %    Basophils % 0 0 - 1 %    Atypical Lymphocytes % 10 (H) <=0 %    Absolute Neutrophils 6.98 1.85 - 7.62 Thousand/uL    Lymphocytes Absolute 62.14 (H) 0.60 - 4.47 Thousand/uL    Monocytes Absolute 0.70 0.00 - 1.22 Thousand/uL    Eosinophils Absolute 0.00 0.00 - 0.40 Thousand/uL    Basophils Absolute 0.00 0.00 - 0.10 Thousand/uL    Total Counted      Smudge Cells Present     RBC Morphology Normal     Platelet Estimate Decreased (A) Adequate    Differential Comment see note        Tissue Exam: [id number]  Order: [id number]   Collected 11/10/2022  9:07 AM      Status: Final result      Visible to patient: Yes (seen)      Dx:  Abdominal pain      0 Result Notes  Component    Case Report   Surgical Pathology Report                         Case: [id number]                                    Authorizing Provider: Jesus Sheets MD          Collected:           11/10/2022 0907               Ordering Location:     [facility]      Received:            11/10/2022 1059                                      Hospital Operating Room                                                       Pathologist:           Lorena Rosales MD                                                          Specimen:    Gallbladder, GALLBLADDER                                                                   Final Diagnosis   A. Gallbladder and lymph node (1), cholecystectomy:      - Chronic lymphocytic leukemia/small lymphocytic lymphoma (CLL/SLL) involving one lymph node and gallbladder, see note       - Chronic cholecystitis with cholelithiasis   Electronically signed by Lorena Rosales MD on 11/23/2022 at 11:31 AM   Note           Labs, Imaging, & Other studies:   All pertinent labs and imaging studies were personally reviewed            Reviewed and discussed with patient. 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 Initial treatment was with chlorambucil. In 2014 patient had Rituxan plus Bendamustine. Since April 2023 patient has been on zanubrutinib and he has been tolerating that without much problem. No bleeding. No atrial fibrillation and no other symptoms secondary to zanubrutinib. He has tiredness and exertional dyspnea and the symptoms could be secondary to CLL and cardiac in origin because he has underlying cardiac disorder and had TAVR for aortic stenosis in July 2022 followed by cardiac rehabilitation. He did start  playing golf again. Has less night sweats. Has arthritic symptoms. Recurrent scalp lesion and he follows with his dermatologist and he states scalp lesions are not malignant. History of herpes zoster right lower leg. No postherpetic neuralgia. Physical examination and test results are as recorded and discussed. Patient is being continued on zanubrutinib. Patient's condition, counts, chemistry and other CLL parameters are being monitored. He follows with Dr. Yancy Garcia at [facility]. for CLL for his expert advice. Potassium is 5.7 and that will be rechecked today. Hydration. Discussed patient's condition with him in detail with explaned  Questions answered. He is taking allopurinol and acyclovir. Discussed the importance of eating healthy foods, staying active as tolerated and health screening tests   Patient is capable of self-care. Discussed precautions against coronavirus and other infections. He will continue to follow with primary physician and other consultants. See diagnoses, orders and instructions   . 1. CLL (chronic lymphoid leukemia) in relapse (HCC)    - Uric acid; Future    2.  Stage 3 chronic kidney disease, unspecified whether stage 3a or 3b CKD ([address])      3. Hyperkalemia    - Basic metabolic panel; Future    4. Anemia, unspecified type    5. Thrombocytopenia ([address])      6. Coronary artery disease involving native coronary artery of native heart without angina pectoris      7. S/P TAVR (transcatheter aortic valve replacement)    Patient has standing orders for blood work. No change in therapy for CLL. Stat BMP today for high potassium. Follow-up in 2 months. I used a dictation device to dictate this note and there could be mistakes in my note and patient may contact my office for that.     Patient voiced understanding and agrees      Counseling / Coordination of Care  Provided counseling and support

## 2023-09-01 NOTE — PROGRESS NOTES
1 YEAR FOLLOW UP VISIT S/P TAVR    Procedure: S/P transcatheter aortic valve replacement, performed on 7/12/23    History of Present Illness: Sal Boxer is a 71y.o. year old male who presents to our office today for one year follow up care from transcatheter aortic valve replacement. He was last evaluated in our office in August of 2022, and was doing well with his recovery at that time. He did 35 out of 36 sessions of cardiac rehab. In November of last year, he had abdominal pain and went to the hospital. He ended up having cholecystitis, and underwent cholecystectomy. He recovered well from that. He continues close follow up with Dr. Miri Lion of Heme/Onc for his CLL, and was recently started on Zanubrutinib for new recurrence. He reports fatigue and exertional dyspnea as a result. He denies LE swelling or significant weight changes. He golfs once a week. Takes ASA daily.      Review of Systems:  Review of Systems - History obtained from chart review and the patient  General ROS: positive for  - fatigue  Psychological ROS: negative  Ophthalmic ROS: negative  ENT ROS: negative  Allergy and Immunology ROS: negative  Hematological and Lymphatic ROS: negative  Endocrine ROS: negative  Respiratory ROS: no cough, shortness of breath, or wheezing  Cardiovascular ROS: no chest pain or dyspnea on exertion  Gastrointestinal ROS: no abdominal pain, change in bowel habits, or black or bloody stools  Genito-Urinary ROS: no dysuria, trouble voiding, or hematuria  Musculoskeletal ROS: negative  Neurological ROS: no TIA or stroke symptoms  Dermatological ROS: negative     Past Medical History:    Past Medical History:   Diagnosis Date   • Basal cell carcinoma (BCC) of skin of nose    • History of chemotherapy    • History of colonoscopy 2012   • Lymphocytic leukemia ([address])        Past Surgical History:    Past Surgical History:   Procedure Laterality Date   • CARDIAC CATHETERIZATION N/A 6/15/2022    Procedure: Cardiac Coronary Angiogram;  Surgeon: Vivek Morton MD;  [facility];   Service: Cardiology   • CARDIAC CATHETERIZATION N/A 7/12/2022    Procedure: CARDIAC TAVR;  Surgeon: Geoff Pitts MD;  [facility];  Service: Cardiology   • CATARACT EXTRACTION Bilateral    • CHOLECYSTECTOMY LAPAROSCOPIC N/A 11/10/2022    Procedure: CHOLECYSTECTOMY LAPAROSCOPIC;  Surgeon: Hildy Peabody, MD;  [facility];  Service: General   • FLAP LOCAL HEAD / NECK N/A 4/14/2020    Procedure: ADJACENT TISSUE TRANSFER  FOREHEAD FLAP,  CARTILAGE GRAFT NOSE;  Surgeon: Edwina Turcios MD;  [facility];  Service: Plastics   • FLAP LOCAL HEAD / NECK N/A 6/17/2020    Procedure: DIVISION INSET FOREHEAD FLAP; FULL THICKNESS SKIN GRAFT TO FOREHEAD;  Surgeon: Edwina Turcios MD;  [facility];  Service: Plastics   • FULL THICKNESS SKIN GRAFT N/A 4/14/2020    Procedure: SKIN GRAFT FULL THICKNESS  (FTSG) NOSE;  Surgeon: Edwina Turcios MD;  [facility];  Service: Plastics   • IR BIOPSY BONE MARROW  5/1/2023   • MOHS RECONSTRUCTION N/A 4/14/2020    Procedure: MOHS RECONSTRUCTION NOSE DEFECT;  Surgeon: Edwina Turcios MD;  [facility];  Service: Plastics   • MOHS RECONSTRUCTION N/A 5/4/2020    Procedure: RECONSTRUCTION MOHS NASAL DEFECT WITH EAR CARTILAGE GRAFT;  Surgeon: Edwina Turcios MD;  [facility];  Service: Plastics   • REPLACEMENT AORTIC VALVE TRANSCATHETER (TAVR) N/A 7/12/2022    Procedure: REPLACEMENT AORTIC VALVE TRANSCATHETER (TAVR) TRANSFEMORAL W/ 26MM LEE CLINT S3 ULTRA VALVE(ACCESS ON LEFT) PARISA;  Surgeon: Aisha Ochoa MD;  [facility];  Service: Cardiac Surgery   • SHOULDER SURGERY     • TIBIA FRACTURE SURGERY     • TONSILLECTOMY         Vital Signs:     Vitals:    09/05/23 1120 09/05/23 1123   BP: 142/77 134/80   BP Location: Left arm Right arm   Patient Position: Sitting Sitting   Cuff Size: Standard Standard   Pulse: 64    Temp: 98.2 °F (36.8 °C)    TempSrc: Tympanic    SpO2: 98%    Weight: 83.4 kg (183 lb 12.8 oz)    Height: 5' 9" (1.753 m)          Home Medications:     Prior to Admission medications    Medication Sig Start Date End Date Taking? Authorizing Provider   acetaminophen (TYLENOL) 325 mg tablet Take 2 tablets (650 mg total) by mouth every 4 (four) hours as needed for fever or moderate pain (temperature greater than 101 F) 7/13/22   Aj Acosta PA-C   acyclovir (ZOVIRAX) 400 MG tablet Take 1 tablet (400 mg total) by mouth 2 (two) times a day 4/7/23 4/6/24  Asif Mathur MD   allopurinol (ZYLOPRIM) 100 mg tablet TAKE 1 TABLET(100 MG) BY MOUTH TWICE DAILY 6/2/23   Asif Mathur MD   allopurinol (ZYLOPRIM) 100 mg tablet TAKE 1 TABLET(100 MG) BY MOUTH TWICE DAILY 6/2/23   Asif Mathur MD   amoxicillin (AMOXIL) 500 MG tablet Take 4 tablets (2,000 mg total) by mouth once as needed (Take 60 minutes prior to ANY dental work) for up to 1 dose  Patient not taking: Reported on 6/23/2023 10/7/22 10/7/40  Luis Anthony MD   Ascorbic Acid (VITAMIN C PO) Take by mouth daily     Historical Provider, MD   chlorhexidine (PERIDEX) 0.12 % solution  7/27/22   Historical Provider, MD   fexofenadine-pseudoephedrine (ALLEGRA-D 24) 180-240 MG per 24 hr tablet Take 1 tablet by mouth as needed     Historical Provider, MD   VITAMIN D PO Take by mouth daily     Historical Provider, MD   VITAMIN E PO Take by mouth daily     Historical Provider, MD   Zanubrutinib 80 MG CAPS Take 160 mg by mouth 2 (two) times a day 4/11/23   Asif Mathur MD       Allergies:    No Known Allergies    Physical Exam:    General: alert, oriented, NAD   HEENT/NECK:  PERRL. No jugular venous distention. Cardiac:Regular rate and rhythm. Pulmonary:Breath sounds clear bilaterally  Abdomen:  Non-tender, Non-distended. Positive bowel sounds.   Upper extremities: 2+ radial pulses; brisk capillary refill  Lower extremities: Extremities warm/dry. PT/DP pulses 2+ bilaterally. No edema B/L  Musculoskeletal: [place]  Neuro: Alert and oriented X 3. Sensation is grossly intact. No focal deficits  Skin: Warm/Dry, without rashes or lesions. Lab Results:   Lab Results   Component Value Date    WBC 69.82 (HH) 08/26/2023    HGB 11.8 (L) 08/26/2023    HCT 39.2 08/26/2023     (H) 08/26/2023     (L) 08/26/2023     Lab Results   Component Value Date    GLUCOSE 110 07/12/2022    CALCIUM 10.4 (H) 09/01/2023     08/01/2015    K 5.7 (H) 09/01/2023    CO2 29 09/01/2023     09/01/2023    BUN 26 (H) 09/01/2023    CREATININE 1.47 (H) 09/01/2023       Imaging Studies:     Echocardiogram: June 2023  Findings    Left Ventricle Left ventricular cavity size is normal. Wall thickness is normal. The left ventricular ejection fraction is 65%. Systolic function is normal.  Wall motion is normal. Diastolic function is normal.      Right Ventricle Right ventricular cavity size is normal. Systolic function is normal.      Left Atrium The atrium is normal in size. Right Atrium The atrium is normal in size. Aortic Valve There is an Gu CLINT 3 Ultra 26 mm TAVR bioprosthetic valve. The prosthetic valve appears well-seated. There is no evidence of paravalvular regurgitation. There is no evidence of transvalvular regurgitation. The aortic valve has no significant stenosis. The gradient recorded across the prosthetic aortic valve is within the expected range. The aortic valve peak velocity is 1.93 m/s. The aortic valve mean gradient is 8 mmHg. The dimensionless velocity index is 0.64. The aortic valve area is 1.78 cm2. Mitral Valve The leaflets exhibit normal mobility. There is mild annular calcification. There is mild regurgitation with a centrally directed jet. There is no evidence of stenosis. Tricuspid Valve The leaflets are not thickened. The leaflets exhibit normal mobility. There is trace regurgitation.  There is no evidence of stenosis. The right ventricular systolic pressure is normal. The estimated right ventricular systolic pressure is 74.08 mmHg. Pulmonic Valve The pulmonic valve was not well visualized. There is mild regurgitation. There is no evidence of stenosis. Ascending Aorta The aortic root is normal in size. The ascending aorta is normal in size. IVC/SVC The right atrial pressure is estimated at 3.0 mmHg. The inferior vena cava is normal in size. Respirophasic changes were normal.      Pericardium There is no pericardial effusion. EKG: June 2023  NSR, 1st degree AV block, 69 bpm    I have personally reviewed pertinent reports. TAVR evaluation Assessment:     [address]: I    [id number] completed     Assessment:     Aortic stenosis, Non-Rheumatic. S/P transcatheter aortic valve replacement;      Rita Winn returns to our office today for 1 year routine follow up s/p  transcatheter aortic valve replacement . Their NYHA functional status is class I. Recent echocardiogram demonstrates well seated bioprosthetic aortic valve with normal function. ECG, CBC and BMP are reviewed and stable. Plan:     I reviewed test results with patient. I reviewed medications and made no changes. Continue Aspirin 81 mg daily, lifelong, for antiplatelet therapy for bioprosthetic valve. Rita Winn does not need to return to our office for follow up in the future but will continue to be managed by their primary care physician and cardiologist for ongoing medical care. We recommend yearly echocardiograms which can be ordered and monitored by their cardiologist.  Rita Winn was comfortable with our recommendations and their questions were answered to their satisfaction. The patient recently had a screening colonoscopy in 2018. Therefore GI referral is not indicated at this time.      Cuca Hills PA-C  [unfilled]  12:02 PM

## 2023-09-01 NOTE — PATIENT INSTRUCTIONS
Patient has standing orders for blood work. No change in therapy for CLL. Stat BMP today for high potassium. Follow-up in 2 months.

## 2023-09-05 ENCOUNTER — OFFICE VISIT (OUTPATIENT)
Dept: CARDIAC SURGERY | Facility: CLINIC | Age: 73
End: 2023-09-05
Payer: MEDICARE

## 2023-09-05 VITALS
OXYGEN SATURATION: 98 % | HEART RATE: 64 BPM | HEIGHT: 69 IN | DIASTOLIC BLOOD PRESSURE: 80 MMHG | BODY MASS INDEX: 27.22 KG/M2 | TEMPERATURE: 98.2 F | SYSTOLIC BLOOD PRESSURE: 134 MMHG | WEIGHT: 183.8 LBS

## 2023-09-05 DIAGNOSIS — Z95.2 S/P TAVR (TRANSCATHETER AORTIC VALVE REPLACEMENT): Primary | ICD-10-CM

## 2023-09-05 PROCEDURE — 99214 OFFICE O/P EST MOD 30 MIN: CPT | Performed by: PHYSICIAN ASSISTANT

## 2023-09-05 RX ORDER — ASPIRIN 81 MG/1
81 TABLET, CHEWABLE ORAL DAILY
COMMUNITY

## 2023-09-08 ENCOUNTER — ESTABLISHED COMPREHENSIVE EXAM (OUTPATIENT)
Dept: URBAN - METROPOLITAN AREA CLINIC 6 | Facility: CLINIC | Age: 73
End: 2023-09-08

## 2023-09-08 DIAGNOSIS — H04.123: ICD-10-CM

## 2023-09-08 DIAGNOSIS — C95.90: ICD-10-CM

## 2023-09-08 DIAGNOSIS — H18.513: ICD-10-CM

## 2023-09-08 DIAGNOSIS — Z96.1: ICD-10-CM

## 2023-09-08 DIAGNOSIS — H52.7: ICD-10-CM

## 2023-09-08 PROCEDURE — 92134 CPTRZ OPH DX IMG PST SGM RTA: CPT

## 2023-09-08 PROCEDURE — 92014 COMPRE OPH EXAM EST PT 1/>: CPT

## 2023-09-08 PROCEDURE — 92132 CPTRZD OPH DX IMG ANT SGM: CPT

## 2023-09-08 ASSESSMENT — PACHYMETRY
OD_CT_UM: 598
OS_CT_UM: 592

## 2023-09-08 ASSESSMENT — TONOMETRY
OD_IOP_MMHG: 11
OS_IOP_MMHG: 11

## 2023-09-08 ASSESSMENT — VISUAL ACUITY
OD_SC: 20/200
OS_SC: 20/50
OD_PH: 20/60
OU_CC: J3
OS_PH: 20/30

## 2023-09-29 ENCOUNTER — TELEPHONE (OUTPATIENT)
Dept: HEMATOLOGY ONCOLOGY | Facility: CLINIC | Age: 73
End: 2023-09-29

## 2023-09-29 DIAGNOSIS — C91.12 CLL (CHRONIC LYMPHOID LEUKEMIA) IN RELAPSE (HCC): Primary | ICD-10-CM

## 2023-09-29 NOTE — TELEPHONE ENCOUNTER
Lab Inquiry   Who are you speaking with? Patient     If it is not the patient, are they listed on an active communication consent form? N/A   Name of ordering provider Dr. Kevin Blake   What is being requested? Lab orders need to be entered   Lab draw location [facility]   What is the best call back number?  [phone]

## 2023-10-04 ENCOUNTER — APPOINTMENT (OUTPATIENT)
Dept: LAB | Age: 73
End: 2023-10-04
Payer: MEDICARE

## 2023-10-04 LAB
ALBUMIN SERPL BCP-MCNC: 4.4 G/DL (ref 3.5–5)
ALP SERPL-CCNC: 72 U/L (ref 34–104)
ALT SERPL W P-5'-P-CCNC: 15 U/L (ref 7–52)
ANION GAP SERPL CALCULATED.3IONS-SCNC: 9 MMOL/L
ANISOCYTOSIS BLD QL SMEAR: PRESENT
AST SERPL W P-5'-P-CCNC: 25 U/L (ref 13–39)
BASOPHILS # BLD MANUAL: 0 THOUSAND/UL (ref 0–0.1)
BASOPHILS NFR MAR MANUAL: 0 % (ref 0–1)
BILIRUB SERPL-MCNC: 0.33 MG/DL (ref 0.2–1)
BUN SERPL-MCNC: 22 MG/DL (ref 5–25)
CALCIUM SERPL-MCNC: 9.8 MG/DL (ref 8.4–10.2)
CHLORIDE SERPL-SCNC: 102 MMOL/L (ref 96–108)
CHOLEST SERPL-MCNC: 195 MG/DL
CO2 SERPL-SCNC: 27 MMOL/L (ref 21–32)
CREAT SERPL-MCNC: 1.51 MG/DL (ref 0.6–1.3)
EOSINOPHIL # BLD MANUAL: 0 THOUSAND/UL (ref 0–0.4)
EOSINOPHIL NFR BLD MANUAL: 0 % (ref 0–6)
ERYTHROCYTE [DISTWIDTH] IN BLOOD BY AUTOMATED COUNT: 14.3 % (ref 11.6–15.1)
GFR SERPL CREATININE-BSD FRML MDRD: 45 ML/MIN/1.73SQ M
GLUCOSE P FAST SERPL-MCNC: 100 MG/DL (ref 65–99)
HCT VFR BLD AUTO: 38.7 % (ref 36.5–49.3)
HDLC SERPL-MCNC: 52 MG/DL
HGB BLD-MCNC: 12.3 G/DL (ref 12–17)
LDLC SERPL CALC-MCNC: 109 MG/DL (ref 0–100)
LYMPHOCYTES # BLD AUTO: 43.8 THOUSAND/UL (ref 0.6–4.47)
LYMPHOCYTES # BLD AUTO: 84 % (ref 14–44)
MCH RBC QN AUTO: 32.6 PG (ref 26.8–34.3)
MCHC RBC AUTO-ENTMCNC: 31.8 G/DL (ref 31.4–37.4)
MCV RBC AUTO: 103 FL (ref 82–98)
MONOCYTES # BLD AUTO: 1.04 THOUSAND/UL (ref 0–1.22)
MONOCYTES NFR BLD: 2 % (ref 4–12)
NEUTROPHILS # BLD MANUAL: 7.3 THOUSAND/UL (ref 1.85–7.62)
NEUTS SEG NFR BLD AUTO: 14 % (ref 43–75)
PLATELET # BLD AUTO: 133 THOUSANDS/UL (ref 149–390)
PLATELET BLD QL SMEAR: ABNORMAL
PMV BLD AUTO: 11.4 FL (ref 8.9–12.7)
POTASSIUM SERPL-SCNC: 5.2 MMOL/L (ref 3.5–5.3)
PROT SERPL-MCNC: 6.4 G/DL (ref 6.4–8.4)
RBC # BLD AUTO: 3.77 MILLION/UL (ref 3.88–5.62)
RBC MORPH BLD: PRESENT
SMUDGE CELLS BLD QL SMEAR: PRESENT
SODIUM SERPL-SCNC: 138 MMOL/L (ref 135–147)
TRIGL SERPL-MCNC: 168 MG/DL
WBC # BLD AUTO: 52.14 THOUSAND/UL (ref 4.31–10.16)

## 2023-10-04 PROCEDURE — 85027 COMPLETE CBC AUTOMATED: CPT

## 2023-10-04 PROCEDURE — 36415 COLL VENOUS BLD VENIPUNCTURE: CPT

## 2023-10-04 PROCEDURE — 85007 BL SMEAR W/DIFF WBC COUNT: CPT

## 2023-10-04 PROCEDURE — 80053 COMPREHEN METABOLIC PANEL: CPT

## 2023-10-04 PROCEDURE — 80061 LIPID PANEL: CPT

## 2023-10-16 ENCOUNTER — RX CHECK (OUTPATIENT)
Dept: URBAN - METROPOLITAN AREA CLINIC 6 | Facility: CLINIC | Age: 73
End: 2023-10-16

## 2023-10-16 DIAGNOSIS — H52.13: ICD-10-CM

## 2023-10-16 DIAGNOSIS — H52.203: ICD-10-CM

## 2023-10-16 DIAGNOSIS — H52.4: ICD-10-CM

## 2023-10-16 PROCEDURE — 92015 DETERMINE REFRACTIVE STATE: CPT

## 2023-10-16 ASSESSMENT — VISUAL ACUITY
OD_SC: 20/100-2
OS_SC: 20/50-2

## 2023-11-03 ENCOUNTER — OFFICE VISIT (OUTPATIENT)
Dept: CARDIOLOGY CLINIC | Facility: CLINIC | Age: 73
End: 2023-11-03
Payer: MEDICARE

## 2023-11-03 VITALS
OXYGEN SATURATION: 96 % | DIASTOLIC BLOOD PRESSURE: 82 MMHG | HEART RATE: 79 BPM | HEIGHT: 69 IN | BODY MASS INDEX: 28.47 KG/M2 | SYSTOLIC BLOOD PRESSURE: 148 MMHG | WEIGHT: 192.2 LBS

## 2023-11-03 DIAGNOSIS — I25.10 CORONARY ARTERY DISEASE INVOLVING NATIVE CORONARY ARTERY OF NATIVE HEART WITHOUT ANGINA PECTORIS: Primary | ICD-10-CM

## 2023-11-03 DIAGNOSIS — Z95.2 S/P TAVR (TRANSCATHETER AORTIC VALVE REPLACEMENT): ICD-10-CM

## 2023-11-03 DIAGNOSIS — E78.5 HYPERLIPIDEMIA, UNSPECIFIED HYPERLIPIDEMIA TYPE: Chronic | ICD-10-CM

## 2023-11-03 PROCEDURE — 99213 OFFICE O/P EST LOW 20 MIN: CPT | Performed by: INTERNAL MEDICINE

## 2023-11-03 RX ORDER — AMOXICILLIN 500 MG/1
2000 TABLET, FILM COATED ORAL ONCE AS NEEDED
Qty: 4 TABLET | Refills: 1 | Status: SHIPPED | OUTPATIENT
Start: 2023-11-03 | End: 2041-11-03

## 2023-11-03 NOTE — PROGRESS NOTES
[facility]    CHIEF COMPLAINT:   Chief Complaint   Patient presents with    Follow-up     Yearly follow up     Shortness of Breath     On exertion        HPI:  Basilia Guzman is a 68 y.o. male with a past medical history of CLL, hyperlipidemia, CKD 3, nonobstructive CAD, severe AS s/p TAVR (7/12/22). Initial OV - 4/15/22: Briefly, he was diagnosed with CLL in 1996 treated with intermittent therapy with chlorambucil. In 2014 he had 6 cycles of Rituxan and bendamustine. He follows with Heme Onc regularly. He has been in his baseline state of health and generally stays active with outdoor lawn work including cutting the lawn with a push mower. Likes to play golf. Was not very active since last summer up until about 8 weeks ago. He went out one weekend to get ready for the golf season. He was doing some gardening/lawn work and felt very short of breath and fatigued. Also noticed this while going up and down steps. Reports a "little chest tightness" which happens "here and there." Complains of occasional lightheadedness. He denies any recent fever, chills, nausea, vomiting, abdominal pain, orthopnea, PND or LE swelling. TTE revealed LVEF 65%, mild-to-moderate concentric hypertrophy, normal diastolic function. Normal RV size and systolic function. Mildly dilated LA. There was moderate aortic stenosis with a mean gradient of 21 mmHg and calculated aortic valve area 1.32 cm2. No family history of premature coronary artery disease. Brother is over 300 lbs and had a heart attack & coronary stent - obese, drinks, smokes, late 46s. OV - 10/7/22: Repeat echo performed and c/w severe AS. He underwent pre operative coronary angiogram which revealed a 50% mid LAD lesion. He underwent TAVR implantation on 7/12/22 with an Gu CLINT 3 Ultra 26 mm valve.  He had felt his shortness of breath had improved but had still been struggling - interestingly his shortness of breath wasn't occurring with more strenuous activity or while at cardiac rehab. Symptoms were occurring with lighter activity like climbing stairs. This past Wednesday, he feels a significant change for the better. He continues to stay active and attend cardiac rehab. He also had complaints of leg pain and discontinued his statin therapy - this was also limiting him while at rehab. He has no lightheadedness, chest pain, palpitations, orthopnea, PND, LE swelling. +bruising    Interval history:   -Cholecystitis in Nov 2022 and had cholecystectomy   -Following with Dr Grace Velazco for his CLL and was placed on Zanubrutinib    No lightheadedness, syncope, chest pain. He does feel his shortness of breath is slightly improving but has dyspnea with going up long hills. No orthopnea, PND, leg edema. He's able to cut the lawn again without difficulty. Still golfing during warm weather just two weeks ago. Abx for dental work. Taking aspirin 81 mg daily. The following portions of the patient's history were reviewed and updated as appropriate: allergies, current medications, past family history, past medical history, past social history, past surgical history, and problem list.    SINCE LAST OV I REVIEWED WITH THE PATIENT THE INTERIM LABS, TEST RESULTS, CONSULTANT(S) NOTES AND PERFORMED AN INTERIM REVIEW OF HISTORY    Past Medical History:   Diagnosis Date    Basal cell carcinoma (BCC) of skin of nose     History of chemotherapy     History of colonoscopy 2012    Lymphocytic leukemia ([address])        Past Surgical History:   Procedure Laterality Date    CARDIAC CATHETERIZATION N/A 6/15/2022    Procedure: Cardiac Coronary Angiogram;  Surgeon: Radha Hill MD;  [facility];   Service: Cardiology    CARDIAC CATHETERIZATION N/A 7/12/2022    Procedure: CARDIAC TAVR;  Surgeon: Hari Caldwell MD;  [facility];  Service: Cardiology    CATARACT EXTRACTION Bilateral     CHOLECYSTECTOMY LAPAROSCOPIC N/A 11/10/2022    Procedure: CHOLECYSTECTOMY LAPAROSCOPIC;  Surgeon: Ej Doss MD;  [facility];  Service: General    FLAP LOCAL HEAD / NECK N/A 4/14/2020    Procedure: ADJACENT TISSUE TRANSFER  FOREHEAD FLAP,  CARTILAGE GRAFT NOSE;  Surgeon: Tracey Mcgraw MD;  [facility];  Service: Plastics    FLAP LOCAL HEAD / NECK N/A 6/17/2020    Procedure: DIVISION INSET FOREHEAD FLAP; FULL THICKNESS SKIN GRAFT TO FOREHEAD;  Surgeon: Tracey Mcgraw MD;  [facility];  Service: Plastics    FULL THICKNESS SKIN GRAFT N/A 4/14/2020    Procedure: SKIN GRAFT FULL THICKNESS  (FTSG) NOSE;  Surgeon: Tracey Mcgraw MD;  [facility];  Service: Plastics    IR BIOPSY BONE MARROW  5/1/2023    MOHS RECONSTRUCTION N/A 4/14/2020    Procedure: MOHS RECONSTRUCTION NOSE DEFECT;  Surgeon: Tracey Mcgraw MD;  [facility];  Service: Plastics    MOHS RECONSTRUCTION N/A 5/4/2020    Procedure: RECONSTRUCTION MOHS NASAL DEFECT WITH EAR CARTILAGE GRAFT;  Surgeon: Tracey Mcgraw MD;  [facility];  Service: Plastics    REPLACEMENT AORTIC VALVE TRANSCATHETER (TAVR) N/A 7/12/2022    Procedure: REPLACEMENT AORTIC VALVE TRANSCATHETER (TAVR) TRANSFEMORAL W/ 26MM LEE CLINT S3 ULTRA VALVE(ACCESS ON LEFT) PARISA;  Surgeon: Anselmo Vizcarra MD;  [facility];  Service: Cardiac Surgery    SHOULDER SURGERY      TIBIA FRACTURE SURGERY      TONSILLECTOMY         Social History     Socioeconomic History    Marital status:       Spouse name: Not on file    Number of children: Not on file    Years of education: Not on file    Highest education level: Not on file   Occupational History    Not on file   Tobacco Use    Smoking status: Never    Smokeless tobacco: Never   Vaping Use    Vaping Use: Never used   Substance and Sexual Activity    Alcohol use: Not Currently     Comment: rare    Drug use: Never    Sexual activity: Not on file   Other Topics Concern    Not on file   Social History Narrative    Not on file     Social Determinants of Health     Financial Resource Strain: Not on file   Food Insecurity: Not on file   Transportation Needs: Not on file   Physical Activity: Not on file   Stress: Not on file   Social Connections: Not on file   Intimate Partner Violence: Not on file   Housing Stability: Not on file       Family History   Problem Relation Age of Onset    Stroke Mother     No Known Problems Father        No Known Allergies    Current Outpatient Medications   Medication Sig Dispense Refill    acetaminophen (TYLENOL) 325 mg tablet Take 2 tablets (650 mg total) by mouth every 4 (four) hours as needed for fever or moderate pain (temperature greater than 101 F) 100 tablet 0    acyclovir (ZOVIRAX) 400 MG tablet Take 1 tablet (400 mg total) by mouth 2 (two) times a day 60 tablet 11    allopurinol (ZYLOPRIM) 100 mg tablet TAKE 1 TABLET(100 MG) BY MOUTH TWICE DAILY 180 tablet 1    amoxicillin (AMOXIL) 500 MG tablet Take 4 tablets (2,000 mg total) by mouth once as needed (Take 60 minutes prior to ANY dental work) for up to 1 dose 4 tablet 1    Ascorbic Acid (VITAMIN C PO) Take by mouth daily       aspirin 81 mg chewable tablet Chew 81 mg daily      fexofenadine-pseudoephedrine (ALLEGRA-D 24) 180-240 MG per 24 hr tablet Take 1 tablet by mouth as needed       VITAMIN D PO Take by mouth daily       VITAMIN E PO Take by mouth daily       Zanubrutinib 80 MG CAPS Take 160 mg by mouth 2 (two) times a day 120 capsule 11    allopurinol (ZYLOPRIM) 100 mg tablet TAKE 1 TABLET(100 MG) BY MOUTH TWICE DAILY (Patient not taking: Reported on 11/3/2023) 60 tablet 1     No current facility-administered medications for this visit. Ht 5' 9" (1.753 m)   Wt 87.2 kg (192 lb 3.2 oz)   SpO2 96%   BMI 28.38 kg/m²     Review of Systems   All other systems reviewed and are negative. Physical Exam  Vitals reviewed. Constitutional:       General: He is not in acute distress.      Appearance: Normal appearance. He is well-developed and normal weight. He is not ill-appearing. HENT:      Head: Normocephalic and atraumatic. Eyes:      General: No scleral icterus. Extraocular Movements: Extraocular movements intact. Cardiovascular:      Rate and Rhythm: Normal rate and regular rhythm. Pulses: Normal pulses. Heart sounds: Normal heart sounds. No gallop. Pulmonary:      Effort: Pulmonary effort is normal. No respiratory distress. Breath sounds: Normal breath sounds. No wheezing or rales. Abdominal:      General: Abdomen is flat. Bowel sounds are normal. There is no distension. Palpations: Abdomen is soft. Tenderness: There is no abdominal tenderness. Musculoskeletal:      Right lower leg: No edema. Left lower leg: No edema. Skin:     General: Skin is warm and dry. Coloration: Skin is not jaundiced or pale. Neurological:      Mental Status: He is alert. Lab Results   Component Value Date     08/01/2015    K 5.2 10/04/2023     10/04/2023    CO2 27 10/04/2023    BUN 22 10/04/2023    CREATININE 1.51 (H) 10/04/2023    GLUCOSE 110 07/12/2022    CALCIUM 9.8 10/04/2023    ALT 15 10/04/2023    AST 25 10/04/2023    INR 1.09 07/05/2022       Lab Results   Component Value Date    WBC 52.14 (HH) 10/04/2023    HGB 12.3 10/04/2023    HCT 38.7 10/04/2023     (L) 10/04/2023       Cardiac studies:   ECG - 6/21/23: SR 1st degree AV block    TTE - 6/21/23:    Left Ventricle: Left ventricular cavity size is normal. Wall thickness is normal. The left ventricular ejection fraction is 65%. Systolic function is normal. Wall motion is normal. Diastolic function is normal.    Aortic Valve: There is an Gu CLINT 3 Ultra 26 mm TAVR bioprosthetic valve. The prosthetic valve appears well-seated. There is no evidence of paravalvular regurgitation. There is no evidence of transvalvular regurgitation. The aortic valve has no significant stenosis.  The gradient recorded across the prosthetic aortic valve is within the expected range. The aortic valve peak velocity is 1.93 m/s. The aortic valve mean gradient is 8 mmHg. The dimensionless velocity index is 0.64. The aortic valve area is 1.78 cm2. Mitral Valve: There is mild annular calcification. There is mild regurgitation with a centrally directed jet. Pulmonic Valve: There is mild regurgitation. TTE - 8/11/22:  Left Ventricle Left ventricular cavity size is normal. Wall thickness is normal. There is no concentric hypertrophy. The left ventricular ejection fraction is 65%. Systolic function is normal.  Wall motion is normal. Diastolic function is mildly abnormal, consistent with grade I (abnormal) relaxation. Right Ventricle Right ventricular cavity size is normal. Systolic function is normal. Wall thickness is normal.   Left Atrium The atrium is mildly dilated. Right Atrium The atrium is normal in size. Aortic Valve There is an Gu CLINT 3 Ultra 26 mm TAVR bioprosthetic valve. There is no evidence of regurgitation. The aortic valve has no significant stenosis. Mitral Valve There is mild annular calcification. There is mild to moderate regurgitation. There is no evidence of stenosis. The mitral valve has normal structure and normal function. Tricuspid Valve Tricuspid valve structure is normal. There is trace regurgitation. There is no evidence of stenosis. The right ventricular systolic pressure is normal.   Pulmonic Valve Pulmonic valve structure is normal. There is no evidence of regurgitation. There is no evidence of stenosis. Ascending Aorta The aortic root is normal in size. The ascending aorta is mildly dilated. The ascending aorta is 3.9 cm. IVC/SVC The inferior vena cava is normal in size. Pericardium There is no pericardial effusion. The pericardium is normal in appearance.      TTE - 5/18/22: LVEF 60%, mild LA enlargement, severe AS with peak velocity 3.5 m/s, MG 28 mmHg, DI 0.24.    TTE - 4/1/22:    Left Ventricle: Left ventricular cavity size is normal. Wall thickness is mildly increased. The left ventricular ejection fraction is 65%. Systolic function is normal. Wall motion is normal. Diastolic function is normal. There is mild to moderate concentric hypertrophy. Left Atrium: The atrium is mildly dilated. Aortic Valve: The aortic valve is trileaflet. The leaflets are moderately thickened. The leaflets are moderately calcified. There is severely reduced mobility. There is mild regurgitation. There is moderate stenosis. The aortic valve mean gradient is 21.0 mmHg. The aortic valve area is 1.32 cm2. The aortic valve velocity is increased due to stenosis. Mitral Valve: There is mild annular calcification. Tricuspid Valve: There is mild regurgitation      ASSESSMENT AND PLAN:  Chapin Mcnair was seen today for follow-up and shortness of breath. Diagnoses and all orders for this visit:    #. S/P TAVR (transcatheter aortic valve replacement): TTE from 8/11/22 with normally function bioprosthesis and no paravalvular leak. Completed 90 days DAPT. Completed cardiac rehab. Remains on daily aspirin 81 mg. 1-year post TAVR echo in June 2023 shows well-seated and normally functioning bioprosthetic valve. Reviewed Abx prophylaxis and provided new Rx. #. Hyperlipidemia, unspecified hyperlipidemia type:  #. Coronary artery disease involving native coronary artery of native heart without angina pectoris: Nonobstructive CAD. Cath on 6/15/22 with 50% focal mid LAD lesion, Ramus normal, LCx normal, RCA normal. Self-discontinued atorvastatin due to myalgias. Previously discussed trial of another statin or attempting every other day but would like to defer.       Wilber Pérez MD

## 2023-11-03 NOTE — PATIENT INSTRUCTIONS
You were seen today in the Cardiology office for follow up. No medication changes were made today. Please continue all your medications as prescribed. Thank you for choosing [facility].

## 2023-11-04 ENCOUNTER — APPOINTMENT (OUTPATIENT)
Dept: LAB | Age: 73
End: 2023-11-04
Payer: MEDICARE

## 2023-11-10 ENCOUNTER — OFFICE VISIT (OUTPATIENT)
Dept: HEMATOLOGY ONCOLOGY | Facility: CLINIC | Age: 73
End: 2023-11-10
Payer: MEDICARE

## 2023-11-10 VITALS
TEMPERATURE: 97.3 F | SYSTOLIC BLOOD PRESSURE: 138 MMHG | OXYGEN SATURATION: 98 % | HEART RATE: 70 BPM | BODY MASS INDEX: 28.44 KG/M2 | RESPIRATION RATE: 18 BRPM | WEIGHT: 192 LBS | HEIGHT: 69 IN | DIASTOLIC BLOOD PRESSURE: 82 MMHG

## 2023-11-10 DIAGNOSIS — N18.30 STAGE 3 CHRONIC KIDNEY DISEASE, UNSPECIFIED WHETHER STAGE 3A OR 3B CKD (HCC): ICD-10-CM

## 2023-11-10 DIAGNOSIS — D69.6 THROMBOCYTOPENIA (HCC): ICD-10-CM

## 2023-11-10 DIAGNOSIS — C91.12 CLL (CHRONIC LYMPHOID LEUKEMIA) IN RELAPSE (HCC): Primary | ICD-10-CM

## 2023-11-10 DIAGNOSIS — Z95.2 S/P TAVR (TRANSCATHETER AORTIC VALVE REPLACEMENT): ICD-10-CM

## 2023-11-10 DIAGNOSIS — I25.10 CORONARY ARTERY DISEASE INVOLVING NATIVE CORONARY ARTERY OF NATIVE HEART WITHOUT ANGINA PECTORIS: ICD-10-CM

## 2023-11-10 PROCEDURE — 99214 OFFICE O/P EST MOD 30 MIN: CPT | Performed by: INTERNAL MEDICINE

## 2023-11-10 NOTE — PROGRESS NOTES
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 He is responding very nicely to zanubrutinib that was started in April 2023. Follow-up visit for longstanding history of CLL, diagnosed several years ago. There was long period of observation. Initial treatment was with chlorambucil. In 2014 patient had Rituxan plus Bendamustine. Since April 2023 patient has been on zanubrutinib. No bleeding. No atrial fibrillation and no other symptoms secondary to zanubrutinib. He has less tiredness and exertional dyspnea and the symptoms could be secondary to CLL and cardiac in origin because he has underlying cardiac disorder and had TAVR for aortic stenosis in July 2022 followed by cardiac rehabilitation. He did start  playing golf again but he is not playing golf now because of the cold weather. Has less night sweats. Has arthritic symptoms. Recurrent scalp lesion and he follows with his dermatologist and he states scalp lesions are not malignant. History of herpes zoster right lower leg. No postherpetic neuralgia.           Current Outpatient Medications:     acetaminophen (TYLENOL) 325 mg tablet, Take 2 tablets (650 mg total) by mouth every 4 (four) hours as needed for fever or moderate pain (temperature greater than 101 F), Disp: 100 tablet, Rfl: 0    acyclovir (ZOVIRAX) 400 MG tablet, Take 1 tablet (400 mg total) by mouth 2 (two) times a day, Disp: 60 tablet, Rfl: 11    allopurinol (ZYLOPRIM) 100 mg tablet, TAKE 1 TABLET(100 MG) BY MOUTH TWICE DAILY, Disp: 180 tablet, Rfl: 1    allopurinol (ZYLOPRIM) 100 mg tablet, TAKE 1 TABLET(100 MG) BY MOUTH TWICE DAILY, Disp: 60 tablet, Rfl: 1    amoxicillin (AMOXIL) 500 MG tablet, Take 4 tablets (2,000 mg total) by mouth once as needed (Take 60 minutes prior to ANY dental work) for up to 1 dose, Disp: 4 tablet, Rfl: 1    Ascorbic Acid (VITAMIN C PO), Take by mouth daily , Disp: , Rfl:     aspirin 81 mg chewable tablet, Chew 81 mg daily, Disp: , Rfl:     fexofenadine-pseudoephedrine (ALLEGRA-D 24) 180-240 MG per 24 hr tablet, Take 1 tablet by mouth as needed , Disp: , Rfl:     VITAMIN D PO, Take by mouth daily , Disp: , Rfl:     VITAMIN E PO, Take by mouth daily , Disp: , Rfl:     Zanubrutinib 80 MG CAPS, Take 160 mg by mouth 2 (two) times a day, Disp: 120 capsule, Rfl: 11    No Known Allergies    Oncology History Overview Note   1996:  CLL was diagnosed. Intermittently he received chlorambucil over the years. 2014:  6 cycles of Rituxan and bendamustine. Presently under surveillance. CLL (chronic lymphoid leukemia) in relapse [facility])    Chemotherapy       1996:  CLL was diagnosed. Intermittent therapy with chlorambucil. 2014:  6 cycles of Rituxan and bendamustine. Presently under surveillance. 6/26/2023 -  Cancer Staged    Staging form: Chronic Lymphocytic Leukemia, AJCC 8th Edition  - Clinical: Modified Tolliver Stage IV (Modified Tolliver risk: High, Lymphocytosis: Present, Adenopathy: Unknown, Organomegaly: Present, Anemia: Present, Thrombocytopenia: Present) - Signed by Shahid Marin PA-C on 6/26/2023           ROS:  11/10/23 Reviewed 12 systems:  See symptoms in HPI. Presently no other neurological, cardiac, pulmonary, GI and  symptoms. Other symptoms are in HPI. No fever, chills, bleeding, bone pains, skin rash, weight loss,   weakness, numbness,  claudication and gait problem. No frequent infections . Not unusually sensitive to heat or cold. No swelling of the ankles. No swollen glands. Patient is  anxious. Examination:     11/10/23 11/3/23 9/5/23   Blood Pressure 138/82 148/82 134/80   Pulse 70 79 64   Respirations 18 -- --   Temperature 97.3 °F (36.3 °C) Abnormal  -- 98.2 °F (36.8 °C)   Temp Source -- -- Tympanic   SpO2 98 % 96 % 98 %   Weight - Scale 87.1 kg (192 lb) 87.2 kg (192 lb 3.2 oz) 83.4 kg (183 lb 12.8 oz)   Height 5' 9" (1.753 m) 5' 9" (1.753 m) 5' 9" (1.753 m)     Alert and oriented and not in distress. Vitals are above. There is no icterus. There is no oral thrush. There is no palpable neck mass. Lung fields are clear. Regular heart rate. Systolic murmur. Soft and nontender abdomen. Liver and spleen are not palpably enlarged. No ascites. There is no edema of the ankles. No calf tenderness. No focal neurological deficit. There is no skin rash. There is no palpable lymphadenopathy in the neck and axillary areas. There is no focal neurological deficit. There is no skin rash. Performance status 2 on ECOG scale.       IMAGING:  IMPRESSION:     No choledocholithiasis is seen  No biliary dilation  Distended gallbladder with pericholecystic fluid and cholelithiasis correlated with the acute cholecystitis  Splenomegaly with spleen measuring about 16 cm can be correlated with the patient's history of CLL  Mild edema seen in the mesentery as seen on the CT              Workstation performed: [id number]        Imaging    MRI abdomen wo contrast and mrcp (Order: [id number]) - 11/8/2022    Blood tests done on 3/30/2023  LABS:      Results for orders placed or performed in visit on 10/13/23   CBC and differential   Result Value Ref Range    WBC 38.16 (HH) 4.31 - 10.16 Thousand/uL    RBC 3.93 3.88 - 5.62 Million/uL    Hemoglobin 12.7 12.0 - 17.0 g/dL    Hematocrit 40.5 36.5 - 49.3 %     (H) 82 - 98 fL    MCH 32.3 26.8 - 34.3 pg    MCHC 31.4 31.4 - 37.4 g/dL    RDW 14.6 11.6 - 15.1 %    MPV 10.9 8.9 - 12.7 fL    Platelets 340 (L) 830 - 390 Thousands/uL   Comprehensive metabolic panel   Result Value Ref Range    Sodium 134 (L) 135 - 147 mmol/L    Potassium 5.0 3.5 - 5.3 mmol/L    Chloride 100 96 - 108 mmol/L    CO2 25 21 - 32 mmol/L    ANION GAP 9 mmol/L    BUN 21 5 - 25 mg/dL    Creatinine 1.49 (H) 0.60 - 1.30 mg/dL    Glucose 95 65 - 140 mg/dL    Calcium 9.7 8.4 - 10.2 mg/dL    AST 29 13 - 39 U/L    ALT 16 7 - 52 U/L    Alkaline Phosphatase 72 34 - 104 U/L    Total Protein 6.8 6.4 - 8.4 g/dL    Albumin 4.5 3.5 - 5.0 g/dL    Total Bilirubin 0.34 0.20 - 1.00 mg/dL    eGFR 46 ml/min/1.73sq m   Manual Differential(PHLEBS Do Not Order)   Result Value Ref Range    Segmented % 13 (L) 43 - 75 %    Lymphocytes % 80 (H) 14 - 44 %    Monocytes % 2 (L) 4 - 12 %    Eosinophils, % 1 0 - 6 %    Basophils % 0 0 - 1 %    Atypical Lymphocytes % 4 (H) <=0 %    Absolute Neutrophils 4.96 1.85 - 7.62 Thousand/uL    Lymphocytes Absolute 32.05 (H) 0.60 - 4.47 Thousand/uL    Monocytes Absolute 0.76 0.00 - 1.22 Thousand/uL    Eosinophils Absolute 0.38 0.00 - 0.40 Thousand/uL    Basophils Absolute 0.00 0.00 - 0.10 Thousand/uL    Total Counted      Smudge Cells Present     RBC Morphology Present     Platelet Estimate Borderline (A) Adequate    Anisocytosis Present        Tissue Exam: [id number]  Order: [id number]  Collected 11/10/2022  9:07 AM     Status: Final result     Visible to patient: Yes (seen)     Dx: Abdominal pain     0 Result Notes  Component    Case Report   Surgical Pathology Report                         Case: [id number]                                    Authorizing Provider:  Margot Collins MD          Collected:           11/10/2022 [id number]               Ordering Location:     [facility]      Received:            11/10/2022 [address] [place] [address] Operating Room                                                       Pathologist:           Melissa Carreon MD                                                          Specimen:    Gallbladder, GALLBLADDER                                                                   Final Diagnosis   A. Gallbladder and lymph node (1), cholecystectomy:      - Chronic lymphocytic leukemia/small lymphocytic lymphoma (CLL/SLL) involving one lymph node and gallbladder, see note       - Chronic cholecystitis with cholelithiasis   Electronically signed by Melissa Carreon MD on 11/23/2022 at 11:31 AM   Note           Labs, Imaging, & Other studies:   All pertinent labs and imaging studies were personally reviewed            Reviewed and discussed with patient. 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 He is responding very nicely to zanubrutinib that was started in April 2023. Follow-up visit for longstanding history of CLL, diagnosed several years ago. There was long period of observation. Initial treatment was with chlorambucil. In 2014 patient had Rituxan plus Bendamustine. Since April 2023 patient has been on zanubrutinib. No bleeding. No atrial fibrillation and no other symptoms secondary to zanubrutinib. He has less tiredness and exertional dyspnea and the symptoms could be secondary to CLL and cardiac in origin because he has underlying cardiac disorder and had TAVR for aortic stenosis in July 2022 followed by cardiac rehabilitation. He did start  playing golf again but he is not playing golf now because of the cold weather. Has less night sweats. Has arthritic symptoms. Recurrent scalp lesion and he follows with his dermatologist and he states scalp lesions are not malignant. History of herpes zoster right lower leg. No postherpetic neuralgia. Physical examination and test results are as recorded and discussed. Patient is being continued on zanubrutinib. Patient's condition, counts, chemistry and other CLL parameters are being monitored. He follows with Dr. Jhony Carlos at [facility]. for CLL for his expert advice. Hydration. Discussed patient's condition with him in detail with explaned  Questions answered. He is taking allopurinol and acyclovir. Discussed the importance of eating healthy foods, staying active as tolerated and health screening tests   Patient is capable of self-care. Discussed precautions against coronavirus and other infections. He will continue to follow with primary physician and other consultants. See diagnoses, orders and instructions   . 1.  CLL (chronic lymphoid leukemia) in relapse (HCC)    - CBC and differential; Standing  - Comprehensive metabolic panel; Standing  - Uric acid; Standing  - IgG; Standing  - CBC and differential  - Comprehensive metabolic panel  - Uric acid  - IgG    2. Stage 3 chronic kidney disease, unspecified whether stage 3a or 3b CKD ([address])      3. Thrombocytopenia ([address])      4. Coronary artery disease involving native coronary artery of native heart without angina pectoris      5. S/P TAVR (transcatheter aortic valve replacement)      Blood work every month. No change in therapy. Follow-up in 3 months. I used a dictation device to dictate this note and there could be mistakes in my note and patient may contact my office for that.     Patient voiced understanding and agrees      Counseling / Coordination of Care  Provided counseling and support

## 2023-11-28 ENCOUNTER — TELEPHONE (OUTPATIENT)
Dept: HEMATOLOGY ONCOLOGY | Facility: CLINIC | Age: 73
End: 2023-11-28

## 2023-11-28 ENCOUNTER — HOSPITAL ENCOUNTER (OUTPATIENT)
Dept: NON INVASIVE DIAGNOSTICS | Facility: HOSPITAL | Age: 73
Discharge: HOME | End: 2023-11-28
Payer: MEDICARE

## 2023-11-28 DIAGNOSIS — R60.0 EDEMA OF LEFT UPPER ARM: Primary | ICD-10-CM

## 2023-11-28 DIAGNOSIS — R60.0 EDEMA OF LEFT UPPER ARM: ICD-10-CM

## 2023-11-28 PROCEDURE — 93971 EXTREMITY STUDY: CPT | Performed by: SURGERY

## 2023-11-28 PROCEDURE — 93971 EXTREMITY STUDY: CPT

## 2023-11-28 NOTE — TELEPHONE ENCOUNTER
Patient stop by the office to have his left arm evaluated. He reports he had blood work done on 11/4/23 and saw Dr. Scarlett Saint on 11/10/23. He noted that the blood draw site was bruised a few days later, noted that the bruising had gone up his arm, and now there is bruising in the wrist area. It has progressively gotten worse. Edema noted on left arm in comparison to right arm. Patient reports mild pain. Pulses palpable. Bruised area is warm to touch. Patient has ROM. /80. Denies CP, SOB, palpitations. Recommend stat venous duplex of left arm, discussed with Dayanara Pritchett PA-C with Dr. Milka Silva office. She is in agreement with plan. We will assist patient in scheduling the ultrasound and will call him with the results.        Media Information      Document Information    Clinical Image - Mobile Device      11/28/2023 11:38 AM   Attached To:   Cathryn Conner   Source Information    CANDE Morales  Pg Hem Onc [employer] Information      Document Information    Clinical Image - Mobile Device      11/28/2023 11:38 AM   Attached To:   Cathryn Dialectiveyuly   Source Information    CANDE Morales  Pg Hem Onc [employer] Information      Document Information    Clinical Image - Mobile Device      11/28/2023 11:39 AM   Attached To:   [address], [address] [facility]

## 2023-11-28 NOTE — TELEPHONE ENCOUNTER
Spoke with patient to make him aware of the following from Guillermo GLYNN:   Please advise patient as below doppler is negative. Supportive care would be tylenol for pain, elevation/rest for the swelling. Offer him to come into office for appt Friday to re-assess the area if he would like. He verbalized understanding and agreed to plan. He thinks that this will improve. Will call later in week if he wishes to have appointment.

## 2023-12-15 ENCOUNTER — TELEPHONE (OUTPATIENT)
Dept: OTHER | Facility: OTHER | Age: 73
End: 2023-12-15

## 2023-12-15 ENCOUNTER — TELEPHONE (OUTPATIENT)
Dept: HEMATOLOGY ONCOLOGY | Facility: CLINIC | Age: 73
End: 2023-12-15

## 2023-12-15 ENCOUNTER — APPOINTMENT (OUTPATIENT)
Dept: LAB | Age: 73
End: 2023-12-15
Payer: MEDICARE

## 2023-12-15 LAB
IGG SERPL-MCNC: 672 MG/DL (ref 635–1741)
URATE SERPL-MCNC: 3.7 MG/DL (ref 3.5–8.5)

## 2023-12-15 NOTE — TELEPHONE ENCOUNTER
Received the critical lab results about WBC about WBC 34.13    Per chart review actually that is lower than before [facility] continues to trend down and treatments for CLL as per Dr. Petar Petersen    I called and updated the patient he is aware and happy about the results denies any new symptoms of concern doing well and will continue to follow-up with Dr. Petar Petersen as planned.      Patient had no further questions or concerns    Claudia Velarde, DO   Hematology and Medical Oncology - PGY [place]

## 2024-01-04 ENCOUNTER — DOCUMENTATION (OUTPATIENT)
Dept: HEMATOLOGY ONCOLOGY | Facility: CLINIC | Age: 74
End: 2024-01-04

## 2024-01-04 NOTE — PROGRESS NOTES
Johnvd email that pt will need help for his brukinsa his [employer] funding ran out  Called pt to get info to enroll him in the my [employer] program  Spoke to genevieve & she sd that all reps are busy but she will have somebody get back to me  Called pt on [phone] got voicemail left a message for pt to call me back  Called [phone] got voicemail left a message for pt to call me back

## 2024-01-05 ENCOUNTER — DOCUMENTATION (OUTPATIENT)
Dept: HEMATOLOGY ONCOLOGY | Facility: CLINIC | Age: 74
End: 2024-01-05

## 2024-01-05 NOTE — PROGRESS NOTES
Pt called & was asking about the medication & if he is able to pick it up. Told him to wait to get the medication because I am enrolling him in the [facility]. & as soon as I am done I will let him know what I was able to do & if he is approved I will notify the pt & the pharmacy.  Called [facility] & spoke to lorena & she was able to do the application over the phone.  Pt was approved for $4500  Effective 01/05/24-01/04/25 w/look back 10/07/23.  Id# [id number]  [id number]  Cardholder# [id number]  PCN=[id number]  FHR=[id number]  Called the pt back & gave him that info & he thanked me  Emailed the team & norma

## 2024-02-02 ENCOUNTER — APPOINTMENT (OUTPATIENT)
Dept: LAB | Age: 74
End: 2024-02-02
Payer: MEDICARE

## 2024-02-12 ENCOUNTER — OFFICE VISIT (OUTPATIENT)
Dept: HEMATOLOGY ONCOLOGY | Facility: CLINIC | Age: 74
End: 2024-02-12
Payer: MEDICARE

## 2024-02-12 VITALS
WEIGHT: 191 LBS | HEART RATE: 77 BPM | SYSTOLIC BLOOD PRESSURE: 124 MMHG | OXYGEN SATURATION: 97 % | RESPIRATION RATE: 17 BRPM | HEIGHT: 69 IN | TEMPERATURE: 97.3 F | DIASTOLIC BLOOD PRESSURE: 78 MMHG | BODY MASS INDEX: 28.29 KG/M2

## 2024-02-12 DIAGNOSIS — N18.30 STAGE 3 CHRONIC KIDNEY DISEASE, UNSPECIFIED WHETHER STAGE 3A OR 3B CKD (HCC): ICD-10-CM

## 2024-02-12 DIAGNOSIS — I25.10 CORONARY ARTERY DISEASE INVOLVING NATIVE CORONARY ARTERY OF NATIVE HEART WITHOUT ANGINA PECTORIS: ICD-10-CM

## 2024-02-12 DIAGNOSIS — Z95.2 S/P TAVR (TRANSCATHETER AORTIC VALVE REPLACEMENT): ICD-10-CM

## 2024-02-12 DIAGNOSIS — R60.0 EDEMA OF LEFT UPPER ARM: ICD-10-CM

## 2024-02-12 DIAGNOSIS — D64.9 ANEMIA, UNSPECIFIED TYPE: ICD-10-CM

## 2024-02-12 DIAGNOSIS — D69.6 THROMBOCYTOPENIA (HCC): ICD-10-CM

## 2024-02-12 DIAGNOSIS — C91.12 CLL (CHRONIC LYMPHOID LEUKEMIA) IN RELAPSE (HCC): Primary | ICD-10-CM

## 2024-02-12 PROCEDURE — 99214 OFFICE O/P EST MOD 30 MIN: CPT | Performed by: INTERNAL MEDICINE

## 2024-02-12 NOTE — PATIENT INSTRUCTIONS
Patient has standing orders for blood work.  No change in Zanubrutinib.  Venous Doppler left arm this week.  Ultrasound left axilla within a week.  Follow-up in 2 months.

## 2024-02-12 NOTE — PROGRESS NOTES
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   He is responding  to zanubrutinib.     CLL was diagnosed several years ago.  There was long period of observation.  Initial treatment was with chlorambucil.  In 2014 patient had Rituxan plus Bendamustine.  Since April 2023 patient has been on zanubrutinib.   No bleeding.  No atrial fibrillation and no other symptoms secondary to zanubrutinib.  He has less tiredness and exertional dyspnea and the symptoms could be secondary to CLL and cardiac in origin because he has underlying cardiac disorder and had TAVR for aortic stenosis in July 2022 followed by cardiac rehabilitation.  He did start  playing golf again but he is not playing golf now because of the cold weather. Has less night sweats.  Has arthritic symptoms.  Recurrent scalp lesion and he follows with his dermatologist and he states scalp lesions are not malignant.   History of herpes zoster right lower leg.  No postherpetic neuralgia.   In November 2023, 2 days after venipuncture for lab blood draw from left antecubital vein he noticed swelling of left forearm and hand.  Swelling is still there.  It is not painful.  Negative venous Doppler study and that will be repeated.         Current Outpatient Medications:   •  acetaminophen (TYLENOL) 325 mg tablet, Take 2 tablets (650 mg total) by mouth every 4 (four) hours as needed for fever or moderate pain (temperature greater than 101 F), Disp: 100 tablet, Rfl: 0  •  acyclovir (ZOVIRAX) 400 MG tablet, Take 1 tablet (400 mg total) by mouth 2 (two) times a day, Disp: 60 tablet, Rfl: 11  •  allopurinol (ZYLOPRIM) 100 mg tablet, TAKE 1 TABLET(100 MG) BY MOUTH TWICE DAILY, Disp: 180 tablet, Rfl: 1  •  allopurinol (ZYLOPRIM) 100 mg tablet, TAKE 1 TABLET(100 MG) BY MOUTH TWICE DAILY, Disp: 60 tablet, Rfl: 1  •  amoxicillin (AMOXIL) 500 MG tablet, Take 4 tablets (2,000 mg total) by mouth once as needed (Take 60  minutes prior to ANY dental work) for up to 1 dose, Disp: 4 tablet, Rfl: 1  •  Ascorbic Acid (VITAMIN C PO), Take by mouth daily , Disp: , Rfl:   •  aspirin 81 mg chewable tablet, Chew 81 mg daily, Disp: , Rfl:   •  fexofenadine-pseudoephedrine (ALLEGRA-D 24) 180-240 MG per 24 hr tablet, Take 1 tablet by mouth as needed , Disp: , Rfl:   •  VITAMIN D PO, Take by mouth daily , Disp: , Rfl:   •  VITAMIN E PO, Take by mouth daily , Disp: , Rfl:   •  Zanubrutinib 80 MG CAPS, Take 160 mg by mouth 2 (two) times a day, Disp: 120 capsule, Rfl: 11    No Known Allergies    Oncology History Overview Note   1996:  CLL was diagnosed.  Intermittently he received chlorambucil over the years.    2014:  6 cycles of Rituxan and bendamustine.  Presently under surveillance.     CLL (chronic lymphoid leukemia) in relapse (HCC)    Chemotherapy       1996:  CLL was diagnosed.  Intermittent therapy with chlorambucil.    2014:  6 cycles of Rituxan and bendamustine.  Presently under surveillance.     6/26/2023 -  Cancer Staged    Staging form: Chronic Lymphocytic Leukemia, AJCC 8th Edition  - Clinical: Modified Tolliver Stage IV (Modified Tolliver risk: High, Lymphocytosis: Present, Adenopathy: Unknown, Organomegaly: Present, Anemia: Present, Thrombocytopenia: Present) - Signed by Charlotte Mir PA-C on 6/26/2023           ROS:  02/13/24 Reviewed 12 systems:  See symptoms in HPI.  Presently no other neurological, cardiac, pulmonary, GI and  symptoms.  Other symptoms are in HPI.  No other symptoms like fever, chills, bleeding, bone pains, skin rash, weight loss,   weakness, numbness,  claudication and gait problem. No frequent infections .  Not unusually sensitive to heat or cold. No swelling of the ankles. No swollen glands.  Patient is  anxious.  Examination:     2/12/24 11/10/23 11/3/23   Blood Pressure 124/78 138/82 148/82   Pulse 77 70 79   Respirations 17 18 --   Temperature 97.3 °F (36.3 °C) (Abnormal)   97.3 °F (36.3 °C) (Abnormal)   " --   SpO2 97 % 98 % 96 %   Weight - Scale 86.6 kg (191 lb) 87.1 kg (192 lb) 87.2 kg (192 lb 3.2 oz)   Height 5' 9\" (1.753 m) 5' 9\" (1.753 m) 5' 9\" (1.753 m)   Pain Score Zero -- --   Patient is alert and oriented.  Patient is not in distress.  Vital signs are above.  No icterus.  No oral thrush.  No palpable neck mass.  Clear lung fields.  Heart rate is regular.  Systolic murmur.  No palpable abdominal mass.  Soft and nontender abdomen.  There is no ascites.  No edema of ankles.  There is no calf tenderness.  There is no focal neurological deficit.  There is no skin rash.  There is no palpable lymphadenopathy in the neck and axillary areas.  There is no focal neurological deficit.  There is no skin rash.  There is swelling of left forearm and back of left hand.  No tenderness.  No redness.  No warmth.  Performance status 2 on ECOG scale.      IMAGING:  IMPRESSION:     No choledocholithiasis is seen  No biliary dilation  Distended gallbladder with pericholecystic fluid and cholelithiasis correlated with the acute cholecystitis  Splenomegaly with spleen measuring about 16 cm can be correlated with the patient's history of CLL  Mild edema seen in the mesentery as seen on the CT              Workstation performed: [id number]        Imaging    MRI abdomen wo contrast and mrcp (Order: [id number]) - 11/8/2022    Blood tests done on 3/30/2023  LABS:      Results for orders placed or performed in visit on 01/05/24   Uric acid   Result Value Ref Range    Uric Acid 3.8 3.5 - 8.5 mg/dL   IgG   Result Value Ref Range     635 - 1,741 mg/dL   CBC and differential  Status: Final result      Contains abnormal data CBC and differential  Order: [id number]  Status: Final result       Visible to patient: Yes (seen)       Next appt: None       Dx: CLL (chronic lymphoid leukemia) in re...    0 Result Notes            Component  Ref Range & Units 2/2/24 10:01 AM 12/15/23 11:22 AM 11/4/23 10:27 AM 10/4/23 11:02 AM 8/26/23  8:41 AM " 8/3/23 10:18 AM 7/8/23  9:15 AM   WBC  4.31 - 10.16 Thousand/uL 26.61 High  32.13 High Panic  38.16 High Panic  52.14 High Panic  69.82 High Panic  90.25 High Panic  107.79 High Panic    RBC  3.88 - 5.62 Million/uL 3.84 Low  3.79 Low  3.93 3.77 Low  3.73 Low  3.89 3.46 Low    Hemoglobin  12.0 - 17.0 g/dL 12.5 12.5 12.7 12.3 11.8 Low  12.4 10.8 Low    Hematocrit  36.5 - 49.3 % 39.3 39.7 40.5 38.7 39.2 40.5 36.6   MCV  82 - 98 fL 102 High  105 High  103 High  103 High  105 High  104 High  106 High    MCH  26.8 - 34.3 pg 32.6 33.0 32.3 32.6 31.6 31.9 31.2   MCHC  31.4 - 37.4 g/dL 31.8 31.5 31.4 31.8 30.1 Low  30.6 Low  29.5 Low    RDW  11.6 - 15.1 % 14.5 14.6 14.6 14.3 14.3 14.1 14.3   MPV  8.9 - 12.7 fL 11.2 11.1 10.9 11.4 11.0 11.4 10.7   Platelets  149 - 390 Thousands/uL 166 169 126 Low  133 Low  141 Low  130 Low  135 Low       Manual Differential(PHLEBS Do Not Order)  Status: Final result      Contains abnormal data Manual Differential(PHLEBS Do Not Order)  Order: [id number] - Reflex for Order [id number]  Status: Final result       Visible to patient: Yes (seen)       Next appt: None       Dx: CLL (chronic lymphoid leukemia) in re...    0 Result Notes              Component  Ref Range & Units 2/2/24 10:01 AM 12/15/23 11:22 AM 11/4/23 10:27 AM 10/4/23 11:02 AM 8/26/23  8:41 AM 8/3/23 10:18 AM 7/8/23  9:15 AM   Segmented %  43 - 75 % 34 Low  14 Low  13 Low  14 Low  9 Low  8 Low  3 Low    Lymphocytes %  14 - 44 % 60 High  72 High  80 High  84 High  79 High  90 High  96 High    Monocytes %  4 - 12 % 2 Low  4 2 Low  2 Low  1 Low  2 Low  1 Low    Eosinophils, %  0 - 6 % 2 1 1 0 0 0 0   Basophils %  0 - 1 % 0 0 0 0 0 0 0   Atypical Lymphocytes %  <=0 % 2 High  4 High  4 High   10 High      Absolute Neutrophils  1.85 - 7.62 Thousand/uL 9.05 High  6.10 4.96 7.30 6.98 7.22 3.23   Lymphocytes Absolute  0.60 - 4.47 Thousand/uL 16.50 High  24.42 High  32.05 High  43.80 High  62.14 High  81.23 High  103.48 High    Monocytes  Absolute  0.00 - 1.22 Thousand/uL 0.53 1.29 High  0.76 1.04 0.70 1.81 High  1.08   Eosinophils Absolute  0.00 - 0.40 Thousand/uL 0.53 High  0.32 0.38 0.00 0.00 0.00 0.00   Basophils Absolute  0.00 - 0.10 Thousand/uL 0.00 0.00 0.00 0.00 0.00 0.00 0.00   Total Counted          RBC Morphology Present Normal Present Present Normal Present Present   Platelet Estimate  Adequate Adequate Adequate Borderline Abnormal  Borderline Abnormal  Decreased Abnormal  Borderline Abnormal  Decreased Abnormal    Anisocytosis Present  Present Present  Present Present   Macrocytes Present                    Narrative    This is an appended report.  These results have been appended to a previously verified report.            Comprehensive metabolic panel  Status: Final result      Contains abnormal data Comprehensive metabolic panel  Order: [id number]  Status: Final result       Visible to patient: Yes (seen)       Next appt: None       Dx: CLL (chronic lymphoid leukemia) in re...    0 Result Notes            Component  Ref Range & Units 2/2/24 10:01 AM 12/15/23 11:22 AM 11/4/23 10:27 AM 10/4/23 11:02 AM 9/1/23 10:16 AM 8/26/23  8:41 AM 8/3/23 10:18 AM   Sodium  135 - 147 mmol/L 139 138 134 Low  138 137 138 137   Potassium  3.5 - 5.3 mmol/L 5.1 4.8 5.0 5.2 5.7 High  CM 5.7 High  4.9 CM   Chloride  96 - 108 mmol/L 102 102 100 102 102 105 107   CO2  21 - 32 mmol/L 24 26 25 27 29 28 23   ANION GAP  mmol/L 13 10 9 9 6 5 7   BUN  5 - 25 mg/dL 26 High  18 21 22 26 High  24 25   Creatinine  0.60 - 1.30 mg/dL 1.49 High  1.48 High  CM 1.49 High  CM 1.51 High  CM 1.47 High  CM 1.52 High  CM 1.42 High  CM   Comment: Standardized to IDMS reference method   Glucose  65 - 140 mg/dL 97  95 CM  97 CM  99 CM   Comment: If the patient is fasting, the ADA then defines impaired fasting glucose as > 100 mg/dL and diabetes as > or equal to 123 mg/dL.   Calcium  8.4 - 10.2 mg/dL 10.8 High  10.0 9.7 9.8 10.4 High  10.7 High  10.2 High  R   AST  13 - 39 U/L 25 28  29 25  23 34 R, CM   ALT  7 - 52 U/L 13 17 CM 16 CM 15 CM  14 CM 24 R, CM   Comment: Specimen collection should occur prior to Sulfasalazine administration due to the potential for falsely depressed results.   Alkaline Phosphatase  34 - 104 U/L 77 76 72 72  74 80 R   Total Protein  6.4 - 8.4 g/dL 7.1 6.9 6.8 6.4  7.2 7.6   Albumin  3.5 - 5.0 g/dL 4.6 4.6 4.5 4.4  4.5 4.2   Total Bilirubin  0.20 - 1.00 mg/dL 0.38 0.31 CM 0.34 CM 0.33 CM  0.40 CM 0.36 CM   Comment: Use of this assay is not recommended for patients undergoing treatment with eltrombopag due to the potential for falsely elevated results.  N-acetyl-p-benzoquinone imine (metabolite of Acetaminophen) will generate erroneously low results in samples for patients that have taken an overdose of Acetaminophen.   eGFR  ml/min/1.73sq m 46 46 46 45 47 45 49                             Component  Ref Range & Units 2/2/24 10:01 AM 12/15/23 11:22 AM 3/30/23 10:50 AM 11/26/22  8:31 AM 10/5/22 11:12 AM 7/5/22  3:01 PM 5/10/22  1:46 PM   IGG  635 - 1,741 mg/dL 733 672 685.0 Low  R 676.0 Low  R 641.0 Low  R 746.0 R 688.0 Low  R              Specimen Collected: 02/02/24 10:01 AM Last Resulted: 02/02/24  9:53 PM                     Tissue Exam: [id number]  Order: [id number]  Collected 11/10/2022  9:07 AM     Status: Final result     Visible to patient: Yes (seen)     Dx: Abdominal pain     0 Result Notes  Component    Case Report   Surgical Pathology Report                         Case: [id number]                                    Authorizing Provider:  Jacinto Nascimento MD          Collected:           11/10/2022 0907               Ordering Location:     [facility]      Received:            11/10/2022 [facility]                                      Hospital Operating Room                                                       Pathologist:           Mary Morgan MD                                                          Specimen:    Gallbladder, GALLBLADDER                                                                    Final Diagnosis   A. Gallbladder and lymph node (1), cholecystectomy:      - Chronic lymphocytic leukemia/small lymphocytic lymphoma (CLL/SLL) involving one lymph node and gallbladder, see note       - Chronic cholecystitis with cholelithiasis   Electronically signed by Mary Morgan MD on 11/23/2022 at 11:31 AM   Note           Labs, Imaging, & Other studies:   All pertinent labs and imaging studies were personally reviewed            Reviewed and discussed with patient.    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   He is responding  to zanubrutinib.     CLL was diagnosed several years ago.  There was long period of observation.  Initial treatment was with chlorambucil.  In 2014 patient had Rituxan plus Bendamustine.  Since April 2023 patient has been on zanubrutinib.   No bleeding.  No atrial fibrillation and no other symptoms secondary to zanubrutinib.  He has less tiredness and exertional dyspnea and the symptoms could be secondary to CLL and cardiac in origin because he has underlying cardiac disorder and had TAVR for aortic stenosis in July 2022 followed by cardiac rehabilitation.  He did start  playing golf again but he is not playing golf now because of the cold weather. Has less night sweats.  Has arthritic symptoms.  Recurrent scalp lesion and he follows with his dermatologist and he states scalp lesions are not malignant.   History of herpes zoster right lower leg.  No postherpetic neuralgia.   In November 2023, 2 days after venipuncture for lab blood draw from left antecubital vein he noticed swelling of left forearm and hand.  Swelling is still there.  It is not painful.  Negative venous Doppler study and that will be repeated.             Physical examination and test results are as recorded and discussed.  Patient is being continued on zanubrutinib.  Patient's  condition, counts, chemistry and other CLL parameters are being monitored.  He is responding to zanubrutinib.  He follows with Dr. Brooks at [facility] for CLL for his expert advice.  So far swelling of left forearm is concerned he remembers very clearly when and how it happened.  Venous Doppler study will be repeated.  He will have ultrasound of left axilla.  Most likely he will have lymphedema like compression therapy.  I explained this to the patient.   Discussed patient's condition with him in detail with explaned  Questions answered.  He is taking allopurinol and acyclovir.  Discussed the importance of eating healthy foods, staying active as tolerated and health screening tests.  Goal is remission from CLL and prolongation of survival.   Patient is capable of self-care.  Discussed precautions against coronavirus and other infections.  He will continue to follow with primary physician and other consultants.      See diagnoses, orders and instructions   .  1. CLL (chronic lymphoid leukemia) in relapse (HCC)    2. Edema of left upper arm    - VAS upper limb venous duplex scan, unilateral/limited; Future  - US Breast Axilla Left; Future    3. Stage 3 chronic kidney disease, unspecified whether stage 3a or 3b CKD (HCC)      4. Thrombocytopenia (HCC)      5. Coronary artery disease involving native coronary artery of native heart without angina pectoris      6. S/P TAVR (transcatheter aortic valve replacement)      7. Anemia, unspecified type    Patient has standing orders for blood work.  No change in Zanubrutinib.  Venous Doppler left arm this week.  Ultrasound left axilla within a week.  Follow-up in 2 months.      I used a dictation device to dictate this note and there could be mistakes in my note and patient may contact my office for that.    Patient voiced understanding and agrees      Counseling / Coordination of Care  Provided counseling and support

## 2024-02-23 ENCOUNTER — HOSPITAL ENCOUNTER (OUTPATIENT)
Dept: NON INVASIVE DIAGNOSTICS | Facility: HOSPITAL | Age: 74
Discharge: HOME | End: 2024-02-23
Attending: INTERNAL MEDICINE
Payer: MEDICARE

## 2024-02-23 DIAGNOSIS — R60.0 EDEMA OF LEFT UPPER ARM: ICD-10-CM

## 2024-02-23 PROCEDURE — 93971 EXTREMITY STUDY: CPT

## 2024-02-23 PROCEDURE — 93971 EXTREMITY STUDY: CPT | Performed by: SURGERY

## 2024-02-23 NOTE — TELEPHONE ENCOUNTER
Patient scheduled for R+LHC at [facility] on 6/15/22 with Dr Blank Stapleton  Patient aware of general instructions and labs required  Patient cover under medicare  Dr Nova Hartmann , can you please place the case for this procedure  Thank you  I performed the procedure in its entirety.

## 2024-02-28 ENCOUNTER — TELEPHONE (OUTPATIENT)
Dept: HEMATOLOGY ONCOLOGY | Facility: CLINIC | Age: 74
End: 2024-02-28

## 2024-02-28 DIAGNOSIS — I89.0 LYMPHEDEMA OF LEFT ARM: Primary | ICD-10-CM

## 2024-02-28 DIAGNOSIS — C91.12 CLL (CHRONIC LYMPHOID LEUKEMIA) IN RELAPSE (HCC): ICD-10-CM

## 2024-02-28 NOTE — TELEPHONE ENCOUNTER
----- Message from Liang Aden MD sent at 2/28/2024  1:08 PM EST -----  Hi,  Please send patient about lymphedema therapy of left arm.  Diagnosis lymphedema left arm.  Order placed in epic.  Thanks  Markie

## 2024-02-28 NOTE — TELEPHONE ENCOUNTER
Spoke with patient to make him aware of the below. I asked that he contact our office if he does not hear about scheduling by Friday. He verbalized understanding and agreed to plan.

## 2024-03-04 ENCOUNTER — EVALUATION (OUTPATIENT)
Dept: PHYSICAL THERAPY | Facility: REHABILITATION | Age: 74
End: 2024-03-04
Payer: MEDICARE

## 2024-03-04 ENCOUNTER — HOSPITAL ENCOUNTER (OUTPATIENT)
Dept: ULTRASOUND IMAGING | Facility: CLINIC | Age: 74
Discharge: HOME | End: 2024-03-04
Payer: MEDICARE

## 2024-03-04 VITALS — WEIGHT: 191 LBS | BODY MASS INDEX: 28.29 KG/M2 | HEIGHT: 69 IN

## 2024-03-04 DIAGNOSIS — R60.0 EDEMA OF LEFT UPPER ARM: ICD-10-CM

## 2024-03-04 DIAGNOSIS — I89.0 LYMPHEDEMA OF LEFT ARM: Primary | ICD-10-CM

## 2024-03-04 DIAGNOSIS — C91.12 CLL (CHRONIC LYMPHOID LEUKEMIA) IN RELAPSE (HCC): ICD-10-CM

## 2024-03-04 PROCEDURE — 76642 ULTRASOUND BREAST LIMITED: CPT

## 2024-03-04 PROCEDURE — 97162 PT EVAL MOD COMPLEX 30 MIN: CPT | Performed by: PHYSICAL THERAPIST

## 2024-03-04 PROCEDURE — 97110 THERAPEUTIC EXERCISES: CPT | Performed by: PHYSICAL THERAPIST

## 2024-03-04 PROCEDURE — 97140 MANUAL THERAPY 1/> REGIONS: CPT | Performed by: PHYSICAL THERAPIST

## 2024-03-04 NOTE — PROGRESS NOTES
Lymphedema Evaluation    Today's Date: 3/4/2024  Patient name: Raghavendra Gutierrez  : 1950  MRN: [id number]  Referring provider: Liang Aden MD  Dx:  Encounter Diagnosis     ICD-10-CM    1. Lymphedema of left arm  I89.0 Ambulatory Referral to PT/OT Lymphedema Therapy      2. CLL (chronic lymphoid leukemia) in relapse ([facility])  C91.12 Ambulatory Referral to PT/OT Lymphedema Therapy          Assessment  Assessment details: Raghavendra Gutierrez is a 73 y.o. male with complaints of chronic L arm edema.  Patient's presentation is consistent with stage 2 secondary lymphedema with the following impairments found on evaluation: + stemmers sign, mild fibrotic tissue changes of fingers, hand, and forearm. Relevant medical history includes stage IV CLL under chemotherapy; h/o aortic valve stenosis s/p TAVR. The listed physical impairments contribute to the following functional limitations: decreased tolerance to gripping activities.  Raghavendra Gutierrez is a good candidate for physical therapy and would benefit from skilled physical therapy to address the above impairments in order to allow the patient to achieve the goals listed and return to prior level of function. Physical therapy plan of care to include manual lymphatic drainage techniques; compression therapy. Compression prescription includes: 20-30mmHg sleeve and glove-- ordered through [facility] today to be shipped to patient.    During initial evaluation, education was provided on lymphatic anatomy and physiology, edema differential diagnosis, edema risk reduction behaviors, and healthy habits; decongestion vs maintenance treatment options. Recommendations were made for skin care, elevation of the edematous limb, and muscle pump exercises to address edema and patient consented to these recommendations. Patient also educated on diagnosis, plan of care and prognosis.  He is in agreement with recommended plan of care and goals for therapy, and  "demonstrates motivation for active participation in proposed plan of care.    Understanding of Dx/Px/POC: good   Prognosis: good    Goals  1. Patient will demonstrate independence and compliance with compression garment wear and care in 2 weeks.  2. Patient will demonstrate independence and compliance with self-MLD and skin care in 2 weeks.  3. Patient will demonstrate negative stemmer sign and 3% reduction in L arm volume after 4 weeks of compression, MLD, and exercise in compression.    Plan  Planned therapy interventions: therapeutic exercise, compression and manual therapy  Frequency: 2x week  Duration in weeks: 8  Plan of Care beginning date: 3/4/2024  Plan of Care expiration date: 4/29/2024  Treatment plan discussed with: referring physician and patient        Subjective/History:  Chief Complaint: L arm edema; fatigue; exertional dyspnea  HPI of L arm edema: In November 2023 after a L cubital blood draw-- specifically, 2 days after venipuncture for lab blood draw from left antecubital vein he noticed swelling of left forearm and hand. Edema is constant and no notable fluctuation. Fingers are very swollen and affects  strength.  H/o R RTC repair, denies L upper quarter surgeries, but had a L wrist fracture in teenage years. Denies h/o DVT, cellulitis.   Reports bilaterally cold hands this winter.   Imaging:   VAS Dopplar 2/23/24: \"LEFT UPPER LIMB: No evidence of acute or chronic deep vein thrombosis. No evidence of superficial thrombophlebitis noted. Doppler evaluation shows a normal response to augmentation maneuvers. NOTE: Edema is identified in the forearm.\"  US 3/4/24: \"FINDINGS: Sonographic evaluation of the left axilla shows no sonographic mass, distortion or axillary adenopathy \"  Relevant PMHx: CLL; cardiac- TAVR for aortic stenosis in July 2022 followed by cardiac rehabilitation successfully.  Personal history of Cancer? Yes  Chronic Lymphoid Leukemia  Diagnosis: 1996 2023 Cancer staged: Modified " Tolliver Stage IV (Modified Tolliver risk: High, Lymphocytosis: Present, Adenopathy: Unknown, Organomegaly: Present, Anemia: Present, Thrombocytopenia: Present) - Signed by Charlotte Mir PA-C on 6/26/2023  Treatment- chemo  Intermittently he received chlorambucil over the years.  2014:  6 cycles of Rituxan and bendamustine.  Presently under surveillance.   Since April 2023 patient has been on zanubrutinib.   No bleeding.  No atrial fibrillation and no other symptoms secondary to zanubrutinib.    Lymphedema special questions  Feeling of heaviness, fullness, pressure? yes  Resolves with elevation: No  Sleeping location? bed  Change in fit of shoes/clothes/jewelry?yes  History of Cellulitis?  no  History of S/DVT? no  Latex Allergy? no    Systems Review:  Cardiovascular hx: Yes- aortic stenosis  On diuretics? no  Thyroid dysfunction: No  Diabetes: No  Kidney disease:No   Liver disease: No  Abdominal surgeries: cholecystectomy 2023  Orthopedic injuries/surgeries: Yes- R RTC repair; L ankle fracture s/ ORIF; ?L wrist fracture    Pain: none currently; discomfort with making a fit due to edema in the hand.  Function: indep; lives by herself.  Working Status: full time; works from home; contract furniture sales for nursing facilities.  Exercise status: golfing in the spring/summer 2x/week; none in the winter  Patient goals: resolve edema to golf effectively.    Objective  Lymphedema Exam: Upper Extremity  Hand dominance: right  Chest Wall/Breast Edema: none visualized  Upper extremity Edema location and quality: left  Fingers: spongy, pitting  Dorsum of hand: spongy, pitting  Palm: trace spongy pitting  Wrist: spongy, pitting  Forearm: spongy pitting  Elbow: spongy, nonpitting  Upper arm: none  Shoulder: none  Lymphedema classification (0 latent, 1 reverse, 2 non-rev, 3 elephantiasis): 2  >> REFER TO FLOW SHEET FOR GIRTH MEASUREMENTS <<  Axillary Web Syndrome? no    Skin Assessment:  Stemmer's sign? yes  Fibrosis (0  normal - 4 >severe): 2  Wound present: no  Scar present: no  Surface anatomy changes: hemosideran staining L forearm- moderate  Capillary refill: 4 sec  Pedal pulses: 1+ diminished    Functional Capacity:  Gait assessment: WNL  Transfer status: WNL  Ability to don/doff clothing/garments: INDEP  Upper quarter Sensation: Normal  Upper quarter Range of Motion: Normal  Upper Quarter Strength: Normal       Precautions: active cancer, on chemotherapy    POC expires Auth Status? (BOMN, approved, pending) Unit limit (Daily) Auth Start date Expiration date PT/OT + Visit Limit?    BOMN         Date of Service 3/4        Visits Used 1        Visits Remaining 99          Compression Rx 3/4        Jobst Renata Strong glove and sleeve Ordered through [facility]                 Manual Ther         volumetrics completed        MLD 10 min        Compression Bandaging Deferred due to patient preference        Wound Care N/a                 Ther Ex         Remedial Exercise         Self-MLD Instructed, handout provided; practiced                          Ther Activity & Self Care         Skin care 5' lotion recommendations        Garment Fit/Train  nv **      Sleeping position Supine in bed                 Pt Education         Edema differential dx 5'        Lymphatic A&P 5'        Compression options 5'

## 2024-03-05 NOTE — PROGRESS NOTES
Daily Note     Today's date: 3/6/2024  Patient name: Raghavendra Gutierrez  : 1950  MRN: [id number]  Referring provider: Liang Aden MD  Dx:   Encounter Diagnosis     ICD-10-CM    1. Lymphedema of left arm  I89.0       2. CLL (chronic lymphoid leukemia) in relapse (HCC)  C91.12         Subjective: Performing self-MLD but is unsure if his technique is correct.    Objective: See treatment diary below    Assessment: Tolerated treatment well. Continued MLD with direction of edema toward ipisilateral lymph nodes, and reviewed self-MLD technique and sequence with patient as there is a tendency to slide over skin and achieve minimal stretch of skin/lymphatics.    Plan: Continue per plan of care.      Precautions: active cancer, on chemotherapy    POC expires Auth Status? (BOMN, approved, pending) Unit limit (Daily) Auth Start date Expiration date PT/OT + Visit Limit?   24 BOMN         Date of Service 3/4 3/6       Visits Used 1 2       Visits Remaining 99 99         Compression Rx 3/4 3/6       Jobst Renata Strong glove and sleeve Ordered through [facility] delivery                Manual Ther         volumetrics completed        MLD 10 min 45'       Compression Bandaging Deferred due to patient preference        Wound Care N/a                 Ther Ex         Remedial Exercise         Self-MLD Instructed, handout provided; practiced 10'                         Ther Activity & Self Care         Skin care 5' lotion recommendations        Garment Fit/Train  nv **      Sleeping position Supine in bed                 Pt Education         Edema differential dx 5'        Lymphatic A&P 5'        Compression options 5'

## 2024-03-06 ENCOUNTER — OFFICE VISIT (OUTPATIENT)
Dept: PHYSICAL THERAPY | Facility: REHABILITATION | Age: 74
End: 2024-03-06
Payer: MEDICARE

## 2024-03-06 DIAGNOSIS — I89.0 LYMPHEDEMA OF LEFT ARM: Primary | ICD-10-CM

## 2024-03-06 DIAGNOSIS — C91.12 CLL (CHRONIC LYMPHOID LEUKEMIA) IN RELAPSE (HCC): ICD-10-CM

## 2024-03-06 PROCEDURE — 97110 THERAPEUTIC EXERCISES: CPT | Performed by: PHYSICAL THERAPIST

## 2024-03-06 PROCEDURE — 97140 MANUAL THERAPY 1/> REGIONS: CPT | Performed by: PHYSICAL THERAPIST

## 2024-03-07 ENCOUNTER — OFFICE VISIT (OUTPATIENT)
Dept: PHYSICAL THERAPY | Facility: REHABILITATION | Age: 74
End: 2024-03-07
Payer: MEDICARE

## 2024-03-07 DIAGNOSIS — I89.0 LYMPHEDEMA OF LEFT ARM: Primary | ICD-10-CM

## 2024-03-07 DIAGNOSIS — C91.12 CLL (CHRONIC LYMPHOID LEUKEMIA) IN RELAPSE (HCC): ICD-10-CM

## 2024-03-07 PROCEDURE — 97140 MANUAL THERAPY 1/> REGIONS: CPT | Performed by: PHYSICAL THERAPIST

## 2024-03-07 PROCEDURE — 97110 THERAPEUTIC EXERCISES: CPT | Performed by: PHYSICAL THERAPIST

## 2024-03-07 NOTE — PROGRESS NOTES
Daily Note     Today's date: 3/7/2024  Patient name: Raghavendra Gutierrez  : 1950  MRN: [id number]  Referring provider: Liang Aden MD  Dx:   Encounter Diagnosis     ICD-10-CM    1. Lymphedema of left arm  I89.0       2. CLL (chronic lymphoid leukemia) in relapse (HCC)  C91.12           Subjective: Performing self-MLD but is unsure if his technique is correct.    Objective: See treatment diary below    Assessment: Tolerated treatment well. Continued MLD with direction of edema toward ipisilateral lymph nodes, and reviewed self-MLD technique and sequence with patient as there is a tendency to slide over skin and achieve minimal stretch of skin/lymphatics.    Plan: Continue per plan of care.      Precautions: active cancer, on chemotherapy    POC expires Auth Status? (BOMN, approved, pending) Unit limit (Daily) Auth Start date Expiration date PT/OT + Visit Limit?   24 BOMN         Date of Service 3/4 3/6 3/7      Visits Used 1 2 2      Visits Remaining 99 99 99        Compression Rx 3/4 3/6 3/7      Jobst Renata Strong glove and sleeve Ordered through [facility] delivery                Manual Ther         volumetrics completed        MLD 10 min 45' 45'      Compression Bandaging Deferred due to patient preference        Wound Care N/a                 Ther Ex         Remedial Exercise   UBE 6'      Self-MLD Instructed, handout provided; practiced 10'                         Ther Activity & Self Care         Skin care 5' lotion recommendations        Garment Fit/Train  nv **      Sleeping position Supine in bed                 Pt Education         Edema differential dx 5'        Lymphatic A&P 5'        Compression options 5'

## 2024-03-08 DIAGNOSIS — C91.12 CLL (CHRONIC LYMPHOID LEUKEMIA) IN RELAPSE (HCC): ICD-10-CM

## 2024-03-08 RX ORDER — ZANUBRUTINIB 80 MG/1
CAPSULE, GELATIN COATED ORAL
Qty: 120 CAPSULE | Refills: 10 | Status: SHIPPED | OUTPATIENT
Start: 2024-03-08

## 2024-03-11 ENCOUNTER — OFFICE VISIT (OUTPATIENT)
Dept: PHYSICAL THERAPY | Facility: REHABILITATION | Age: 74
End: 2024-03-11
Payer: MEDICARE

## 2024-03-11 DIAGNOSIS — I89.0 LYMPHEDEMA OF LEFT ARM: Primary | ICD-10-CM

## 2024-03-11 PROCEDURE — 97110 THERAPEUTIC EXERCISES: CPT | Performed by: PHYSICAL THERAPIST

## 2024-03-11 PROCEDURE — 97140 MANUAL THERAPY 1/> REGIONS: CPT | Performed by: PHYSICAL THERAPIST

## 2024-03-11 NOTE — PROGRESS NOTES
Daily Note     Today's date: 3/11/2024  Patient name: Raghavendra Gutierrez  : 1950  MRN: [id number]  Referring provider: Liang Aden MD  Dx:   Encounter Diagnosis     ICD-10-CM    1. Lymphedema of left arm  I89.0           Subjective: Received compression sleeve and glove; feels some discomfort of thumb when wearing the compression.    Objective: See treatment diary below    Assessment: Fit patient with Jobst Renata Strong sleeve and glove- they fit well, but patient had sleeve positioned too distally. With repositioning, thumb pain abolished. Wrist aspect of glove is not too tight per pinch test. During session, applied low-stretch compression bandaging to fingers, hand, and forearm and used in conjunction with MLD and UE muscle pump exercise today. Visible decongestion of forearm and hand, and better fit of gloves (danica in thumb and thenar eminence) noted after bandaging was removed today.    Plan: Continue per plan of care.      Precautions: active cancer, on chemotherapy    POC expires Auth Status? (BOMN, approved, pending) Unit limit (Daily) Auth Start date Expiration date PT/OT + Visit Limit?   24 BOMN         Date of Service 3/4 3/6 3/7 3/11     Visits Used 1 2 3 4     Visits Remaining 99 99 99        Compression Rx 3/4 3/6 3/7 3/11     Louie Renata Strong glove and sleeve Ordered through [facility] delivery                Manual Ther         volumetrics completed        MLD 10 min 45' 45' 15' proximal techniques to elbow     Compression Bandaging Deferred due to patient preference   Applied for 30 minutes; stockinette finger guaze x2; cotton padding @ wrist; 6cm hand; 8cm wrist to axilla herringbone     Wound Care N/a                 Ther Ex         Remedial Exercise   UBE 6' UBE 9' L1.8    Bicep curl 7# 2x10  OH press 7# 2x8  Triceps ext OH 7# 2x8  Upright row 15#KB x15     Self-MLD Instructed, handout provided; practiced 10'                         Ther Activity & Self Care          Skin care 5' lotion recommendations        Garment Fit/Train  nv  10'     Sleeping position Supine in bed                 Pt Education         Edema differential dx 5'        Lymphatic A&P 5'        Compression options 5'

## 2024-03-12 NOTE — PROGRESS NOTES
Daily Note     Today's date: 3/13/2024  Patient name: Raghavendra Gutierrez  : 1950  MRN: [id number]  Referring provider: Liang Aden MD  Dx:   Encounter Diagnosis     ICD-10-CM    1. Lymphedema of left arm  I89.0       2. CLL (chronic lymphoid leukemia) in relapse (HCC)  C91.12           Subjective:  No pain or numbness in L hand since last visit.    Objective: See treatment diary below    Assessment: Continued visible decongestion of forearm, wrist, and fingers today. Dorsum of hand and knuckles are still moderately swollen. Decongestion of dorsum of hand achieved with bandaging today. Will add foam pad to dorsum of hand within glove next week.  Continued with strengthening/muscle pump exercises within compression bandaging within session today.     Plan: Continue per plan of care.  Volumetrics next visit.     Precautions: active cancer, on chemotherapy    POC expires Auth Status? (BOMN, approved, pending) Unit limit (Daily) Auth Start date Expiration date PT/OT + Visit Limit?   24 BOMN         Date of Service 3/4 3/6 3/7 3/11     Visits Used 1 2 3 4     Visits Remaining 99 99 99        Compression Rx 3/4 3/6 3/7 3/11 3/13    Jobst Renata Strong glove and sleeve Ordered through [facility] delivery                Manual Ther         volumetrics completed        MLD 10 min 45' 45' 15' proximal techniques to elbow 30'    Compression Bandaging Deferred due to patient preference   Applied for 30 minutes; stockinette finger guaze x2; cotton padding @ wrist; 6cm hand; 8cm wrist to axilla herringbone Applied for 30 minutes; stockinette finger guaze x2; cotton padding @ wrist; 6cm hand; 8cm wrist to axilla herringbone    Wound Care N/a                 Ther Ex         Remedial Exercise   UBE 6' UBE 9' L1.8    Bicep curl 7# 2x10  OH press 7# 2x8  Triceps ext OH 7# 2x8  Upright row 15#KB x15 UBE 6' L2.0    Bicep curl 7# 2x10  OH press 8# 2x8  Triceps ext OH 8# 2x8  Upright row 15#KB x15  Chest press 10#  2x10    Self-MLD Instructed, handout provided; practiced 10'                         Ther Activity & Self Care         Skin care 5' lotion recommendations        Garment Fit/Train  nv  10'     Sleeping position Supine in bed                 Pt Education         Edema differential dx 5'        Lymphatic A&P 5'        Compression options 5'

## 2024-03-13 ENCOUNTER — OFFICE VISIT (OUTPATIENT)
Dept: PHYSICAL THERAPY | Facility: REHABILITATION | Age: 74
End: 2024-03-13
Payer: MEDICARE

## 2024-03-13 DIAGNOSIS — C91.12 CLL (CHRONIC LYMPHOID LEUKEMIA) IN RELAPSE (HCC): ICD-10-CM

## 2024-03-13 DIAGNOSIS — I89.0 LYMPHEDEMA OF LEFT ARM: Primary | ICD-10-CM

## 2024-03-13 PROCEDURE — 97110 THERAPEUTIC EXERCISES: CPT | Performed by: PHYSICAL THERAPIST

## 2024-03-13 PROCEDURE — 97140 MANUAL THERAPY 1/> REGIONS: CPT | Performed by: PHYSICAL THERAPIST

## 2024-03-14 ENCOUNTER — APPOINTMENT (OUTPATIENT)
Dept: PHYSICAL THERAPY | Facility: REHABILITATION | Age: 74
End: 2024-03-14
Payer: MEDICARE

## 2024-03-18 ENCOUNTER — OFFICE VISIT (OUTPATIENT)
Dept: PHYSICAL THERAPY | Facility: REHABILITATION | Age: 74
End: 2024-03-18
Payer: MEDICARE

## 2024-03-18 ENCOUNTER — APPOINTMENT (OUTPATIENT)
Dept: PHYSICAL THERAPY | Facility: REHABILITATION | Age: 74
End: 2024-03-18
Payer: MEDICARE

## 2024-03-18 DIAGNOSIS — C91.12 CLL (CHRONIC LYMPHOID LEUKEMIA) IN RELAPSE (HCC): ICD-10-CM

## 2024-03-18 DIAGNOSIS — I89.0 LYMPHEDEMA OF LEFT ARM: Primary | ICD-10-CM

## 2024-03-18 PROCEDURE — 97140 MANUAL THERAPY 1/> REGIONS: CPT | Performed by: PHYSICAL THERAPIST

## 2024-03-18 PROCEDURE — 97140 MANUAL THERAPY 1/> REGIONS: CPT

## 2024-03-18 PROCEDURE — 97110 THERAPEUTIC EXERCISES: CPT

## 2024-03-18 NOTE — PROGRESS NOTES
"Daily Note     Today's date: 3/18/2024  Patient name: Raghavendra Gutierrez  : 1950  MRN: [id number]  Referring provider: Liang Aden MD  Dx:   Encounter Diagnosis     ICD-10-CM    1. Lymphedema of left arm  I89.0       2. CLL (chronic lymphoid leukemia) in relapse (HCC)  C91.12             Subjective:  Pt reports no pain and no change in LUE since previous session.     Objective: See treatment diary below    Assessment: Pt tolerated donning of bandaging well. Pt performed exercise w/ compression bandaging well with fatigue noted in target muscle groups L>R. Mild pain noted in 5th finger with ball squeeze that resolved before end of session. [place] bandage and MLD following exercise - pt noted decreased \"pulling\" noted in dorsum of hand. Next visit, continue to address edema w/ continued bandaging process, exercise, and volumetric measurements.     Plan: Continue per plan of care.  Volumetrics next visit.     Precautions: active cancer, on chemotherapy    POC expires Auth Status? (BOMN, approved, pending) Unit limit (Daily) Auth Start date Expiration date PT/OT + Visit Limit?   24 BOMN         Date of Service 3/4 3/6 3/7 3/11 3/13 3/18   Visits Used 1 2 3 4 5 6   Visits Remaining 99 99 99 99 99 99     Compression Rx 3/4 3/6 3/7 3/11 3/13 3/18   Jobst Renata Strong glove and sleeve Ordered through [facility] delivery                Manual Ther         volumetrics completed        MLD 10 min 45' 45' 15' proximal techniques to elbow 30' 20'   Compression Bandaging Deferred due to patient preference   Applied for 30 minutes; stockinette finger guaze x2; cotton padding @ wrist; 6cm hand; 8cm wrist to axilla herringbone Applied for 30 minutes; stockinette finger guaze x2; cotton padding @ wrist; 6cm hand; 8cm wrist to axilla herringbone Applied for 30 minutes; stockinette finger guaze x2; cotton padding @ wrist; 6cm hand; 8cm wrist to axilla herringbone   Wound Care N/a                 Ther Ex      "    Remedial Exercise   UBE 6' UBE 9' L1.8    Bicep curl 7# 2x10  OH press 7# 2x8  Triceps ext OH 7# 2x8  Upright row 15#KB x15 UBE 6' L2.0    Bicep curl 7# 2x10  OH press 8# 2x8  Triceps ext OH 8# 2x8  Upright row 15#KB x15  Chest press 10# 2x10 UBE 6' L2.0    Bicep curl 10# x15  Snatch to OH press 10# 2x8  Triceps kick back 10# x15  Pronated Bicep curl x10 #10 each   Pronated bicep curl to OH Press #8 x10 each   Yellow ball squeeze - 15 sec x 3 each & 1x pulsed for 20    Self-MLD Instructed, handout provided; practiced 10'                         Ther Activity & Self Care         Skin care 5' lotion recommendations        Garment Fit/Train  nv  10'     Sleeping position Supine in bed                 Pt Education         Edema differential dx 5'        Lymphatic A&P 5'        Compression options 5'

## 2024-03-20 ENCOUNTER — OFFICE VISIT (OUTPATIENT)
Dept: PHYSICAL THERAPY | Facility: REHABILITATION | Age: 74
End: 2024-03-20
Payer: MEDICARE

## 2024-03-20 DIAGNOSIS — I89.0 LYMPHEDEMA OF LEFT ARM: Primary | ICD-10-CM

## 2024-03-20 DIAGNOSIS — C91.12 CLL (CHRONIC LYMPHOID LEUKEMIA) IN RELAPSE (HCC): ICD-10-CM

## 2024-03-20 PROCEDURE — 97110 THERAPEUTIC EXERCISES: CPT | Performed by: PHYSICAL THERAPIST

## 2024-03-20 PROCEDURE — 97140 MANUAL THERAPY 1/> REGIONS: CPT | Performed by: PHYSICAL THERAPIST

## 2024-03-20 NOTE — PROGRESS NOTES
Daily Note     Today's date: 3/20/2024  Patient name: Raghavendra Gutierrez  : 1950  MRN: [id number]  Referring provider: Liang Aden MD  Dx:   Encounter Diagnosis     ICD-10-CM    1. Lymphedema of left arm  I89.0       2. CLL (chronic lymphoid leukemia) in relapse (HCC)  C91.12         Subjective:  Can see more of his knuckles today. No hand pain.    Objective: See treatment diary below    Assessment: Pt tolerated donning of bandaging well. Pt performed upper quarter (with focus on  strength) resistance exercise sw/ compression bandaging well with fatigue noted in target muscle groups L>R. Pisiform pain upon fatigue.     Plan: Continue per plan of care.      Precautions: active cancer, on chemotherapy    POC expires Auth Status? (BOMN, approved, pending) Unit limit (Daily) Auth Start date Expiration date PT/OT + Visit Limit?   24 BOMN         Date of Service 3/20        Visits Used 7        Visits Remaining 99 99 99 99 99 99     Compression Rx 3/4 3/6 3/7 3/11 3/13 3/18 3/20   Jobst Renata Strong glove and sleeve Ordered through [facility] delivery                  Manual Ther          volumetrics completed         MLD 10 min 45' 45' 15' proximal techniques to elbow 30' 20' 20'   Compression Bandaging Deferred due to patient preference   Applied for 30 minutes; stockinette finger guaze x2; cotton padding @ wrist; 6cm hand; 8cm wrist to axilla herringbone Applied for 30 minutes; stockinette finger guaze x2; cotton padding @ wrist; 6cm hand; 8cm wrist to axilla herringbone Applied for 30 minutes; stockinette finger guaze x2; cotton padding @ wrist; 6cm hand; 8cm wrist to axilla herringbone Applied for 30 minutes; stockinette finger guaze x2; cotton padding @ wrist; 6cm hand; 8cm wrist to axilla herringbone   Wound Care N/a                   Ther Ex          Remedial Exercise   UBE 6' UBE 9' L1.8    Bicep curl 7# 2x10  OH press 7# 2x8  Triceps ext OH 7# 2x8  Upright row 15#KB x15 UBE 6'  L2.0    Bicep curl 7# 2x10  OH press 8# 2x8  Triceps ext OH 8# 2x8  Upright row 15#KB x15  Chest press 10# 2x10 UBE 6' L2.0    Bicep curl 10# x15  Snatch to OH press 10# 2x8  Triceps kick back 10# x15  Pronated Bicep curl x10 #10 each   Pronated bicep curl to OH Press #8 x10 each   Yellow ball squeeze - 15 sec x 3 each & 1x pulsed for 20  UBE 6' L1    Bicep curl 10# x15  Pronated Bicep curl  #10 x10   Snatch to OH press 10# 2x10    Resisted golf swing 15# 2x10       Self-MLD Instructed, handout provided; practiced 10'                            Ther Activity & Self Care          Skin care 5' lotion recommendations         Garment Fit/Train  nv  10'      Sleeping position Supine in bed                   Pt Education          Edema differential dx 5'         Lymphatic A&P 5'         Compression options 5'

## 2024-03-21 NOTE — PROGRESS NOTES
Daily Note     Today's date: 3/22/2024  Patient name: Raghavendra Gutierrez  : 1950  MRN: [id number]  Referring provider: Liang Aden MD  Dx:   Encounter Diagnosis     ICD-10-CM    1. Lymphedema of left arm  I89.0           Subjective:  No arm edema... just wrist and dorsum of hand    Objective: See treatment diary below    Assessment: Pt tolerated donning of bandaging well. Added 1/4 inch grey foam pad to dorsum of hand with compression glove today- will re-assess on Monday and determine if he would benefit from sizing down in glove or changing to custom glove with dorsal pad. Anticipate needing 2-4 more weeks of focused compression to achieve maximal decongestion of L hand... and unclear if decongestion will cure the edema or if there is a chance of recurrence, since vein function has never been specifically measured.    Plan: Continue per plan of care.      Precautions: active cancer, on chemotherapy    POC expires Auth Status? (BOMN, approved, pending) Unit limit (Daily) Auth Start date Expiration date PT/OT + Visit Limit?   24 BOMN         Date of Service 3/20 3/22       Visits Used 7 8       Visits Remaining 99 99 99 99 99 99     Compression Rx 3/13 3/18 3/20 3/22    [place] Renata Strong glove and sleeve                Manual Ther        volumetrics        MLD 30' 20' 20'     Compression Bandaging Applied for 30 minutes; stockinette finger gauze x2; cotton padding @ wrist; 6cm hand; 8cm wrist to axilla herringbone Applied for 30 minutes; stockinette finger gauze x2; cotton padding @ wrist; 6cm hand; 8cm wrist to axilla herringbone Applied for 30 minutes; stockinette finger gauze x2; cotton padding @ wrist; 6cm hand; 8cm wrist to axilla herringbone Applied for 30 minutes; stockinette finger gauze x2; cotton padding @ wrist; 6cm hand; 8cm wrist to axilla herringbone    Wound Care                Ther Ex        Remedial Exercise UBE 6' L2.0    Bicep curl 7# 2x10  OH press 8# 2x8  Triceps ext OH 8#  2x8  Upright row 15#KB x15  Chest press 10# 2x10 UBE 6' L2.0    Bicep curl 10# x15  Snatch to OH press 10# 2x8  Triceps kick back 10# x15  Pronated Bicep curl x10 #10 each   Pronated bicep curl to OH Press #8 x10 each   Yellow ball squeeze - 15 sec x 3 each & 1x pulsed for 20  UBE 6' L1    Bicep curl 10# x15  Pronated Bicep curl  #10 x10   Snatch to OH press 10# 2x10    Resisted golf swing 15# 2x10     UBE 4 min LUE only; 4 min BUE    Self-MLD                        Ther Activity & Self Care        Skin care        Garment Fit/Train    Added dorsal hand pad    Sleeping position                Pt Education        Edema differential dx        Lymphatic A&P        Compression options

## 2024-03-22 ENCOUNTER — OFFICE VISIT (OUTPATIENT)
Dept: PHYSICAL THERAPY | Facility: REHABILITATION | Age: 74
End: 2024-03-22
Payer: MEDICARE

## 2024-03-22 DIAGNOSIS — I89.0 LYMPHEDEMA OF LEFT ARM: Primary | ICD-10-CM

## 2024-03-22 PROCEDURE — 97110 THERAPEUTIC EXERCISES: CPT | Performed by: PHYSICAL THERAPIST

## 2024-03-22 PROCEDURE — 97140 MANUAL THERAPY 1/> REGIONS: CPT | Performed by: PHYSICAL THERAPIST

## 2024-03-26 ENCOUNTER — OFFICE VISIT (OUTPATIENT)
Dept: PHYSICAL THERAPY | Facility: REHABILITATION | Age: 74
End: 2024-03-26
Payer: MEDICARE

## 2024-03-26 DIAGNOSIS — I89.0 LYMPHEDEMA OF LEFT ARM: Primary | ICD-10-CM

## 2024-03-26 PROCEDURE — 97140 MANUAL THERAPY 1/> REGIONS: CPT | Performed by: PHYSICAL THERAPIST

## 2024-03-26 PROCEDURE — 97110 THERAPEUTIC EXERCISES: CPT | Performed by: PHYSICAL THERAPIST

## 2024-03-26 NOTE — PROGRESS NOTES
Daily Note     Today's date: 3/26/2024  Patient name: Raghavendra Gutierrez  : 1950  MRN: [id number]  Referring provider: Liang Aden MD  Dx:   Encounter Diagnosis     ICD-10-CM    1. Lymphedema of left arm  I89.0           Subjective:  Feeling pleased with arm and hand edema with addition of dorsal hand pad.    Objective: See treatment diary below    Assessment: Dorsal hand edema has improved with use of added foam compression pad within compression glove, without adverse effects. Progress in decongestion is slowing; will take volumetrics next visit then transition to independent decongestion after next visit and follow up in 2-3 weeks.    Plan: Continue per plan of care.      Precautions: active cancer, on chemotherapy    POC expires Auth Status? (BOMN, approved, pending) Unit limit (Daily) Auth Start date Expiration date PT/OT + Visit Limit?   24 BOMN         Date of Service 3/20 3/22 3/26      Visits Used 7 8 9      Visits Remaining 99 99 99 99 99 99     Compression Rx 3/13 3/18 3/20 3/22 3/26   Jobst Renata Strong glove and sleeve                Manual Ther        volumetrics        MLD 30' 20' 20' 20' 20'   Compression Bandaging Applied for 30 minutes; stockinette finger gauze x2; cotton padding @ wrist; 6cm hand; 8cm wrist to axilla herringbone Applied for 30 minutes; stockinette finger gauze x2; cotton padding @ wrist; 6cm hand; 8cm wrist to axilla herringbone Applied for 30 minutes; stockinette finger gauze x2; cotton padding @ wrist; 6cm hand; 8cm wrist to axilla herringbone Applied for 30 minutes; stockinette finger gauze x2; cotton padding @ wrist; 6cm hand; 8cm wrist to axilla herringbone Applied for 30 minutes; stockinette finger gauze x2; cotton padding @ wrist; 6cm hand; 8cm wrist to axilla herringbone   Wound Care                Ther Ex        Remedial Exercise UBE 6' L2.0    Bicep curl 7# 2x10  OH press 8# 2x8  Triceps ext OH 8# 2x8  Upright row 15#KB x15  Chest press 10# 2x10 UBE 6'  L2.0    Bicep curl 10# x15  Snatch to OH press 10# 2x8  Triceps kick back 10# x15  Pronated Bicep curl x10 #10 each   Pronated bicep curl to OH Press #8 x10 each   Yellow ball squeeze - 15 sec x 3 each & 1x pulsed for 20  UBE 6' L1    Bicep curl 10# x15  Pronated Bicep curl  #10 x10   Snatch to OH press 10# 2x10    Resisted golf swing 15# 2x10     UBE 4 min LUE only; 4 min BUE UBE 10 min  Bicep curl 10# x10  Chest press 15# x10  LPD 10# x10  OH press 10# x10   Self-MLD                        Ther Activity & Self Care        Skin care        Garment Fit/Train    Added dorsal hand pad Reviewed dorsal pad use   Sleeping position                Pt Education        Edema differential dx        Lymphatic A&P        Compression options

## 2024-03-27 ENCOUNTER — APPOINTMENT (OUTPATIENT)
Dept: PHYSICAL THERAPY | Facility: REHABILITATION | Age: 74
End: 2024-03-27
Payer: MEDICARE

## 2024-03-27 DIAGNOSIS — C91.12 CLL (CHRONIC LYMPHOID LEUKEMIA) IN RELAPSE (HCC): ICD-10-CM

## 2024-03-27 RX ORDER — ACYCLOVIR 400 MG/1
400 TABLET ORAL 2 TIMES DAILY
Qty: 60 TABLET | Refills: 0 | Status: SHIPPED | OUTPATIENT
Start: 2024-03-27 | End: 2025-03-27

## 2024-03-27 RX ORDER — ACYCLOVIR 400 MG/1
TABLET ORAL
Qty: 60 TABLET | Refills: 11 | Status: SHIPPED | OUTPATIENT
Start: 2024-03-27 | End: 2025-03-27

## 2024-03-29 ENCOUNTER — OFFICE VISIT (OUTPATIENT)
Dept: PHYSICAL THERAPY | Facility: REHABILITATION | Age: 74
End: 2024-03-29
Payer: MEDICARE

## 2024-03-29 DIAGNOSIS — I89.0 LYMPHEDEMA OF LEFT ARM: Primary | ICD-10-CM

## 2024-03-29 PROCEDURE — 97110 THERAPEUTIC EXERCISES: CPT | Performed by: PHYSICAL THERAPIST

## 2024-03-29 PROCEDURE — 97140 MANUAL THERAPY 1/> REGIONS: CPT | Performed by: PHYSICAL THERAPIST

## 2024-03-29 NOTE — PROGRESS NOTES
Daily Note     Today's date: 3/29/2024  Patient name: Raghavendra Gutierrez  : 1950  MRN: [id number]  Referring provider: Liang Aden MD  Dx:   Encounter Diagnosis     ICD-10-CM    1. Lymphedema of left arm  I89.0           Subjective:  Feeling pleased with arm and hand edema with addition of dorsal hand pad. Has resumed playing golf without impairment; cannot wear compression glove with golf glove.    Objective: See treatment diary below.    Assessment: Area of high pressure at distal end of compression sleeve- patient to wear glove under sleeve or d/c sleeve completely. Continue with dorsal hand pad.    Plan: Continue per plan of care.  F/u in 2-3 weeks.     Precautions: active cancer, on chemotherapy    POC expires Auth Status? (BOMN, approved, pending) Unit limit (Daily) Auth Start date Expiration date PT/OT + Visit Limit?   24 BOMN         Date of Service 3/20 3/22 3/26 3/29     Visits Used 7 8 9 10     Visits Remaining 99 99 99 99 99 99     Compression Rx 3/13 3/18 3/20 3/22 3/26 3/29   Jobst Renata Strong glove and sleeve                  Manual Ther         volumetrics      completed   MLD 30' 20' 20' 20' 20' 15'   Compression Bandaging Applied for 30 minutes; stockinette finger gauze x2; cotton padding @ wrist; 6cm hand; 8cm wrist to axilla herringbone Applied for 30 minutes; stockinette finger gauze x2; cotton padding @ wrist; 6cm hand; 8cm wrist to axilla herringbone Applied for 30 minutes; stockinette finger gauze x2; cotton padding @ wrist; 6cm hand; 8cm wrist to axilla herringbone Applied for 30 minutes; stockinette finger gauze x2; cotton padding @ wrist; 6cm hand; 8cm wrist to axilla herringbone Applied for 30 minutes; stockinette finger gauze x2; cotton padding @ wrist; 6cm hand; 8cm wrist to axilla herringbone Applied for 30 minutes; stockinette finger gauze x2; cotton padding @ wrist; 6cm hand; 8cm wrist to axilla herringbone   Wound Care                  Ther Ex         Remedial  Exercise UBE 6' L2.0    Bicep curl 7# 2x10  OH press 8# 2x8  Triceps ext OH 8# 2x8  Upright row 15#KB x15  Chest press 10# 2x10 UBE 6' L2.0    Bicep curl 10# x15  Snatch to OH press 10# 2x8  Triceps kick back 10# x15  Pronated Bicep curl x10 #10 each   Pronated bicep curl to OH Press #8 x10 each   Yellow ball squeeze - 15 sec x 3 each & 1x pulsed for 20  UBE 6' L1    Bicep curl 10# x15  Pronated Bicep curl  #10 x10   Snatch to OH press 10# 2x10    Resisted golf swing 15# 2x10     UBE 4 min LUE only; 4 min BUE UBE 10 min  Bicep curl 10# x10  Chest press 15# x10  LPD 10# x10  OH press 10# x10 UBE 10 min  Bicep curl 10# x10  Upright row 10# x10  OH press 10# x10  Triceps ext 10# x10   Self-MLD                           Ther Activity & Self Care         Skin care         Garment Fit/Train    Added dorsal hand pad Reviewed dorsal pad use    Sleeping position                  Pt Education         Edema differential dx         Lymphatic A&P         Compression options

## 2024-04-01 ENCOUNTER — APPOINTMENT (OUTPATIENT)
Dept: LAB | Age: 74
End: 2024-04-01
Payer: MEDICARE

## 2024-04-10 ENCOUNTER — OFFICE VISIT (OUTPATIENT)
Dept: PHYSICAL THERAPY | Facility: REHABILITATION | Age: 74
End: 2024-04-10
Payer: MEDICARE

## 2024-04-10 DIAGNOSIS — I89.0 LYMPHEDEMA OF LEFT ARM: Primary | ICD-10-CM

## 2024-04-10 PROCEDURE — 97530 THERAPEUTIC ACTIVITIES: CPT | Performed by: PHYSICAL THERAPIST

## 2024-04-10 NOTE — PROGRESS NOTES
Discharge/Daily Note     Today's date: 4/10/2024  Patient name: Raghavendra Gutierrez  : 1950  MRN: [id number]  Referring provider: Liang Aden MD  Dx:   Encounter Diagnosis     ICD-10-CM    1. Lymphedema of left arm  I89.0             Subjective:  Feeling pleased with arm and hand edema with addition of dorsal hand pad. Has resumed playing golf without impairment; although cannot wear compression glove with golf glove.    Objective: See treatment diary below.  UPPER EXTREMITY LEFT CALCULATIONS      Flowsheet Row Most Recent Value   Volume UE (mL) 2637   Difference from last visit (mL)  74   Difference from first visit (mL)  76   Difference from last visit (%)  3   Difference from first visit (%)  3               Assessment: 3% volume reduction through compression, MLD, and exercise over the last 6 weeks. Patient demonstrates good compliance with compression and exercise for edema mgmt. Some mild increase in petechiae after golfing 4 hours yesterday- likely due to overload effect. Patient to monitor and return to vascular if symptoms continue to worsen, but I suspect there will be accommodation.    Goals  1. Patient will demonstrate independence and compliance with compression garment wear and care in 2 weeks. MET  2. Patient will demonstrate independence and compliance with self-MLD and skin care in 2 weeks. MET  3. Patient will demonstrate negative stemmer sign and 3% reduction in L arm volume after 4 weeks of compression, MLD, and exercise in compression. MET    Plan:  discharge     Precautions: active cancer, on chemotherapy    POC expires Auth Status? (BOMN, approved, pending) Unit limit (Daily) Auth Start date Expiration date PT/OT + Visit Limit?   24 BOMN         Date of Service 3/20 3/22 3/26 3/29 4/10    Visits Used 7 8 9 10 11    Visits Remaining 99 99 99 99 99 99     Compression Rx 3/22 3/26 3/29 4/10   Jobst Renata Strong glove and sleeve    DAILY USE          Manual Ther       volumetrics    completed    MLD 20' 20' 15' D/c   Compression Bandaging Applied for 30 minutes; stockinette finger gauze x2; cotton padding @ wrist; 6cm hand; 8cm wrist to axilla herringbone Applied for 30 minutes; stockinette finger gauze x2; cotton padding @ wrist; 6cm hand; 8cm wrist to axilla herringbone Applied for 30 minutes; stockinette finger gauze x2; cotton padding @ wrist; 6cm hand; 8cm wrist to axilla herringbone D/c   Wound Care              Ther Ex       Remedial Exercise UBE 4 min LUE only; 4 min BUE UBE 10 min  Bicep curl 10# x10  Chest press 15# x10  LPD 10# x10  OH press 10# x10 UBE 10 min  Bicep curl 10# x10  Upright row 10# x10  OH press 10# x10  Triceps ext 10# x10 D/c   Self-MLD    indep                 Ther Activity & Self Care       Skin care       Garment Fit/Train Added dorsal hand pad Reviewed dorsal pad use  indep   Sleeping position              Pt Education       Edema differential dx       Lymphatic A&P       Compression options

## 2024-04-12 ENCOUNTER — OFFICE VISIT (OUTPATIENT)
Dept: HEMATOLOGY ONCOLOGY | Facility: CLINIC | Age: 74
End: 2024-04-12
Payer: MEDICARE

## 2024-04-12 VITALS
SYSTOLIC BLOOD PRESSURE: 126 MMHG | OXYGEN SATURATION: 98 % | WEIGHT: 190 LBS | HEART RATE: 78 BPM | HEIGHT: 69 IN | BODY MASS INDEX: 28.14 KG/M2 | TEMPERATURE: 98 F | RESPIRATION RATE: 17 BRPM | DIASTOLIC BLOOD PRESSURE: 74 MMHG

## 2024-04-12 DIAGNOSIS — I89.0 LYMPHEDEMA OF LEFT ARM: ICD-10-CM

## 2024-04-12 DIAGNOSIS — N18.30 STAGE 3 CHRONIC KIDNEY DISEASE, UNSPECIFIED WHETHER STAGE 3A OR 3B CKD (HCC): ICD-10-CM

## 2024-04-12 DIAGNOSIS — Z95.2 S/P TAVR (TRANSCATHETER AORTIC VALVE REPLACEMENT): ICD-10-CM

## 2024-04-12 DIAGNOSIS — C91.12 CLL (CHRONIC LYMPHOID LEUKEMIA) IN RELAPSE (HCC): Primary | ICD-10-CM

## 2024-04-12 DIAGNOSIS — D69.6 THROMBOCYTOPENIA (HCC): ICD-10-CM

## 2024-04-12 DIAGNOSIS — I25.10 CORONARY ARTERY DISEASE INVOLVING NATIVE CORONARY ARTERY OF NATIVE HEART WITHOUT ANGINA PECTORIS: ICD-10-CM

## 2024-04-12 PROCEDURE — G2211 COMPLEX E/M VISIT ADD ON: HCPCS | Performed by: INTERNAL MEDICINE

## 2024-04-12 PROCEDURE — 99214 OFFICE O/P EST MOD 30 MIN: CPT | Performed by: INTERNAL MEDICINE

## 2024-04-12 NOTE — PATIENT INSTRUCTIONS
You may stop taking allopurinol (Zyloprim).  Please continue taking acyclovir to prevent shingles.  Patient has standing orders for blood work.  No other change in treatment.  Follow-up in 2 months.

## 2024-04-12 NOTE — PROGRESS NOTES
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  Since April 2023 patient has been on Zanubrutinib and he is responding  to zanubrutinib.  Patient had lymphedema therapy for left arm edema and there is some improvement and he is using compression garment.  He states he can play golf again and played 18 holes yesterday.   CLL was diagnosed several years ago.  There was long period of observation.  Initial treatment was with chlorambucil.  In 2014 patient had Rituxan plus Bendamustine.  Since April 2023 patient has been on zanubrutinib.   No bleeding.  No atrial fibrillation and no other symptoms secondary to zanubrutinib.  He has less tiredness and exertional dyspnea and the symptoms could be secondary to CLL and cardiac in origin because he has underlying cardiac disorder and had TAVR for aortic stenosis in July 2022 followed by cardiac rehabilitation.  Occasionally he has  night sweats.  Has arthritic symptoms.  Recurrent scalp lesion and he follows with his dermatologist and he states scalp lesions are not malignant.   History of herpes zoster right lower leg.  No postherpetic neuralgia.           Current Outpatient Medications:   •  acetaminophen (TYLENOL) 325 mg tablet, Take 2 tablets (650 mg total) by mouth every 4 (four) hours as needed for fever or moderate pain (temperature greater than 101 F), Disp: 100 tablet, Rfl: 0  •  acyclovir (ZOVIRAX) 400 MG tablet, TAKE 1 TABLET(400 MG) BY MOUTH TWICE DAILY, Disp: 60 tablet, Rfl: 11  •  acyclovir (ZOVIRAX) 400 MG tablet, Take 1 tablet (400 mg total) by mouth 2 (two) times a day, Disp: 60 tablet, Rfl: 0  •  allopurinol (ZYLOPRIM) 100 mg tablet, TAKE 1 TABLET(100 MG) BY MOUTH TWICE DAILY, Disp: 180 tablet, Rfl: 1  •  allopurinol (ZYLOPRIM) 100 mg tablet, TAKE 1 TABLET(100 MG) BY MOUTH TWICE DAILY, Disp: 60 tablet, Rfl: 1  •  amoxicillin (AMOXIL) 500 MG tablet, Take 4 tablets (2,000 mg total) by  mouth once as needed (Take 60 minutes prior to ANY dental work) for up to 1 dose, Disp: 4 tablet, Rfl: 1  •  Ascorbic Acid (VITAMIN C PO), Take by mouth daily , Disp: , Rfl:   •  aspirin 81 mg chewable tablet, Chew 81 mg daily, Disp: , Rfl:   •  Brukinsa 80 MG CAPS, Take 2 capsules (160 mg total) by mouth 2 (two) times a day., Disp: 120 capsule, Rfl: 10  •  fexofenadine-pseudoephedrine (ALLEGRA-D 24) 180-240 MG per 24 hr tablet, Take 1 tablet by mouth as needed , Disp: , Rfl:   •  VITAMIN D PO, Take by mouth daily , Disp: , Rfl:   •  VITAMIN E PO, Take by mouth daily , Disp: , Rfl:     No Known Allergies    Oncology History Overview Note   1996:  CLL was diagnosed.  Intermittently he received chlorambucil over the years.    2014:  6 cycles of Rituxan and bendamustine.  Presently under surveillance.     CLL (chronic lymphoid leukemia) in relapse (HCC)    Chemotherapy       1996:  CLL was diagnosed.  Intermittent therapy with chlorambucil.    2014:  6 cycles of Rituxan and bendamustine.  Presently under surveillance.     6/26/2023 -  Cancer Staged    Staging form: Chronic Lymphocytic Leukemia, AJCC 8th Edition  - Clinical: Modified Tolliver Stage IV (Modified Tolliver risk: High, Lymphocytosis: Present, Adenopathy: Unknown, Organomegaly: Present, Anemia: Present, Thrombocytopenia: Present) - Signed by Charlotte Mir PA-C on 6/26/2023           ROS:  04/14/24 Reviewed 12 systems:  See symptoms in HPI.  Presently no other neurological, cardiac, pulmonary, GI and  symptoms.  Other symptoms are in HPI.  No  fever, chills, bleeding, bone pains, skin rash, weight loss,   weakness, numbness,  claudication and gait problem. No frequent infections .  Not unusually sensitive to heat or cold. No swelling of the ankles. No swollen glands.  Patient is less anxious.  Examination:     4/12/24 3/4/24 2/12/24   Blood Pressure 126/74 -- 124/78   Pulse 78 -- 77   Respirations 17 -- 17   Temperature 98 °F (36.7 °C) -- 97.3 °F (36.3  "°C) (Abnormal)     SpO2 98 % -- 97 %   Weight - Scale 86.2 kg (190 lb) 86.6 kg (191 lb) 86.6 kg (191 lb)   Height 5' 9\" (1.753 m) 5' 9\" (1.753 m) 5' 9\" (1.753 m)   Pain Score -- -- Zero     Alert and oriented and not in distress.  Vitals are above.  There is no icterus.  There is no oral thrush.  There is no palpable neck mass.  Lung fields are clear to percussion and auscultation.  Regular heart rate.  Systolic murmur.  Soft and nontender abdomen.  There is no palpable abdominal mass.  No ascites.  There is no edema of the ankles.  He has edema of the left arm.  No calf tenderness.  No focal neurological deficit.  There is no skin rash.  There is no palpable lymphadenopathy in the neck and axillary areas.  There is no skin rash. Performance status 2 on ECOG scale.      IMAGING:  IMPRESSION:     No choledocholithiasis is seen  No biliary dilation  Distended gallbladder with pericholecystic fluid and cholelithiasis correlated with the acute cholecystitis  Splenomegaly with spleen measuring about 16 cm can be correlated with the patient's history of CLL  Mild edema seen in the mesentery as seen on the CT              Workstation performed: [id number]        Imaging    MRI abdomen wo contrast and mrcp (Order: [id number]) - 11/8/2022  MPRESSION:   No findings to explain left arm swelling.  Recommend clinical management.           ASSESSMENT/BI-RADS CATEGORY:  Left: 1 - Negative  Overall: 1 - Negative     RECOMMENDATION:       - Clinical management for the left breast.     Workstation ID: [id number]           Imaging    US Breast Axilla Left (Order: [id number]) - 3/4/2024  Blood tests done on 3/30/2023  LABS:  CBC and differential  Status: Final result      Contains abnormal data CBC and differential  Order: [id number]   Status: Final result       Visible to patient: Yes (seen)       Next appt: None       Dx: CLL (chronic lymphoid leukemia) in re...    0 Result Notes            Component  Ref Range & Units 4/1/24  " 8:50 AM 2/2/24 10:01 AM 12/15/23 11:22 AM 11/4/23 10:27 AM 10/4/23 11:02 AM 8/26/23  8:41 AM 8/3/23 10:18 AM   WBC  4.31 - 10.16 Thousand/uL 21.74 High  26.61 High  32.13 High Panic  38.16 High Panic  52.14 High Panic  69.82 High Panic  90.25 High Panic    RBC  3.88 - 5.62 Million/uL 3.88 3.84 Low  3.79 Low  3.93 3.77 Low  3.73 Low  3.89   Hemoglobin  12.0 - 17.0 g/dL 12.7 12.5 12.5 12.7 12.3 11.8 Low  12.4   Hematocrit  36.5 - 49.3 % 39.9 39.3 39.7 40.5 38.7 39.2 40.5   MCV  82 - 98 fL 103 High  102 High  105 High  103 High  103 High  105 High  104 High    MCH  26.8 - 34.3 pg 32.7 32.6 33.0 32.3 32.6 31.6 31.9   MCHC  31.4 - 37.4 g/dL 31.8 31.8 31.5 31.4 31.8 30.1 Low  30.6 Low    RDW  11.6 - 15.1 % 14.5 14.5 14.6 14.6 14.3 14.3 14.1   MPV  8.9 - 12.7 fL 11.2 11.2 11.1 10.9 11.4 11.0 11.4   Platelets  149 - 390 Thousands/uL 135 Low  166 169 126 Low  133 Low  141 Low  130 Low                          Results for orders placed or performed in visit on 03/01/24   CBC and differential   Result Value Ref Range    WBC 21.74 (H) 4.31 - 10.16 Thousand/uL    RBC 3.88 3.88 - 5.62 Million/uL    Hemoglobin 12.7 12.0 - 17.0 g/dL    Hematocrit 39.9 36.5 - 49.3 %     (H) 82 - 98 fL    MCH 32.7 26.8 - 34.3 pg    MCHC 31.8 31.4 - 37.4 g/dL    RDW 14.5 11.6 - 15.1 %    MPV 11.2 8.9 - 12.7 fL    Platelets 135 (L) 149 - 390 Thousands/uL   Comprehensive metabolic panel   Result Value Ref Range    Sodium 141 135 - 147 mmol/L    Potassium 5.1 3.5 - 5.3 mmol/L    Chloride 108 96 - 108 mmol/L    CO2 25 21 - 32 mmol/L    ANION GAP 8 4 - 13 mmol/L    BUN 21 5 - 25 mg/dL    Creatinine 1.45 (H) 0.60 - 1.30 mg/dL    Glucose 106 65 - 140 mg/dL    Calcium 10.0 8.4 - 10.2 mg/dL    AST 23 13 - 39 U/L    ALT 14 7 - 52 U/L    Alkaline Phosphatase 73 34 - 104 U/L    Total Protein 6.9 6.4 - 8.4 g/dL    Albumin 4.5 3.5 - 5.0 g/dL    Total Bilirubin 0.34 0.20 - 1.00 mg/dL    eGFR 47 ml/min/1.73sq m   Uric acid   Result Value Ref Range    Uric Acid  3.7 3.5 - 8.5 mg/dL   IgG   Result Value Ref Range     635 - 1,741 mg/dL   Manual Differential(PHLEBS Do Not Order)   Result Value Ref Range    Segmented % 21 (L) 43 - 75 %    Lymphocytes % 78 (H) 14 - 44 %    Monocytes % 0 (L) 4 - 12 %    Eosinophils % 1 0 - 6 %    Basophils % 0 0 - 1 %    Absolute Neutrophils 4.57 1.85 - 7.62 Thousand/uL    Absolute Lymphocytes 16.96 (H) 0.60 - 4.47 Thousand/uL    Absolute Monocytes 0.00 0.00 - 1.22 Thousand/uL    Absolute Eosinophils 0.22 0.00 - 0.40 Thousand/uL    Absolute Basophils 0.00 0.00 - 0.10 Thousand/uL    Total Counted      RBC Morphology Present     Platelet Estimate Decreased (A) Adequate    Large Platelet Present     Differential Comment see note     Ovalocytes Present     Poikilocytes Present     Polychromasia Present    CBC and differential  Status: Final result      Contains abnormal data CBC and differential  Order: [id number]  Status: Final result       Visible to patient: Yes (seen)       Next appt: None       Dx: CLL (chronic lymphoid leukemia) in re...    0 Result Notes    Status: Final result      Contains abnormal data Manual Differential(PHLEBS Do Not Order)  Order: [id number] - Reflex for Order [id number]  Status: Final result       Visible to patient: Yes (seen)       Next appt: None       Dx: CLL (chronic lymphoid leukemia) in re...    0 Result Notes      Dx: CLL (chronic lymphoid leukemia) in re...    0 Result Notes            Component  Ref Range & Units 2/2/24 10:01 AM 12/15/23 11:22 AM 11/4/23 10:27 AM 10/4/23 11:02 AM 9/1/23 10:16 AM 8/26/23  8:41 AM 8/3/23 10:18 AM   Sodium  135 - 147 mmol/L 139 138 134 Low  138 137 138 137   Potassium  3.5 - 5.3 mmol/L 5.1 4.8 5.0 5.2 5.7 High  CM 5.7 High  4.9 CM   Chloride  96 - 108 mmol/L 102 102 100 102 102 105 107   CO2  21 - 32 mmol/L 24 26 25 27 29 28 23   ANION GAP  mmol/L 13 10 9 9 6 5 7   BUN  5 - 25 mg/dL 26 High  18 21 22 26 High  24 25   Creatinine  0.60 - 1.30 mg/dL 1.49 High  1.48 High  CM 1.49  High  CM 1.51 High  CM 1.47 High  CM 1.52 High  CM 1.42 High  CM   Comment: Standardized to IDMS reference method   Glucose  65 - 140 mg/dL 97  95 CM  97 CM  99 CM   Comment: If the patient is fasting, the ADA then defines impaired fasting glucose as > 100 mg/dL and diabetes as > or equal to 123 mg/dL.   Calcium  8.4 - 10.2 mg/dL 10.8 High  10.0 9.7 9.8 10.4 High  10.7 High  10.2 High  R   AST  13 - 39 U/L 25 28 29 25  23 34 R, CM   ALT  7 - 52 U/L 13 17 CM 16 CM 15 CM  14 CM 24 R, CM   Comment: Specimen collection should occur prior to Sulfasalazine administration due to the potential for falsely depressed results.   Alkaline Phosphatase  34 - 104 U/L 77 76 72 72  74 80 R   Total Protein  6.4 - 8.4 g/dL 7.1 6.9 6.8 6.4  7.2 7.6   Albumin  3.5 - 5.0 g/dL 4.6 4.6 4.5 4.4  4.5 4.2   Total Bilirubin  0.20 - 1.00 mg/dL 0.38 0.31 CM 0.34 CM 0.33 CM  0.40 CM 0.36 CM   Comment: Use of this assay is not recommended for patients undergoing treatment with eltrombopag due to the potential for falsely elevated results.  N-acetyl-p-benzoquinone imine (metabolite of Acetaminophen) will generate erroneously low results in samples for patients that have taken an overdose of Acetaminophen.   eGFR  ml/min/1.73sq m 46 46 46 45 47 45 49                             Component  Ref Range & Units 2/2/24 10:01 AM 12/15/23 11:22 AM 3/30/23 10:50 AM 11/26/22  8:31 AM 10/5/22 11:12 AM 7/5/22  3:01 PM 5/10/22  1:46 PM   IGG  635 - 1,741 mg/dL 733 672 685.0 Low  R 676.0 Low  R 641.0 Low  R 746.0 R 688.0 Low  R              Specimen Collected: 02/02/24 10:01 AM Last Resulted: 02/02/24  9:53 PM               s            Component  Ref Range & Units 4/1/24  8:50 AM 2/2/24 10:01 AM 12/15/23 11:22 AM 9/1/23 10:16 AM 5/26/23 11:15 AM 5/19/23 10:59 AM 5/11/23 10:00 AM   Uric Acid  3.5 - 8.5 mg/dL 3.7 3.8 CM 3.7 CM 3.9 CM 3.9 CM 4.0 CM 4.1 CM   Comment: Specimen collection should occur prior to Metamizole administration due to the potential for falsely  depressed results.                         Tissue Exam: [id number]  Order: [id number]  Collected 11/10/2022  9:07 AM     Status: Final result     Visible to patient: Yes (seen)     Dx: Abdominal pain     0 Result Notes  Component    Case Report   Surgical Pathology Report                         Case: [id number]                                    Authorizing Provider:  Jacinto Nascmiento MD          Collected:           11/10/2022 0907               Ordering Location:     [facility]      Received:            11/10/2022 1059                                      Hospital Operating Room                                                       Pathologist:           Mary Morgan MD                                                          Specimen:    Gallbladder, GALLBLADDER                                                                   Final Diagnosis   A. Gallbladder and lymph node (1), cholecystectomy:      - Chronic lymphocytic leukemia/small lymphocytic lymphoma (CLL/SLL) involving one lymph node and gallbladder, see note       - Chronic cholecystitis with cholelithiasis   Electronically signed by Mary Morgan MD on 11/23/2022 at 11:31 AM   Note           Labs, Imaging, & Other studies:   All pertinent labs and imaging studies were personally reviewed            Reviewed and discussed with patient.    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  Since April 2023 patient has been on Zanubrutinib and he is responding  to zanubrutinib.  Patient had lymphedema therapy for left arm edema and there is some improvement and he is using compression garment.  He states he can play golf again and played 18 holes yesterday.   CLL was diagnosed several years ago.  There was long period of observation.  Initial treatment was with chlorambucil.  In 2014 patient had Rituxan plus Bendamustine.  Since April 2023 patient has been  on zanubrutinib.   No bleeding.  No atrial fibrillation and no other symptoms secondary to zanubrutinib.  He has less tiredness and exertional dyspnea and the symptoms could be secondary to CLL and cardiac in origin because he has underlying cardiac disorder and had TAVR for aortic stenosis in July 2022 followed by cardiac rehabilitation.  Occasionally he has  night sweats.  Has arthritic symptoms.  Recurrent scalp lesion and he follows with his dermatologist and he states scalp lesions are not malignant.   History of herpes zoster right lower leg.  No postherpetic neuralgia.             Physical examination and test results are as recorded and discussed.  CLL remains in partial remission on Zanubrutinib and that is being continued as before.  He has been tolerating Zanubrutinib without much problem.  Patient's condition, counts, chemistry and other CLL parameters are being monitored.  He follows with Dr. [facility] at [facility] for CLL for his expert advice.  He has less swelling of left forearm.  Venous Doppler study was negative.  Lymphedema therapy has helped some.     Discussed patient's condition with him in detail with explaned  Questions answered.  He is taking allopurinol and acyclovir.  He will stop allopurinol when present supply runs out.  Discussed the importance of eating healthy foods, staying active as tolerated and health screening tests.  Goal is remission from CLL and prolongation of survival.   Patient is capable of self-care.  Discussed precautions against coronavirus and other infections.  He will continue to follow with primary physician and other consultants.      See diagnoses, orders and instructions   .  1. CLL (chronic lymphoid leukemia) in relapse (HCC)    - CBC and differential; Future    2. Lymphedema of left arm      3. Stage 3 chronic kidney disease, unspecified whether stage 3a or 3b CKD (HCC)      4. Thrombocytopenia (HCC)      5. Coronary artery disease involving native coronary artery of  native heart without angina pectoris      6. S/P TAVR (transcatheter aortic valve replacement)    You may stop taking allopurinol (Zyloprim).  Please continue taking acyclovir to prevent shingles.  Patient has standing orders for blood work.  No other change in treatment.  Follow-up in 2 months.      I used a dictation device to dictate this note and there could be mistakes in my note and patient may contact my office for that.    Patient voiced understanding and agrees      Counseling / Coordination of Care  Provided counseling and support

## 2024-05-07 ENCOUNTER — TELEPHONE (OUTPATIENT)
Dept: HEMATOLOGY ONCOLOGY | Facility: CLINIC | Age: 74
End: 2024-05-07

## 2024-05-07 NOTE — TELEPHONE ENCOUNTER
I called [facility] regarding an appointment that they have scheduled with Dr. Aden scheduled on  06/21/2024      Phone is busy and not able to leave a VM. Appointment canceled and my chart message was sent if applicable.

## 2024-05-17 ENCOUNTER — APPOINTMENT (OUTPATIENT)
Dept: LAB | Age: 74
End: 2024-05-17
Payer: MEDICARE

## 2024-05-17 DIAGNOSIS — C91.12 CLL (CHRONIC LYMPHOID LEUKEMIA) IN RELAPSE (HCC): ICD-10-CM

## 2024-05-17 LAB
BASOPHILS # BLD MANUAL: 0 THOUSAND/UL (ref 0–0.1)
BASOPHILS NFR MAR MANUAL: 0 % (ref 0–1)
BURR CELLS BLD QL SMEAR: PRESENT
EOSINOPHIL # BLD MANUAL: 0 THOUSAND/UL (ref 0–0.4)
EOSINOPHIL NFR BLD MANUAL: 0 % (ref 0–6)
ERYTHROCYTE [DISTWIDTH] IN BLOOD BY AUTOMATED COUNT: 14.2 % (ref 11.6–15.1)
GIANT PLATELETS BLD QL SMEAR: PRESENT
HCT VFR BLD AUTO: 40 % (ref 36.5–49.3)
HGB BLD-MCNC: 12.9 G/DL (ref 12–17)
LYMPHOCYTES # BLD AUTO: 12.42 THOUSAND/UL (ref 0.6–4.47)
LYMPHOCYTES # BLD AUTO: 69 % (ref 14–44)
MCH RBC QN AUTO: 33 PG (ref 26.8–34.3)
MCHC RBC AUTO-ENTMCNC: 32.3 G/DL (ref 31.4–37.4)
MCV RBC AUTO: 102 FL (ref 82–98)
MONOCYTES # BLD AUTO: 0.72 THOUSAND/UL (ref 0–1.22)
MONOCYTES NFR BLD: 4 % (ref 4–12)
NEUTROPHILS # BLD MANUAL: 4.86 THOUSAND/UL (ref 1.85–7.62)
NEUTS BAND NFR BLD MANUAL: 1 % (ref 0–8)
NEUTS SEG NFR BLD AUTO: 26 % (ref 43–75)
PLATELET # BLD AUTO: 137 THOUSANDS/UL (ref 149–390)
PLATELET BLD QL SMEAR: ABNORMAL
PMV BLD AUTO: 11.5 FL (ref 8.9–12.7)
POIKILOCYTOSIS BLD QL SMEAR: PRESENT
POLYCHROMASIA BLD QL SMEAR: PRESENT
RBC # BLD AUTO: 3.91 MILLION/UL (ref 3.88–5.62)
RBC MORPH BLD: PRESENT
TARGETS BLD QL SMEAR: PRESENT
WBC # BLD AUTO: 18 THOUSAND/UL (ref 4.31–10.16)

## 2024-05-17 PROCEDURE — 36415 COLL VENOUS BLD VENIPUNCTURE: CPT

## 2024-05-17 PROCEDURE — 85027 COMPLETE CBC AUTOMATED: CPT

## 2024-05-17 PROCEDURE — 85007 BL SMEAR W/DIFF WBC COUNT: CPT

## 2024-06-21 DIAGNOSIS — C91.12 CLL (CHRONIC LYMPHOID LEUKEMIA) IN RELAPSE (HCC): ICD-10-CM

## 2024-06-21 RX ORDER — ALLOPURINOL 100 MG/1
TABLET ORAL
Qty: 60 TABLET | Refills: 1 | Status: SHIPPED | OUTPATIENT
Start: 2024-06-21

## 2024-06-22 ENCOUNTER — APPOINTMENT (OUTPATIENT)
Dept: LAB | Age: 74
End: 2024-06-22
Payer: MEDICARE

## 2024-06-22 DIAGNOSIS — C91.12 CHRONIC LYMPHOID LEUKEMIA IN RELAPSE (HCC): ICD-10-CM

## 2024-06-22 LAB
BASOPHILS # BLD MANUAL: 0 THOUSAND/UL (ref 0–0.1)
BASOPHILS NFR MAR MANUAL: 0 % (ref 0–1)
DIFFERENTIAL COMMENT: ABNORMAL
EOSINOPHIL # BLD MANUAL: 0 THOUSAND/UL (ref 0–0.4)
EOSINOPHIL NFR BLD MANUAL: 0 % (ref 0–6)
ERYTHROCYTE [DISTWIDTH] IN BLOOD BY AUTOMATED COUNT: 13.9 % (ref 11.6–15.1)
HCT VFR BLD AUTO: 38.9 % (ref 36.5–49.3)
HGB BLD-MCNC: 12.4 G/DL (ref 12–17)
LYMPHOCYTES # BLD AUTO: 11.63 THOUSAND/UL (ref 0.6–4.47)
LYMPHOCYTES # BLD AUTO: 72 % (ref 14–44)
MCH RBC QN AUTO: 33.1 PG (ref 26.8–34.3)
MCHC RBC AUTO-ENTMCNC: 31.9 G/DL (ref 31.4–37.4)
MCV RBC AUTO: 104 FL (ref 82–98)
MONOCYTES # BLD AUTO: 0 THOUSAND/UL (ref 0–1.22)
MONOCYTES NFR BLD: 0 % (ref 4–12)
MYELOCYTE ABSOLUTE CT: 0.16 THOUSAND/UL (ref 0–0.1)
MYELOCYTES NFR BLD MANUAL: 1 % (ref 0–1)
NEUTROPHILS # BLD MANUAL: 4.2 THOUSAND/UL (ref 1.85–7.62)
NEUTS BAND NFR BLD MANUAL: 7 % (ref 0–8)
NEUTS SEG NFR BLD AUTO: 19 % (ref 43–75)
OVALOCYTES BLD QL SMEAR: PRESENT
PLATELET # BLD AUTO: 156 THOUSANDS/UL (ref 149–390)
PLATELET BLD QL SMEAR: ADEQUATE
PMV BLD AUTO: 11 FL (ref 8.9–12.7)
PROMYELOCYTE ABSOLUTE CT: 0.16 THOUSAND/UL (ref 0–0)
PROMYELOCYTES NFR BLD MANUAL: 1 % (ref 0–0)
RBC # BLD AUTO: 3.75 MILLION/UL (ref 3.88–5.62)
RBC MORPH BLD: PRESENT
SMUDGE CELLS BLD QL SMEAR: PRESENT
WBC # BLD AUTO: 16.15 THOUSAND/UL (ref 4.31–10.16)

## 2024-06-22 PROCEDURE — 36415 COLL VENOUS BLD VENIPUNCTURE: CPT

## 2024-06-22 PROCEDURE — 85027 COMPLETE CBC AUTOMATED: CPT

## 2024-06-22 PROCEDURE — 85007 BL SMEAR W/DIFF WBC COUNT: CPT

## 2024-07-01 ENCOUNTER — OFFICE VISIT (OUTPATIENT)
Dept: HEMATOLOGY ONCOLOGY | Facility: CLINIC | Age: 74
End: 2024-07-01
Payer: MEDICARE

## 2024-07-01 VITALS
DIASTOLIC BLOOD PRESSURE: 72 MMHG | HEART RATE: 107 BPM | HEIGHT: 69 IN | SYSTOLIC BLOOD PRESSURE: 122 MMHG | BODY MASS INDEX: 27.4 KG/M2 | OXYGEN SATURATION: 100 % | WEIGHT: 185 LBS | TEMPERATURE: 97.3 F | RESPIRATION RATE: 17 BRPM

## 2024-07-01 DIAGNOSIS — N18.30 STAGE 3 CHRONIC KIDNEY DISEASE, UNSPECIFIED WHETHER STAGE 3A OR 3B CKD (HCC): ICD-10-CM

## 2024-07-01 DIAGNOSIS — I89.0 LYMPHEDEMA OF LEFT ARM: ICD-10-CM

## 2024-07-01 DIAGNOSIS — C91.12 CLL (CHRONIC LYMPHOID LEUKEMIA) IN RELAPSE (HCC): Primary | ICD-10-CM

## 2024-07-01 DIAGNOSIS — I25.10 CORONARY ARTERY DISEASE INVOLVING NATIVE CORONARY ARTERY OF NATIVE HEART WITHOUT ANGINA PECTORIS: ICD-10-CM

## 2024-07-01 DIAGNOSIS — Z95.2 S/P TAVR (TRANSCATHETER AORTIC VALVE REPLACEMENT): ICD-10-CM

## 2024-07-01 PROCEDURE — 99214 OFFICE O/P EST MOD 30 MIN: CPT | Performed by: INTERNAL MEDICINE

## 2024-07-01 PROCEDURE — G2211 COMPLEX E/M VISIT ADD ON: HCPCS | Performed by: INTERNAL MEDICINE

## 2024-07-01 NOTE — PROGRESS NOTES
HPI: This is continuation of care for CLL and patient has been on Zanubrutinib and has been tolerating that without much problem.  Has exertional dyspnea.  Not unusually tired.  No fevers, chills or bleeding.  No night sweats and weight loss.  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    Zanubrutinib was started in April 20, 2020.    Patient has lymphedema therapy for left arm edema and there is some improvement and he is using compression garment.  He states he can play golf again .   CLL was diagnosed several years ago.  There was long period of observation.  Initial treatment was with chlorambucil.  In 2014 patient had Rituxan plus Bendamustine.  Since April 2023 patient has been on zanubrutinib.   No bleeding.  No atrial fibrillation and no other symptoms secondary to zanubrutinib.  He has  underlying cardiac disorder and had TAVR for aortic stenosis in July 2022 followed by cardiac rehabilitation.   Has arthritic symptoms.  Recurrent scalp lesion and he follows with his dermatologist and he states scalp lesions are not malignant.   History of herpes zoster right lower leg.  No postherpetic neuralgia.           Current Outpatient Medications:   •  acetaminophen (TYLENOL) 325 mg tablet, Take 2 tablets (650 mg total) by mouth every 4 (four) hours as needed for fever or moderate pain (temperature greater than 101 F), Disp: 100 tablet, Rfl: 0  •  acyclovir (ZOVIRAX) 400 MG tablet, TAKE 1 TABLET(400 MG) BY MOUTH TWICE DAILY, Disp: 60 tablet, Rfl: 11  •  acyclovir (ZOVIRAX) 400 MG tablet, Take 1 tablet (400 mg total) by mouth 2 (two) times a day, Disp: 60 tablet, Rfl: 0  •  allopurinol (ZYLOPRIM) 100 mg tablet, TAKE 1 TABLET(100 MG) BY MOUTH TWICE DAILY, Disp: 180 tablet, Rfl: 1  •  allopurinol (ZYLOPRIM) 100 mg tablet, TAKE 1 TABLET(100 MG) BY MOUTH TWICE DAILY, Disp: 60 tablet, Rfl: 1  •  amoxicillin (AMOXIL) 500 MG tablet, Take 4 tablets (2,000 mg total) by mouth  once as needed (Take 60 minutes prior to ANY dental work) for up to 1 dose, Disp: 4 tablet, Rfl: 1  •  Ascorbic Acid (VITAMIN C PO), Take by mouth daily , Disp: , Rfl:   •  aspirin 81 mg chewable tablet, Chew 81 mg daily, Disp: , Rfl:   •  Brukinsa 80 MG CAPS, Take 2 capsules (160 mg total) by mouth 2 (two) times a day., Disp: 120 capsule, Rfl: 10  •  fexofenadine-pseudoephedrine (ALLEGRA-D 24) 180-240 MG per 24 hr tablet, Take 1 tablet by mouth as needed , Disp: , Rfl:   •  VITAMIN D PO, Take by mouth daily , Disp: , Rfl:   •  VITAMIN E PO, Take by mouth daily , Disp: , Rfl:     No Known Allergies    Oncology History Overview Note   1996:  CLL was diagnosed.  Intermittently he received chlorambucil over the years.    2014:  6 cycles of Rituxan and bendamustine.  Presently under surveillance.     CLL (chronic lymphoid leukemia) in relapse (HCC)    Chemotherapy       1996:  CLL was diagnosed.  Intermittent therapy with chlorambucil.    2014:  6 cycles of Rituxan and bendamustine.  Presently under surveillance.     6/26/2023 -  Cancer Staged    Staging form: Chronic Lymphocytic Leukemia, AJCC 8th Edition  - Clinical: Modified Tolliver Stage IV (Modified Tolliver risk: High, Lymphocytosis: Present, Adenopathy: Unknown, Organomegaly: Present, Anemia: Present, Thrombocytopenia: Present) - Signed by Charlotte Mir PA-C on 6/26/2023           ROS:  07/02/24 Reviewed 12 systems:  See symptoms in HPI.    Presently no other neurological, cardiac, pulmonary, GI and  symptoms.  Other symptoms are in HPI.  No other symptoms like fever, chills, bleeding, bone pains, skin rash, weight loss,   weakness, numbness,  claudication and gait problem. No frequent infections .  Not unusually sensitive to heat or cold. No swelling of the ankles. No swollen glands.  Patient is less anxious.  Examination:     7/1/24 4/12/24 3/4/24   Blood Pressure 122/72 126/74 --   Pulse 107 (Abnormal)   78 --   Respirations 17 17 --   Temperature 97.3 °F  "(36.3 °C) (Abnormal)   98 °F (36.7 °C) --   SpO2 100 % 98 % --   Weight - Scale 83.9 kg (185 lb) 86.2 kg (190 lb) 86.6 kg (191 lb)   Height 5' 9\" (1.753 m) 5' 9\" (1.753 m) 5' 9\" (1.753 m)   Pain Score Zero -- --   Heart rate 96 when I checked  Patient is alert and oriented.  Patient is not in distress.  Vital signs are above.  No icterus.  No oral thrush.  No palpable neck mass.  Clear lung fields.  Regular heart rate.  Systolic murmur.  No palpable abdominal mass.  Soft and nontender abdomen.  There is no ascites.  There is no edema of the ankles.  There is no calf tenderness.  There is no focal neurological deficit.  There is no skin rash.  Less lymphedema of left arm.  No palpable lymphadenopathy in the neck and axillary areas.  No skin rash.  Performance status 1 on ECOG scale.      IMAGING:  IMPRESSION:     No choledocholithiasis is seen  No biliary dilation  Distended gallbladder with pericholecystic fluid and cholelithiasis correlated with the acute cholecystitis  Splenomegaly with spleen measuring about 16 cm can be correlated with the patient's history of CLL  Mild edema seen in the mesentery as seen on the CT              Workstation performed: [id number]        Imaging    MRI abdomen wo contrast and mrcp (Order: [id number]) - 11/8/2022  MPRESSION:   No findings to explain left arm swelling.  Recommend clinical management.           ASSESSMENT/BI-RADS CATEGORY:  Left: 1 - Negative  Overall: 1 - Negative     RECOMMENDATION:       - Clinical management for the left breast.     Workstation ID: [id number]           Imaging    US Breast Axilla Left (Order: [id number]) - 3/4/2024      Manual Differential(PHLEBS Do Not Order)  Status: Final result      Contains abnormal data Manual Differential(PHLEBS Do Not Order)  Order: [id number] - Reflex for Order [id number]   Status: Final result       Visible to patient: Yes (seen)       Next appt: None       Dx: Chronic lymphoid leukemia in relapse ...    0 Result " Notes            Component  Ref Range & Units 6/22/24  8:32 AM 5/17/24 10:57 AM 4/1/24  8:50 AM 2/2/24 10:01 AM 12/15/23 11:22 AM 11/4/23 10:27 AM 10/4/23 11:02 AM   Segmented %  43 - 75 % 19 Low  26 Low  21 Low  34 Low  14 Low  13 Low  14 Low    Bands %  0 - 8 % 7 1   5     Lymphocytes %  14 - 44 % 72 High  69 High  78 High  60 High  72 High  80 High  84 High    Monocytes %  4 - 12 % 0 Low  4 0 Low  2 Low  4 2 Low  2 Low    Eosinophils %  0 - 6 % 0 0 1 2 1 1 0   Basophils %  0 - 1 % 0 0 0 0 0 0 0   Myelocytes %  0 - 1 % 1         Promyelocytes %  0 - 0 % 1 High          Absolute Neutrophils  1.85 - 7.62 Thousand/uL 4.20 4.86 4.57 9.05 High  6.10 4.96 7.30   Absolute Lymphocytes  0.60 - 4.47 Thousand/uL 11.63 High  12.42 High  16.96 High  16.50 High  24.42 High  32.05 High  43.80 High    Absolute Monocytes  0.00 - 1.22 Thousand/uL 0.00 0.72 0.00 0.53 1.29 High  0.76 1.04   Absolute Eosinophils  0.00 - 0.40 Thousand/uL 0.00 0.00 0.22 0.53 High  0.32 0.38 0.00   Absolute Basophils  0.00 - 0.10 Thousand/uL 0.00 0.00 0.00 0.00 0.00 0.00 0.00   Absolute Myelocytes  0.00 - 0.10 Thousand/uL 0.16 High          Absolute Promyelocytes  0.00 - 0.00 Thousand/uL 0.16 High          Total Counted          Smudge Cells Present     Present Present   RBC Morphology Present Present Present Present Normal Present Present   Platelet Estimate  Adequate Adequate Decreased Abnormal  Decreased Abnormal  Adequate Adequate Borderline Abnormal  Borderline Abnormal    Differential Comment see note  see note CM       Comment: Albumin Smear.   Ovalocytes Present  Present                     Manual Differential(PHLEBS Do Not Order)              Results for orders placed or performed in visit on 06/22/24   CBC and differential   Result Value Ref Range    WBC 16.15 (H) 4.31 - 10.16 Thousand/uL    RBC 3.75 (L) 3.88 - 5.62 Million/uL    Hemoglobin 12.4 12.0 - 17.0 g/dL    Hematocrit 38.9 36.5 - 49.3 %     (H) 82 - 98 fL    MCH 33.1 26.8 - 34.3  pg    MCHC 31.9 31.4 - 37.4 g/dL    RDW 13.9 11.6 - 15.1 %    MPV 11.0 8.9 - 12.7 fL    Platelets 156 149 - 390 Thousands/uL   Manual Differential(PHLEBS Do Not Order)   Result Value Ref Range    Segmented % 19 (L) 43 - 75 %    Bands % 7 0 - 8 %    Lymphocytes % 72 (H) 14 - 44 %    Monocytes % 0 (L) 4 - 12 %    Eosinophils % 0 0 - 6 %    Basophils % 0 0 - 1 %    Myelocytes % 1 0 - 1 %    Promyelocytes % 1 (H) 0 - 0 %    Absolute Neutrophils 4.20 1.85 - 7.62 Thousand/uL    Absolute Lymphocytes 11.63 (H) 0.60 - 4.47 Thousand/uL    Absolute Monocytes 0.00 0.00 - 1.22 Thousand/uL    Absolute Eosinophils 0.00 0.00 - 0.40 Thousand/uL    Absolute Basophils 0.00 0.00 - 0.10 Thousand/uL    Absolute Myelocytes 0.16 (H) 0.00 - 0.10 Thousand/uL    Absolute Promyelocytes 0.16 (H) 0.00 - 0.00 Thousand/uL    Total Counted      Smudge Cells Present     RBC Morphology Present     Platelet Estimate Adequate Adequate    Differential Comment see note     Ovalocytes Present    CBC and differential  Status: Final result      Contains abnormal data CBC and differential  Order: [id number]  Status: Final result                             Tissue Exam: [id number]  Order: [id number]  Collected 11/10/2022  9:07 AM     Status: Final result     Visible to patient: Yes (seen)     Dx: Abdominal pain     0 Result Notes  Component    Case Report   Surgical Pathology Report                         Case: [id number]                                    Authorizing Provider:  Jacinto Nascimento MD          Collected:           11/10/2022 0907               Ordering Location:     [facility]      Received:            11/10/2022 [facility]                                      Hospital Operating Room                                                       Pathologist:           Mary Morgan MD                                                          Specimen:    Gallbladder, GALLBLADDER                                                                   Final  Diagnosis   A. Gallbladder and lymph node (1), cholecystectomy:      - Chronic lymphocytic leukemia/small lymphocytic lymphoma (CLL/SLL) involving one lymph node and gallbladder, see note       - Chronic cholecystitis with cholelithiasis   Electronically signed by Mary Morgan MD on 11/23/2022 at 11:31 AM   Note           Status: Final result     IgG  Order: [id number]   Status: Final result       Visible to patient: Yes (seen)       Next appt: None       Dx: CLL (chronic lymphoid leukemia) in re...    0 Result Notes            Component  Ref Range & Units 4/1/24  8:50 AM 2/2/24 10:01 AM 12/15/23 11:22 AM 3/30/23 10:50 AM 11/26/22  8:31 AM 10/5/22 11:12 AM 7/5/22  3:01 PM   IGG  635 - 1,741 mg/dL 686 733 672 685.0 Low  R 676.0 Low  R 641.0 Low  R 746.0 R                    important suggestion  Newer results are available. Click to view them now.                 Component  Ref Range & Units 4/1/24  8:50 AM 2/2/24 10:01 AM 12/15/23 11:22 AM 11/4/23 10:27 AM 10/4/23 11:02 AM 8/26/23  8:41 AM 8/3/23 10:18 AM   WBC  4.31 - 10.16 Thousand/uL 21.74 High  26.61 High  32.13 High Panic  38.16 High Panic  52.14 High Panic  69.82 High Panic  90.25 High Panic    RBC  3.88 - 5.62 Million/uL 3.88 3.84 Low  3.79 Low  3.93 3.77 Low  3.73 Low  3.89   Hemoglobin  12.0 - 17.0 g/dL 12.7 12.5 12.5 12.7 12.3 11.8 Low  12.4   Hematocrit  36.5 - 49.3 % 39.9 39.3 39.7 40.5 38.7 39.2 40.5   MCV  82 - 98 fL 103 High  102 High  105 High  103 High  103 High  105 High  104 High    MCH  26.8 - 34.3 pg 32.7 32.6 33.0 32.3 32.6 31.6 31.9   MCHC  31.4 - 37.4 g/dL 31.8 31.8 31.5 31.4 31.8 30.1 Low  30.6 Low    RDW  11.6 - 15.1 % 14.5 14.5 14.6 14.6 14.3 14.3 14.1   MPV  8.9 - 12.7 fL 11.2 11.2 11.1 10.9 11.4 11.0 11.4   Platelets  149 - 390 Thousands/uL 135 Low  166 169 126 Low  133 Low  141 Low  130 Low                       Component  Ref Range & Units 4/1/24  8:50 AM 2/2/24 10:01 AM 12/15/23 11:22 AM 11/4/23 10:27 AM 10/4/23 11:02 AM 9/1/23  10:16 AM 8/26/23  8:41 AM   Sodium  135 - 147 mmol/L 141 139 138 134 Low  138 137 138   Potassium  3.5 - 5.3 mmol/L 5.1 5.1 4.8 5.0 5.2 5.7 High  CM 5.7 High    Chloride  96 - 108 mmol/L 108 102 102 100 102 102 105   CO2  21 - 32 mmol/L 25 24 26 25 27 29 28   ANION GAP  4 - 13 mmol/L 8 13 R 10 R 9 R 9 R 6 R 5 R   BUN  5 - 25 mg/dL 21 26 High  18 21 22 26 High  24   Creatinine  0.60 - 1.30 mg/dL 1.45 High  1.49 High  CM 1.48 High  CM 1.49 High  CM 1.51 High  CM 1.47 High  CM 1.52 High  CM   Comment: Standardized to IDMS reference method   Glucose  65 - 140 mg/dL 106 97 CM  95 CM  97 CM    Comment: If the patient is fasting, the ADA then defines impaired fasting glucose as > 100 mg/dL and diabetes as > or equal to 123 mg/dL.   Calcium  8.4 - 10.2 mg/dL 10.0 10.8 High  10.0 9.7 9.8 10.4 High  10.7 High    AST  13 - 39 U/L 23 25 28 29 25  23   ALT  7 - 52 U/L 14 13 CM 17 CM 16 CM 15 CM  14 CM   Comment: Specimen collection should occur prior to Sulfasalazine administration due to the potential for falsely depressed results.   Alkaline Phosphatase  34 - 104 U/L 73 77 76 72 72  74   Total Protein  6.4 - 8.4 g/dL 6.9 7.1 6.9 6.8 6.4  7.2   Albumin  3.5 - 5.0 g/dL 4.5 4.6 4.6 4.5 4.4  4.5   Total Bilirubin  0.20 - 1.00 mg/dL 0.34 0.38 CM 0.31 CM 0.34 CM 0.33 CM  0.40 CM   Comment: Use of this assay is not recommended for patients undergoing treatment with eltrombopag due to the potential for falsely elevated results.  N-acetyl-p-benzoquinone imine (metabolite of Acetaminophen) will generate erroneously low results in samples for patients that have taken an overdose of Acetaminophen.   eGFR  ml/min/1.73sq m 47 46 46 46 45 47 45                [facility]    National Kidney Disease Foundation guidelines for Chronic Kidney Disease (CKD):  •  Stage 1 with normal or high GFR (GFR > 90 mL/min/1.73 square meters)  •  Stage 2 Mild CKD (GFR = 60-89 mL/min/1.73 square meters)  •  Stage 3A Moderate CKD (GFR = 45-59 mL/min/1.73 square  meters)  •  Stage 3B Moderate CKD (GFR = 30-44 mL/min/1.73 square meters)  •  Stage 4 Severe CKD (GFR = 15-29 mL/min/1.73 square meters)  •  Stage 5 End Stage CKD (GFR <15 mL/min/1.73 square meters)  Note: GFR calculation is accurate only with a steady state creatinine      Specimen Collected: 04/01/24  8:50 AM Last Resulted: 04/01/24  2:53 PM         View All Conversations on this Encounter        CM=Additional comments  R=Reference range differs from displayed range          Related Results     Uric acid Final result 4/1/2024                     IgG Final result 4/1/2024                         Result Care Coordination        Patient Communication     Add Comments   Seen Back to Top           Ordered On 11/10/2023  8:41 AM    Ordering Provider Authorizing Provider Ordering User Ordering Department   MD Liang Montesinos MD Subhash Proothi, MD  [facility] [place]   Hematology and Oncology Hematology and Oncology Hematology and Oncology Hematology and Oncology    [phone]  [phone]  [phone]  [phone]          Uric acid  Order: [id number]   Status: Final result         Visible to patient: Yes (seen)         Next appt: None         Dx: CLL (chronic lymphoid leukemia) in re...      0 Result Notes                Component  Ref Range & Units 4/1/24  8:50 AM 2/2/24 10:01 AM 12/15/23 11:22 AM 9/1/23 10:16 AM 5/26/23 11:15 AM 5/19/23 10:59 AM 5/11/23 10:00 AM   Uric Acid  3.5 - 8.5 mg/dL 3.7 3.8 CM 3.7 CM 3.9 CM 3.9 CM 4.0 CM 4.1 CM   Comment: Specimen collection should occur prior to Metamizole administration due to the potential for falsely depressed results.            Labs, Imaging, & Other studies:   All pertinent labs and imaging studies were personally reviewed            Reviewed and discussed with patient.    Assessment and plan:   This is continuation of care for CLL and patient has been on Zanubrutinib and has been tolerating that without much problem.  Has exertional  dyspnea.  Not unusually tired.  No fevers, chills or bleeding.  No night sweats and weight loss.  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    Zanubrutinib was started in April 20, 2020.    Patient has lymphedema therapy for left arm edema and there is some improvement and he is using compression garment.  He states he can play golf again .   CLL was diagnosed several years ago.  There was long period of observation.  Initial treatment was with chlorambucil.  In 2014 patient had Rituxan plus Bendamustine.  Since April 2023 patient has been on zanubrutinib.   No bleeding.  No atrial fibrillation and no other symptoms secondary to zanubrutinib.  He has  underlying cardiac disorder and had TAVR for aortic stenosis in July 2022 followed by cardiac rehabilitation.   Has arthritic symptoms.  Recurrent scalp lesion and he follows with his dermatologist and he states scalp lesions are not malignant.   History of herpes zoster right lower leg.  No postherpetic neuralgia.         Physical examination and test results are as recorded and discussed.  CLL remains in partial remission on Zanubrutinib and that is being continued as before.  He has been tolerating Zanubrutinib without much problem.  Patient's condition, counts, chemistry and other CLL parameters are being monitored.  He follows with Dr. Brooks at [place]. for CLL for his expert advice.  He has less swelling of left forearm.  Venous Doppler study was negative.  Lymphedema therapy has helped some.     Discussed patient's condition with him in detail with explaned  Questions answered.  He is taking allopurinol and acyclovir.  He will stop allopurinol when present supply runs out.  Discussed the importance of eating healthy foods, staying active as tolerated and health screening tests.  Goal is remission from CLL and prolongation of survival.   Patient is capable of self-care.  Discussed precautions against coronavirus and  other infections.  He will continue to follow with primary physician and other consultants.      See diagnoses, orders and instructions   .  1. CLL (chronic lymphoid leukemia) in relapse (HCC)    - Beta 2 microglobulin, serum; Standing  - CBC and differential; Standing  - Comprehensive metabolic panel; Standing  - IgG, IgA, IgM; Standing  - LD,Blood; Standing  - Uric acid; Standing    2. Lymphedema of left arm      3. Stage 3 chronic kidney disease, unspecified whether stage 3a or 3b CKD (HCC)      4. Coronary artery disease involving native coronary artery of native heart without angina pectoris      5. S/P TAVR (transcatheter aortic valve replacement)    Blood work every 2 months.  No change in Zanubrutinib.  Follow-up in 4 months.    I used a dictation device to dictate this note and there could be mistakes in my note and patient may contact my office for that.    Patient voiced understanding and agrees      Counseling / Coordination of Care  Provided counseling and support

## 2024-08-10 ENCOUNTER — APPOINTMENT (OUTPATIENT)
Dept: LAB | Age: 74
End: 2024-08-10
Payer: MEDICARE

## 2024-08-10 DIAGNOSIS — C91.12 CLL (CHRONIC LYMPHOID LEUKEMIA) IN RELAPSE (HCC): ICD-10-CM

## 2024-08-10 LAB
ALBUMIN SERPL BCG-MCNC: 4.2 G/DL (ref 3.5–5)
ALP SERPL-CCNC: 63 U/L (ref 34–104)
ALT SERPL W P-5'-P-CCNC: 14 U/L (ref 7–52)
ANION GAP SERPL CALCULATED.3IONS-SCNC: 6 MMOL/L (ref 4–13)
AST SERPL W P-5'-P-CCNC: 22 U/L (ref 13–39)
BASOPHILS # BLD MANUAL: 0 THOUSAND/UL (ref 0–0.1)
BASOPHILS NFR MAR MANUAL: 0 % (ref 0–1)
BILIRUB SERPL-MCNC: 0.23 MG/DL (ref 0.2–1)
BUN SERPL-MCNC: 20 MG/DL (ref 5–25)
CALCIUM SERPL-MCNC: 9.8 MG/DL (ref 8.4–10.2)
CHLORIDE SERPL-SCNC: 109 MMOL/L (ref 96–108)
CO2 SERPL-SCNC: 26 MMOL/L (ref 21–32)
CREAT SERPL-MCNC: 1.36 MG/DL (ref 0.6–1.3)
DIFFERENTIAL COMMENT: ABNORMAL
EOSINOPHIL # BLD MANUAL: 0 THOUSAND/UL (ref 0–0.4)
EOSINOPHIL NFR BLD MANUAL: 0 % (ref 0–6)
ERYTHROCYTE [DISTWIDTH] IN BLOOD BY AUTOMATED COUNT: 13.8 % (ref 11.6–15.1)
GFR SERPL CREATININE-BSD FRML MDRD: 51 ML/MIN/1.73SQ M
GLUCOSE SERPL-MCNC: 101 MG/DL (ref 65–140)
HCT VFR BLD AUTO: 36.9 % (ref 36.5–49.3)
HGB BLD-MCNC: 11.6 G/DL (ref 12–17)
IGA SERPL-MCNC: 73 MG/DL (ref 66–433)
IGG SERPL-MCNC: 625 MG/DL (ref 635–1741)
IGM SERPL-MCNC: <20 MG/DL (ref 45–281)
LDH SERPL-CCNC: 329 U/L (ref 140–271)
LYMPHOCYTES # BLD AUTO: 11.09 THOUSAND/UL (ref 0.6–4.47)
LYMPHOCYTES # BLD AUTO: 84 % (ref 14–44)
MCH RBC QN AUTO: 33 PG (ref 26.8–34.3)
MCHC RBC AUTO-ENTMCNC: 31.4 G/DL (ref 31.4–37.4)
MCV RBC AUTO: 105 FL (ref 82–98)
MONOCYTES # BLD AUTO: 0.4 THOUSAND/UL (ref 0–1.22)
MONOCYTES NFR BLD: 3 % (ref 4–12)
NEUTROPHILS # BLD MANUAL: 1.72 THOUSAND/UL (ref 1.85–7.62)
NEUTS SEG NFR BLD AUTO: 13 % (ref 43–75)
PLATELET # BLD AUTO: 132 THOUSANDS/UL (ref 149–390)
PLATELET BLD QL SMEAR: ABNORMAL
PMV BLD AUTO: 10.9 FL (ref 8.9–12.7)
POTASSIUM SERPL-SCNC: 4.6 MMOL/L (ref 3.5–5.3)
PROT SERPL-MCNC: 6.4 G/DL (ref 6.4–8.4)
RBC # BLD AUTO: 3.51 MILLION/UL (ref 3.88–5.62)
RBC MORPH BLD: NORMAL
SMUDGE CELLS BLD QL SMEAR: PRESENT
SODIUM SERPL-SCNC: 141 MMOL/L (ref 135–147)
URATE SERPL-MCNC: 4.3 MG/DL (ref 3.5–8.5)
WBC # BLD AUTO: 13.2 THOUSAND/UL (ref 4.31–10.16)

## 2024-08-10 PROCEDURE — 82232 ASSAY OF BETA-2 PROTEIN: CPT

## 2024-08-10 PROCEDURE — 84550 ASSAY OF BLOOD/URIC ACID: CPT

## 2024-08-10 PROCEDURE — 85027 COMPLETE CBC AUTOMATED: CPT

## 2024-08-10 PROCEDURE — 83615 LACTATE (LD) (LDH) ENZYME: CPT

## 2024-08-10 PROCEDURE — 85007 BL SMEAR W/DIFF WBC COUNT: CPT

## 2024-08-10 PROCEDURE — 82784 ASSAY IGA/IGD/IGG/IGM EACH: CPT

## 2024-08-10 PROCEDURE — 36415 COLL VENOUS BLD VENIPUNCTURE: CPT

## 2024-08-10 PROCEDURE — 80053 COMPREHEN METABOLIC PANEL: CPT

## 2024-08-12 LAB — B2 MICROGLOB SERPL-MCNC: 1.9 MG/L (ref 0.6–2.4)

## 2024-08-17 ENCOUNTER — APPOINTMENT (OUTPATIENT)
Dept: LAB | Age: 74
End: 2024-08-17

## 2024-08-19 ENCOUNTER — APPOINTMENT (OUTPATIENT)
Dept: LAB | Age: 74
End: 2024-08-19
Payer: MEDICARE

## 2024-08-19 DIAGNOSIS — R19.7 DIARRHEA, UNSPECIFIED TYPE: ICD-10-CM

## 2024-08-19 PROCEDURE — 89055 LEUKOCYTE ASSESSMENT FECAL: CPT

## 2024-08-19 PROCEDURE — 87505 NFCT AGENT DETECTION GI: CPT

## 2024-08-20 LAB
C COLI+JEJUNI TUF STL QL NAA+PROBE: NEGATIVE
C DIFF TOX GENS STL QL NAA+PROBE: NEGATIVE
EC STX1+STX2 GENES STL QL NAA+PROBE: NEGATIVE
SALMONELLA SP SPAO STL QL NAA+PROBE: NEGATIVE
SHIGELLA SP+EIEC IPAH STL QL NAA+PROBE: NEGATIVE
WBC SPEC QL GRAM STN: NORMAL

## 2024-10-11 ENCOUNTER — APPOINTMENT (OUTPATIENT)
Dept: LAB | Age: 74
End: 2024-10-11
Payer: MEDICARE

## 2024-10-11 DIAGNOSIS — C91.12 CLL (CHRONIC LYMPHOID LEUKEMIA) IN RELAPSE (HCC): ICD-10-CM

## 2024-10-11 LAB
ALBUMIN SERPL BCG-MCNC: 4.5 G/DL (ref 3.5–5)
ALP SERPL-CCNC: 59 U/L (ref 34–104)
ALT SERPL W P-5'-P-CCNC: 13 U/L (ref 7–52)
ANION GAP SERPL CALCULATED.3IONS-SCNC: 10 MMOL/L (ref 4–13)
AST SERPL W P-5'-P-CCNC: 21 U/L (ref 13–39)
BASOPHILS # BLD MANUAL: 0 THOUSAND/UL (ref 0–0.1)
BASOPHILS NFR MAR MANUAL: 0 % (ref 0–1)
BILIRUB SERPL-MCNC: 0.29 MG/DL (ref 0.2–1)
BUN SERPL-MCNC: 24 MG/DL (ref 5–25)
CALCIUM SERPL-MCNC: 9.4 MG/DL (ref 8.4–10.2)
CHLORIDE SERPL-SCNC: 106 MMOL/L (ref 96–108)
CO2 SERPL-SCNC: 25 MMOL/L (ref 21–32)
CREAT SERPL-MCNC: 1.41 MG/DL (ref 0.6–1.3)
EOSINOPHIL # BLD MANUAL: 0.12 THOUSAND/UL (ref 0–0.4)
EOSINOPHIL NFR BLD MANUAL: 1 % (ref 0–6)
ERYTHROCYTE [DISTWIDTH] IN BLOOD BY AUTOMATED COUNT: 13.5 % (ref 11.6–15.1)
GFR SERPL CREATININE-BSD FRML MDRD: 49 ML/MIN/1.73SQ M
GLUCOSE SERPL-MCNC: 111 MG/DL (ref 65–140)
HCT VFR BLD AUTO: 39 % (ref 36.5–49.3)
HGB BLD-MCNC: 12.6 G/DL (ref 12–17)
IGA SERPL-MCNC: 72 MG/DL (ref 66–433)
IGG SERPL-MCNC: 598 MG/DL (ref 635–1741)
IGM SERPL-MCNC: <20 MG/DL (ref 45–281)
LDH SERPL-CCNC: 261 U/L (ref 140–271)
LYMPHOCYTES # BLD AUTO: 46 % (ref 14–44)
LYMPHOCYTES # BLD AUTO: 7.66 THOUSAND/UL (ref 0.6–4.47)
MCH RBC QN AUTO: 33.3 PG (ref 26.8–34.3)
MCHC RBC AUTO-ENTMCNC: 32.3 G/DL (ref 31.4–37.4)
MCV RBC AUTO: 103 FL (ref 82–98)
MONOCYTES # BLD AUTO: 0.83 THOUSAND/UL (ref 0–1.22)
MONOCYTES NFR BLD: 7 % (ref 4–12)
NEUTROPHILS # BLD MANUAL: 3.18 THOUSAND/UL (ref 1.85–7.62)
NEUTS SEG NFR BLD AUTO: 27 % (ref 43–75)
PLATELET # BLD AUTO: 155 THOUSANDS/UL (ref 149–390)
PLATELET BLD QL SMEAR: ADEQUATE
PMV BLD AUTO: 11 FL (ref 8.9–12.7)
POTASSIUM SERPL-SCNC: 4.6 MMOL/L (ref 3.5–5.3)
PROT SERPL-MCNC: 6.4 G/DL (ref 6.4–8.4)
RBC # BLD AUTO: 3.78 MILLION/UL (ref 3.88–5.62)
RBC MORPH BLD: NORMAL
SODIUM SERPL-SCNC: 141 MMOL/L (ref 135–147)
URATE SERPL-MCNC: 5.2 MG/DL (ref 3.5–8.5)
VARIANT LYMPHS # BLD AUTO: 19 %
WBC # BLD AUTO: 11.79 THOUSAND/UL (ref 4.31–10.16)

## 2024-10-11 PROCEDURE — 84550 ASSAY OF BLOOD/URIC ACID: CPT

## 2024-10-11 PROCEDURE — 80053 COMPREHEN METABOLIC PANEL: CPT

## 2024-10-11 PROCEDURE — 83615 LACTATE (LD) (LDH) ENZYME: CPT

## 2024-10-11 PROCEDURE — 85027 COMPLETE CBC AUTOMATED: CPT

## 2024-10-11 PROCEDURE — 82784 ASSAY IGA/IGD/IGG/IGM EACH: CPT

## 2024-10-11 PROCEDURE — 82232 ASSAY OF BETA-2 PROTEIN: CPT

## 2024-10-11 PROCEDURE — 85007 BL SMEAR W/DIFF WBC COUNT: CPT

## 2024-10-11 PROCEDURE — 36415 COLL VENOUS BLD VENIPUNCTURE: CPT

## 2024-10-13 LAB — B2 MICROGLOB SERPL-MCNC: 2.5 MG/L (ref 0.6–2.4)

## 2024-11-04 ENCOUNTER — OFFICE VISIT (OUTPATIENT)
Dept: HEMATOLOGY ONCOLOGY | Facility: CLINIC | Age: 74
End: 2024-11-04
Payer: MEDICARE

## 2024-11-04 VITALS
HEART RATE: 66 BPM | BODY MASS INDEX: 25.33 KG/M2 | OXYGEN SATURATION: 100 % | DIASTOLIC BLOOD PRESSURE: 84 MMHG | SYSTOLIC BLOOD PRESSURE: 156 MMHG | RESPIRATION RATE: 16 BRPM | WEIGHT: 171 LBS | HEIGHT: 69 IN | TEMPERATURE: 97.2 F

## 2024-11-04 DIAGNOSIS — Z95.2 S/P TAVR (TRANSCATHETER AORTIC VALVE REPLACEMENT): ICD-10-CM

## 2024-11-04 DIAGNOSIS — C91.12 CLL (CHRONIC LYMPHOID LEUKEMIA) IN RELAPSE (HCC): Primary | ICD-10-CM

## 2024-11-04 DIAGNOSIS — N18.30 STAGE 3 CHRONIC KIDNEY DISEASE, UNSPECIFIED WHETHER STAGE 3A OR 3B CKD (HCC): ICD-10-CM

## 2024-11-04 DIAGNOSIS — I25.10 CORONARY ARTERY DISEASE INVOLVING NATIVE CORONARY ARTERY OF NATIVE HEART WITHOUT ANGINA PECTORIS: ICD-10-CM

## 2024-11-04 DIAGNOSIS — I89.0 LYMPHEDEMA OF LEFT ARM: ICD-10-CM

## 2024-11-04 PROCEDURE — 99214 OFFICE O/P EST MOD 30 MIN: CPT | Performed by: INTERNAL MEDICINE

## 2024-11-04 PROCEDURE — G2211 COMPLEX E/M VISIT ADD ON: HCPCS | Performed by: INTERNAL MEDICINE

## 2024-11-04 NOTE — PROGRESS NOTES
HPI: Hematology follow-up for CLL and patient continues to respond to Zanubrutinib and has no problem tolerating Zanubrutinib except blood pressure is high and that could happen on Zanubrutinib.  Patient has appointment with his primary physician on 11/6/2024.  Left arm lymphedema as before.  Minimal  exertional dyspnea.  Not unusually tired.  No fevers, chills or bleeding.  No night sweats and weight loss.  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  He stays active and plays golf  .   CLL was diagnosed several years ago.  There was long period of observation.  Initial treatment was with chlorambucil.  In 2014 patient had Rituxan plus Bendamustine.  Since April 2023 patient has been on zanubrutinib.   No bleeding.  No atrial fibrillation and no other symptoms secondary to zanubrutinib.  He has  underlying cardiac disorder and had TAVR for aortic stenosis in July 2022 followed by cardiac rehabilitation.   Has arthritic symptoms.  Recurrent scalp lesion and he follows with his dermatologist and he states scalp lesions are not malignant.   History of herpes zoster right lower leg.  No postherpetic neuralgia.  He has been on acyclovir.          Current Outpatient Medications:     acetaminophen (TYLENOL) 325 mg tablet, Take 2 tablets (650 mg total) by mouth every 4 (four) hours as needed for fever or moderate pain (temperature greater than 101 F), Disp: 100 tablet, Rfl: 0    allopurinol (ZYLOPRIM) 100 mg tablet, TAKE 1 TABLET(100 MG) BY MOUTH TWICE DAILY, Disp: 180 tablet, Rfl: 1    allopurinol (ZYLOPRIM) 100 mg tablet, TAKE 1 TABLET(100 MG) BY MOUTH TWICE DAILY, Disp: 60 tablet, Rfl: 1    amoxicillin (AMOXIL) 500 MG tablet, Take 4 tablets (2,000 mg total) by mouth once as needed (Take 60 minutes prior to ANY dental work) for up to 1 dose, Disp: 4 tablet, Rfl: 1    Ascorbic Acid (VITAMIN C PO), Take by mouth daily , Disp: , Rfl:     aspirin 81 mg chewable tablet, Chew 81 mg  daily, Disp: , Rfl:     Brukinsa 80 MG CAPS, Take 2 capsules (160 mg total) by mouth 2 (two) times a day., Disp: 120 capsule, Rfl: 10    fexofenadine-pseudoephedrine (ALLEGRA-D 24) 180-240 MG per 24 hr tablet, Take 1 tablet by mouth as needed , Disp: , Rfl:     VITAMIN D PO, Take by mouth daily , Disp: , Rfl:     VITAMIN E PO, Take by mouth daily , Disp: , Rfl:     acyclovir (ZOVIRAX) 400 MG tablet, TAKE 1 TABLET(400 MG) BY MOUTH TWICE DAILY (Patient not taking: Reported on 11/4/2024), Disp: 60 tablet, Rfl: 11    acyclovir (ZOVIRAX) 400 MG tablet, Take 1 tablet (400 mg total) by mouth 2 (two) times a day (Patient not taking: Reported on 11/4/2024), Disp: 60 tablet, Rfl: 0    No Known Allergies    Oncology History Overview Note   1996:  CLL was diagnosed.  Intermittently he received chlorambucil over the years.    2014:  6 cycles of Rituxan and bendamustine.  Presently under surveillance.     CLL (chronic lymphoid leukemia) in relapse (HCC)    Chemotherapy       1996:  CLL was diagnosed.  Intermittent therapy with chlorambucil.    2014:  6 cycles of Rituxan and bendamustine.  Presently under surveillance.     6/26/2023 -  Cancer Staged    Staging form: Chronic Lymphocytic Leukemia, AJCC 8th Edition  - Clinical: Modified Tolliver Stage IV (Modified Tolliver risk: High, Lymphocytosis: Present, Adenopathy: Unknown, Organomegaly: Present, Anemia: Present, Thrombocytopenia: Present) - Signed by Charlotte Mir PA-C on 6/26/2023           ROS:  11/04/24 Reviewed 12 systems:  See symptoms in HPI.    Presently no other neurological, cardiac, pulmonary, GI and  symptoms.  Other symptoms are in HPI.  No fever, chills, bleeding, bone pains, skin rash, weight loss,   weakness, numbness,  claudication and gait problem. No frequent infections .  Not unusually sensitive to heat or cold. No swelling of the ankles. No swollen glands.  Patient is less anxious.  Examination:     11/4/24 7/1/24 4/12/24   Blood Pressure 156/84 122/72  "126/74   Pulse 66 107 (Abnormal)   78   Respirations 16 17 17   Temperature 97.2 °F (36.2 °C) (Abnormal)   97.3 °F (36.3 °C) (Abnormal)   98 °F (36.7 °C)   SpO2 100 % 100 % 98 %   Weight - Scale 77.6 kg (171 lb) 83.9 kg (185 lb) 86.2 kg (190 lb)   Height 5' 9\" (1.753 m) 5' 9\" (1.753 m) 5' 9\" (1.753 m)   Pain Score Zero Zero --   Alert and oriented and not in distress.  Vital signs are above.  Blood pressure is high.  There is no icterus.  There is no oral thrush.  There is no palpable neck mass.  Lung fields are clear to percussion and auscultation.  Heart rate is regular.  Patient has systolic murmur.  Abdomen is soft and nontender.  There is no palpable abdominal mass.  There is no ascites.  No edema of the ankles.  No calf tenderness.  No focal neurological deficit.  There is no skin rash.  Less lymphedema of left arm.  No palpable lymphadenopathy in the neck and axillary areas.  No skin rash.  Performance status 1 on ECOG scale.      IMAGING:  IMPRESSION:     No choledocholithiasis is seen  No biliary dilation  Distended gallbladder with pericholecystic fluid and cholelithiasis correlated with the acute cholecystitis  Splenomegaly with spleen measuring about 16 cm can be correlated with the patient's history of CLL  Mild edema seen in the mesentery as seen on the CT              Workstation performed: [id number]        Imaging    MRI abdomen wo contrast and mrcp (Order: [id number]) - 11/8/2022  MPRESSION:   No findings to explain left arm swelling.  Recommend clinical management.           ASSESSMENT/BI-RADS CATEGORY:  Left: 1 - Negative  Overall: 1 - Negative     RECOMMENDATION:       - Clinical management for the left breast.     Workstation ID: [id number]           Imaging    US Breast Axilla Left (Order: [id number]) - 3/4/2024      Manual Differential(PHLEBS Do Not Order)  Status: Final result      Contains abnormal data Manual Differential(PHLEBS Do Not Order)  Order: [id number] - Reflex for Order " [id number]   Status: Final result       Visible to patient: Yes (seen)       Next appt: None       Dx: Chronic lymphoid leukemia in relapse ...    0 Result Notes              Results for orders placed or performed in visit on 10/11/24   Beta 2 microglobulin, serum    Collection Time: 10/11/24  2:01 PM   Result Value Ref Range    Beta-2 Microglobulin 2.5 (H) 0.6 - 2.4 mg/L   CBC and differential    Collection Time: 10/11/24  2:01 PM   Result Value Ref Range    WBC 11.79 (H) 4.31 - 10.16 Thousand/uL    RBC 3.78 (L) 3.88 - 5.62 Million/uL    Hemoglobin 12.6 12.0 - 17.0 g/dL    Hematocrit 39.0 36.5 - 49.3 %     (H) 82 - 98 fL    MCH 33.3 26.8 - 34.3 pg    MCHC 32.3 31.4 - 37.4 g/dL    RDW 13.5 11.6 - 15.1 %    MPV 11.0 8.9 - 12.7 fL    Platelets 155 149 - 390 Thousands/uL   Comprehensive metabolic panel    Collection Time: 10/11/24  2:01 PM   Result Value Ref Range    Sodium 141 135 - 147 mmol/L    Potassium 4.6 3.5 - 5.3 mmol/L    Chloride 106 96 - 108 mmol/L    CO2 25 21 - 32 mmol/L    ANION GAP 10 4 - 13 mmol/L    BUN 24 5 - 25 mg/dL    Creatinine 1.41 (H) 0.60 - 1.30 mg/dL    Glucose 111 65 - 140 mg/dL    Calcium 9.4 8.4 - 10.2 mg/dL    AST 21 13 - 39 U/L    ALT 13 7 - 52 U/L    Alkaline Phosphatase 59 34 - 104 U/L    Total Protein 6.4 6.4 - 8.4 g/dL    Albumin 4.5 3.5 - 5.0 g/dL    Total Bilirubin 0.29 0.20 - 1.00 mg/dL    eGFR 49 ml/min/1.73sq m   IgG, IgA, IgM    Collection Time: 10/11/24  2:01 PM   Result Value Ref Range    IGA 72 66 - 433 mg/dL     (L) 635 - 1,741 mg/dL    IGM <20 (L) 45 - 281 mg/dL   LD,Blood    Collection Time: 10/11/24  2:01 PM   Result Value Ref Range     140 - 271 U/L   Uric acid    Collection Time: 10/11/24  2:01 PM   Result Value Ref Range    Uric Acid 5.2 3.5 - 8.5 mg/dL   Manual Differential(PHLEBS Do Not Order)    Collection Time: 10/11/24  2:01 PM   Result Value Ref Range    Segmented % 27 (L) 43 - 75 %    Lymphocytes % 46 (H) 14 - 44 %    Monocytes % 7 4 - 12 %     Eosinophils % 1 0 - 6 %    Basophils % 0 0 - 1 %    Atypical Lymphocytes % 19 (H) <=0 %    Absolute Neutrophils 3.18 1.85 - 7.62 Thousand/uL    Absolute Lymphocytes 7.66 (H) 0.60 - 4.47 Thousand/uL    Absolute Monocytes 0.83 0.00 - 1.22 Thousand/uL    Absolute Eosinophils 0.12 0.00 - 0.40 Thousand/uL    Absolute Basophils 0.00 0.00 - 0.10 Thousand/uL    Total Counted      RBC Morphology Normal     Platelet Estimate Adequate Adequate   CBC and differential  Status: Final result      Contains abnormal data CBC and differential  Order: [id number]  Status: Final result                             Tissue Exam: [id number]  Order: [id number]  Collected 11/10/2022  9:07 AM     Status: Final result     Visible to patient: Yes (seen)     Dx: Abdominal pain     0 Result Notes  Component    Case Report   Surgical Pathology Report                         Case: [id number]                                    Authorizing Provider:  Jacinto Nascimento MD          Collected:           11/10/2022 0907               Ordering Location:     [facility]      Received:            11/10/2022 [facility]                                      Hospital Operating Room                                                       Pathologist:           Mary Morgan MD                                                          Specimen:    Gallbladder, GALLBLADDER                                                                   Final Diagnosis   A. Gallbladder and lymph node (1), cholecystectomy:      - Chronic lymphocytic leukemia/small lymphocytic lymphoma (CLL/SLL) involving one lymph node and gallbladder, see note       - Chronic cholecystitis with cholelithiasis   Electronically signed by Mary Morgan MD on 11/23/2022 at 11:31 AM   Note           Status: Final result     IgG  Order: [id number]   Status: Final result       Visible to patient: Yes (seen)       Next appt: None       Dx: CLL (chronic lymphoid leukemia) in re...    0 Result Notes             Component  Ref Range & Units 4/1/24  8:50 AM 2/2/24 10:01 AM 12/15/23 11:22 AM 3/30/23 10:50 AM 11/26/22  8:31 AM 10/5/22 11:12 AM 7/5/22  3:01 PM   IGG  635 - 1,741 mg/dL 686 733 672 685.0 Low  R 676.0 Low  R 641.0 Low  R 746.0 R                    important suggestion  Newer results are available. Click to view them now.                 Component  Ref Range & Units 4/1/24  8:50 AM 2/2/24 10:01 AM 12/15/23 11:22 AM 11/4/23 10:27 AM 10/4/23 11:02 AM 8/26/23  8:41 AM 8/3/23 10:18 AM   WBC  4.31 - 10.16 Thousand/uL 21.74 High  26.61 High  32.13 High Panic  38.16 High Panic  52.14 High Panic  69.82 High Panic  90.25 High Panic    RBC  3.88 - 5.62 Million/uL 3.88 3.84 Low  3.79 Low  3.93 3.77 Low  3.73 Low  3.89   Hemoglobin  12.0 - 17.0 g/dL 12.7 12.5 12.5 12.7 12.3 11.8 Low  12.4   Hematocrit  36.5 - 49.3 % 39.9 39.3 39.7 40.5 38.7 39.2 40.5   MCV  82 - 98 fL 103 High  102 High  105 High  103 High  103 High  105 High  104 High    MCH  26.8 - 34.3 pg 32.7 32.6 33.0 32.3 32.6 31.6 31.9   MCHC  31.4 - 37.4 g/dL 31.8 31.8 31.5 31.4 31.8 30.1 Low  30.6 Low    RDW  11.6 - 15.1 % 14.5 14.5 14.6 14.6 14.3 14.3 14.1   MPV  8.9 - 12.7 fL 11.2 11.2 11.1 10.9 11.4 11.0 11.4   Platelets  149 - 390 Thousands/uL 135 Low  166 169 126 Low  133 Low  141 Low  130 Low                       Component  Ref Range & Units 4/1/24  8:50 AM 2/2/24 10:01 AM 12/15/23 11:22 AM 11/4/23 10:27 AM 10/4/23 11:02 AM 9/1/23 10:16 AM 8/26/23  8:41 AM   Sodium  135 - 147 mmol/L 141 139 138 134 Low  138 137 138   Potassium  3.5 - 5.3 mmol/L 5.1 5.1 4.8 5.0 5.2 5.7 High  CM 5.7 High    Chloride  96 - 108 mmol/L 108 102 102 100 102 102 105   CO2  21 - 32 mmol/L 25 24 26 25 27 29 28   ANION GAP  4 - 13 mmol/L 8 13 R 10 R 9 R 9 R 6 R 5 R   BUN  5 - 25 mg/dL 21 26 High  18 21 22 26 High  24   Creatinine  0.60 - 1.30 mg/dL 1.45 High  1.49 High  CM 1.48 High  CM 1.49 High  CM 1.51 High  CM 1.47 High  CM 1.52 High  CM   Comment: Standardized to IDMS reference  method   Glucose  65 - 140 mg/dL 106 97 CM  95 CM  97 CM    Comment: If the patient is fasting, the ADA then defines impaired fasting glucose as > 100 mg/dL and diabetes as > or equal to 123 mg/dL.   Calcium  8.4 - 10.2 mg/dL 10.0 10.8 High  10.0 9.7 9.8 10.4 High  10.7 High    AST  13 - 39 U/L 23 25 28 29 25  23   ALT  7 - 52 U/L 14 13 CM 17 CM 16 CM 15 CM  14 CM   Comment: Specimen collection should occur prior to Sulfasalazine administration due to the potential for falsely depressed results.   Alkaline Phosphatase  34 - 104 U/L 73 77 76 72 72  74   Total Protein  6.4 - 8.4 g/dL 6.9 7.1 6.9 6.8 6.4  7.2   Albumin  3.5 - 5.0 g/dL 4.5 4.6 4.6 4.5 4.4  4.5   Total Bilirubin  0.20 - 1.00 mg/dL 0.34 0.38 CM 0.31 CM 0.34 CM 0.33 CM  0.40 CM   Comment: Use of this assay is not recommended for patients undergoing treatment with eltrombopag due to the potential for falsely elevated results.  N-acetyl-p-benzoquinone imine (metabolite of Acetaminophen) will generate erroneously low results in samples for patients that have taken an overdose of Acetaminophen.   eGFR  ml/min/1.73sq m 47 46 46 46 45 47 45                [facility]    National Kidney Disease Foundation guidelines for Chronic Kidney Disease (CKD):    Stage 1 with normal or high GFR (GFR > 90 mL/min/1.73 square meters)    Stage 2 Mild CKD (GFR = 60-89 mL/min/1.73 square meters)    Stage 3A Moderate CKD (GFR = 45-59 mL/min/1.73 square meters)    Stage 3B Moderate CKD (GFR = 30-44 mL/min/1.73 square meters)    Stage 4 Severe CKD (GFR = 15-29 mL/min/1.73 square meters)    Stage 5 End Stage CKD (GFR <15 mL/min/1.73 square meters)  Note: GFR calculation is accurate only with a steady state creatinine      Specimen Collected: 04/01/24  8:50 AM Last Resulted: 04/01/24  2:53 PM         View All Conversations on this Encounter        CM=Additional comments  R=Reference range differs from displayed range          Related Results     Uric acid Final result 4/1/2024                      IgG Final result 4/1/2024                         Result Care Coordination        Patient Communication     Add Comments   Seen Back to Top           Ordered On 11/10/2023  8:41 AM    Ordering Provider Authorizing Provider Ordering User Ordering Department   MD Liang Montesinos MD Subhash Proothi, MD [facility] [place]   Hematology and Oncology Hematology and Oncology Hematology and Oncology Hematology and Oncology    [phone]  [phone]  [phone]  [phone]          Uric acid  Order: [id number]   Status: Final result         Visible to patient: Yes (seen)         Next appt: None         Dx: CLL (chronic lymphoid leukemia) in re...      0 Result Notes                Component  Ref Range & Units 4/1/24  8:50 AM 2/2/24 10:01 AM 12/15/23 11:22 AM 9/1/23 10:16 AM 5/26/23 11:15 AM 5/19/23 10:59 AM 5/11/23 10:00 AM   Uric Acid  3.5 - 8.5 mg/dL 3.7 3.8 CM 3.7 CM 3.9 CM 3.9 CM 4.0 CM 4.1 CM   Comment: Specimen collection should occur prior to Metamizole administration due to the potential for falsely depressed results.            Labs, Imaging, & Other studies:   All pertinent labs and imaging studies were personally reviewed            Reviewed test results and discussed with patient.    Assessment and plan:  See diagnoses, orders and instructions below   Hematology follow-up for CLL and patient continues to respond to Zanubrutinib and has no problem tolerating Zanubrutinib except blood pressure is high and that could happen on Zanubrutinib.  Patient has appointment with his primary physician on 11/6/2024.  Left arm lymphedema as before.  Minimal  exertional dyspnea.  Not unusually tired.  No fevers, chills or bleeding.  No night sweats and weight loss.  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  He stays active and plays golf  .   CLL was diagnosed several years ago.  There was long period of observation.  Initial treatment  was with chlorambucil.  In 2014 patient had Rituxan plus Bendamustine.  Since April 2023 patient has been on zanubrutinib.   No bleeding.  No atrial fibrillation and no other symptoms secondary to zanubrutinib.  He has  underlying cardiac disorder and had TAVR for aortic stenosis in July 2022 followed by cardiac rehabilitation.   Has arthritic symptoms.  Recurrent scalp lesion and he follows with his dermatologist and he states scalp lesions are not malignant.   History of herpes zoster right lower leg.  No postherpetic neuralgia.  He has been on acyclovir.               Physical examination and test results are as recorded and discussed.  CLL remains in partial remission on Zanubrutinib and that is being continued as before.  He has been tolerating Zanubrutinib without much problem except for increase in blood pressure.  Patient's condition, counts, chemistry and other CLL parameters are being monitored.  He follows with Dr. Brooks at [facility] for CLL for his expert advice.  He has less swelling of left forearm.  Venous Doppler study was negative.  Lymphedema therapy has helped some.     Discussed patient's condition with him in detail with explaned  Questions answered.  He is taking  acyclovir.  Discussed the importance of eating healthy foods, staying active as tolerated and health screening tests.  Goal is remission from CLL and prolongation of survival.   Patient is capable of self-care.  Discussed precautions against coronavirus and other infections.  He will continue to follow with primary physician and other consultants.      See diagnoses, orders and instructions   .  1. CLL (chronic lymphoid leukemia) in relapse (HCC)    - CBC and differential; Standing  - Comprehensive metabolic panel; Standing  - LD,Blood; Standing  - IgG, IgA, IgM; Standing  - Uric acid; Standing    2. Lymphedema of left arm      3. Stage 3 chronic kidney disease, unspecified whether stage 3a or 3b CKD ([facility])      4. Coronary artery disease  involving native coronary artery of native heart without angina pectoris      5. S/P TAVR (transcatheter aortic valve replacement)    .Blood work every 8 weeks.  Visit in 3 months.    I used a dictation device to dictate this note and there could be mistakes in my note and patient may contact my office for that.    Patient voiced understanding and agrees      Counseling / Coordination of Care  Provided counseling and support

## 2024-12-01 NOTE — TELEPHONE ENCOUNTER
Lab Result: WBC 13.13   Date/Time Drawn: 12/15/2023/ 11:22 A    Ordering Provider:   Stephanie Hankins MD  Hematology and Oncology   Lab Personnel's Name: [email or web address]        The following critical/stat result was read back to the lab as stated above and [employer] to the on-call provider. The provider confirmed receipt of the message.
Negative

## 2025-01-14 ENCOUNTER — APPOINTMENT (OUTPATIENT)
Dept: LAB | Age: 75
End: 2025-01-14
Payer: MEDICARE

## 2025-01-14 DIAGNOSIS — C91.12 CLL (CHRONIC LYMPHOID LEUKEMIA) IN RELAPSE (HCC): ICD-10-CM

## 2025-01-14 LAB
ALBUMIN SERPL BCG-MCNC: 4.9 G/DL (ref 3.5–5)
ALP SERPL-CCNC: 54 U/L (ref 34–104)
ALT SERPL W P-5'-P-CCNC: 11 U/L (ref 7–52)
ANION GAP SERPL CALCULATED.3IONS-SCNC: 8 MMOL/L (ref 4–13)
ANISOCYTOSIS BLD QL SMEAR: PRESENT
AST SERPL W P-5'-P-CCNC: 14 U/L (ref 13–39)
BASOPHILS # BLD MANUAL: 0.14 THOUSAND/UL (ref 0–0.1)
BASOPHILS NFR MAR MANUAL: 1 % (ref 0–1)
BILIRUB SERPL-MCNC: 0.49 MG/DL (ref 0.2–1)
BUN SERPL-MCNC: 19 MG/DL (ref 5–25)
CALCIUM SERPL-MCNC: 10.2 MG/DL (ref 8.4–10.2)
CHLORIDE SERPL-SCNC: 105 MMOL/L (ref 96–108)
CO2 SERPL-SCNC: 28 MMOL/L (ref 21–32)
CREAT SERPL-MCNC: 1.35 MG/DL (ref 0.6–1.3)
EOSINOPHIL # BLD MANUAL: 0.14 THOUSAND/UL (ref 0–0.4)
EOSINOPHIL NFR BLD MANUAL: 1 % (ref 0–6)
ERYTHROCYTE [DISTWIDTH] IN BLOOD BY AUTOMATED COUNT: 13 % (ref 11.6–15.1)
GFR SERPL CREATININE-BSD FRML MDRD: 51 ML/MIN/1.73SQ M
GLUCOSE SERPL-MCNC: 99 MG/DL (ref 65–140)
HCT VFR BLD AUTO: 41.6 % (ref 36.5–49.3)
HGB BLD-MCNC: 13.1 G/DL (ref 12–17)
IGA SERPL-MCNC: 83 MG/DL (ref 66–433)
IGG SERPL-MCNC: 676 MG/DL (ref 635–1741)
IGM SERPL-MCNC: 20 MG/DL (ref 45–281)
LDH SERPL-CCNC: 156 U/L (ref 140–271)
LYMPHOCYTES # BLD AUTO: 67 % (ref 14–44)
LYMPHOCYTES # BLD AUTO: 9.44 THOUSAND/UL (ref 0.6–4.47)
MACROCYTES BLD QL AUTO: PRESENT
MCH RBC QN AUTO: 32.9 PG (ref 26.8–34.3)
MCHC RBC AUTO-ENTMCNC: 31.5 G/DL (ref 31.4–37.4)
MCV RBC AUTO: 105 FL (ref 82–98)
MONOCYTES # BLD AUTO: 0.14 THOUSAND/UL (ref 0–1.22)
MONOCYTES NFR BLD: 1 % (ref 4–12)
NEUTROPHILS # BLD MANUAL: 4.23 THOUSAND/UL (ref 1.85–7.62)
NEUTS BAND NFR BLD MANUAL: 1 % (ref 0–8)
NEUTS SEG NFR BLD AUTO: 29 % (ref 43–75)
PLATELET # BLD AUTO: 170 THOUSANDS/UL (ref 149–390)
PLATELET BLD QL SMEAR: ADEQUATE
PMV BLD AUTO: 11.2 FL (ref 8.9–12.7)
POTASSIUM SERPL-SCNC: 5.1 MMOL/L (ref 3.5–5.3)
PROT SERPL-MCNC: 7.4 G/DL (ref 6.4–8.4)
RBC # BLD AUTO: 3.98 MILLION/UL (ref 3.88–5.62)
RBC MORPH BLD: PRESENT
SODIUM SERPL-SCNC: 141 MMOL/L (ref 135–147)
URATE SERPL-MCNC: 4.6 MG/DL (ref 3.5–8.5)
WBC # BLD AUTO: 14.09 THOUSAND/UL (ref 4.31–10.16)

## 2025-01-14 PROCEDURE — 80053 COMPREHEN METABOLIC PANEL: CPT

## 2025-01-14 PROCEDURE — 83615 LACTATE (LD) (LDH) ENZYME: CPT

## 2025-01-14 PROCEDURE — 36415 COLL VENOUS BLD VENIPUNCTURE: CPT

## 2025-01-14 PROCEDURE — 85027 COMPLETE CBC AUTOMATED: CPT

## 2025-01-14 PROCEDURE — 85007 BL SMEAR W/DIFF WBC COUNT: CPT

## 2025-01-14 PROCEDURE — 84550 ASSAY OF BLOOD/URIC ACID: CPT

## 2025-01-14 PROCEDURE — 82784 ASSAY IGA/IGD/IGG/IGM EACH: CPT

## 2025-01-14 PROCEDURE — 82232 ASSAY OF BETA-2 PROTEIN: CPT

## 2025-01-16 LAB — B2 MICROGLOB SERPL-MCNC: 2.4 MG/L (ref 0.6–2.4)

## 2025-02-10 ENCOUNTER — OFFICE VISIT (OUTPATIENT)
Dept: HEMATOLOGY ONCOLOGY | Facility: CLINIC | Age: 75
End: 2025-02-10
Payer: MEDICARE

## 2025-02-10 VITALS
DIASTOLIC BLOOD PRESSURE: 82 MMHG | SYSTOLIC BLOOD PRESSURE: 160 MMHG | RESPIRATION RATE: 17 BRPM | HEIGHT: 69 IN | WEIGHT: 148 LBS | BODY MASS INDEX: 21.92 KG/M2 | OXYGEN SATURATION: 100 % | TEMPERATURE: 97.8 F | HEART RATE: 59 BPM

## 2025-02-10 DIAGNOSIS — C91.12 CLL (CHRONIC LYMPHOID LEUKEMIA) IN RELAPSE (HCC): Primary | ICD-10-CM

## 2025-02-10 DIAGNOSIS — Z95.2 S/P TAVR (TRANSCATHETER AORTIC VALVE REPLACEMENT): ICD-10-CM

## 2025-02-10 DIAGNOSIS — N18.30 STAGE 3 CHRONIC KIDNEY DISEASE, UNSPECIFIED WHETHER STAGE 3A OR 3B CKD (HCC): ICD-10-CM

## 2025-02-10 DIAGNOSIS — I25.10 CORONARY ARTERY DISEASE INVOLVING NATIVE CORONARY ARTERY OF NATIVE HEART WITHOUT ANGINA PECTORIS: ICD-10-CM

## 2025-02-10 DIAGNOSIS — I89.0 LYMPHEDEMA OF LEFT ARM: ICD-10-CM

## 2025-02-10 PROCEDURE — G2211 COMPLEX E/M VISIT ADD ON: HCPCS | Performed by: INTERNAL MEDICINE

## 2025-02-10 PROCEDURE — 99214 OFFICE O/P EST MOD 30 MIN: CPT | Performed by: INTERNAL MEDICINE

## 2025-02-10 NOTE — ASSESSMENT & PLAN NOTE
Lab Results   Component Value Date    EGFR 51 01/14/2025    EGFR 49 10/11/2024    EGFR 51 08/10/2024    CREATININE 1.35 (H) 01/14/2025    CREATININE 1.41 (H) 10/11/2024    CREATININE 1.36 (H) 08/10/2024   Follows with PCP

## 2025-02-10 NOTE — PROGRESS NOTES
Name: Raghavendra Gutierrez      : 1950      MRN: [id number]  Encounter Provider: Liang Aden MD  Encounter Date: 2/10/2025   Encounter department: [facility] [place]  :  Assessment & Plan  CLL (chronic lymphoid leukemia) in relapse (HCC)  Patient is on Zanubrutinib and that is effective.  Orders:    Beta 2 microglobulin, serum; Standing    CBC and differential; Standing    Comprehensive metabolic panel; Standing    IgG, IgA, IgM; Standing    LD,Blood; Standing    Uric acid; Standing    Lymphedema of left arm  He had lymphedema therapy.       Stage 3 chronic kidney disease, unspecified whether stage 3a or 3b CKD (HCC)  Lab Results   Component Value Date    EGFR 51 2025    EGFR 49 10/11/2024    EGFR 51 08/10/2024    CREATININE 1.35 (H) 2025    CREATININE 1.41 (H) 10/11/2024    CREATININE 1.36 (H) 08/10/2024   Follows with PCP         Coronary artery disease involving native coronary artery of native heart without angina pectoris  Follows with cardiologist       S/P TAVR (transcatheter aortic valve replacement)  Follows with cardiologist     No change in Brukinsa and acyclovir.  Blood work every 8 weeks.  Visit in 4 months.  All discussed in detail.  Questions answered.  Goal is prolonged remission and survival from CLL.  Patient is capable of self-care.  I suggested eating healthy foods, staying active but to avoid falls and trauma.  Suggested health screening tests. Patient to continue to follow-up with primary physician and other consultants.  Provided counseling and support.  I used a dictation device to dictate this note and there could be mistakes in my note and for that patient may contact my office.        History of Present Illness   Chief Complaint   Patient presents with    Follow-up   Longstanding history of CLL and he states that was diagnosed about 30 years ago.  After a long period of observation he was treated with chlorambucil.In  patient had  "Rituxan plus Bendamustine. Since April 2023 patient has been on zanubrutinib. No bleeding. No atrial fibrillation.  No headache.  He runs  high blood pressure and he follows with his PCP.  No other symptoms secondary to zanubrutinib.  He has responded to Zanubrutinib. 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    He has  underlying cardiac disorder and had TAVR for aortic stenosis in July 2022 followed by cardiac rehabilitation.   Has arthritic symptoms.  Recurrent scalp lesion and he follows with his dermatologist and he states scalp lesions are not malignant.   History of herpes zoster right lower leg.  No postherpetic neuralgia.  He has been on acyclovir.  Not unusually tired.  Has exertional dyspnea.  Has minimal left arm edema.  He stays active and plays golf  Pertinent Medical History   02/10/25:     See details in HPI and assessment section   Review of Systems  .  Reviewed 12 systems.  Symptoms are as in HPI.  No fevers, chills, bleeding, bone pains, skin rash, weight loss, night sweats and no swelling of the ankles and no swollen glands.  Has lymphedema of left arm, less than before.  No other neurological, cardiac, pulmonary, GI and  symptoms other than listed in HPI.  Other symptoms are in HPI.        Objective   /82 (BP Location: Right arm, Patient Position: Sitting, Cuff Size: Adult)   Pulse 59   Temp 97.8 °F (36.6 °C) (Temporal)   Resp 17   Ht 5' 9\" (1.753 m)   Wt 67.1 kg (148 lb)   SpO2 100%   BMI 21.86 kg/m²   ECOG 1  Physical Exam  Vitals reviewed.   Constitutional:       General: He is not in acute distress.     Appearance: Normal appearance. He is not ill-appearing.   HENT:      Head: Normocephalic and atraumatic.      Mouth/Throat:      Comments: No thrush.  Eyes:      General: No scleral icterus.  Cardiovascular:      Rate and Rhythm: Normal rate and regular rhythm.      Heart sounds: Normal heart sounds. No murmur heard.  Pulmonary:     "  Effort: Pulmonary effort is normal. No respiratory distress.      Breath sounds: Normal breath sounds. No rhonchi or rales.   Abdominal:      General: Abdomen is flat. There is no distension.      Palpations: Abdomen is soft. There is no mass.      Tenderness: There is no abdominal tenderness.      Comments: No ascites.    Musculoskeletal:         General: Swelling present.      Cervical back: Normal range of motion.      Right lower leg: No edema.      Left lower leg: No edema.      Comments: No calf tenderness.  Has lymphedema left   Lymphadenopathy:      Cervical: No cervical adenopathy.      Upper Body:      Right upper body: No supraclavicular or axillary adenopathy.      Left upper body: No supraclavicular or axillary adenopathy.   Skin:     Coloration: Skin is not jaundiced or pale.      Findings: No bruising or rash.      Nails: There is no clubbing.   Neurological:      General: No focal deficit present.      Mental Status: He is alert and oriented to person, place, and time.      Motor: No weakness.      Coordination: Coordination normal.      Gait: Gait normal.   Psychiatric:         Behavior: Behavior normal.         Thought Content: Thought content normal.         Judgment: Judgment normal.      Comments: Anxious         Labs: I have reviewed the following labs:  Results for orders placed or performed in visit on 01/14/25   Beta 2 microglobulin, serum   Result Value Ref Range    Beta-2 Microglobulin 2.4 0.6 - 2.4 mg/L   CBC and differential   Result Value Ref Range    WBC 14.09 (H) 4.31 - 10.16 Thousand/uL    RBC 3.98 3.88 - 5.62 Million/uL    Hemoglobin 13.1 12.0 - 17.0 g/dL    Hematocrit 41.6 36.5 - 49.3 %     (H) 82 - 98 fL    MCH 32.9 26.8 - 34.3 pg    MCHC 31.5 31.4 - 37.4 g/dL    RDW 13.0 11.6 - 15.1 %    MPV 11.2 8.9 - 12.7 fL    Platelets 170 149 - 390 Thousands/uL   Comprehensive metabolic panel   Result Value Ref Range    Sodium 141 135 - 147 mmol/L    Potassium 5.1 3.5 - 5.3 mmol/L     Chloride 105 96 - 108 mmol/L    CO2 28 21 - 32 mmol/L    ANION GAP 8 4 - 13 mmol/L    BUN 19 5 - 25 mg/dL    Creatinine 1.35 (H) 0.60 - 1.30 mg/dL    Glucose 99 65 - 140 mg/dL    Calcium 10.2 8.4 - 10.2 mg/dL    AST 14 13 - 39 U/L    ALT 11 7 - 52 U/L    Alkaline Phosphatase 54 34 - 104 U/L    Total Protein 7.4 6.4 - 8.4 g/dL    Albumin 4.9 3.5 - 5.0 g/dL    Total Bilirubin 0.49 0.20 - 1.00 mg/dL    eGFR 51 ml/min/1.73sq m   IgG, IgA, IgM   Result Value Ref Range    IGA 83 66 - 433 mg/dL     635 - 1,741 mg/dL    IGM 20 (L) 45 - 281 mg/dL   LD,Blood   Result Value Ref Range     140 - 271 U/L   Result Value Ref Range    Uric Acid 4.6 3.5 - 8.5 mg/dL   Manual Differential(PHLEBS Do Not Order)   Result Value Ref Range    Segmented % 29 (L) 43 - 75 %    Bands % 1 0 - 8 %    Lymphocytes % 67 (H) 14 - 44 %    Monocytes % 1 (L) 4 - 12 %    Eosinophils % 1 0 - 6 %    Basophils % 1 0 - 1 %    Absolute Neutrophils 4.23 1.85 - 7.62 Thousand/uL    Absolute Lymphocytes 9.44 (H) 0.60 - 4.47 Thousand/uL    Absolute Monocytes 0.14 0.00 - 1.22 Thousand/uL    Absolute Eosinophils 0.14 0.00 - 0.40 Thousand/uL    Absolute Basophils 0.14 (H) 0.00 - 0.10 Thousand/uL    Total Counted      RBC Morphology Present     Platelet Estimate Adequate Adequate    Anisocytosis Present     Macrocytes Present                  Component  Ref Range & Units (hover) 1/14/25 10:30 AM 10/11/24  2:01 PM 8/10/24  8:32 AM 6/22/24  8:32 AM 5/17/24 10:57 AM 4/1/24  8:50 AM 2/2/24 10:01 AM   WBC 14.09 High  11.79 High  13.20 High  16.15 High  18.00 High  21.74 High  26.61 High    RBC 3.98 3.78 Low  3.51 Low  3.75 Low  3.91 3.88 3.84 Low    Hemoglobin 13.1 12.6 11.6 Low  12.4 12.9 12.7 12.5   Hematocrit 41.6 39.0 36.9 38.9 40.0 39.9 39.3    High  103 High  105 High  104 High  102 High  103 High  102 High    MCH 32.9 33.3 33.0 33.1 33.0 32.7 32.6   MCHC 31.5 32.3 31.4 31.9 32.3 31.8 31.8   RDW 13.0 13.5 13.8 13.9 14.2 14.5 14.5   MPV 11.2 11.0  10.9 11.0 11.5 11.2 11.2   Platelets 170 155 132 Low  156 137 Low  135 Low  166                    Component  Ref Range & Units (hover) 1/14/25 10:30 AM 10/11/24  2:01 PM 8/10/24  8:32 AM 6/22/24  8:32 AM 5/17/24 10:57 AM 4/1/24  8:50 AM 2/2/24 10:01 AM   Segmented % 29 Low  27 Low  13 Low  19 Low  26 Low  21 Low  34 Low    Bands % 1   7 1     Lymphocytes % 67 High  46 High  84 High  72 High  69 High  78 High  60 High    Monocytes % 1 Low  7 3 Low  0 Low  4 0 Low  2 Low    Eosinophils % 1 1 0 0 0 1 2   Basophils % 1 0 0 0 0 0 0   Absolute Neutrophils 4.23 3.18 1.72 Low  4.20 4.86 4.57 9.05 High    Absolute Lymphocytes 9.44 High  7.66 High  11.09 High  11.63 High  12.42 High  16.96 High  16.50 High    Absolute Monocytes 0.14 0.83 0.40 0.00 0.72 0.00 0.53   Absolute Eosinophils 0.14 0.12 0.00 0.00 0.00 0.22 0.53 High    Absolute Basophils 0.14 High  0.00 0.00 0.00 0.00 0.00 0.00   Total Counted          RBC Morphology Present Normal Normal Present Present Present Present   Platelet Estimate Adequate Adequate Borderline Abnormal  Adequate Decreased Abnormal  Decreased Abnormal  Adequate   Anisocytosis Present      Present   Macrocytes Present      Present             Specimen Collected: 01/14/25 10:30 AM Last Resulted: 01/14/25  3:47 PM

## 2025-02-10 NOTE — ASSESSMENT & PLAN NOTE
Patient is on Zanubrutinib and that is effective.  Orders:    Beta 2 microglobulin, serum; Standing    CBC and differential; Standing    Comprehensive metabolic panel; Standing    IgG, IgA, IgM; Standing    LD,Blood; Standing    Uric acid; Standing

## 2025-02-10 NOTE — ASSESSMENT & PLAN NOTE
Follows with cardiologist     No change in Brukinsa and acyclovir.  Blood work every 8 weeks.  Visit in 4 months.  All discussed in detail.  Questions answered.  Goal is prolonged remission and survival from CLL.  Patient is capable of self-care.  I suggested eating healthy foods, staying active but to avoid falls and trauma.  Suggested health screening tests. Patient to continue to follow-up with primary physician and other consultants.  Provided counseling and support.  I used a dictation device to dictate this note and there could be mistakes in my note and for that patient may contact my office.

## 2025-03-25 ENCOUNTER — APPOINTMENT (OUTPATIENT)
Dept: LAB | Age: 75
End: 2025-03-25
Payer: MEDICARE

## 2025-03-25 DIAGNOSIS — C91.12 CLL (CHRONIC LYMPHOID LEUKEMIA) IN RELAPSE (HCC): ICD-10-CM

## 2025-03-25 LAB
ALBUMIN SERPL BCG-MCNC: 4.7 G/DL (ref 3.5–5)
ALP SERPL-CCNC: 51 U/L (ref 34–104)
ALT SERPL W P-5'-P-CCNC: 12 U/L (ref 7–52)
ANION GAP SERPL CALCULATED.3IONS-SCNC: 11 MMOL/L (ref 4–13)
ANISOCYTOSIS BLD QL SMEAR: PRESENT
AST SERPL W P-5'-P-CCNC: 15 U/L (ref 13–39)
BASOPHILS # BLD MANUAL: 0 THOUSAND/UL (ref 0–0.1)
BASOPHILS NFR MAR MANUAL: 0 % (ref 0–1)
BILIRUB SERPL-MCNC: 0.28 MG/DL (ref 0.2–1)
BUN SERPL-MCNC: 16 MG/DL (ref 5–25)
CALCIUM SERPL-MCNC: 9.7 MG/DL (ref 8.4–10.2)
CHLORIDE SERPL-SCNC: 108 MMOL/L (ref 96–108)
CO2 SERPL-SCNC: 23 MMOL/L (ref 21–32)
CREAT SERPL-MCNC: 1.3 MG/DL (ref 0.6–1.3)
EOSINOPHIL # BLD MANUAL: 0.55 THOUSAND/UL (ref 0–0.4)
EOSINOPHIL NFR BLD MANUAL: 4 % (ref 0–6)
ERYTHROCYTE [DISTWIDTH] IN BLOOD BY AUTOMATED COUNT: 13.2 % (ref 11.6–15.1)
GFR SERPL CREATININE-BSD FRML MDRD: 54 ML/MIN/1.73SQ M
GIANT PLATELETS BLD QL SMEAR: PRESENT
GLUCOSE SERPL-MCNC: 97 MG/DL (ref 65–140)
HCT VFR BLD AUTO: 39.4 % (ref 36.5–49.3)
HGB BLD-MCNC: 12.5 G/DL (ref 12–17)
IGA SERPL-MCNC: 76 MG/DL (ref 66–433)
IGG SERPL-MCNC: 680 MG/DL (ref 635–1741)
IGM SERPL-MCNC: <20 MG/DL (ref 45–281)
LDH SERPL-CCNC: 153 U/L (ref 140–271)
LYMPHOCYTES # BLD AUTO: 67 % (ref 14–44)
LYMPHOCYTES # BLD AUTO: 9.82 THOUSAND/UL (ref 0.6–4.47)
MACROCYTES BLD QL AUTO: PRESENT
MCH RBC QN AUTO: 32.5 PG (ref 26.8–34.3)
MCHC RBC AUTO-ENTMCNC: 31.7 G/DL (ref 31.4–37.4)
MCV RBC AUTO: 102 FL (ref 82–98)
MONOCYTES # BLD AUTO: 0.27 THOUSAND/UL (ref 0–1.22)
MONOCYTES NFR BLD: 2 % (ref 4–12)
NEUTROPHILS # BLD MANUAL: 3 THOUSAND/UL (ref 1.85–7.62)
NEUTS SEG NFR BLD AUTO: 22 % (ref 43–75)
OVALOCYTES BLD QL SMEAR: PRESENT
PLATELET # BLD AUTO: 159 THOUSANDS/UL (ref 149–390)
PLATELET BLD QL SMEAR: ADEQUATE
PMV BLD AUTO: 11.1 FL (ref 8.9–12.7)
POIKILOCYTOSIS BLD QL SMEAR: PRESENT
POLYCHROMASIA BLD QL SMEAR: PRESENT
POTASSIUM SERPL-SCNC: 4.9 MMOL/L (ref 3.5–5.3)
PROT SERPL-MCNC: 6.9 G/DL (ref 6.4–8.4)
RBC # BLD AUTO: 3.85 MILLION/UL (ref 3.88–5.62)
RBC MORPH BLD: PRESENT
SODIUM SERPL-SCNC: 142 MMOL/L (ref 135–147)
URATE SERPL-MCNC: 4.3 MG/DL (ref 3.5–8.5)
VARIANT LYMPHS # BLD AUTO: 5 %
WBC # BLD AUTO: 13.64 THOUSAND/UL (ref 4.31–10.16)

## 2025-03-25 PROCEDURE — 82232 ASSAY OF BETA-2 PROTEIN: CPT

## 2025-03-25 PROCEDURE — 84550 ASSAY OF BLOOD/URIC ACID: CPT

## 2025-03-25 PROCEDURE — 85027 COMPLETE CBC AUTOMATED: CPT

## 2025-03-25 PROCEDURE — 36415 COLL VENOUS BLD VENIPUNCTURE: CPT

## 2025-03-25 PROCEDURE — 85007 BL SMEAR W/DIFF WBC COUNT: CPT

## 2025-03-25 PROCEDURE — 83615 LACTATE (LD) (LDH) ENZYME: CPT

## 2025-03-25 PROCEDURE — 80053 COMPREHEN METABOLIC PANEL: CPT

## 2025-03-25 PROCEDURE — 82784 ASSAY IGA/IGD/IGG/IGM EACH: CPT

## 2025-03-27 DIAGNOSIS — C91.12 CLL (CHRONIC LYMPHOID LEUKEMIA) IN RELAPSE (HCC): ICD-10-CM

## 2025-03-27 LAB — B2 MICROGLOB SERPL-MCNC: 2 MG/L (ref 0.6–2.4)

## 2025-03-28 DIAGNOSIS — C91.12 CLL (CHRONIC LYMPHOID LEUKEMIA) IN RELAPSE (HCC): ICD-10-CM

## 2025-03-28 RX ORDER — ZANUBRUTINIB 80 MG/1
2 CAPSULE, GELATIN COATED ORAL 2 TIMES DAILY
Qty: 120 CAPSULE | Refills: 10 | OUTPATIENT
Start: 2025-03-28

## 2025-03-28 RX ORDER — ZANUBRUTINIB 80 MG/1
160 CAPSULE, GELATIN COATED ORAL 2 TIMES DAILY
Qty: 120 CAPSULE | Refills: 10 | Status: SHIPPED | OUTPATIENT
Start: 2025-03-28

## 2025-04-16 ENCOUNTER — TELEPHONE (OUTPATIENT)
Dept: HEMATOLOGY ONCOLOGY | Facility: CLINIC | Age: 75
End: 2025-04-16

## 2025-04-16 DIAGNOSIS — C91.12 CLL (CHRONIC LYMPHOID LEUKEMIA) IN RELAPSE (HCC): ICD-10-CM

## 2025-04-16 NOTE — TELEPHONE ENCOUNTER
Left message for patient regarding need to reschedule upcoming appointment with Dr. Aden from 6/20 to 6/19 at 2:20pm due to provider availability.  Advised patient to return call if this appointment time is not acceptable.  [employer] number provided.

## 2025-04-17 RX ORDER — ACYCLOVIR 400 MG/1
400 TABLET ORAL 2 TIMES DAILY
Qty: 60 TABLET | Refills: 5 | Status: SHIPPED | OUTPATIENT
Start: 2025-04-17 | End: 2025-10-14

## 2025-05-23 ENCOUNTER — APPOINTMENT (OUTPATIENT)
Dept: LAB | Age: 75
End: 2025-05-23
Payer: MEDICARE

## 2025-05-23 DIAGNOSIS — C91.12 CLL (CHRONIC LYMPHOID LEUKEMIA) IN RELAPSE (HCC): ICD-10-CM

## 2025-05-23 LAB
ALBUMIN SERPL BCG-MCNC: 4.6 G/DL (ref 3.5–5)
ALP SERPL-CCNC: 66 U/L (ref 34–104)
ALT SERPL W P-5'-P-CCNC: 13 U/L (ref 7–52)
ANION GAP SERPL CALCULATED.3IONS-SCNC: 9 MMOL/L (ref 4–13)
AST SERPL W P-5'-P-CCNC: 22 U/L (ref 13–39)
BASOPHILS # BLD MANUAL: 0.11 THOUSAND/UL (ref 0–0.1)
BASOPHILS NFR MAR MANUAL: 1 % (ref 0–1)
BILIRUB SERPL-MCNC: 0.32 MG/DL (ref 0.2–1)
BUN SERPL-MCNC: 19 MG/DL (ref 5–25)
CALCIUM SERPL-MCNC: 10 MG/DL (ref 8.4–10.2)
CHLORIDE SERPL-SCNC: 106 MMOL/L (ref 96–108)
CO2 SERPL-SCNC: 27 MMOL/L (ref 21–32)
CREAT SERPL-MCNC: 1.31 MG/DL (ref 0.6–1.3)
EOSINOPHIL # BLD MANUAL: 0.11 THOUSAND/UL (ref 0–0.4)
EOSINOPHIL NFR BLD MANUAL: 1 % (ref 0–6)
ERYTHROCYTE [DISTWIDTH] IN BLOOD BY AUTOMATED COUNT: 13.9 % (ref 11.6–15.1)
GFR SERPL CREATININE-BSD FRML MDRD: 53 ML/MIN/1.73SQ M
GLUCOSE P FAST SERPL-MCNC: 108 MG/DL (ref 65–99)
HCT VFR BLD AUTO: 37.7 % (ref 36.5–49.3)
HGB BLD-MCNC: 12.1 G/DL (ref 12–17)
IGA SERPL-MCNC: 74 MG/DL (ref 66–433)
IGG SERPL-MCNC: 607 MG/DL (ref 635–1741)
IGM SERPL-MCNC: <20 MG/DL (ref 45–281)
LDH SERPL-CCNC: 273 U/L (ref 140–271)
LYMPHOCYTES # BLD AUTO: 6.71 THOUSAND/UL (ref 0.6–4.47)
LYMPHOCYTES # BLD AUTO: 62 % (ref 14–44)
MCH RBC QN AUTO: 32.2 PG (ref 26.8–34.3)
MCHC RBC AUTO-ENTMCNC: 32.1 G/DL (ref 31.4–37.4)
MCV RBC AUTO: 100 FL (ref 82–98)
MONOCYTES # BLD AUTO: 0.54 THOUSAND/UL (ref 0–1.22)
MONOCYTES NFR BLD: 5 % (ref 4–12)
NEUTROPHILS # BLD MANUAL: 3.35 THOUSAND/UL (ref 1.85–7.62)
NEUTS BAND NFR BLD MANUAL: 3 % (ref 0–8)
NEUTS SEG NFR BLD AUTO: 28 % (ref 43–75)
PLATELET # BLD AUTO: 164 THOUSANDS/UL (ref 149–390)
PLATELET BLD QL SMEAR: ADEQUATE
PMV BLD AUTO: 10.8 FL (ref 8.9–12.7)
POTASSIUM SERPL-SCNC: 5.3 MMOL/L (ref 3.5–5.3)
PROT SERPL-MCNC: 6.7 G/DL (ref 6.4–8.4)
RBC # BLD AUTO: 3.76 MILLION/UL (ref 3.88–5.62)
RBC MORPH BLD: NORMAL
SODIUM SERPL-SCNC: 142 MMOL/L (ref 135–147)
URATE SERPL-MCNC: 4.8 MG/DL (ref 3.5–8.5)
WBC # BLD AUTO: 10.82 THOUSAND/UL (ref 4.31–10.16)

## 2025-05-23 PROCEDURE — 82784 ASSAY IGA/IGD/IGG/IGM EACH: CPT

## 2025-05-23 PROCEDURE — 83615 LACTATE (LD) (LDH) ENZYME: CPT

## 2025-05-23 PROCEDURE — 80053 COMPREHEN METABOLIC PANEL: CPT

## 2025-05-23 PROCEDURE — 85007 BL SMEAR W/DIFF WBC COUNT: CPT

## 2025-05-23 PROCEDURE — 82232 ASSAY OF BETA-2 PROTEIN: CPT

## 2025-05-23 PROCEDURE — 84550 ASSAY OF BLOOD/URIC ACID: CPT

## 2025-05-23 PROCEDURE — 36415 COLL VENOUS BLD VENIPUNCTURE: CPT

## 2025-05-23 PROCEDURE — 85027 COMPLETE CBC AUTOMATED: CPT

## 2025-05-27 LAB — B2 MICROGLOB SERPL-MCNC: 2 MG/L (ref 0.6–2.4)

## 2025-06-11 NOTE — ED NOTES
Provider at bedside       Tommy Sears  11/09/22 7026
Pt returned from [address] [facility] [address]  11/09/22 8604
Pt transported to [facility]  43/05/71 1432
Goals (3-5 sessions): Sup<->sit independent

## 2025-06-19 ENCOUNTER — OFFICE VISIT (OUTPATIENT)
Dept: HEMATOLOGY ONCOLOGY | Facility: CLINIC | Age: 75
End: 2025-06-19
Payer: MEDICARE

## 2025-06-19 VITALS
RESPIRATION RATE: 16 BRPM | WEIGHT: 173 LBS | OXYGEN SATURATION: 98 % | TEMPERATURE: 98.3 F | SYSTOLIC BLOOD PRESSURE: 150 MMHG | HEART RATE: 96 BPM | BODY MASS INDEX: 25.62 KG/M2 | HEIGHT: 69 IN | DIASTOLIC BLOOD PRESSURE: 80 MMHG

## 2025-06-19 DIAGNOSIS — Z95.2 S/P TAVR (TRANSCATHETER AORTIC VALVE REPLACEMENT): ICD-10-CM

## 2025-06-19 DIAGNOSIS — N18.31 STAGE 3A CHRONIC KIDNEY DISEASE (HCC): ICD-10-CM

## 2025-06-19 DIAGNOSIS — C44.92 SCCA (SQUAMOUS CELL CARCINOMA) OF SKIN: ICD-10-CM

## 2025-06-19 DIAGNOSIS — I89.0 LYMPHEDEMA OF LEFT ARM: ICD-10-CM

## 2025-06-19 DIAGNOSIS — C91.12 CLL (CHRONIC LYMPHOID LEUKEMIA) IN RELAPSE (HCC): Primary | ICD-10-CM

## 2025-06-19 PROBLEM — Z98.890 HISTORY OF MOHS MICROGRAPHIC SURGERY FOR SQUAMOUS CELL CARCINOMA IN SITU (SCCIS) OF SKIN: Status: RESOLVED | Noted: 2025-06-19 | Resolved: 2025-06-19

## 2025-06-19 PROBLEM — Z98.890 HISTORY OF MOHS MICROGRAPHIC SURGERY FOR SQUAMOUS CELL CARCINOMA IN SITU (SCCIS) OF SKIN: Status: ACTIVE | Noted: 2025-06-19

## 2025-06-19 PROBLEM — Z86.007 HISTORY OF MOHS MICROGRAPHIC SURGERY FOR SQUAMOUS CELL CARCINOMA IN SITU (SCCIS) OF SKIN: Status: RESOLVED | Noted: 2025-06-19 | Resolved: 2025-06-19

## 2025-06-19 PROBLEM — Z86.007 HISTORY OF MOHS MICROGRAPHIC SURGERY FOR SQUAMOUS CELL CARCINOMA IN SITU (SCCIS) OF SKIN: Status: ACTIVE | Noted: 2025-06-19

## 2025-06-19 PROCEDURE — G2211 COMPLEX E/M VISIT ADD ON: HCPCS | Performed by: INTERNAL MEDICINE

## 2025-06-19 PROCEDURE — 99214 OFFICE O/P EST MOD 30 MIN: CPT | Performed by: INTERNAL MEDICINE

## 2025-06-19 NOTE — ASSESSMENT & PLAN NOTE
74-year-old male with history of TAVR, squamous cell carcinoma of skin status post Mohs surgery and longstanding history of CLL mutated IGVH, 17P deletion status post BR in 2014 with reprogression to stage IV April 2023 currently on Zanubrutinib 160 mg twice daily daily with no adverse side effects or signs of progression.    Plan follow-up with patient in 3 months  Repeat CBC/CMP/Ig levels and LDH at that time

## 2025-06-19 NOTE — PROGRESS NOTES
Name: Raghavendra Gutierrez      : 1950      MRN: [id number]  Encounter Provider: Liang Aden MD  Encounter Date: 2025   Encounter department: [facility] [place]  :  Assessment & Plan  CLL (chronic lymphoid leukemia) in relapse (HCC)    74-year-old male with history of TAVR, squamous cell carcinoma of skin status post Mohs surgery and longstanding history of CLL mutated IGVH, 17P deletion status post BR in  with reprogression to stage IV 2023 currently on Zanubrutinib 160 mg twice daily daily with no adverse side effects or signs of progression.    Plan follow-up with patient in 3 months  Repeat CBC/CMP/Ig levels and LDH at that time      S/P TAVR (transcatheter aortic valve replacement)  Follows with cardiology   SCCA (squamous cell carcinoma) of skin  Status post Mohs  Follows with dermatology      Return in about 3 months (around 2025).    History of Present Illness   Chief Complaint   Patient presents with    Follow-up     Longstanding history of CLL diagnosed 1995 mutated IGVH, 17P deletion, initially under observation until approximately  patient progressed to stage IV, hyperleukocytosis associated with anemia and thrombocytopenia plus splenomegaly 16.5 cm, requiring Rituxan plus Bendamustine with very good response.  However approximately 2023 developed stage IV symptoms with hyperleukocytosis anemia and thrombocytopenia  started on zanubrutinib 160 mg by mouth 2 times a day.    Interval History:  Overall, patient is doing well  Timelines any bleeding, chest pain or palpitations  Denies any new skin rashes  Notes that he recently had Mohs surgery on his left cheek  Has residual edema of his left lower extremity that is not worsening  Actively playing golf      Oncology History   Cancer Staging   CLL (chronic lymphoid leukemia) in relapse (HCC)  Staging form: Chronic Lymphocytic Leukemia, AJCC 8th Edition  - Clinical: Modified Tolliver Stage  "IV (Modified Tolliver risk: High, Lymphocytosis: Present, Adenopathy: Unknown, Organomegaly: Present, Anemia: Present, Thrombocytopenia: Present) - Signed by Charlotte Mir PA-C on 6/26/2023  Oncology History Overview Note   1996:  CLL was diagnosed.  Intermittently he received chlorambucil over the years.    2014:  6 cycles of Rituxan and bendamustine.  Presently under surveillance.     CLL (chronic lymphoid leukemia) in relapse (HCC)    Chemotherapy       1996:  CLL was diagnosed.  Intermittent therapy with chlorambucil.    2014:  6 cycles of Rituxan and bendamustine.  Presently under surveillance.     6/26/2023 -  Cancer Staged    Staging form: Chronic Lymphocytic Leukemia, AJCC 8th Edition  - Clinical: Modified Tolliver Stage IV (Modified Tolliver risk: High, Lymphocytosis: Present, Adenopathy: Unknown, Organomegaly: Present, Anemia: Present, Thrombocytopenia: Present) - Signed by Charlotte Mir PA-C on 6/26/2023          Pertinent Medical History   06/19/25:   Aortic stenosis status post TAVR July 2022  History of herpes zoster  Scalp lesions follows with dermatology  SSC of left cheek status post Mohs 2025       Review of Systems   All other systems reviewed and are negative.      Objective   /80 (BP Location: Left arm, Patient Position: Sitting, Cuff Size: Adult)   Pulse 96   Temp 98.3 °F (36.8 °C) (Temporal)   Resp 16   Ht 5' 9\" (1.753 m)   Wt 78.5 kg (173 lb)   SpO2 98%   BMI 25.55 kg/m²     Pain Screening:  Pain Score: 0-No pain  ECOG   0   Physical Exam  Constitutional:       Appearance: Normal appearance.   HENT:      Head: Normocephalic and atraumatic.      Nose: Nose normal.      Mouth/Throat:      Mouth: Mucous membranes are dry.     Eyes:      Extraocular Movements: Extraocular movements intact.      Pupils: Pupils are equal, round, and reactive to light.       Cardiovascular:      Rate and Rhythm: Normal rate.      Heart sounds: No murmur heard.  Pulmonary:      Effort: Pulmonary " effort is normal.      Breath sounds: No wheezing or rales.   Abdominal:      General: There is no distension.      Palpations: Abdomen is soft. There is no mass.     Musculoskeletal:         General: Normal range of motion.      Cervical back: Normal range of motion.     Skin:     General: Skin is warm.      Comments: Left side of face surgical scar linear     Neurological:      General: No focal deficit present.     Psychiatric:         Mood and Affect: Mood normal.         Labs: I have reviewed the following labs:  Lab Results   Component Value Date/Time    WBC 10.82 (H) 05/23/2025 10:03 AM    RBC 3.76 (L) 05/23/2025 10:03 AM    Hemoglobin 12.1 05/23/2025 10:03 AM    Hematocrit 37.7 05/23/2025 10:03 AM     (H) 05/23/2025 10:03 AM    MCH 32.2 05/23/2025 10:03 AM    RDW 13.9 05/23/2025 10:03 AM    Platelets 164 05/23/2025 10:03 AM    Lymphocytes % 62 (H) 05/23/2025 10:03 AM    Monocytes % 5 05/23/2025 10:03 AM    Eosinophils % 1 05/23/2025 10:03 AM    Basophils % 1 05/23/2025 10:03 AM     Lab Results   Component Value Date/Time    Potassium 5.3 05/23/2025 10:03 AM    Chloride 106 05/23/2025 10:03 AM    CO2 27 05/23/2025 10:03 AM    BUN 19 05/23/2025 10:03 AM    Creatinine 1.31 (H) 05/23/2025 10:03 AM    Glucose, Fasting 108 (H) 05/23/2025 10:03 AM    Calcium 10.0 05/23/2025 10:03 AM    AST 22 05/23/2025 10:03 AM    ALT 13 05/23/2025 10:03 AM    Alkaline Phosphatase 66 05/23/2025 10:03 AM    Total Protein 6.7 05/23/2025 10:03 AM    Albumin 4.6 05/23/2025 10:03 AM    Total Bilirubin 0.32 05/23/2025 10:03 AM    eGFR 53 05/23/2025 10:03 AM     Dawit Valladares DO  PGY-4 Hematology/Oncology Fellow

## 2025-06-22 PROBLEM — N18.31 STAGE 3A CHRONIC KIDNEY DISEASE (HCC): Status: ACTIVE | Noted: 2022-07-27

## 2025-08-15 ENCOUNTER — APPOINTMENT (OUTPATIENT)
Dept: LAB | Age: 75
End: 2025-08-15
Attending: INTERNAL MEDICINE
Payer: MEDICARE

## (undated) DEVICE — GLOVE INDICATOR PI UNDERGLOVE SZ 8 BLUE

## (undated) DEVICE — SUT MONOCRYL PLUS 4-0 PS-2 18 IN MCP496G

## (undated) DEVICE — INTENDED FOR TISSUE SEPARATION, AND OTHER PROCEDURES THAT REQUIRE A SHARP SURGICAL BLADE TO PUNCTURE OR CUT.: Brand: BARD-PARKER SAFETY BLADES SIZE 10, STERILE

## (undated) DEVICE — TRAY FOLEY 16FR SURESTEP TEMP SENS URIMETER STAT LOK

## (undated) DEVICE — DRESSING SILON DUAL-DRESS 50 FOAM 5.5 X 6 IN

## (undated) DEVICE — RADIFOCUS GLIDEWIRE: Brand: GLIDEWIRE

## (undated) DEVICE — SUT NYLON 5-0 P-3 18 IN 690G

## (undated) DEVICE — GLOVE SRG BIOGEL ECLIPSE 8

## (undated) DEVICE — PENROSE DRAIN, 18 X 3 8: Brand: CARDINAL HEALTH

## (undated) DEVICE — CARDIO PERI-GROIN: Brand: CONVERTORS

## (undated) DEVICE — DRESSING XEROFORM 5 X 9

## (undated) DEVICE — PLASTIC ADHESIVE BANDAGE: Brand: CURITY

## (undated) DEVICE — DEFIB ADULT ELECTRODE CARDINAL

## (undated) DEVICE — GLOVE INDICATOR PI UNDERGLOVE SZ 8.5 BLUE

## (undated) DEVICE — SPONGE 4 X 4 XRAY 16 PLY STRL LF RFD

## (undated) DEVICE — PLUMEPEN PRO 10FT

## (undated) DEVICE — INTENDED FOR TISSUE SEPARATION, AND OTHER PROCEDURES THAT REQUIRE A SHARP SURGICAL BLADE TO PUNCTURE OR CUT.: Brand: BARD-PARKER ® CARBON RIB-BACK BLADES

## (undated) DEVICE — ELECTRODE LAP SPATULA CRV E-Z CLEAN 33CM -0018C

## (undated) DEVICE — CHLORAPREP HI-LITE 26ML ORANGE

## (undated) DEVICE — CYSTO TUBING SINGLE IRRIGATION

## (undated) DEVICE — BETHLEHEM MAJOR GENERAL PACK: Brand: CARDINAL HEALTH

## (undated) DEVICE — X-RAY DETECTABLE SPONGES,16 PLY: Brand: VISTEC

## (undated) DEVICE — ELECTRODE BLADE MOD E-Z CLEAN  2.75IN 7CM -0012AM

## (undated) DEVICE — UNDYED MONOFILAMENT (POLYDIOXANONE), ABSORBABLE SYNTHETIC SURGICAL SUTURE: Brand: PDS

## (undated) DEVICE — SWAN-GANZ BIPOLAR PACING CATHETER: Brand: SWAN-GANZ

## (undated) DEVICE — 3000CC GUARDIAN II: Brand: GUARDIAN

## (undated) DEVICE — SUT MONOCRYL 4-0 PS-2 18 IN Y496G

## (undated) DEVICE — Device

## (undated) DEVICE — ELECTRODE BLADE MOD E-Z CLEAN 2.5IN 6.4CM -0012M

## (undated) DEVICE — SURGICEL 4 X 8

## (undated) DEVICE — INTENDED FOR TISSUE SEPARATION, AND OTHER PROCEDURES THAT REQUIRE A SHARP SURGICAL BLADE TO PUNCTURE OR CUT.: Brand: BARD-PARKER SAFETY BLADES SIZE 15, STERILE

## (undated) DEVICE — PINNACLE INTRODUCER SHEATH: Brand: PINNACLE

## (undated) DEVICE — SUT ETHILON 5-0 PC-1 18 IN 1855G

## (undated) DEVICE — SPONGE LAP 18 X 18 IN

## (undated) DEVICE — GLOVE SRG BIOGEL ECLIPSE 7.5

## (undated) DEVICE — BULB SYRINGE,IRRIGATION WITH PROTECTIVE CAP: Brand: DOVER

## (undated) DEVICE — INTENDED FOR TISSUE SEPARATION, AND OTHER PROCEDURES THAT REQUIRE A SHARP SURGICAL BLADE TO PUNCTURE OR CUT.: Brand: BARD-PARKER SAFETY BLADES SIZE 11, STERILE

## (undated) DEVICE — GUIDEWIRE AMPLATZ SS 260CM J TIP

## (undated) DEVICE — 3M™ TEGADERM™ TRANSPARENT FILM DRESSING FRAME STYLE, 1626W, 4 IN X 4-3/4 IN (10 CM X 12 CM), 50/CT 4CT/CASE: Brand: 3M™ TEGADERM™

## (undated) DEVICE — GUIDEWIRE LUNDERQUIST TSCMG-35-260-7-LES

## (undated) DEVICE — SUT VICRYL PLUS 0 UR-6 27IN VCP603H

## (undated) DEVICE — 3M™ STERI-STRIP™ REINFORCED ADHESIVE SKIN CLOSURES, R1547, 1/2 IN X 4 IN (12 MM X 100 MM), 6 STRIPS/ENVELOPE: Brand: 3M™ STERI-STRIP™

## (undated) DEVICE — CATH DIAG 6FR IMPULSE 110CM PIG

## (undated) DEVICE — ELECTRODE LAP J HOOK E-Z CLEAN 33CM-0021

## (undated) DEVICE — SPLINT 1524055 DOYLE II AIRWAY SET: Brand: DOYLE II ™

## (undated) DEVICE — PACK PBDS PLASTIC HEAD AND NECK RF

## (undated) DEVICE — PROXIMATE PLUS MD MULTI-DIRECTIONAL RELEASE SKIN STAPLERS CONTAINS 35 STAINLESS STEEL STAPLES APPROXIMATE CLOSED DIMENSIONS: 6.9MM X 3.9MM WIDE: Brand: PROXIMATE

## (undated) DEVICE — ELECTRODE NEEDLE MEGAFINE 2IN E-Z CLEAN MEGADYNE -0118

## (undated) DEVICE — TROCAR: Brand: KII® SLEEVE

## (undated) DEVICE — TUBING SUCTION 5MM X 12 FT

## (undated) DEVICE — PENCIL ELECTROSURG E-Z CLEAN -0035H

## (undated) DEVICE — HEAVY DUTY TABLE COVER: Brand: CONVERTORS

## (undated) DEVICE — SPONGE STICK WITH PVP-I: Brand: KENDALL

## (undated) DEVICE — GAUZE SPONGES,USP TYPE VII GAUZE, 12 PLY: Brand: CURITY

## (undated) DEVICE — SYRINGE 10ML LL

## (undated) DEVICE — SUT CHROMIC 5-0 P-3 18 IN 687G

## (undated) DEVICE — CATH DIAG 6FR IMPULSE 100CM AL1

## (undated) DEVICE — NEEDLE 25G X 1 1/2

## (undated) DEVICE — PAD GROUNDING ADULT

## (undated) DEVICE — CURITY NON-ADHERENT STRIPS: Brand: CURITY

## (undated) DEVICE — 3M™ STERI-STRIP™ COMPOUND BENZOIN TINCTURE 40 BAGS/CARTON 4 CARTONS/CASE C1544: Brand: 3M™ STERI-STRIP™

## (undated) DEVICE — TROCAR: Brand: KII FIOS FIRST ENTRY

## (undated) DEVICE — DERMATOME BLADES: Brand: DERMATOME

## (undated) DEVICE — THERMOFLECT BLANKET, L, 25EA                               TS THERMOFLECT BLANKET, 48" X 84", SILVER, 5/BG, 5 BG/CS NW: Brand: THERMOFLECT

## (undated) DEVICE — GLOVE SRG BIOGEL 8

## (undated) DEVICE — LIGAMAX 5 MM ENDOSCOPIC MULTIPLE CLIP APPLIER: Brand: LIGAMAX

## (undated) DEVICE — TISSUE RETRIEVAL SYSTEM: Brand: INZII RETRIEVAL SYSTEM

## (undated) DEVICE — PACK PBDS LAP CHOLE RF

## (undated) DEVICE — GLIDESHEATH SLENDER STAINLESS STEEL KIT: Brand: GLIDESHEATH SLENDER

## (undated) DEVICE — BETHLEHEM UNIVERSAL OUTPATIENT: Brand: CARDINAL HEALTH

## (undated) DEVICE — CATH DIAG 6FR IMPULSE 100CM FR4

## (undated) DEVICE — BIPOLAR CORD DISP

## (undated) DEVICE — DGW .035 FC J3MM 260CM TEF: Brand: EMERALD

## (undated) DEVICE — CARDIOVASCULAR SPLIT DRAPE: Brand: CONVERTORS

## (undated) DEVICE — GUIDEWIRE WHOLEY HI TORQUE INTERM MOD J .035 145CM

## (undated) DEVICE — ADHESIVE SKIN HIGH VISCOSITY EXOFIN 1ML

## (undated) DEVICE — RADIFOCUS OPTITORQUE ANGIOGRAPHIC CATHETER: Brand: OPTITORQUE

## (undated) DEVICE — PANNUS RETENTION SYSTEM

## (undated) DEVICE — FOAM WOUND DRESSING: Brand: DUAL-DRESS 50 11X12

## (undated) DEVICE — SUT SILK 0 CT-1 30 IN 424H

## (undated) DEVICE — SYRINGE BULB 2 OZ

## (undated) DEVICE — DRESSING ALLEVYN LIFE SACRAL 6.75 X 6.5 IN